# Patient Record
Sex: FEMALE | Race: WHITE | NOT HISPANIC OR LATINO | Employment: FULL TIME | URBAN - METROPOLITAN AREA
[De-identification: names, ages, dates, MRNs, and addresses within clinical notes are randomized per-mention and may not be internally consistent; named-entity substitution may affect disease eponyms.]

---

## 2017-03-26 ENCOUNTER — OFFICE VISIT (OUTPATIENT)
Dept: URGENT CARE | Facility: RETAIL CLINIC | Age: 42
End: 2017-03-26
Payer: COMMERCIAL

## 2017-03-26 VITALS — HEART RATE: 75 BPM | TEMPERATURE: 98.4 F | DIASTOLIC BLOOD PRESSURE: 74 MMHG | SYSTOLIC BLOOD PRESSURE: 112 MMHG

## 2017-03-26 DIAGNOSIS — H66.002 ACUTE SUPPURATIVE OTITIS MEDIA OF LEFT EAR WITHOUT SPONTANEOUS RUPTURE OF TYMPANIC MEMBRANE, RECURRENCE NOT SPECIFIED: Primary | ICD-10-CM

## 2017-03-26 PROCEDURE — 99213 OFFICE O/P EST LOW 20 MIN: CPT | Performed by: PHYSICIAN ASSISTANT

## 2017-03-26 RX ORDER — AZITHROMYCIN 250 MG/1
TABLET, FILM COATED ORAL
Qty: 6 TABLET | Refills: 0 | Status: SHIPPED | OUTPATIENT
Start: 2017-03-26 | End: 2017-03-30

## 2017-03-26 NOTE — NURSING NOTE
"Chief Complaint   Patient presents with     Otalgia     bilateral ear pain, left hurts the most, pt thinks low grade fever     Sinus Problem     sinus pain, more pain in the back of head       Initial /74 (BP Location: Left arm)  Pulse 75  Temp 98.4  F (36.9  C) (Temporal) Estimated body mass index is 29.21 kg/(m^2) as calculated from the following:    Height as of 4/13/16: 5' 4\" (1.626 m).    Weight as of 12/9/16: 170 lb 3.2 oz (77.2 kg).  Medication Reconciliation: complete    "

## 2017-03-26 NOTE — MR AVS SNAPSHOT
After Visit Summary   3/26/2017    Avis Avalos    MRN: [id number]           Patient Information     Date Of Birth          1975        Visit Information        Provider Department      3/26/2017 1:40 PM Diana Ardon PA-C [facility]         Follow-ups after your visit        Your next 10 appointments already scheduled     Mar 26, 2017  1:40 PM CDT   SHORT with Diana Ardon PA-C   [facility] ([facility])    [address] [facility] [address] [phone]              Who to contact     You can reach your care team any time of the day by calling [phone].  Notification of test results:  If you have an abnormal lab result, we will notify you by phone as soon as possible.         Additional Information About Your Visit        MyChart Information     MyChart gives you secure access to your electronic health record. If you see a primary care provider, you can also send messages to your care team and make appointments. If you have questions, please call your primary care clinic.  If you do not have a primary care provider, please call [phone] and they will assist you.        Care EveryWhere ID     This is your Care EveryWhere ID. This could be used by other organizations to access your [place] medical records  [id number]         Blood Pressure from Last 3 Encounters:   12/09/16 110/76   11/21/16 122/81   04/13/16 100/68    Weight from Last 3 Encounters:   12/09/16 170 lb 3.2 oz (77.2 kg)   04/13/16 164 lb 3.2 oz (74.5 kg)   09/28/15 163 lb 6.4 oz (74.1 kg)              Today, you had the following     No orders found for display       Primary Care Provider Office Phone # Fax #    Lissett Viramontes PA-C [phone]       [address] [address]        Thank you!     Thank you for choosing [facility]  for your care. Our goal is always to  provide you with excellent care. Hearing back from our patients is one way we can continue to improve our services. Please take a few minutes to complete the written survey that you may receive in the mail after your visit with us. Thank you!             Your Updated Medication List - Protect others around you: Learn how to safely use, store and throw away your medicines at www.disposemymeds.org.          This list is accurate as of: 3/26/17  1:39 PM.  Always use your most recent med list.                   Brand Name Dispense Instructions for use    ALPRAZolam 0.5 MG tablet    XANAX    20 tablet    Use 1-2 tablets 30-60 minutes before flying. Try one tablet first. Take a second if needed.       fluticasone 50 MCG/ACT spray    FLONASE    1 Bottle    Spray 1-2 sprays into both nostrils daily       IBU-200 PO      Take 2-3 tablets by mouth as needed Reported on 3/26/2017       loratadine 10 MG ODT tab    CLARITIN REDITABS     Take 10 mg by mouth daily Reported on 3/26/2017       RA ONE DAILY WOMENS Tabs      Take  by mouth.       TYLENOL PO      Take by mouth as needed Reported on 3/26/2017       valACYclovir 1000 mg tablet    VALTREX    21 tablet    TAKE ONE TABLET BY MOUTH DAILY AS NEEDED

## 2017-03-26 NOTE — PROGRESS NOTES
Chief Complaint   Patient presents with     Otalgia     bilateral ear pain, left hurts the most, pt thinks low grade fever     Sinus Problem     sinus pain, more pain in the back of head       SUBJECTIVE:  Avis Avalos is a 41 year old female who presents with bilateral ear pain (L>R) for 4 day(s).   Severity: moderate   Timing:still present and worsening  Additional symptoms include congestion, fever  History of recurrent otitis: yes    Past Medical History:   Diagnosis Date     Crohn disease (H)      Depression     1-2 years ago     Fructose intolerance      High risk HPV infection 4/14/10    normal pap, + HR HPV (59). normal paps since     PONV (postoperative nausea and vomiting)     after colonoscopy     Current Outpatient Prescriptions   Medication Sig Dispense Refill     azithromycin (ZITHROMAX) 250 MG tablet Two tablets first day, then one tablet daily for four days. 6 tablet 0     valACYclovir (VALTREX) 1000 mg tablet TAKE ONE TABLET BY MOUTH DAILY AS NEEDED 21 tablet 0     Multiple Vitamins-Minerals (RA ONE DAILY WOMENS) TABS Take  by mouth.       fluticasone (FLONASE) 50 MCG/ACT spray Spray 1-2 sprays into both nostrils daily (Patient not taking: Reported on 3/26/2017) 1 Bottle 1     ALPRAZolam (XANAX) 0.5 MG tablet Use 1-2 tablets 30-60 minutes before flying. Try one tablet first. Take a second if needed. (Patient not taking: Reported on 3/26/2017) 20 tablet 0     loratadine (CLARITIN REDITABS) 10 MG dispersible tablet Take 10 mg by mouth daily Reported on 3/26/2017       Ibuprofen (IBU-200 PO) Take 2-3 tablets by mouth as needed Reported on 3/26/2017       Acetaminophen (TYLENOL PO) Take by mouth as needed Reported on 3/26/2017          Allergies   Allergen Reactions     Fructose GI Disturbance     Sorbitol Diarrhea        History   Smoking Status     Former Smoker   Smokeless Tobacco     Never Used     Comment: 2 1/2 year ago       ROS:   Review of systems negative except as stated  above.    OBJECTIVE:  /74 (BP Location: Left arm)  Pulse 75  Temp 98.4  F (36.9  C) (Temporal)  The right TM is air/fluid interface     The right auditory canal is normal and without drainage, edema or erythema  The left TM is bulging and erythematous  The left auditory canal is normal and without drainage, edema or erythema  Oropharynx exam is normal: no lesions, erythema, adenopathy or exudate.  GENERAL: no acute distress  EYES: conjunctiva clear  NECK: supple, non-tender to palpation, no adenopathy noted  RESP: lungs clear to auscultation - no rales, rhonchi or wheezes  CV: regular rates and rhythm, normal S1 S2, no murmur noted  SKIN: no suspicious lesions or rashes     ASSESSMENT:  (H66.002) Acute suppurative otitis media of left ear without spontaneous rupture of tympanic membrane, recurrence not specified     PLAN:  Plan: azithromycin (ZITHROMAX) 250 MG tablet  Take antibiotic as directed  Tylenol, motrin, decongestant, , warm compresses next to ear, humidifier  Please follow up with primary care provider if not improving, worsening or new symptoms or for any adverse reactions to medications.      Diana Ardon PA-C  [facility]

## 2017-03-26 NOTE — PATIENT INSTRUCTIONS
Take antibiotic as directed  Tylenol, motrin, sudafed, warm compresses next to ear, humidifier  Please follow up with primary care provider if not improving, worsening or new symptoms or for any adverse reactions to medications.

## 2017-05-10 ENCOUNTER — OFFICE VISIT (OUTPATIENT)
Dept: URGENT CARE | Facility: RETAIL CLINIC | Age: 42
End: 2017-05-10
Payer: COMMERCIAL

## 2017-05-10 VITALS
TEMPERATURE: 98 F | HEART RATE: 81 BPM | DIASTOLIC BLOOD PRESSURE: 83 MMHG | SYSTOLIC BLOOD PRESSURE: 119 MMHG | OXYGEN SATURATION: 100 %

## 2017-05-10 DIAGNOSIS — J01.90 ACUTE SINUSITIS WITH SYMPTOMS > 10 DAYS: Primary | ICD-10-CM

## 2017-05-10 PROCEDURE — 99213 OFFICE O/P EST LOW 20 MIN: CPT | Performed by: PHYSICIAN ASSISTANT

## 2017-05-10 RX ORDER — CEFUROXIME AXETIL 250 MG/1
250 TABLET ORAL 2 TIMES DAILY
Qty: 20 TABLET | Refills: 0 | Status: SHIPPED | OUTPATIENT
Start: 2017-05-10 | End: 2017-05-20

## 2017-05-10 RX ORDER — FLUCONAZOLE 150 MG/1
150 TABLET ORAL ONCE
Qty: 1 TABLET | Refills: 0 | Status: SHIPPED | OUTPATIENT
Start: 2017-05-10 | End: 2017-05-10

## 2017-05-10 NOTE — PROGRESS NOTES
Chief Complaint   Patient presents with     Sinus Problem     x 1 1/2 weeks, no fevers     Otalgia     bilateral ear pain more right ear since last monday     Cough     since sunday, coughing up a lot of phlegm      SUBJECTIVE:  Avis Avalos is a 41 year old female here with concerns about sinus infection.  She states onset of symptoms were 10 day(s) ago.  She has had maxillary pressure. Course of illness is worsening. Severity moderate  Current and Associated symptoms: nasal congestion, rhinorrhea, cough , ear pressure, facial pain/pressure, headache and post-nasal drainage  Predisposing factors include HX of ear infections and sinusitis. Recent treatment has included: sudafed, flonase, claritin, ibuprofen    Past Medical History:   Diagnosis Date     Crohn disease (H)      Depression     1-2 years ago     Fructose intolerance      High risk HPV infection 4/14/10    normal pap, + HR HPV (59). normal paps since     PONV (postoperative nausea and vomiting)     after colonoscopy     Current Outpatient Prescriptions   Medication Sig Dispense Refill     fluticasone (FLONASE) 50 MCG/ACT spray Spray 1-2 sprays into both nostrils daily 1 Bottle 1     loratadine (CLARITIN REDITABS) 10 MG dispersible tablet Take 10 mg by mouth daily Reported on 3/26/2017       Multiple Vitamins-Minerals (RA ONE DAILY WOMENS) TABS Take  by mouth.       valACYclovir (VALTREX) 1000 mg tablet TAKE ONE TABLET BY MOUTH DAILY AS NEEDED (Patient not taking: Reported on 5/10/2017) 21 tablet 0     ALPRAZolam (XANAX) 0.5 MG tablet Use 1-2 tablets 30-60 minutes before flying. Try one tablet first. Take a second if needed. (Patient not taking: Reported on 3/26/2017) 20 tablet 0     Ibuprofen (IBU-200 PO) Take 2-3 tablets by mouth as needed Reported on 5/10/2017       Acetaminophen (TYLENOL PO) Take by mouth as needed Reported on 5/10/2017          Allergies   Allergen Reactions     Fructose GI Disturbance     Sorbitol Diarrhea        History   Smoking  Status     Former Smoker   Smokeless Tobacco     Never Used     Comment: 2 1/2 year ago       ROS:  Review of systems negative except as stated above.    OBJECTIVE:  /83 (BP Location: Left arm)  Pulse 81  Temp 98  F (36.7  C) (Temporal)  SpO2 100%  Exam:GENERAL APPEARANCE: mildly ill appearing  EYES: conjunctiva clear  HENT: TMs serous effusion, nasal turbinates erythematous, swollen, rhinorrhea yellow, oral mucous membranes moist, no erythema noted, maxillary sinus tenderness. Post nasal drainage noted in the pharynx.  NECK: supple, nontender, no lymphadenopathy  RESP: lungs clear to auscultation - no rales, rhonchi or wheezes  CV: regular rates and rhythm, normal S1 S2, no murmur noted  SKIN: no suspicious lesions or rashes    ASSESSMENT:  (J01.90) Acute sinusitis with symptoms > 10 days      PLAN:  cefuroxime (CEFTIN) 250 MG tablet, fluconazole   (DIFLUCAN) 150 MG tablet  Take antibiotic as directed  Use diflucan if needed  Apply warm facial compresses/packs for 5-10 minutes three times daily.  Drink plenty of fluids- 6 to 10 glasses of liquids each day. Rest.  Saline drops or nasal sprays as needed.   Steam treatments or humidifier.  Sudafed (decongestant) for congestion.  Mucinex (guaifenesin) to thin out secretions.  Tylenol or ibuprofen as needed for pain or fever  Follow up at your primary care clinic for increasing pain, high fever, vision changes, worsening symptoms, or no relief from symptoms after 7-10 days.  Please follow up with primary care provider if not improving, worsening or new symptoms or for any adverse reactions to medications.     Diana Ardon PA-C  [facility]

## 2017-05-10 NOTE — PATIENT INSTRUCTIONS
Take antibiotic as directed  Apply warm facial compresses/packs for 5-10 minutes three times daily.  Drink plenty of fluids- 6 to 10 glasses of liquids each day. Rest.  Saline drops or nasal sprays as needed.   Steam treatments or humidifier.  Sudafed (decongestant) for congestion.  Mucinex (guaifenesin) to thin out secretions.  Tylenol or ibuprofen as needed for pain or fever  Follow up at your primary care clinic for increasing pain, high fever, vision changes, worsening symptoms, or no relief from symptoms after 7-10 days.  Please follow up with primary care provider if not improving, worsening or new symptoms or for any adverse reactions to medications.

## 2017-05-10 NOTE — NURSING NOTE
"Chief Complaint   Patient presents with     Sinus Problem     x 1 1/2 weeks, no fevers     Otalgia     bilateral ear pain more right ear since last monday     Cough     since sunday, coughing up a lot of phlegm       Initial /83 (BP Location: Left arm)  Pulse 81  Temp 98  F (36.7  C) (Temporal)  SpO2 100% Estimated body mass index is 29.21 kg/(m^2) as calculated from the following:    Height as of 4/13/16: 5' 4\" (1.626 m).    Weight as of 12/9/16: 170 lb 3.2 oz (77.2 kg).  Medication Reconciliation: complete    "

## 2017-05-10 NOTE — MR AVS SNAPSHOT
After Visit Summary   5/10/2017    Avis Avalos    MRN: [id number]           Patient Information     Date Of Birth          1975        Visit Information        Provider Department      5/10/2017 6:30 PM Diana Ardon PA-C [facility]        Today's Diagnoses     Acute sinusitis with symptoms > 10 days    -  1      Care Instructions    Take antibiotic as directed  Apply warm facial compresses/packs for 5-10 minutes three times daily.  Drink plenty of fluids- 6 to 10 glasses of liquids each day. Rest.  Saline drops or nasal sprays as needed.   Steam treatments or humidifier.  Sudafed (decongestant) for congestion.  Mucinex (guaifenesin) to thin out secretions.  Tylenol or ibuprofen as needed for pain or fever  Follow up at your primary care clinic for increasing pain, high fever, vision changes, worsening symptoms, or no relief from symptoms after 7-10 days.  Please follow up with primary care provider if not improving, worsening or new symptoms or for any adverse reactions to medications.             Follow-ups after your visit        Who to contact     You can reach your care team any time of the day by calling [phone].  Notification of test results:  If you have an abnormal lab result, we will notify you by phone as soon as possible.         Additional Information About Your Visit        MyChart Information     [employer] gives you secure access to your electronic health record. If you see a primary care provider, you can also send messages to your care team and make appointments. If you have questions, please call your primary care clinic.  If you do not have a primary care provider, please call [phone] and they will assist you.        Care EveryWhere ID     This is your Care EveryWhere ID. This could be used by other organizations to access your [place] medical records  [id number]        Your Vitals Were     Pulse Temperature Pulse Oximetry              81 98  F (36.7  C) (Temporal) 100%          Blood Pressure from Last 3 Encounters:   05/10/17 119/83   03/26/17 112/74   12/09/16 110/76    Weight from Last 3 Encounters:   12/09/16 170 lb 3.2 oz (77.2 kg)   04/13/16 164 lb 3.2 oz (74.5 kg)   09/28/15 163 lb 6.4 oz (74.1 kg)              Today, you had the following     No orders found for display         Today's Medication Changes          These changes are accurate as of: 5/10/17  6:55 PM.  If you have any questions, ask your nurse or doctor.               Start taking these medicines.        Dose/Directions    cefuroxime 250 MG tablet   Commonly known as:  CEFTIN   Used for:  Acute sinusitis with symptoms > 10 days        Dose:  250 mg   Take 1 tablet (250 mg) by mouth 2 times daily for 10 days   Quantity:  20 tablet   Refills:  0       fluconazole 150 MG tablet   Commonly known as:  DIFLUCAN   Used for:  Acute sinusitis with symptoms > 10 days        Dose:  150 mg   Take 1 tablet (150 mg) by mouth once for 1 dose If symptoms develop   Quantity:  1 tablet   Refills:  0            Where to get your medicines      These medications were sent to [place] [address] [place] [facility] [address] [address] [place] [address]  [address] [address] [place] [id number]     Phone:  [phone]     cefuroxime 250 MG tablet    fluconazole 150 MG tablet                Primary Care Provider Office Phone # Fax #    Lissett Viramontes PA-C [phone]       [address] [address]        Thank you!     Thank you for choosing [facility]  for your care. Our goal is always to provide you with excellent care. Hearing back from our patients is one way we can continue to improve our services. Please take a few minutes to complete the written survey that you may receive in the mail after your visit with us. Thank you!             Your Updated Medication List - Protect others around you: Learn how to safely use, store and throw away your medicines at  www.disposemymeds.org.          This list is accurate as of: 5/10/17  6:55 PM.  Always use your most recent med list.                   Brand Name Dispense Instructions for use    ALPRAZolam 0.5 MG tablet    XANAX    20 tablet    Use 1-2 tablets 30-60 minutes before flying. Try one tablet first. Take a second if needed.       cefuroxime 250 MG tablet    CEFTIN    20 tablet    Take 1 tablet (250 mg) by mouth 2 times daily for 10 days       fluconazole 150 MG tablet    DIFLUCAN    1 tablet    Take 1 tablet (150 mg) by mouth once for 1 dose If symptoms develop       fluticasone 50 MCG/ACT spray    FLONASE    1 Bottle    Spray 1-2 sprays into both nostrils daily       IBU-200 PO      Take 2-3 tablets by mouth as needed Reported on 5/10/2017       loratadine 10 MG ODT tab    CLARITIN REDITABS     Take 10 mg by mouth daily Reported on 3/26/2017       RA ONE DAILY WOMENS Tabs      Take  by mouth.       TYLENOL PO      Take by mouth as needed Reported on 5/10/2017       valACYclovir 1000 mg tablet    VALTREX    21 tablet    TAKE ONE TABLET BY MOUTH DAILY AS NEEDED

## 2017-05-18 DIAGNOSIS — F40.243 FLYING PHOBIA: ICD-10-CM

## 2017-05-18 NOTE — TELEPHONE ENCOUNTER
Reason for Call:  Medication or medication refill:    Do you use a [facility]?  Name of the pharmacy and phone number for the current request:  [facility]    Name of the medication requested: med for flying    Other request: patient will be flying and needs this Rx before she leaves on Monday, please call and advise, patient states she has gotten this in the past, patient is aware that Dr. Cruz is out of the clinic today, but would like to get the Rx Friday or Saturday    Can we leave a detailed message on this number? YES    Phone number patient can be reached at: Home number on file [phone] (home)    Best Time: any    Call taken on 5/18/2017 at 10:23 AM by Bety Lamas

## 2017-05-19 RX ORDER — ALPRAZOLAM 0.5 MG
TABLET ORAL
Qty: 20 TABLET | Refills: 0 | Status: SHIPPED | OUTPATIENT
Start: 2017-05-19 | End: 2019-10-11

## 2017-05-19 NOTE — TELEPHONE ENCOUNTER
Script did not print. [facility] [place] and patient informed of refill.  Mayte Alicea, CMA

## 2017-06-02 DIAGNOSIS — Z83.79 FAMILY HISTORY OF CELIAC DISEASE: Primary | ICD-10-CM

## 2017-06-02 LAB
BASOPHILS # BLD AUTO: 0 10E9/L (ref 0–0.2)
BASOPHILS NFR BLD AUTO: 0.8 %
DIFFERENTIAL METHOD BLD: NORMAL
EOSINOPHIL # BLD AUTO: 0.1 10E9/L (ref 0–0.7)
EOSINOPHIL NFR BLD AUTO: 2.4 %
ERYTHROCYTE [DISTWIDTH] IN BLOOD BY AUTOMATED COUNT: 12.4 % (ref 10–15)
HCT VFR BLD AUTO: 38.4 % (ref 35–47)
HGB BLD-MCNC: 13.7 G/DL (ref 11.7–15.7)
LYMPHOCYTES # BLD AUTO: 1.7 10E9/L (ref 0.8–5.3)
LYMPHOCYTES NFR BLD AUTO: 34.2 %
MCH RBC QN AUTO: 32 PG (ref 26.5–33)
MCHC RBC AUTO-ENTMCNC: 35.7 G/DL (ref 31.5–36.5)
MCV RBC AUTO: 90 FL (ref 78–100)
MONOCYTES # BLD AUTO: 0.5 10E9/L (ref 0–1.3)
MONOCYTES NFR BLD AUTO: 10.1 %
NEUTROPHILS # BLD AUTO: 2.6 10E9/L (ref 1.6–8.3)
NEUTROPHILS NFR BLD AUTO: 52.5 %
PLATELET # BLD AUTO: 241 10E9/L (ref 150–450)
RBC # BLD AUTO: 4.28 10E12/L (ref 3.8–5.2)
WBC # BLD AUTO: 5 10E9/L (ref 4–11)

## 2017-06-02 PROCEDURE — 85025 COMPLETE CBC W/AUTO DIFF WBC: CPT | Performed by: PEDIATRICS

## 2017-06-02 PROCEDURE — 83516 IMMUNOASSAY NONANTIBODY: CPT | Mod: 59 | Performed by: PEDIATRICS

## 2017-06-02 PROCEDURE — 82784 ASSAY IGA/IGD/IGG/IGM EACH: CPT | Performed by: PEDIATRICS

## 2017-06-02 PROCEDURE — 36415 COLL VENOUS BLD VENIPUNCTURE: CPT | Performed by: PEDIATRICS

## 2017-06-02 PROCEDURE — 83516 IMMUNOASSAY NONANTIBODY: CPT | Performed by: PEDIATRICS

## 2017-06-04 LAB
TTG IGA SER-ACNC: NORMAL U/ML
TTG IGG SER-ACNC: NORMAL U/ML

## 2017-06-05 ENCOUNTER — OFFICE VISIT (OUTPATIENT)
Dept: FAMILY MEDICINE | Facility: OTHER | Age: 42
End: 2017-06-05
Payer: COMMERCIAL

## 2017-06-05 VITALS
WEIGHT: 172.2 LBS | HEART RATE: 74 BPM | OXYGEN SATURATION: 98 % | HEIGHT: 64 IN | TEMPERATURE: 98.2 F | DIASTOLIC BLOOD PRESSURE: 66 MMHG | BODY MASS INDEX: 29.4 KG/M2 | RESPIRATION RATE: 16 BRPM | SYSTOLIC BLOOD PRESSURE: 108 MMHG

## 2017-06-05 DIAGNOSIS — N89.8 PRURITUS OF VAGINA: ICD-10-CM

## 2017-06-05 DIAGNOSIS — K59.00 CONSTIPATION, UNSPECIFIED CONSTIPATION TYPE: ICD-10-CM

## 2017-06-05 DIAGNOSIS — K63.5 SESSILE COLONIC POLYP: ICD-10-CM

## 2017-06-05 DIAGNOSIS — K50.919 CROHN'S DISEASE WITH COMPLICATION, UNSPECIFIED GASTROINTESTINAL TRACT LOCATION (H): ICD-10-CM

## 2017-06-05 DIAGNOSIS — R30.0 DYSURIA: ICD-10-CM

## 2017-06-05 DIAGNOSIS — B37.31 YEAST INFECTION OF THE VAGINA: Primary | ICD-10-CM

## 2017-06-05 LAB
ALBUMIN UR-MCNC: NEGATIVE MG/DL
APPEARANCE UR: CLEAR
BILIRUB UR QL STRIP: NEGATIVE
COLOR UR AUTO: YELLOW
GLUCOSE UR STRIP-MCNC: NEGATIVE MG/DL
HGB UR QL STRIP: NEGATIVE
IGA SERPL-MCNC: 283 MG/DL (ref 70–380)
KETONES UR STRIP-MCNC: NEGATIVE MG/DL
LEUKOCYTE ESTERASE UR QL STRIP: NEGATIVE
MICRO REPORT STATUS: ABNORMAL
NITRATE UR QL: NEGATIVE
PH UR STRIP: 6.5 PH (ref 5–7)
SP GR UR STRIP: 1.01 (ref 1–1.03)
SPECIMEN SOURCE: ABNORMAL
URN SPEC COLLECT METH UR: NORMAL
UROBILINOGEN UR STRIP-ACNC: 0.2 EU/DL (ref 0.2–1)
WET PREP SPEC: ABNORMAL

## 2017-06-05 PROCEDURE — 87210 SMEAR WET MOUNT SALINE/INK: CPT | Performed by: STUDENT IN AN ORGANIZED HEALTH CARE EDUCATION/TRAINING PROGRAM

## 2017-06-05 PROCEDURE — 81003 URINALYSIS AUTO W/O SCOPE: CPT | Performed by: STUDENT IN AN ORGANIZED HEALTH CARE EDUCATION/TRAINING PROGRAM

## 2017-06-05 PROCEDURE — 99214 OFFICE O/P EST MOD 30 MIN: CPT | Performed by: STUDENT IN AN ORGANIZED HEALTH CARE EDUCATION/TRAINING PROGRAM

## 2017-06-05 RX ORDER — CIPROFLOXACIN 500 MG/1
500 TABLET, FILM COATED ORAL 2 TIMES DAILY
Qty: 6 TABLET | Refills: 0 | Status: CANCELLED | OUTPATIENT
Start: 2017-06-05

## 2017-06-05 RX ORDER — FLUCONAZOLE 150 MG/1
150 TABLET ORAL
Qty: 4 TABLET | Refills: 0 | Status: SHIPPED | OUTPATIENT
Start: 2017-06-05 | End: 2017-10-06

## 2017-06-05 ASSESSMENT — PAIN SCALES - GENERAL: PAINLEVEL: NO PAIN (1)

## 2017-06-05 NOTE — PROGRESS NOTES
SUBJECTIVE:                                                    Avis Avalos is a 41 year old female who presents to clinic today for the following health issues:      HPI    Acute Illness   Acute illness concerns: Ear pain  Onset: 1 week    Fever: no    Chills/Sweats: no    Headache (location?): YES- pressure in back of head    Sinus Pressure:YES- little    Conjunctivitis:  no    Ear Pain: YES: left    Rhinorrhea: no    Congestion: no    Sore Throat: no     Cough: no    Wheeze: no    Decreased Appetite: no    Nausea: no    Vomiting: no    Diarrhea:  no    Dysuria/Freq.: YES- dysuria    Fatigue/Achiness: YES- fatigue    Sick/Strep Exposure: YES-  and daughter     Therapies Tried and outcome: None    URINARY TRACT SYMPTOMS     Onset: 6 days    Description:   Painful urination (Dysuria): YES  Blood in urine (Hematuria): no   Delay in urine (Hesitency): no     Intensity: mild    Progression of Symptoms:  improving    Accompanying Signs & Symptoms:  Fever/chills: no   Flank pain no   Nausea and vomiting: no   Any vaginal symptoms: vaginal discharge and vaginal itching  Abdominal/Pelvic Pain: no    History:   History of frequent UTI's: no   History of kidney stones: no   Sexually Active: YES  Possibility of pregnancy: No    Precipitating factors:   None known         Therapies Tried and outcome: Increase fluid intake      Vaginal Symptoms     Onset: 6 days    Description:  Vaginal Discharge: Yes, but also ovulating   Itching (Pruritis): YES  Burning sensation:  no   Odor: no     Accompanying Signs & Symptoms:  Pain with Urination: YES  Abdominal Pain: no   Fever: no    History:   Sexually active: YES  New Partner: no   Possibility of Pregnancy:  No    Precipitating factors:   Recent Antibiotic Use: YES- Took last pill on May 20/21st     Alleviating factors:  None   Therapies Tried and outcome: Probiotics -May help a little    History of Fructose intolerance  Joint and muscle tightness  Constipation:  Patient  "has been taking senna for constipation. The past 1-2 years. She feels like there is a blockage close to her rectum and which is difficult to pass stools. She does not have hard stools, mainly soft stools but these are still difficult to pass once she feels the stools and her rectum. She has been managing this with laxatives for the past couple of years. She has a history of Crohn's disease. She was treated many years ago for Crohn's disease and has been in remission since. She has no current treatments for Crohn's disease. She denies blood or mucus in her stools, weight loss, change in appetite, nausea, vomiting. Her last colonoscopy was in 2011 which showed an adenomatous polyp. Recommendation was to repeat colonoscopy in 5 years.     Problem list and histories reviewed & adjusted, as indicated.  Additional history: as documented    Labs reviewed in EPIC    ROS:  Constitutional, HEENT, cardiovascular, pulmonary, gi and gu systems are negative, except as otherwise noted.    OBJECTIVE:                                                    /66 (BP Location: Right arm, Patient Position: Chair, Cuff Size: Adult Regular)  Pulse 74  Temp 98.2  F (36.8  C) (Oral)  Resp 16  Ht 5' 3.78\" (1.62 m)  Wt 172 lb 3.2 oz (78.1 kg)  LMP 05/25/2017 (Exact Date)  SpO2 98%  BMI 29.76 kg/m2  Body mass index is 29.76 kg/(m^2).  GENERAL: healthy, alert and no distress  EYES: Eyes grossly normal to inspection, PERRL and conjunctivae and sclerae normal  HENTLeft TM with clear effusion, bilateral ear canals normal and mouth without ulcers or lesions  NECK: no adenopathy, no asymmetry, masses, or scars and thyroid normal to palpation  RESP: lungs clear to auscultation - no rales, rhonchi or wheezes  CV: regular rate and rhythm, normal S1 S2, no S3 or S4, no murmur, click or rub, no peripheral edema   ABDOMEN: soft, nontender, no hepatosplenomegaly, no masses and bowel sounds normal  Rectal exam: Anal sphincter is tight, " nonthrombosed hemorrhoids externally, no obvious masses palpated in the rectum, no obvious blood   MS: no gross musculoskeletal defects noted, no edema  SKIN: no suspicious lesions or rashes  BACK: No CVA tenderness   NEURO: Normal strength and tone, mentation intact and speech normal  PSYCH: mentation appears normal, affect normal/bright    Diagnostic Test Results:  Results for orders placed or performed in visit on 06/05/17   *UA reflex to Microscopic and Culture ([place] [facility] (except [place] and [place])   Result Value Ref Range    Color Urine Yellow     Appearance Urine Clear     Glucose Urine Negative NEG mg/dL    Bilirubin Urine Negative NEG    Ketones Urine Negative NEG mg/dL    Specific Gravity Urine 1.015 1.003 - 1.035    Blood Urine Negative NEG    pH Urine 6.5 5.0 - 7.0 pH    Protein Albumin Urine Negative NEG mg/dL    Urobilinogen Urine 0.2 0.2 - 1.0 EU/dL    Nitrite Urine Negative NEG    Leukocyte Esterase Urine Negative NEG    Source Unspecified Urine    Wet prep   Result Value Ref Range    Specimen Description Vagina     Wet Prep (A)      No Trichomonas seen  No clue cells seen  Yeast seen      Micro Report Status FINAL 06/05/2017         ASSESSMENT/PLAN:                                                      1. Yeast infection of the vagina  Yeast present on wet prep. Will treat with Diflucan ×4 tablets. Reviewed that patient should take 1 tablet every 3 days. Return to clinic if symptoms persist or worsen.  - fluconazole (DIFLUCAN) 150 MG tablet; Take 1 tablet (150 mg) by mouth every 3 days  Dispense: 4 tablet; Refill: 0    2. Sessile colonic polyp  Patient complains of constipation that seems to be less related to stool consistency and more related to an outlet obstruction. On rectal exam she does have tightness of the anal sphincter and external hemorrhoids that are nonthrombosed. I am very concerned about her symptoms in light of her history of an adenomatous polyp on previous  colonoscopy and her history of Crohn's disease. It is encouraging that she has had no notable blood in her stool. I have recommended colonoscopy and given her instructions for prep. She will call to schedule this.  - GASTROENTEROLOGY ADULT REF PROCEDURE ONLY    3. Constipation, unspecified constipation type  Continue current medications to keep stool soft. Follow-up with colonoscopy.  - GASTROENTEROLOGY ADULT REF PROCEDURE ONLY    4. Crohn's disease with complication, unspecified gastrointestinal tract location (H)  Once colonoscopy is completed recommend following up with a gastroenterologist to follow patient for Crohn's disease.  - GASTROENTEROLOGY ADULT REF PROCEDURE ONLY    5. Pruritus of vagina  - Wet prep    6. Dysuria  UA was completely normal.  - *UA reflex to Microscopic and Culture ([place] [facility] (except [place] and [place])    JEM Lindsay [facility]

## 2017-06-05 NOTE — NURSING NOTE
"Chief Complaint   Patient presents with     Otalgia     Vaginal Problem     UTI       Initial /66 (BP Location: Right arm, Patient Position: Chair, Cuff Size: Adult Regular)  Pulse 74  Temp 98.2  F (36.8  C) (Oral)  Resp 16  Ht 5' 3.78\" (1.62 m)  Wt 172 lb 3.2 oz (78.1 kg)  LMP 05/25/2017 (Exact Date)  SpO2 98%  BMI 29.76 kg/m2 Estimated body mass index is 29.76 kg/(m^2) as calculated from the following:    Height as of this encounter: 5' 3.78\" (1.62 m).    Weight as of this encounter: 172 lb 3.2 oz (78.1 kg).  Medication Reconciliation: complete   Karin Almeida CMA      "

## 2017-06-05 NOTE — PATIENT INSTRUCTIONS
Schedule colonoscopy.    Diflucan - take one tablet every 72 hours for 4 doses.    Sarah Wilhelm, NP-C  [phone]

## 2017-06-05 NOTE — MR AVS SNAPSHOT
After Visit Summary   6/5/2017    Avis Avalos    MRN: [id number]           Patient Information     Date Of Birth          1975        Visit Information        Provider Department      6/5/2017 2:30 PM Sarah Wilhelm APRN CNP [facility]        Today's Diagnoses     Pruritus of vagina    -  1    Dysuria        Sessile colonic polyp        Constipation, unspecified constipation type        Crohn's disease with complication, unspecified gastrointestinal tract location (H)        Yeast infection of the vagina          Care Instructions    Schedule colonoscopy.    Diflucan - take one tablet every 72 hours for 4 doses.    LOREN Abdullahi  [phone]          Follow-ups after your visit        Additional Services     GASTROENTEROLOGY ADULT REF PROCEDURE ONLY       Last Lab Result: Creatinine (mg/dL)       Date                     Value                 05/04/2015               0.83             ----------  Body mass index is 29.76 kg/(m^2).     Needed:  No  Language:  English    Patient will be contacted to schedule procedure.     Please be aware that coverage of these services is subject to the terms and limitations of your health insurance plan.  Call member services at your health plan with any benefit or coverage questions.  Any procedures must be performed at a [place] facility OR coordinated by your clinic's referral office.    Please bring the following with you to your appointment:    (1) Any X-Rays, CTs or MRIs which have been performed.  Contact the facility where they were done to arrange for  prior to your scheduled appointment.    (2) List of current medications   (3) This referral request   (4) Any documents/labs given to you for this referral                  Your next 10 appointments already scheduled     Jun 16, 2017  3:20 PM CDT   PHYSICAL with JEM Huffmna CNP   [facility] ([facility]  "[place])    [address]  [place] [id number] [phone]              Who to contact     If you have questions or need follow up information about today's clinic visit or your schedule please contact [facility] directly at [phone].  Normal or non-critical lab and imaging results will be communicated to you by MyChart, letter or phone within 4 business days after the clinic has received the results. If you do not hear from us within 7 days, please contact the clinic through [employer] or phone. If you have a critical or abnormal lab result, we will notify you by phone as soon as possible.  Submit refill requests through [employer] or call your pharmacy and they will forward the refill request to us. Please allow 3 business days for your refill to be completed.          Additional Information About Your Visit        MyChart Information     [employer] gives you secure access to your electronic health record. If you see a primary care provider, you can also send messages to your care team and make appointments. If you have questions, please call your primary care clinic.  If you do not have a primary care provider, please call [phone] and they will assist you.        Care EveryWhere ID     This is your Care EveryWhere ID. This could be used by other organizations to access your [place] medical records  [id number]        Your Vitals Were     Pulse Temperature Respirations Height Last Period Pulse Oximetry    74 98.2  F (36.8  C) (Oral) 16 5' 3.78\" (1.62 m) 05/25/2017 (Exact Date) 98%    BMI (Body Mass Index)                   29.76 kg/m2            Blood Pressure from Last 3 Encounters:   06/05/17 108/66   05/10/17 119/83   03/26/17 112/74    Weight from Last 3 Encounters:   06/05/17 172 lb 3.2 oz (78.1 kg)   12/09/16 170 lb 3.2 oz (77.2 kg)   04/13/16 164 lb 3.2 oz (74.5 kg)              We Performed the Following     *UA reflex to Microscopic and Culture ([place] and [facility] " (except [place] and [place])     GASTROENTEROLOGY ADULT REF PROCEDURE ONLY     Wet prep          Today's Medication Changes          These changes are accurate as of: 6/5/17  3:34 PM.  If you have any questions, ask your nurse or doctor.               Start taking these medicines.        Dose/Directions    fluconazole 150 MG tablet   Commonly known as:  DIFLUCAN   Used for:  Yeast infection of the vagina   Started by:  Sarah Wilhelm APRN CNP        Dose:  150 mg   Take 1 tablet (150 mg) by mouth every 3 days   Quantity:  4 tablet   Refills:  0            Where to get your medicines      These medications were sent to [place] [address] [place] [facility] [place] - [address]  [address] [address] [place] [facility] [place] [address]     Phone:  [phone]     fluconazole 150 MG tablet                Primary Care Provider Office Phone # Fax #    JEM Huffman [phone]       [facility] [address]  [place] [address]        Thank you!     Thank you for choosing [facility]  for your care. Our goal is always to provide you with excellent care. Hearing back from our patients is one way we can continue to improve our services. Please take a few minutes to complete the written survey that you may receive in the mail after your visit with us. Thank you!             Your Updated Medication List - Protect others around you: Learn how to safely use, store and throw away your medicines at www.disposemymeds.org.          This list is accurate as of: 6/5/17  3:34 PM.  Always use your most recent med list.                   Brand Name Dispense Instructions for use    ALPRAZolam 0.5 MG tablet    XANAX    20 tablet    Use 1-2 tablets 30-60 minutes before flying. Try one tablet first. Take a second if needed.       fluconazole 150 MG tablet    DIFLUCAN    4 tablet    Take 1 tablet (150 mg) by mouth every 3 days       fluticasone 50 MCG/ACT spray    FLONASE    1  Bottle    Spray 1-2 sprays into both nostrils daily       IBU-200 PO      Take 2-3 tablets by mouth as needed Reported on 5/10/2017       loratadine 10 MG ODT tab    CLARITIN REDITABS     Take 10 mg by mouth daily Reported on 3/26/2017       RA ONE DAILY WOMENS Tabs      Take  by mouth.       TYLENOL PO      Take by mouth as needed Reported on 5/10/2017       valACYclovir 1000 mg tablet    VALTREX    21 tablet    TAKE ONE TABLET BY MOUTH DAILY AS NEEDED

## 2017-06-06 ENCOUNTER — TELEPHONE (OUTPATIENT)
Dept: FAMILY MEDICINE | Facility: OTHER | Age: 42
End: 2017-06-06

## 2017-06-09 PROBLEM — K59.00 CONSTIPATION, UNSPECIFIED CONSTIPATION TYPE: Status: ACTIVE | Noted: 2017-06-09

## 2017-06-09 PROBLEM — K63.5 SESSILE COLONIC POLYP: Status: ACTIVE | Noted: 2017-06-09
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
## 2017-06-16 ENCOUNTER — OFFICE VISIT (OUTPATIENT)
Dept: FAMILY MEDICINE | Facility: OTHER | Age: 42
End: 2017-06-16
Payer: COMMERCIAL

## 2017-06-16 ENCOUNTER — RADIANT APPOINTMENT (OUTPATIENT)
Dept: GENERAL RADIOLOGY | Facility: OTHER | Age: 42
End: 2017-06-16
Attending: STUDENT IN AN ORGANIZED HEALTH CARE EDUCATION/TRAINING PROGRAM
Payer: COMMERCIAL

## 2017-06-16 VITALS
TEMPERATURE: 98.5 F | DIASTOLIC BLOOD PRESSURE: 66 MMHG | OXYGEN SATURATION: 97 % | WEIGHT: 170.2 LBS | BODY MASS INDEX: 29.06 KG/M2 | HEART RATE: 73 BPM | HEIGHT: 64 IN | RESPIRATION RATE: 14 BRPM | SYSTOLIC BLOOD PRESSURE: 114 MMHG

## 2017-06-16 DIAGNOSIS — M54.50 CHRONIC BILATERAL LOW BACK PAIN WITHOUT SCIATICA: ICD-10-CM

## 2017-06-16 DIAGNOSIS — G89.29 CHRONIC BILATERAL LOW BACK PAIN WITHOUT SCIATICA: ICD-10-CM

## 2017-06-16 DIAGNOSIS — Z12.4 SCREENING FOR MALIGNANT NEOPLASM OF CERVIX: ICD-10-CM

## 2017-06-16 DIAGNOSIS — M25.50 PAIN IN JOINT, MULTIPLE SITES: ICD-10-CM

## 2017-06-16 DIAGNOSIS — B00.9 HERPES SIMPLEX TYPE 2 INFECTION: ICD-10-CM

## 2017-06-16 DIAGNOSIS — Z87.19 HX OF CROHN'S DISEASE: ICD-10-CM

## 2017-06-16 DIAGNOSIS — E74.10 FRUCTOSE INTOLERANCE: ICD-10-CM

## 2017-06-16 DIAGNOSIS — Z00.00 ENCOUNTER FOR ROUTINE ADULT HEALTH EXAMINATION WITHOUT ABNORMAL FINDINGS: Primary | ICD-10-CM

## 2017-06-16 PROCEDURE — 72170 X-RAY EXAM OF PELVIS: CPT

## 2017-06-16 PROCEDURE — 84443 ASSAY THYROID STIM HORMONE: CPT | Performed by: STUDENT IN AN ORGANIZED HEALTH CARE EDUCATION/TRAINING PROGRAM

## 2017-06-16 PROCEDURE — 82306 VITAMIN D 25 HYDROXY: CPT | Performed by: STUDENT IN AN ORGANIZED HEALTH CARE EDUCATION/TRAINING PROGRAM

## 2017-06-16 PROCEDURE — 82746 ASSAY OF FOLIC ACID SERUM: CPT | Performed by: STUDENT IN AN ORGANIZED HEALTH CARE EDUCATION/TRAINING PROGRAM

## 2017-06-16 PROCEDURE — 87624 HPV HI-RISK TYP POOLED RSLT: CPT | Performed by: STUDENT IN AN ORGANIZED HEALTH CARE EDUCATION/TRAINING PROGRAM

## 2017-06-16 PROCEDURE — 72100 X-RAY EXAM L-S SPINE 2/3 VWS: CPT

## 2017-06-16 PROCEDURE — 80053 COMPREHEN METABOLIC PANEL: CPT | Performed by: STUDENT IN AN ORGANIZED HEALTH CARE EDUCATION/TRAINING PROGRAM

## 2017-06-16 PROCEDURE — G0145 SCR C/V CYTO,THINLAYER,RESCR: HCPCS | Performed by: STUDENT IN AN ORGANIZED HEALTH CARE EDUCATION/TRAINING PROGRAM

## 2017-06-16 PROCEDURE — 99396 PREV VISIT EST AGE 40-64: CPT | Performed by: STUDENT IN AN ORGANIZED HEALTH CARE EDUCATION/TRAINING PROGRAM

## 2017-06-16 PROCEDURE — 82607 VITAMIN B-12: CPT | Performed by: STUDENT IN AN ORGANIZED HEALTH CARE EDUCATION/TRAINING PROGRAM

## 2017-06-16 PROCEDURE — 36415 COLL VENOUS BLD VENIPUNCTURE: CPT | Performed by: STUDENT IN AN ORGANIZED HEALTH CARE EDUCATION/TRAINING PROGRAM

## 2017-06-16 RX ORDER — VALACYCLOVIR HYDROCHLORIDE 1 G/1
TABLET, FILM COATED ORAL
Qty: 21 TABLET | Refills: 0 | Status: SHIPPED | OUTPATIENT
Start: 2017-06-16 | End: 2019-12-17

## 2017-06-16 ASSESSMENT — PAIN SCALES - GENERAL: PAINLEVEL: MILD PAIN (2)

## 2017-06-16 NOTE — NURSING NOTE
"Chief Complaint   Patient presents with     [facility] Care     Physical     Panel Management     height       Initial /66  Pulse 73  Temp 98.5  F (36.9  C) (Oral)  Resp 14  Ht 5' 3.58\" (1.615 m)  Wt 170 lb 3.2 oz (77.2 kg)  LMP 05/25/2017 (Exact Date)  SpO2 97%  BMI 29.6 kg/m2 Estimated body mass index is 29.6 kg/(m^2) as calculated from the following:    Height as of this encounter: 5' 3.58\" (1.615 m).    Weight as of this encounter: 170 lb 3.2 oz (77.2 kg).  Medication Reconciliation: complete    "

## 2017-06-16 NOTE — MR AVS SNAPSHOT
After Visit Summary   6/16/2017    Avis Avalos    MRN: [id number]           Patient Information     Date Of Birth          1975        Visit Information        Provider Department      6/16/2017 3:20 PM Sarah Wilhelm APRN CNP [facility]        Today's Diagnoses     Chronic bilateral low back pain without sciatica    -  1    Herpes simplex type 2 infection        Pain in joint, multiple sites        Hx of Crohn's disease        Fructose intolerance          Care Instructions    Lab work today.    X-ray of lumbar spine and pelvis.    Refills of Valtrex sent to pharmacy.    Sarah Wilhelm, NP-C  [phone]          Follow-ups after your visit        Your next 10 appointments already scheduled     Jun 19, 2017   Procedure with Ham Turcios MD   [facility] ([facility])    [address] [place] [id number] [phone]              Future tests that were ordered for you today     Open Future Orders        Priority Expected Expires Ordered    XR Lumbar Spine 2/3 Views Routine 6/16/2017 6/16/2018 6/16/2017    XR Pelvis 1/2 Views Routine 6/16/2017 6/16/2018 6/16/2017            Who to contact     If you have questions or need follow up information about today's clinic visit or your schedule please contact [facility] directly at [phone].  Normal or non-critical lab and imaging results will be communicated to you by MyChart, letter or phone within 4 business days after the clinic has received the results. If you do not hear from us within 7 days, please contact the clinic through MyChart or phone. If you have a critical or abnormal lab result, we will notify you by phone as soon as possible.  Submit refill requests through [employer] or call your pharmacy and they will forward the refill request to us. Please allow 3 business days for your refill to be completed.          Additional Information  "About Your Visit        MyChart Information     [employer] gives you secure access to your electronic health record. If you see a primary care provider, you can also send messages to your care team and make appointments. If you have questions, please call your primary care clinic.  If you do not have a primary care provider, please call [phone] and they will assist you.        Care EveryWhere ID     This is your Care EveryWhere ID. This could be used by other organizations to access your [place] medical records  [id number]        Your Vitals Were     Pulse Temperature Respirations Height Last Period Pulse Oximetry    73 98.5  F (36.9  C) (Oral) 14 5' 3.58\" (1.615 m) 05/25/2017 (Exact Date) 97%    BMI (Body Mass Index)                   29.6 kg/m2            Blood Pressure from Last 3 Encounters:   06/16/17 114/66   06/05/17 108/66   05/10/17 119/83    Weight from Last 3 Encounters:   06/16/17 170 lb 3.2 oz (77.2 kg)   06/05/17 172 lb 3.2 oz (78.1 kg)   12/09/16 170 lb 3.2 oz (77.2 kg)              We Performed the Following     Comprehensive metabolic panel (BMP + Alb, Alk Phos, ALT, AST, Total. Bili, TP)     Folate     TSH with free T4 reflex     Vitamin B12     Vitamin D Deficiency          Where to get your medicines      These medications were sent to [facility] [address] [facility] - [place], [place] - [address] [facility]  [address] [facility] [address]     Phone:  [phone]     valACYclovir 1000 mg tablet          Primary Care Provider Office Phone # Fax #    JEM Huffman [phone]       [facility] [address]  [facility] [address]        Thank you!     Thank you for choosing [facility]  for your care. Our goal is always to provide you with excellent care. Hearing back from our patients is one way we can continue to improve our services. Please take a few minutes to complete the written survey that you may receive in the mail after your " visit with us. Thank you!             Your Updated Medication List - Protect others around you: Learn how to safely use, store and throw away your medicines at www.disposemymeds.org.          This list is accurate as of: 6/16/17  4:19 PM.  Always use your most recent med list.                   Brand Name Dispense Instructions for use    ALPRAZolam 0.5 MG tablet    XANAX    20 tablet    Use 1-2 tablets 30-60 minutes before flying. Try one tablet first. Take a second if needed.       fluconazole 150 MG tablet    DIFLUCAN    4 tablet    Take 1 tablet (150 mg) by mouth every 3 days       fluticasone 50 MCG/ACT spray    FLONASE    1 Bottle    Spray 1-2 sprays into both nostrils daily       IBU-200 PO      Take 2-3 tablets by mouth as needed Reported on 5/10/2017       loratadine 10 MG ODT tab    CLARITIN REDITABS     Take 10 mg by mouth daily Reported on 3/26/2017       RA ONE DAILY WOMENS Tabs      Take  by mouth.       TYLENOL PO      Take by mouth as needed Reported on 5/10/2017       valACYclovir 1000 mg tablet    VALTREX    21 tablet    TAKE ONE TABLET BY MOUTH DAILY AS NEEDED

## 2017-06-16 NOTE — PATIENT INSTRUCTIONS
Lab work today.    X-ray of lumbar spine and pelvis.    Refills of Valtrex sent to pharmacy.    Sarah Wilhelm, NP-C  [phone]

## 2017-06-17 LAB
ALBUMIN SERPL-MCNC: 3.7 G/DL (ref 3.4–5)
ALP SERPL-CCNC: 54 U/L (ref 40–150)
ALT SERPL W P-5'-P-CCNC: 25 U/L (ref 0–50)
ANION GAP SERPL CALCULATED.3IONS-SCNC: 9 MMOL/L (ref 3–14)
AST SERPL W P-5'-P-CCNC: 18 U/L (ref 0–45)
BILIRUB SERPL-MCNC: 0.2 MG/DL (ref 0.2–1.3)
BUN SERPL-MCNC: 15 MG/DL (ref 7–30)
CALCIUM SERPL-MCNC: 8.7 MG/DL (ref 8.5–10.1)
CHLORIDE SERPL-SCNC: 110 MMOL/L (ref 94–109)
CO2 SERPL-SCNC: 25 MMOL/L (ref 20–32)
CREAT SERPL-MCNC: 0.76 MG/DL (ref 0.52–1.04)
FOLATE SERPL-MCNC: 22.3 NG/ML
GFR SERPL CREATININE-BSD FRML MDRD: 84 ML/MIN/1.7M2
GLUCOSE SERPL-MCNC: 94 MG/DL (ref 70–99)
POTASSIUM SERPL-SCNC: 4.1 MMOL/L (ref 3.4–5.3)
PROT SERPL-MCNC: 6.9 G/DL (ref 6.8–8.8)
SODIUM SERPL-SCNC: 144 MMOL/L (ref 133–144)
TSH SERPL DL<=0.005 MIU/L-ACNC: 1.33 MU/L (ref 0.4–4)
VIT B12 SERPL-MCNC: 1044 PG/ML (ref 193–986)

## 2017-06-19 ENCOUNTER — SURGERY (OUTPATIENT)
Age: 42
End: 2017-06-19

## 2017-06-19 ENCOUNTER — HOSPITAL ENCOUNTER (OUTPATIENT)
Facility: CLINIC | Age: 42
Discharge: HOME | End: 2017-06-19
Attending: FAMILY MEDICINE | Admitting: FAMILY MEDICINE
Payer: COMMERCIAL

## 2017-06-19 VITALS
RESPIRATION RATE: 18 BRPM | SYSTOLIC BLOOD PRESSURE: 105 MMHG | OXYGEN SATURATION: 98 % | TEMPERATURE: 98.3 F | DIASTOLIC BLOOD PRESSURE: 60 MMHG | WEIGHT: 170.2 LBS | BODY MASS INDEX: 29.6 KG/M2

## 2017-06-19 LAB
COLONOSCOPY: NORMAL
DEPRECATED CALCIDIOL+CALCIFEROL SERPL-MC: 28 UG/L (ref 20–75)
HCG UR QL: NEGATIVE

## 2017-06-19 PROCEDURE — 25000128 H RX IP 250 OP 636: Performed by: INTERNAL MEDICINE

## 2017-06-19 PROCEDURE — 99153 MOD SED SAME PHYS/QHP EA: CPT

## 2017-06-19 PROCEDURE — 25000125 ZZHC RX 250: Performed by: INTERNAL MEDICINE

## 2017-06-19 PROCEDURE — 45380 COLONOSCOPY AND BIOPSY: CPT | Performed by: INTERNAL MEDICINE

## 2017-06-19 PROCEDURE — 45382 COLONOSCOPY W/CONTROL BLEED: CPT | Performed by: INTERNAL MEDICINE

## 2017-06-19 PROCEDURE — 40000296 ZZH STATISTIC ENDO RECOVERY CLASS 1:2 FIRST HOUR: Performed by: INTERNAL MEDICINE

## 2017-06-19 PROCEDURE — 88305 TISSUE EXAM BY PATHOLOGIST: CPT | Performed by: INTERNAL MEDICINE

## 2017-06-19 PROCEDURE — 88305 TISSUE EXAM BY PATHOLOGIST: CPT | Mod: 26 | Performed by: INTERNAL MEDICINE

## 2017-06-19 PROCEDURE — G0500 MOD SEDAT ENDO SERVICE >5YRS: HCPCS

## 2017-06-19 PROCEDURE — 81025 URINE PREGNANCY TEST: CPT | Performed by: NURSE ANESTHETIST, CERTIFIED REGISTERED

## 2017-06-19 PROCEDURE — 45385 COLONOSCOPY W/LESION REMOVAL: CPT | Performed by: INTERNAL MEDICINE

## 2017-06-19 RX ORDER — ONDANSETRON 2 MG/ML
4 INJECTION INTRAMUSCULAR; INTRAVENOUS
Status: DISCONTINUED | OUTPATIENT
Start: 2017-06-19 | End: 2017-06-19 | Stop reason: HOSPADM

## 2017-06-19 RX ORDER — LIDOCAINE 40 MG/G
CREAM TOPICAL
Status: DISCONTINUED | OUTPATIENT
Start: 2017-06-19 | End: 2017-06-19 | Stop reason: HOSPADM

## 2017-06-19 RX ORDER — SENNOSIDES 8.6 MG
1 TABLET ORAL DAILY
COMMUNITY
End: 2017-11-13

## 2017-06-19 RX ORDER — FENTANYL CITRATE 50 UG/ML
INJECTION, SOLUTION INTRAMUSCULAR; INTRAVENOUS PRN
Status: DISCONTINUED | OUTPATIENT
Start: 2017-06-19 | End: 2017-06-19 | Stop reason: HOSPADM

## 2017-06-19 RX ADMIN — FENTANYL CITRATE 50 MCG: 50 INJECTION, SOLUTION INTRAMUSCULAR; INTRAVENOUS at 14:33

## 2017-06-19 RX ADMIN — MIDAZOLAM HYDROCHLORIDE 1 MG: 1 INJECTION, SOLUTION INTRAMUSCULAR; INTRAVENOUS at 14:34

## 2017-06-19 RX ADMIN — MIDAZOLAM HYDROCHLORIDE 2 MG: 1 INJECTION, SOLUTION INTRAMUSCULAR; INTRAVENOUS at 14:33

## 2017-06-19 RX ADMIN — MIDAZOLAM HYDROCHLORIDE 1 MG: 1 INJECTION, SOLUTION INTRAMUSCULAR; INTRAVENOUS at 14:47

## 2017-06-19 RX ADMIN — FENTANYL CITRATE 25 MCG: 50 INJECTION, SOLUTION INTRAMUSCULAR; INTRAVENOUS at 14:38

## 2017-06-19 RX ADMIN — MIDAZOLAM HYDROCHLORIDE 1 MG: 1 INJECTION, SOLUTION INTRAMUSCULAR; INTRAVENOUS at 14:36

## 2017-06-19 RX ADMIN — MIDAZOLAM HYDROCHLORIDE 2 MG: 1 INJECTION, SOLUTION INTRAMUSCULAR; INTRAVENOUS at 14:38

## 2017-06-19 RX ADMIN — FENTANYL CITRATE 25 MCG: 50 INJECTION, SOLUTION INTRAMUSCULAR; INTRAVENOUS at 14:40

## 2017-06-19 RX ADMIN — MIDAZOLAM HYDROCHLORIDE 1 MG: 1 INJECTION, SOLUTION INTRAMUSCULAR; INTRAVENOUS at 14:35

## 2017-06-19 NOTE — DISCHARGE INSTRUCTIONS
[facility] Discharge Instructions     Discharge disposition:  Discharged to home       Diet:  Regular       Activity Activity as tolerated       Follow-up: with Primary Physician PRN       Additional instructions: None

## 2017-06-19 NOTE — CONSULTS
[facility] GI Pre-Procedure Physical Assessment    Avis Avalos MRN# [id number]   Age: 41 year old YOB: 1975      Date of Surgery: 6/19/2017  Location [facility]      Date of Exam 6/19/2017 Facility (Same day)         Primary care provider: Sarah Wilhelm         Active problem list:   Patient Active Problem List   Diagnosis     Crohns disease (H)     Herpes simplex type 2 infection     Knee pain     Sessile colonic polyp     Constipation, unspecified constipation type            Medications (include herbals and vitamins):   Any Plavix use in the last 7 days?  No     Current Facility-Administered Medications   Medication     lidocaine 1 % 1 mL     lidocaine (LMX4) kit     sodium chloride (PF) 0.9% PF flush 3 mL     sodium chloride (PF) 0.9% PF flush 3 mL     lidocaine 1 % 1 mL     lidocaine (LMX4) kit     sodium chloride (PF) 0.9% PF flush 3 mL     sodium chloride (PF) 0.9% PF flush 3 mL     sodium chloride (PF) 0.9% PF flush 3 mL     ondansetron (ZOFRAN) injection 4 mg             Allergies:      Allergies   Allergen Reactions     Fructose GI Disturbance     Sorbitol Diarrhea     Allergy to Latex?  No  Allergy to tape?    No          Social History:     Social History   Substance Use Topics     Smoking status: Former Smoker     Smokeless tobacco: Never Used      Comment: 2 1/2 year ago     Alcohol use Yes      Comment: Occ.            Physical Exam:   All vitals have been reviewed  Blood pressure 127/88, temperature 98.3  F (36.8  C), temperature source Oral, resp. rate 14, weight 77.2 kg (170 lb 3.2 oz), last menstrual period 05/25/2017, SpO2 99 %, not currently breastfeeding.  Airway assessment:   Patient is able to open mouth wide      Lungs:   No increased work of breathing, good air exchange, clear to auscultation bilaterally, no crackles or wheezing      Cardiovascular:   Normal apical impulse, regular rate and rhythm, normal S1 and S2, no S3 or S4, and no  murmur noted           Lab / Radiology Results:   All laboratory data reviewed          Assessment:   Appropriately NPO  Chief complaint or anatomic assessment of involved area: history of polyps         Plan:   Moderate (conscious) sedation     Patient's active problems diagnostically and therapeutically optimized for the planned procedure  Risks, benefits, alternatives to procedure explained and consent obtained  P2 (patient with mild systemic disease)  Orders and progress notes are in the chart  Discharge from Phase 1 and / or Phase 2 recovery when patient meets criteria    I have reviewed the history and physical, lab finding(s), diagnostic data, medicaitons, and the plan for sedation.  I have determined this patient to be an appropriate candidate for the planned sedation / procedure and have reassessed the patient immediately prior to sedation / procedure.    I have personally and medically directed the administration of medications used.    Josué Hawk MD

## 2017-06-21 LAB — COPATH REPORT: NORMAL

## 2017-06-22 ENCOUNTER — MYC MEDICAL ADVICE (OUTPATIENT)
Dept: FAMILY MEDICINE | Facility: OTHER | Age: 42
End: 2017-06-22

## 2017-06-22 DIAGNOSIS — M54.50 CHRONIC BILATERAL LOW BACK PAIN WITHOUT SCIATICA: Primary | ICD-10-CM

## 2017-06-22 DIAGNOSIS — G89.29 CHRONIC BILATERAL LOW BACK PAIN WITHOUT SCIATICA: Primary | ICD-10-CM

## 2017-06-22 LAB
COPATH REPORT: NORMAL
PAP: NORMAL

## 2017-06-23 LAB
FINAL DIAGNOSIS: NORMAL
HPV HR 12 DNA CVX QL NAA+PROBE: NEGATIVE
HPV16 DNA SPEC QL NAA+PROBE: NEGATIVE
HPV18 DNA SPEC QL NAA+PROBE: NEGATIVE
SPECIMEN DESCRIPTION: NORMAL

## 2017-06-23 NOTE — TELEPHONE ENCOUNTER
Message sent stating EM out of clinic until Monday. Will route to EM.    Last OV not completed, so unsure of plan.  Sara Cruz, CMA

## 2017-06-25 ENCOUNTER — MYC MEDICAL ADVICE (OUTPATIENT)
Dept: FAMILY MEDICINE | Facility: OTHER | Age: 42
End: 2017-06-25

## 2017-06-25 DIAGNOSIS — E55.9 VITAMIN D INSUFFICIENCY: Primary | ICD-10-CM

## 2017-06-26 RX ORDER — CHOLECALCIFEROL (VITAMIN D3) 1250 MCG
50000 CAPSULE ORAL
Qty: 8 CAPSULE | Refills: 0 | Status: SHIPPED | OUTPATIENT
Start: 2017-06-26 | End: 2018-01-09

## 2017-06-27 ENCOUNTER — OFFICE VISIT (OUTPATIENT)
Dept: FAMILY MEDICINE | Facility: OTHER | Age: 42
End: 2017-06-27
Payer: COMMERCIAL

## 2017-06-27 VITALS
BODY MASS INDEX: 28.82 KG/M2 | RESPIRATION RATE: 16 BRPM | SYSTOLIC BLOOD PRESSURE: 100 MMHG | HEIGHT: 64 IN | WEIGHT: 168.8 LBS | TEMPERATURE: 98.4 F | HEART RATE: 72 BPM | DIASTOLIC BLOOD PRESSURE: 62 MMHG

## 2017-06-27 DIAGNOSIS — K63.5 SESSILE COLONIC POLYP: ICD-10-CM

## 2017-06-27 DIAGNOSIS — G89.29 CHRONIC BILATERAL LOW BACK PAIN WITHOUT SCIATICA: ICD-10-CM

## 2017-06-27 DIAGNOSIS — F33.1 MODERATE EPISODE OF RECURRENT MAJOR DEPRESSIVE DISORDER (H): Primary | ICD-10-CM

## 2017-06-27 DIAGNOSIS — K59.00 CONSTIPATION, UNSPECIFIED CONSTIPATION TYPE: ICD-10-CM

## 2017-06-27 DIAGNOSIS — F42.9 OBSESSIVE-COMPULSIVE DISORDER, UNSPECIFIED TYPE: ICD-10-CM

## 2017-06-27 DIAGNOSIS — M54.50 CHRONIC BILATERAL LOW BACK PAIN WITHOUT SCIATICA: ICD-10-CM

## 2017-06-27 DIAGNOSIS — R19.8 RECTAL PRESSURE: ICD-10-CM

## 2017-06-27 PROCEDURE — 99214 OFFICE O/P EST MOD 30 MIN: CPT | Performed by: STUDENT IN AN ORGANIZED HEALTH CARE EDUCATION/TRAINING PROGRAM

## 2017-06-27 RX ORDER — FLUOXETINE 10 MG/1
10 CAPSULE ORAL DAILY
Qty: 30 CAPSULE | Refills: 1 | Status: SHIPPED | OUTPATIENT
Start: 2017-06-27 | End: 2017-08-21

## 2017-06-27 ASSESSMENT — ANXIETY QUESTIONNAIRES
5. BEING SO RESTLESS THAT IT IS HARD TO SIT STILL: NOT AT ALL
7. FEELING AFRAID AS IF SOMETHING AWFUL MIGHT HAPPEN: NOT AT ALL
2. NOT BEING ABLE TO STOP OR CONTROL WORRYING: NOT AT ALL
GAD7 TOTAL SCORE: 3
1. FEELING NERVOUS, ANXIOUS, OR ON EDGE: SEVERAL DAYS
3. WORRYING TOO MUCH ABOUT DIFFERENT THINGS: SEVERAL DAYS
6. BECOMING EASILY ANNOYED OR IRRITABLE: SEVERAL DAYS
GAD7 TOTAL SCORE: 3
4. TROUBLE RELAXING: NOT AT ALL
7. FEELING AFRAID AS IF SOMETHING AWFUL MIGHT HAPPEN: NOT AT ALL
GAD7 TOTAL SCORE: 3

## 2017-06-27 ASSESSMENT — PAIN SCALES - GENERAL: PAINLEVEL: NO PAIN (0)

## 2017-06-27 ASSESSMENT — PATIENT HEALTH QUESTIONNAIRE - PHQ9
10. IF YOU CHECKED OFF ANY PROBLEMS, HOW DIFFICULT HAVE THESE PROBLEMS MADE IT FOR YOU TO DO YOUR WORK, TAKE CARE OF THINGS AT HOME, OR GET ALONG WITH OTHER PEOPLE: SOMEWHAT DIFFICULT
SUM OF ALL RESPONSES TO PHQ QUESTIONS 1-9: 4
SUM OF ALL RESPONSES TO PHQ QUESTIONS 1-9: 4

## 2017-06-27 NOTE — NURSING NOTE
"Chief Complaint   Patient presents with     Depression     Anxiety       Initial /62 (BP Location: Right arm, Patient Position: Chair, Cuff Size: Adult Regular)  Pulse 72  Temp 98.4  F (36.9  C) (Oral)  Resp 16  Ht 5' 3.58\" (1.615 m)  Wt 168 lb 12.8 oz (76.6 kg)  LMP 05/25/2017 (Exact Date)  BMI 29.36 kg/m2 Estimated body mass index is 29.36 kg/(m^2) as calculated from the following:    Height as of this encounter: 5' 3.58\" (1.615 m).    Weight as of this encounter: 168 lb 12.8 oz (76.6 kg).  Medication Reconciliation: complete   Karin Almeida CMA      "

## 2017-06-27 NOTE — MR AVS SNAPSHOT
After Visit Summary   6/27/2017    Avis Avalos    MRN: [id number]           Patient Information     Date Of Birth          1975        Visit Information        Provider Department      6/27/2017 4:40 PM Sarah Wilhelm APRN CNP [facility]        Today's Diagnoses     Moderate episode of recurrent major depressive disorder (H)    -  1    Chronic bilateral low back pain without sciatica        Obsessive-compulsive disorder, unspecified type        Sessile colonic polyp        Constipation, unspecified constipation type        Rectal pressure           Follow-ups after your visit        Who to contact     If you have questions or need follow up information about today's clinic visit or your schedule please contact [facility] directly at [phone].  Normal or non-critical lab and imaging results will be communicated to you by [employer], letter or phone within 4 business days after the clinic has received the results. If you do not hear from us within 7 days, please contact the clinic through [employer] or phone. If you have a critical or abnormal lab result, we will notify you by phone as soon as possible.  Submit refill requests through [employer] or call your pharmacy and they will forward the refill request to us. Please allow 3 business days for your refill to be completed.          Additional Information About Your Visit        MyChart Information     [employer] gives you secure access to your electronic health record. If you see a primary care provider, you can also send messages to your care team and make appointments. If you have questions, please call your primary care clinic.  If you do not have a primary care provider, please call [phone] and they will assist you.        Care EveryWhere ID     This is your Care EveryWhere ID. This could be used by other organizations to access your [place] medical records  [id number]        Your Vitals Were   "   Pulse Temperature Respirations Height Last Period BMI (Body Mass Index)    72 98.4  F (36.9  C) (Oral) 16 5' 3.58\" (1.615 m) 05/25/2017 (Exact Date) 29.36 kg/m2       Blood Pressure from Last 3 Encounters:   06/27/17 100/62   06/19/17 105/60   06/16/17 114/66    Weight from Last 3 Encounters:   06/27/17 168 lb 12.8 oz (76.6 kg)   06/19/17 170 lb 3.2 oz (77.2 kg)   06/16/17 170 lb 3.2 oz (77.2 kg)              Today, you had the following     No orders found for display         Today's Medication Changes          These changes are accurate as of: 6/27/17 11:59 PM.  If you have any questions, ask your nurse or doctor.               Start taking these medicines.        Dose/Directions    FLUoxetine 10 MG capsule   Commonly known as:  PROzac   Used for:  Moderate episode of recurrent major depressive disorder (H)   Started by:  Sarah Wilhelm APRN CNP        Dose:  10 mg   Take 1 capsule (10 mg) by mouth daily   Quantity:  30 capsule   Refills:  1            Where to get your medicines      These medications were sent to [facility] [address] [facility] - [place], [place] - [address] [address] [place] [address]  [address] [address] [place] [address]     Phone:  [phone]     FLUoxetine 10 MG capsule                Primary Care Provider Office Phone # Fax #    JEM Huffman [phone]       [address]  [facility] [address]        Equal Access to Services     [facility]ME[facility] AH: Hadii larry serratoo Sooleg, waaxda luqadaha, qaybta kaalmada adeshonayaandrae, les elliott. So [facility] [phone].    ATENCIÓN: Si habla español, tiene a devine disposición servicios gratuitos de asistencia lingüística. Llame al [phone].    We comply with applicable federal civil rights laws and [place] laws. We do not discriminate on the basis of race, color, national origin, age, disability sex, sexual orientation or gender identity.            Thank you!     Thank you for " choosing [facility]  for your care. Our goal is always to provide you with excellent care. Hearing back from our patients is one way we can continue to improve our services. Please take a few minutes to complete the written survey that you may receive in the mail after your visit with us. Thank you!             Your Updated Medication List - Protect others around you: Learn how to safely use, store and throw away your medicines at www.disposemymeds.org.          This list is accurate as of: 6/27/17 11:59 PM.  Always use your most recent med list.                   Brand Name Dispense Instructions for use Diagnosis    ALEVE PO      Take 220 mg by mouth daily as needed for moderate pain        ALPRAZolam 0.5 MG tablet    XANAX    20 tablet    Use 1-2 tablets 30-60 minutes before flying. Try one tablet first. Take a second if needed.    Flying phobia       fluconazole 150 MG tablet    DIFLUCAN    4 tablet    Take 1 tablet (150 mg) by mouth every 3 days    Yeast infection of the vagina       FLUoxetine 10 MG capsule    PROzac    30 capsule    Take 1 capsule (10 mg) by mouth daily    Moderate episode of recurrent major depressive disorder (H)       fluticasone 50 MCG/ACT spray    FLONASE    1 Bottle    Spray 1-2 sprays into both nostrils daily    Acute sinusitis with symptoms > 10 days       IBU-200 PO      Take 2-3 tablets by mouth as needed Reported on 5/10/2017        loratadine 10 MG ODT tab    CLARITIN REDITABS     Take 10 mg by mouth daily Reported on 3/26/2017        * NONFORMULARY      1 tablet daily        * NONFORMULARY           RA ONE DAILY WOMENS Tabs      Take  by mouth.        sennosides 8.6 MG tablet    SENOKOT     Take 1 tablet by mouth daily        TYLENOL PO      Take by mouth as needed Reported on 5/10/2017        valACYclovir 1000 mg tablet    VALTREX    21 tablet    TAKE ONE TABLET BY MOUTH DAILY AS NEEDED    Herpes simplex type 2 infection       vitamin D3 62449 UNITS capsule     CHOLECALCIFEROL    8 capsule    Take 1 capsule (50,000 Units) by mouth every 7 days    Vitamin D insufficiency       * Notice:  This list has 2 medication(s) that are the same as other medications prescribed for you. Read the directions carefully, and ask your doctor or other care provider to review them with you.

## 2017-06-27 NOTE — PROGRESS NOTES
SUBJECTIVE:                                                    Avis Avalos is a 41 year old female who presents to clinic today for the following health issues:      HPI     Answers for HPI/ROS submitted by the patient on 6/27/2017   If you checked off any problems, how difficult have these problems made it for you to do your work, take care of things at home, or get along with other people?: Somewhat difficult  PHQ9 TOTAL SCORE: 4  LYNDSAY 7 TOTAL SCORE: 3      Abnormal Mood Symptoms  Onset: 15-20 years     Description:   Depression: YES  Anxiety: YES    Accompanying Signs & Symptoms:  Still participating in activities that you used to enjoy: YES  Fatigue: no  Irritability: no  Difficulty concentrating: YES- sometimes  Changes in appetite: no  Problems with sleep: YES- when waking up in the middle of the night, hard to fall asleep  Heart racing/beating fast : YES- sometimes  Thoughts of hurting yourself or others: none    History:   Recent stress: YES- work  Prior depression hospitalization: None  Family history of depression: YES - strong family history of depression, anxiety, OCD    Family history of anxiety: YES    Precipitating factors:   Alcohol/drug use: YES- occasional alcohol    Alleviating factors:  none    Therapies Tried and outcome: Zoloft (Sertraline), she tolerated this well but felt that it blunted her emotions. She would prefer not to take medication but is feeling that she is at a point that without medication she is having difficulty dealing with symptoms. She would like to try something different than Zoloft. Her sister takes Prozac and tolerates this well.    Pt stopped medications when she started having children about in 2008.  Pt states she can't stop thinking, mood is more tied to menstrual cycle.    Back pain - will start physical therapy for chronic low back pain, she thought about this after previous clinic visit and decided that this may be a good option to treat back pain. Taking  "ibuprofen as needed for pain. Denies radiation of back pain into buttocks or thighs.    Constipation - patient continues to have change in bowel movements over the past year. She reports that she will have difficulty having a bowel movement as if she is constipated but then the caliber of the stool is narrow. She has a sensation of rectal pressure with the feeling that her rectum is narrowed. She had a colonoscopy which showed 1 polyp. Denies blood in stool.    Problem list and histories reviewed & adjusted, as indicated.  Additional history: as documented    Labs reviewed in EPIC    ROS:  Constitutional, HEENT, cardiovascular, pulmonary, gi and gu systems are negative, except as otherwise noted.    OBJECTIVE:     /62 (BP Location: Right arm, Patient Position: Chair, Cuff Size: Adult Regular)  Pulse 72  Temp 98.4  F (36.9  C) (Oral)  Resp 16  Ht 5' 3.58\" (1.615 m)  Wt 168 lb 12.8 oz (76.6 kg)  LMP 05/25/2017 (Exact Date)  BMI 29.36 kg/m2  Body mass index is 29.36 kg/(m^2).  GENERAL: healthy, alert and no distress  RESP: lungs clear to auscultation - no rales, rhonchi or wheezes  CV: regular rate and rhythm, normal S1 S2, no S3 or S4, no murmur, click or rub, no peripheral edema and peripheral pulses strong  MS: no gross musculoskeletal defects noted, no edema  SKIN: no suspicious lesions or rashes  NEURO: Normal strength and tone, mentation intact and speech normal  PSYCH: mentation appears normal, affect normal/bright, tearful, anxious, judgement and insight intact and appearance well groomed    Diagnostic Test Results:  none     ASSESSMENT/PLAN:     1. Moderate episode of recurrent major depressive disorder (H)/3. Obsessive-compulsive disorder, unspecified type  The patient has history of depression. She was previously on Zoloft but felt that this blunted her emotionally and would like to try something different. She stopped taking the Zoloft when she started having children. She's been trying to " manage her depression through good self-care but feels that her symptoms have worsened and that she would like to try medication to get through this phase. She does report history of OCD with persistent repetitive thoughts that can be overwhelming. Her sister takes Prozac and does very well on this. We will start her at a low dose of 10 mg daily and follow-up within 6-8 weeks.  Discussed counseling but patient feels that her symptoms are primarily due to genetic disposition to depression and chemical imbalance of serotonin as she has really done well with self-care but continues to have symptoms.   - FLUoxetine (PROZAC) 10 MG capsule; Take 1 capsule (10 mg) by mouth daily  Dispense: 30 capsule; Refill: 1    2. Chronic bilateral low back pain without sciatica  Physical therapy order was placed after her last visit as patient was interested in trying this for chronic bilateral low back pain without sciatica. She previously had physical therapy for knee pain which was very helpful and is hoping that this will help with chronic pain. Taking ibuprofen as needed for pain.     4. Sessile colonic polyp  5. Constipation, unspecified constipation type  6. Rectal pressure  Reviewed results of colonoscopy and hyperplastic polyp that is essentially benign. I am still concerned about her rectal exam at previous visit that demonstrated tightness of the rectal sphincter with digital exam. Patient also notes that her stools have changed in the last year in which she has difficulty having a bowel movement but the caliber of the stool is not large. Patient is going to make an appointment with gastroenterology for follow-up on this. Patient was previously diagnosed with Crohn's disease. However her colonoscopy showed no signs of inflammation that would be consistent with Crohn's. Her previous colonoscopy in 2011 also showed no signs of inflammation consistent with Crohn's. This is reassuring but we will continue to consider this  previous diagnosis if alternative symptoms develop.    Greater than 50% of 30 minute visit were spent on counseling or coordination of care regarding depression, OCD, chronic back pain, sessile polyp, constipation, rectal pressure.     JEM Lindsay [facility]

## 2017-06-28 PROBLEM — F33.1 MODERATE EPISODE OF RECURRENT MAJOR DEPRESSIVE DISORDER (H): Status: ACTIVE | Noted: 2017-06-28

## 2017-06-28 PROBLEM — G89.29 CHRONIC BILATERAL LOW BACK PAIN WITHOUT SCIATICA: Status: ACTIVE | Noted: 2017-06-28

## 2017-06-28 PROBLEM — M54.50 CHRONIC BILATERAL LOW BACK PAIN WITHOUT SCIATICA: Status: ACTIVE | Noted: 2017-06-28

## 2017-06-28 PROBLEM — F42.9 OBSESSIVE-COMPULSIVE DISORDER, UNSPECIFIED TYPE: Status: ACTIVE | Noted: 2017-06-28

## 2017-06-28 ASSESSMENT — PATIENT HEALTH QUESTIONNAIRE - PHQ9: SUM OF ALL RESPONSES TO PHQ QUESTIONS 1-9: 4

## 2017-06-28 ASSESSMENT — ANXIETY QUESTIONNAIRES: GAD7 TOTAL SCORE: 3

## 2017-07-03 ENCOUNTER — THERAPY VISIT (OUTPATIENT)
Dept: PHYSICAL THERAPY | Facility: CLINIC | Age: 42
End: 2017-07-03
Payer: COMMERCIAL

## 2017-07-03 DIAGNOSIS — G89.29 CHRONIC BILATERAL LOW BACK PAIN WITHOUT SCIATICA: Primary | ICD-10-CM

## 2017-07-03 DIAGNOSIS — M54.50 CHRONIC BILATERAL LOW BACK PAIN WITHOUT SCIATICA: Primary | ICD-10-CM

## 2017-07-03 PROCEDURE — 97162 PT EVAL MOD COMPLEX 30 MIN: CPT | Mod: GP | Performed by: PHYSICAL THERAPIST

## 2017-07-03 PROCEDURE — 97140 MANUAL THERAPY 1/> REGIONS: CPT | Mod: GP | Performed by: PHYSICAL THERAPIST

## 2017-07-03 NOTE — LETTER
[facility] PH[facility]ICAL [facility]  [address]  [facility] [id number] [phone]    2017    Re: Avis Avalos   :   1975  MRN:  [id number]   REFERRING PHYSICIAN:   Sarah Whitten*    [facility]    Date of Initial Evaluation:  ***  Visits:  Rxs Used: 1  Reason for Referral:  Chronic bilateral low back pain without sciatica    EVALUATION SUMMARY    [facility] Initial Evaluation  Subjective:    Patient is a 41 year old female presenting with rehab back hpi. The history is provided by the patient. No  was used.   Avis Avalos is a 41 year old female with a lumbar condition.  Condition occurred with:  Other reason.  Condition occurred: for unknown reasons.  This is a chronic condition  Was involved in dance line when younger. Has had low back pain for past 5-10 years. Has been increasing over past 2-3 years. Referral date 17..    Patient reports pain:  Lower lumbar spine, SI joint right and SI joint left (also gets rectal pain).    Pain is described as burning and aching (rarely sharping/zinging) and is intermittent and reported as 6/10 (depending on position).  Associated symptoms:  Fatigue, loss of motion/stiffness and loss of strength (decreased frequency of bowel movements). Pain is worse in the A.M..  Symptoms are exacerbated by bending and walking (prolonged positions (sitting or standing)) and relieved by rest and NSAID's.  Since onset symptoms are gradually worsening.  Special tests:  X-ray (negative).  Previous treatment includes chiropractic.  There was no improvement following previous treatment.  General health as reported by patient is good.  Pertinent medical history includes:  Overweight, depression and migraines.  Medical allergies: yes.  Other surgeries include:  Other.  Current medications:  Anti-depressants and other.  Current  occupation ; 2 kids at home (ages 7 and 4).  Patient is working in normal job without restrictions.  Primary job tasks include:  Prolonged sitting, lifting and other (computer work).    Barriers: stairs.    Red flags:  None as reported by the patient.                        Objective:    Standing Alignment:    Cervical/Thoracic:  Forward head and Dowager's hump          Knee deviations alignment: stands w/ knees locked.          Flexibility/Screens:       Lower Extremity:      Decreased right lower extremity flexibility:  Piriformis               Lumbar/SI Evaluation  ROM:    AROM Lumbar:   Flexion:            To floor  Ext:                    25% limited ++pain central low back   Side Bend:        Left:  To knee    Right:  To knee  Rotation:           Left:  WNL    Right:  WNL  Side Glide:        Left:     Right:                   Neural Tension/Mobility:    Left side:  Slump (mild low back pain improves w/ cervical ext) positive.  Left side:SLR  negative.     Right side:   Slump or SLR  negative.           SI joint/Sacrum:    (+) Active SLR L  (+) IESHA R    Hip abd strength:  L 4/5, R 5/5  ASIS low on R  After MET:  (-) Active SLR  Hip Abd 5/5  After piriformis stretch:  (-) IESHA R                                                       General     ROS    Assessment/Plan:      Patient is a 41 year old female with lumbar complaints.    Patient has the following significant findings with corresponding treatment plan.                Diagnosis 1:  Low back pain  Pain -  hot/cold therapy, electric stimulation, manual therapy, self management, education, directional preference exercise and home program  Decreased ROM/flexibility - manual therapy, therapeutic exercise, therapeutic activity and home program  Decreased joint mobility - manual therapy, therapeutic exercise, therapeutic activity and home program  Decreased strength - therapeutic exercise, therapeutic activities and home program  Impaired  muscle performance - neuro re-education and home program  Decreased function - therapeutic activities and home program  Impaired posture - neuro re-education, therapeutic activities and home program    Therapy Evaluation Codes:   1) History comprised of:   Personal factors that impact the plan of care:      Profession.    Comorbidity factors that impact the plan of care are:      Bowel/bladder changes, Depression, Migraines/headaches and Overweight.     Medications impacting care: Anti-depressant.  2) Examination of Body Systems comprised of:   Body structures and functions that impact the plan of care:      Lumbar spine, Pelvis and Sacral illiac joint.   Activity limitations that impact the plan of care are:      Bending, Cooking, Sitting, Stairs, Standing, Walking, Working, Sleeping and Laying down.  3) Clinical presentation characteristics are:   Evolving/Changing.  4) Decision-Making    Moderate complexity using standardized patient assessment instrument and/or measureable assessment of functional outcome.  Cumulative Therapy Evaluation is: Moderate complexity.    Previous and current functional limitations:  (See Goal Flow Sheet for this information)    Short term and Long term goals: (See Goal Flow Sheet for this information)     Communication ability:  Patient appears to be able to clearly communicate and understand verbal and written communication and follow directions correctly.  Treatment Explanation - The following has been discussed with the patient:   RX ordered/plan of care  Anticipated outcomes  Possible risks and side effects  This patient would benefit from PT intervention to resume normal activities.   Rehab potential is good.    Frequency:  1-2 X week, once daily  Duration:  for 8 weeks  Discharge Plan:  Achieve all LTG.  Independent in home treatment program.  Reach maximal therapeutic benefit.    Please refer to the daily flowsheet for treatment today, total treatment time and time spent  performing 1:1 timed codes.             Thank you for your referral.    INQUIRIES  Therapist:   [facility]  [address]  [facility] [address]  Phone: [phone]  Fax: [phone]

## 2017-07-03 NOTE — PROGRESS NOTES
[facility] Initial Evaluation  Subjective:    Patient is a 41 year old female presenting with rehab back hpi. The history is provided by the patient. No  was used.   Avis Avalos is a 41 year old female with a lumbar condition.  Condition occurred with:  Other reason.  Condition occurred: for unknown reasons.  This is a chronic condition  Was involved in dance line when younger. Has had low back pain for past 5-10 years. Has been increasing over past 2-3 years. Referral date 6/22/17..    Patient reports pain:  Lower lumbar spine, SI joint right and SI joint left (also gets rectal pain).    Pain is described as burning and aching (rarely sharping/zinging) and is intermittent and reported as 6/10 (depending on position).  Associated symptoms:  Fatigue, loss of motion/stiffness and loss of strength (decreased frequency of bowel movements). Pain is worse in the A.M..  Symptoms are exacerbated by bending and walking (prolonged positions (sitting or standing)) and relieved by rest and NSAID's.  Since onset symptoms are gradually worsening.  Special tests:  X-ray (negative).  Previous treatment includes chiropractic.  There was no improvement following previous treatment.  General health as reported by patient is good.  Pertinent medical history includes:  Overweight, depression and migraines.  Medical allergies: yes.  Other surgeries include:  Other.  Current medications:  Anti-depressants and other.  Current occupation ; 2 kids at home (ages 7 and 4).  Patient is working in normal job without restrictions.  Primary job tasks include:  Prolonged sitting, lifting and other (computer work).    Barriers: stairs.    Red flags:  None as reported by the patient.                        Objective:    Standing Alignment:    Cervical/Thoracic:  Forward head and Dowager's hump          Knee deviations alignment: stands w/ knees locked.          Flexibility/Screens:        Lower Extremity:      Decreased right lower extremity flexibility:  Piriformis               Lumbar/SI Evaluation  ROM:    AROM Lumbar:   Flexion:            To floor  Ext:                    25% limited ++pain central low back   Side Bend:        Left:  To knee    Right:  To knee  Rotation:           Left:  WNL    Right:  WNL  Side Glide:        Left:     Right:                   Neural Tension/Mobility:    Left side:  Slump (mild low back pain improves w/ cervical ext) positive.  Left side:SLR  negative.     Right side:   Slump or SLR  negative.           SI joint/Sacrum:    (+) Active SLR L  (+) IESHA R    Hip abd strength:  L 4/5, R 5/5  ASIS low on R  After MET:  (-) Active SLR  Hip Abd 5/5  After piriformis stretch:  (-) IESHA GUZMAN                                                       General     ROS    Assessment/Plan:      Patient is a 41 year old female with lumbar complaints.    Patient has the following significant findings with corresponding treatment plan.                Diagnosis 1:  Low back pain  Pain -  hot/cold therapy, electric stimulation, manual therapy, self management, education, directional preference exercise and home program  Decreased ROM/flexibility - manual therapy, therapeutic exercise, therapeutic activity and home program  Decreased joint mobility - manual therapy, therapeutic exercise, therapeutic activity and home program  Decreased strength - therapeutic exercise, therapeutic activities and home program  Impaired muscle performance - neuro re-education and home program  Decreased function - therapeutic activities and home program  Impaired posture - neuro re-education, therapeutic activities and home program    Therapy Evaluation Codes:   1) History comprised of:   Personal factors that impact the plan of care:      Profession.    Comorbidity factors that impact the plan of care are:      Bowel/bladder changes, Depression, Migraines/headaches and Overweight.     Medications  impacting care: Anti-depressant.  2) Examination of Body Systems comprised of:   Body structures and functions that impact the plan of care:      Lumbar spine, Pelvis and Sacral illiac joint.   Activity limitations that impact the plan of care are:      Bending, Cooking, Sitting, Stairs, Standing, Walking, Working, Sleeping and Laying down.  3) Clinical presentation characteristics are:   Evolving/Changing.  4) Decision-Making    Moderate complexity using standardized patient assessment instrument and/or measureable assessment of functional outcome.  Cumulative Therapy Evaluation is: Moderate complexity.    Previous and current functional limitations:  (See Goal Flow Sheet for this information)    Short term and Long term goals: (See Goal Flow Sheet for this information)     Communication ability:  Patient appears to be able to clearly communicate and understand verbal and written communication and follow directions correctly.  Treatment Explanation - The following has been discussed with the patient:   RX ordered/plan of care  Anticipated outcomes  Possible risks and side effects  This patient would benefit from PT intervention to resume normal activities.   Rehab potential is good.    Frequency:  1-2 X week, once daily  Duration:  for 8 weeks  Discharge Plan:  Achieve all LTG.  Independent in home treatment program.  Reach maximal therapeutic benefit.    Please refer to the daily flowsheet for treatment today, total treatment time and time spent performing 1:1 timed codes.

## 2017-07-03 NOTE — MR AVS SNAPSHOT
After Visit Summary   7/3/2017    Avis Avalos    MRN: [id number]           Patient Information     Date Of Birth          1975        Visit Information        Provider Department      7/3/2017 3:30 PM Hilligoss, Amanda K, PT [facility]        Today's Diagnoses     Chronic bilateral low back pain without sciatica    -  1       Follow-ups after your visit        Your next 10 appointments already scheduled     Jul 06, 2017  5:10 PM CDT   МАРИНА Spine with Amanda K Hilligoss, PT   [facility] ([facility]  )    [address]  [facility] [address]   [phone]            Jul 20, 2017  4:30 PM CDT   МАРИНА For Women Only with Amanda K Hilligoss, PT   [facility] ([facility]  )    [address]  [facility] [id number] [phone]              Who to contact     If you have questions or need follow up information about today's clinic visit or your schedule please contact [facility] directly at [phone].  Normal or non-critical lab and imaging results will be communicated to you by Stockleaphart, letter or phone within 4 business days after the clinic has received the results. If you do not hear from us within 7 days, please contact the clinic through Stockleaphart or phone. If you have a critical or abnormal lab result, we will notify you by phone as soon as possible.  Submit refill requests through Koemei or call your pharmacy and they will forward the refill request to us. Please allow 3 business days for your refill to be completed.          Additional Information About Your Visit        Stockleaphart Information     Koemei gives you secure access to your electronic health record. If you see a primary care provider, you can also send messages to your care team and make appointments.  If you have questions, please call your primary care clinic.  If you do not have a primary care provider, please call [phone] and they will assist you.        Care EveryWhere ID     This is your Care EveryWhere ID. This could be used by other organizations to access your [place] medical records  [id number]        Your Vitals Were     Last Period                   05/25/2017 (Exact Date)            Blood Pressure from Last 3 Encounters:   06/27/17 100/62   06/19/17 105/60   06/16/17 114/66    Weight from Last 3 Encounters:   06/27/17 76.6 kg (168 lb 12.8 oz)   06/19/17 77.2 kg (170 lb 3.2 oz)   06/16/17 77.2 kg (170 lb 3.2 oz)              We Performed the Following     HC PT EVAL, MODERATE COMPLEXITY     МАРИНА INITIAL EVAL REPORT     MANUAL THER TECH,1+REGIONS,EA 15 MIN        Primary Care Provider Office Phone # Fax #    Sarah JEM Panda [facility] [phone]       [facility] [address]  [facility] [address]        Equal Access to Services     JAQUI NEWMAN AH: Hadii aad ku hadasho Soomaali, waaxda luqadaha, qaybta kaalmada adeegyada, les lópez . So [facility] [phone].    ATENCIÓN: Si habla español, tiene a devine disposición servicios gratuitos de asistencia lingüística. [facility] [phone].    We comply with applicable federal civil rights laws and [place] laws. We do not discriminate on the basis of race, color, national origin, age, disability sex, sexual orientation or gender identity.            Thank you!     Thank you for choosing [facility]  for your care. Our goal is always to provide you with excellent care. Hearing back from our patients is one way we can continue to improve our services. Please take a few minutes to complete the written survey that you may receive in the mail after your visit with us. Thank you!             Your Updated Medication List - Protect others  around you: Learn how to safely use, store and throw away your medicines at www.disposemymeds.org.          This list is accurate as of: 7/3/17  6:23 PM.  Always use your most recent med list.                   Brand Name Dispense Instructions for use Diagnosis    ALEVE PO      Take 220 mg by mouth daily as needed for moderate pain        ALPRAZolam 0.5 MG tablet    XANAX    20 tablet    Use 1-2 tablets 30-60 minutes before flying. Try one tablet first. Take a second if needed.    Flying phobia       fluconazole 150 MG tablet    DIFLUCAN    4 tablet    Take 1 tablet (150 mg) by mouth every 3 days    Yeast infection of the vagina       FLUoxetine 10 MG capsule    PROzac    30 capsule    Take 1 capsule (10 mg) by mouth daily    Moderate episode of recurrent major depressive disorder (H)       fluticasone 50 MCG/ACT spray    FLONASE    1 Bottle    Spray 1-2 sprays into both nostrils daily    Acute sinusitis with symptoms > 10 days       IBU-200 PO      Take 2-3 tablets by mouth as needed Reported on 5/10/2017        loratadine 10 MG ODT tab    CLARITIN REDITABS     Take 10 mg by mouth daily Reported on 3/26/2017        * NONFORMULARY      1 tablet daily        * NONFORMULARY           RA ONE DAILY WOMENS Tabs      Take  by mouth.        sennosides 8.6 MG tablet    SENOKOT     Take 1 tablet by mouth daily        TYLENOL PO      Take by mouth as needed Reported on 5/10/2017        valACYclovir 1000 mg tablet    VALTREX    21 tablet    TAKE ONE TABLET BY MOUTH DAILY AS NEEDED    Herpes simplex type 2 infection       vitamin D3 25859 UNITS capsule    CHOLECALCIFEROL    8 capsule    Take 1 capsule (50,000 Units) by mouth every 7 days    Vitamin D insufficiency       * Notice:  This list has 2 medication(s) that are the same as other medications prescribed for you. Read the directions carefully, and ask your doctor or other care provider to review them with you.

## 2017-07-06 ENCOUNTER — THERAPY VISIT (OUTPATIENT)
Dept: PHYSICAL THERAPY | Facility: CLINIC | Age: 42
End: 2017-07-06
Payer: COMMERCIAL

## 2017-07-06 DIAGNOSIS — M54.50 CHRONIC BILATERAL LOW BACK PAIN WITHOUT SCIATICA: ICD-10-CM

## 2017-07-06 DIAGNOSIS — G89.29 CHRONIC BILATERAL LOW BACK PAIN WITHOUT SCIATICA: ICD-10-CM

## 2017-07-06 PROCEDURE — 97140 MANUAL THERAPY 1/> REGIONS: CPT | Mod: GP | Performed by: PHYSICAL THERAPIST

## 2017-07-06 PROCEDURE — 97110 THERAPEUTIC EXERCISES: CPT | Mod: GP | Performed by: PHYSICAL THERAPIST

## 2017-07-10 ENCOUNTER — MYC MEDICAL ADVICE (OUTPATIENT)
Dept: FAMILY MEDICINE | Facility: OTHER | Age: 42
End: 2017-07-10

## 2017-07-13 NOTE — TELEPHONE ENCOUNTER
Contacted pt and let her know EM not in clinic today and will route and have her review in the AM.  Chikis Medel, CMA

## 2017-07-19 ENCOUNTER — MYC MEDICAL ADVICE (OUTPATIENT)
Dept: FAMILY MEDICINE | Facility: OTHER | Age: 42
End: 2017-07-19

## 2017-07-19 DIAGNOSIS — R19.8 RECTAL PRESSURE: Primary | ICD-10-CM

## 2017-07-19 DIAGNOSIS — M62.89 PELVIC FLOOR DYSFUNCTION: ICD-10-CM

## 2017-07-19 DIAGNOSIS — K59.00 CONSTIPATION, UNSPECIFIED CONSTIPATION TYPE: ICD-10-CM

## 2017-07-20 ENCOUNTER — THERAPY VISIT (OUTPATIENT)
Dept: PHYSICAL THERAPY | Facility: CLINIC | Age: 42
End: 2017-07-20
Payer: COMMERCIAL

## 2017-07-20 DIAGNOSIS — K62.89 RECTAL PAIN: Primary | ICD-10-CM

## 2017-07-20 DIAGNOSIS — G89.29 CHRONIC BILATERAL LOW BACK PAIN WITHOUT SCIATICA: ICD-10-CM

## 2017-07-20 DIAGNOSIS — M54.50 CHRONIC BILATERAL LOW BACK PAIN WITHOUT SCIATICA: ICD-10-CM

## 2017-07-20 DIAGNOSIS — M62.89 PELVIC FLOOR DYSFUNCTION: ICD-10-CM

## 2017-07-20 PROCEDURE — 97110 THERAPEUTIC EXERCISES: CPT | Mod: GP | Performed by: PHYSICAL THERAPIST

## 2017-07-20 PROCEDURE — 97161 PT EVAL LOW COMPLEX 20 MIN: CPT | Mod: GP | Performed by: PHYSICAL THERAPIST

## 2017-07-20 PROCEDURE — 97140 MANUAL THERAPY 1/> REGIONS: CPT | Mod: GP | Performed by: PHYSICAL THERAPIST

## 2017-07-20 PROCEDURE — 97112 NEUROMUSCULAR REEDUCATION: CPT | Mod: GP | Performed by: PHYSICAL THERAPIST

## 2017-07-20 NOTE — MR AVS SNAPSHOT
After Visit Summary   7/20/2017    Avis Avalso    MRN: [id number]           Patient Information     Date Of Birth          1975        Visit Information        Provider Department      7/20/2017 4:30 PM Hilligoss, Amanda K, PT [facility]        Today's Diagnoses     Rectal pain    -  1    Chronic bilateral low back pain without sciatica        Pelvic floor dysfunction           Follow-ups after your visit        Who to contact     If you have questions or need follow up information about today's clinic visit or your schedule please contact [facility] directly at [phone].  Normal or non-critical lab and imaging results will be communicated to you by Perficienthart, letter or phone within 4 business days after the clinic has received the results. If you do not hear from us within 7 days, please contact the clinic through Perficienthart or phone. If you have a critical or abnormal lab result, we will notify you by phone as soon as possible.  Submit refill requests through Clinical Data or call your pharmacy and they will forward the refill request to us. Please allow 3 business days for your refill to be completed.          Additional Information About Your Visit        MyChart Information     Clinical Data gives you secure access to your electronic health record. If you see a primary care provider, you can also send messages to your care team and make appointments. If you have questions, please call your primary care clinic.  If you do not have a primary care provider, please call [phone] and they will assist you.        Care EveryWhere ID     This is your Care EveryWhere ID. This could be used by other organizations to access your [place] medical records  [id number]         Blood Pressure from Last 3 Encounters:   06/27/17 100/62   06/19/17 105/60   06/16/17 114/66    Weight from Last 3 Encounters:   06/27/17  76.6 kg (168 lb 12.8 oz)   06/19/17 77.2 kg (170 lb 3.2 oz)   06/16/17 77.2 kg (170 lb 3.2 oz)              We Performed the Following     HC PT EVAL, LOW COMPLEXITY     МАРИНА INITIAL EVAL REPORT     MANUAL THER TECH,1+REGIONS,EA 15 MIN     NEUROMUSCULAR RE-EDUCATION     THERAPEUTIC EXERCISES        Primary Care Provider Office Phone # Fax #    Sarah ReeseJEM charles [facility] [phone]       [facility] [address]  [facility] [address]        Equal Access to Services     [facility] Hospital[facility] : Hadii aad ku hadasho Soomaali, waaxda luqadaha, qaybta kaalmada adeegyada, les segovia haysana lópez . So [facility] [phone].    ATENCIÓN: Si habla español, tiene a devine disposición servicios gratuitos de asistencia lingüística. [facility] [phone].    We comply with applicable federal civil rights laws and [place] laws. We do not discriminate on the basis of race, color, national origin, age, disability sex, sexual orientation or gender identity.            Thank you!     Thank you for choosing [facility]  for your care. Our goal is always to provide you with excellent care. Hearing back from our patients is one way we can continue to improve our services. Please take a few minutes to complete the written survey that you may receive in the mail after your visit with us. Thank you!             Your Updated Medication List - Protect others around you: Learn how to safely use, store and throw away your medicines at www.disposemymeds.org.          This list is accurate as of: 7/20/17  7:29 PM.  Always use your most recent med list.                   Brand Name Dispense Instructions for use Diagnosis    ALEVE PO      Take 220 mg by mouth daily as needed for moderate pain        ALPRAZolam 0.5 MG tablet    XANAX    20 tablet    Use 1-2 tablets 30-60 minutes before flying. Try one tablet first. Take a second if needed.    Flying  phobia       fluconazole 150 MG tablet    DIFLUCAN    4 tablet    Take 1 tablet (150 mg) by mouth every 3 days    Yeast infection of the vagina       FLUoxetine 10 MG capsule    PROzac    30 capsule    Take 1 capsule (10 mg) by mouth daily    Moderate episode of recurrent major depressive disorder (H)       fluticasone 50 MCG/ACT spray    FLONASE    1 Bottle    Spray 1-2 sprays into both nostrils daily    Acute sinusitis with symptoms > 10 days       IBU-200 PO      Take 2-3 tablets by mouth as needed Reported on 5/10/2017        loratadine 10 MG ODT tab    CLARITIN REDITABS     Take 10 mg by mouth daily Reported on 3/26/2017        * NONFORMULARY      1 tablet daily        * NONFORMULARY           RA ONE DAILY WOMENS Tabs      Take  by mouth.        sennosides 8.6 MG tablet    SENOKOT     Take 1 tablet by mouth daily        TYLENOL PO      Take by mouth as needed Reported on 5/10/2017        valACYclovir 1000 mg tablet    VALTREX    21 tablet    TAKE ONE TABLET BY MOUTH DAILY AS NEEDED    Herpes simplex type 2 infection       vitamin D3 38852 UNITS capsule    CHOLECALCIFEROL    8 capsule    Take 1 capsule (50,000 Units) by mouth every 7 days    Vitamin D insufficiency       * Notice:  This list has 2 medication(s) that are the same as other medications prescribed for you. Read the directions carefully, and ask your doctor or other care provider to review them with you.

## 2017-07-20 NOTE — PROGRESS NOTES
[facility] Initial Evaluation  Subjective:  Avis Avalos is a 41 year old year old female with a pelvic floor condition. Patient reports onset of symptoms July 20176 for unknown reason. Symptoms include rectal pain and constipation. Pain level 2-3/10. Pain exacerbated by sitting on hard surfaces (worst w/ initial sitting) or with bowel movents  Since onset symptoms have been getting worse.  Urination:  Do you leak on the way to the bathroom or with a strong urge to void? No   Do you leak with cough,sneeze, jumping, running?No   Any other activities that cause leaking? No   Do you have triggers that make you feel you can't wait to go to the bathroom? No  Type of pad and number used per day? No  When you leak what is the amount? None  How long can you delay the need to urinate? 1-2 hours.   How many times do you get up to urinate at night? 0   Can you stop the flow of urine when on the toilet? Yes  Is the volume of urine passed usually: average. (8sec rule= 250ml with average bladder storing 400-600ml)  Do you strain to pass urine? No  Do you have a slow or hesitant urinary stream? No  Do you have difficulty initiating the urine stream? No  Is urination painful? No  How many bladder infections have you had in last 12 months? 2-3 yeast infection  Fluid intake(one glass is 8oz or one cup) 5-6 glasses/day, 1-2 caffinated glasses/day  0-2 alcohol glasses/day.  Bowel habits:  Frequency of bowel movements? 2-4 times a week  Consistancy of stool? soft formed, Bulloch Stool Scale 4  Do you ignore the urge to defecate? No  Do you strain to pass stool? Yes  Pelvic Pain:  Do you have any pelvic pain with intercourse, exams, use of tampons? Yes (rarely, but sacrum to coccyx if present)  Pain level rate at 1 /10  Are you sexually active?Yes  Is initial penetration during intercourse painful? No  Is deeper penetration painful? Yes  Do you use lubricant? Yes, MD recommended lotion  ?  Given birth? Yes   Any  complications? Yes, breach  # of vaginal delieveries? 0  # of C-sections? 2  # of episiotomies? 0.  Have you ever been worried for your physical safety? No  Any abdominal or pelvic surgeries? Yes, appendectomy, colonoscopy  Are you having any regular exercise? Yes, 3-4x/week  Have you practiced the PF(kegel) exercises for 4 or more weeks? Intermittently   Thyroid checked?Yes (related to hair loss, flu-like symptoms, wt gain/loss, fatigue, menopause)  Changed diet lately? Over past few years has taken out processed foods, added probiotic to food.    General health as reported by patient is good.  Pertinent medical history includes:  Overweight, depression and migraines.  Medical allergies: yes.  Other surgeries include:  Other.  Current medications:  Anti-depressants and other.  Current occupation ; 2 kids at home (ages 7 and 4).  Patient is working in normal job without restrictions.  Primary job tasks include:  Prolonged sitting, lifting and other (computer work).     Barriers: stairs.     Red flags:  None as reported by the patient.       HPI                    Objective:  Pt verbally agrees to pelvic floor muscle assessment rectally. Did not assess pelvic floor muscles vaginally today (may assess as needed in future)  OBSERVATION  Lumbar Posture: Positive  Hemorrhoidal remnants observed  SI Assessment:  (-) Gillet test, (+) Active SLR R, (-) IESHA B; Hip abd 5/5 B  Joint Mobility  Hypomobile coccyx  PALPATION  Reproduction of symptoms with digital evaluations and Pain: levator ani, coccygeous, cocyx, symphysis pubis and internal/external anal sphincter          System    Physical Exam    General     ROS    Assessment/Plan:      Patient is a 41 year old female with pelvic complaints.    Patient has the following significant findings with corresponding treatment plan.                Diagnosis 1:  Pelvic floor dysfunction and rectal pain consistent w/ levator ani syndrome  Pain -  manual therapy,  self management, education and home program  Decreased ROM/flexibility - manual therapy, therapeutic exercise, therapeutic activity and home program  Decreased joint mobility - manual therapy, therapeutic exercise, therapeutic activity and home program  Impaired muscle performance - neuro re-education and home program    Therapy Evaluation Codes:   1) History comprised of:   Personal factors that impact the plan of care:      Time since onset of symptoms.    Comorbidity factors that impact the plan of care are:      Depression, Migraines/headaches and Overweight.     Medications impacting care: Anti-depressant.  2) Examination of Body Systems comprised of:   Body structures and functions that impact the plan of care:      Lumbar spine, Pelvis and Sacral illiac joint.   Activity limitations that impact the plan of care are:      Sitting and [place].  3) Clinical presentation characteristics are:   Stable/Uncomplicated.  4) Decision-Making    Low complexity using standardized patient assessment instrument and/or measureable assessment of functional outcome.  Cumulative Therapy Evaluation is: Low complexity.    Previous and current functional limitations:  (See Goal Flow Sheet for this information)    Short term and Long term goals: (See Goal Flow Sheet for this information)     Communication ability:  Patient appears to be able to clearly communicate and understand verbal and written communication and follow directions correctly.  Treatment Explanation - The following has been discussed with the patient:   RX ordered/plan of care  Anticipated outcomes  Possible risks and side effects  This patient would benefit from PT intervention to resume normal activities.   Rehab potential is good.    Frequency:  1 X week, once daily  Duration:  for 6 weeks  Discharge Plan:  Achieve all LTG.  Independent in home treatment program.  Reach maximal therapeutic benefit.    Please refer to the daily flowsheet for treatment today,  total treatment time and time spent performing 1:1 timed codes.

## 2017-07-20 NOTE — LETTER
[facility]  [address]  [facility] [id number] [phone]    2017    Re: Avis Avalos   :   1975  MRN:  [id number]   REFERRING PHYSICIAN:   Sarah Whitten*    [facility]    Date of Initial Evaluation:  ***  Visits:  Rxs Used: 3  Reason for Referral:     Chronic bilateral low back pain without sciatica  Rectal pain  Pelvic floor dysfunction    EVALUATION SUMMARY    [facility]  Subjective:  Avis Avalos is a 41 year old year old female with a pelvic floor condition. Patient reports onset of symptoms  for unknown reason. Symptoms include rectal pain and constipation. Pain level 2-3/10. Pain exacerbated by sitting on hard surfaces (worst w/ initial sitting) or with bowel movents  Since onset symptoms have been getting worse.  Urination:  Do you leak on the way to the bathroom or with a strong urge to void? No   Do you leak with cough,sneeze, jumping, running?No   Any other activities that cause leaking? No   Do you have triggers that make you feel you can't wait to go to the bathroom? No  Type of pad and number used per day? No  When you leak what is the amount? None  How long can you delay the need to urinate? 1-2 hours.   How many times do you get up to urinate at night? 0   Can you stop the flow of urine when on the toilet? Yes  Is the volume of urine passed usually: average. (8sec rule= 250ml with average bladder storing 400-600ml)  Do you strain to pass urine? No  Do you have a slow or hesitant urinary stream? No  Do you have difficulty initiating the urine stream? No  Is urination painful? No  How many bladder infections have you had in last 12 months? 2-3 yeast infection  Fluid intake(one glass is 8oz or one cup) 5-6 glasses/day, 1-2 caffinated glasses/day  0-2 alcohol glasses/day.  Bowel habits:  Frequency of bowel  movements? 2-4 times a week  Consistancy of stool? soft formed, Coryell Stool Scale 4  Do you ignore the urge to defecate? No  Do you strain to pass stool? Yes  Pelvic Pain:  Do you have any pelvic pain with intercourse, exams, use of tampons? Yes (rarely, but sacrum to coccyx if present)  Pain level rate at 1 /10  Are you sexually active?Yes  Is initial penetration during intercourse painful? No  Is deeper penetration painful? Yes  Do you use lubricant? Yes, MD recommended lotion  ?  Given birth? Yes   Any complications? Yes, breach  # of vaginal delieveries? 0  # of C-sections? 2  # of episiotomies? 0.  Have you ever been worried for your physical safety? No  Any abdominal or pelvic surgeries? Yes, appendectomy, colonoscopy  Are you having any regular exercise? Yes, 3-4x/week  Have you practiced the PF(kegel) exercises for 4 or more weeks? Intermittently   Thyroid checked?Yes (related to hair loss, flu-like symptoms, wt gain/loss, fatigue, menopause)  Changed diet lately? Over past few years has taken out processed foods, added probiotic to food.    General health as reported by patient is good.  Pertinent medical history includes:  Overweight, depression and migraines.  Medical allergies: yes.  Other surgeries include:  Other.  Current medications:  Anti-depressants and other.  Current occupation ; 2 kids at home (ages 7 and 4).  Patient is working in normal job without restrictions.  Primary job tasks include:  Prolonged sitting, lifting and other (computer work).     Barriers: stairs.     Red flags:  None as reported by the patient.       HPI                    Objective:  Pt verbally agrees to pelvic floor muscle assessment rectally. Did not assess pelvic floor muscles vaginally today (may assess as needed in future)  OBSERVATION  Lumbar Posture: Positive  Hemorrhoidal remnants observed  SI Assessment:  (-) Gillet test, (+) Active SLR R, (-) IESHA B; Hip abd 5/5 B  Joint Mobility  Hypomobile  coccyx  PALPATION  Reproduction of symptoms with digital evaluations and Pain: levator ani, coccygeous, cocyx, symphysis pubis and internal/external anal sphincter          System    Physical Exam    General     ROS    Assessment/Plan:      Patient is a 41 year old female with pelvic complaints.    Patient has the following significant findings with corresponding treatment plan.                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    Comorbidity factors that impact the plan of care are:      Depression, Migraines/headaches and Overweight.     Medications impacting care: Anti-depressant.  2) Examination of Body Systems comprised of:   Body structures and functions that impact the plan of care:      Lumbar spine, Pelvis and Sacral illiac joint.   Activity limitations that impact the plan of care are:      Sitting and [place].  3) Clinical presentation characteristics are:   Stable/Uncomplicated.  4) Decision-Making    Low complexity using standardized patient assessment instrument and/or measureable assessment of functional outcome.  Cumulative Therapy Evaluation is: Low complexity.    Previous and current functional limitations:  (See Goal Flow Sheet for this information)    Short term and Long term goals: (See Goal Flow Sheet for this information)     Communication ability:  Patient appears to be able to clearly communicate and understand verbal and written communication and follow  directions correctly.  Treatment Explanation - The following has been discussed with the patient:   RX ordered/plan of care  Anticipated outcomes  Possible risks and side effects  This patient would benefit from PT intervention to resume normal activities.   Rehab potential is good.    Frequency:  1 X week, once daily  Duration:  for 6 weeks  Discharge Plan:  Achieve all LTG.  Independent in home treatment program.  Reach maximal therapeutic benefit.            Thank you for your referral.    INQUIRIES  Therapist:   [facility]  [address]  [facility] [address]  Phone: [phone]  Fax: [phone]

## 2017-07-26 ENCOUNTER — THERAPY VISIT (OUTPATIENT)
Dept: PHYSICAL THERAPY | Facility: CLINIC | Age: 42
End: 2017-07-26
Payer: COMMERCIAL

## 2017-07-26 DIAGNOSIS — K62.89 RECTAL PAIN: ICD-10-CM

## 2017-07-26 DIAGNOSIS — M62.89 PELVIC FLOOR DYSFUNCTION: ICD-10-CM

## 2017-07-26 PROCEDURE — 97140 MANUAL THERAPY 1/> REGIONS: CPT | Mod: GP | Performed by: PHYSICAL THERAPIST

## 2017-08-18 ENCOUNTER — THERAPY VISIT (OUTPATIENT)
Dept: PHYSICAL THERAPY | Facility: CLINIC | Age: 42
End: 2017-08-18
Payer: COMMERCIAL

## 2017-08-18 DIAGNOSIS — G89.29 CHRONIC BILATERAL LOW BACK PAIN WITHOUT SCIATICA: ICD-10-CM

## 2017-08-18 DIAGNOSIS — M54.50 CHRONIC BILATERAL LOW BACK PAIN WITHOUT SCIATICA: ICD-10-CM

## 2017-08-18 PROCEDURE — 97140 MANUAL THERAPY 1/> REGIONS: CPT | Mod: GP | Performed by: PHYSICAL THERAPIST

## 2017-08-18 PROCEDURE — 97110 THERAPEUTIC EXERCISES: CPT | Mod: GP | Performed by: PHYSICAL THERAPIST

## 2017-08-21 DIAGNOSIS — F33.1 MODERATE EPISODE OF RECURRENT MAJOR DEPRESSIVE DISORDER (H): ICD-10-CM

## 2017-08-21 NOTE — TELEPHONE ENCOUNTER
prozac     Last Written Prescription Date: 06/27/17  Last Fill Quantity: 30, # refills: 1  Last Office Visit with G primary care provider:  06/27/17        Last PHQ-9 score on record=   PHQ-9 SCORE 6/27/2017   Total Score -   Total Score MyChart 4 (Minimal depression)   Total Score 4

## 2017-08-22 RX ORDER — FLUOXETINE 10 MG/1
CAPSULE ORAL
Qty: 30 CAPSULE | Refills: 0 | Status: SHIPPED | OUTPATIENT
Start: 2017-08-22 | End: 2017-09-13

## 2017-08-22 NOTE — TELEPHONE ENCOUNTER
Medication is being filled for 1 time refill only due to:  Patient needs to be seen because due for mood follow up.     Annabella Matthews, RN, BSN

## 2017-08-24 ENCOUNTER — THERAPY VISIT (OUTPATIENT)
Dept: PHYSICAL THERAPY | Facility: CLINIC | Age: 42
End: 2017-08-24
Payer: COMMERCIAL

## 2017-08-24 DIAGNOSIS — K62.89 RECTAL PAIN: ICD-10-CM

## 2017-08-24 DIAGNOSIS — M62.89 PELVIC FLOOR DYSFUNCTION: ICD-10-CM

## 2017-08-24 PROCEDURE — 97140 MANUAL THERAPY 1/> REGIONS: CPT | Mod: GP | Performed by: PHYSICAL THERAPIST

## 2017-08-24 PROCEDURE — 97110 THERAPEUTIC EXERCISES: CPT | Mod: GP | Performed by: PHYSICAL THERAPIST

## 2017-09-01 ENCOUNTER — THERAPY VISIT (OUTPATIENT)
Dept: PHYSICAL THERAPY | Facility: CLINIC | Age: 42
End: 2017-09-01
Payer: COMMERCIAL

## 2017-09-01 DIAGNOSIS — M54.50 CHRONIC BILATERAL LOW BACK PAIN WITHOUT SCIATICA: ICD-10-CM

## 2017-09-01 DIAGNOSIS — G89.29 CHRONIC BILATERAL LOW BACK PAIN WITHOUT SCIATICA: ICD-10-CM

## 2017-09-01 PROCEDURE — 97140 MANUAL THERAPY 1/> REGIONS: CPT | Mod: GP | Performed by: PHYSICAL THERAPIST

## 2017-09-01 PROCEDURE — 97112 NEUROMUSCULAR REEDUCATION: CPT | Mod: GP | Performed by: PHYSICAL THERAPIST

## 2017-09-01 PROCEDURE — 97110 THERAPEUTIC EXERCISES: CPT | Mod: GP | Performed by: PHYSICAL THERAPIST

## 2017-09-08 ENCOUNTER — THERAPY VISIT (OUTPATIENT)
Dept: PHYSICAL THERAPY | Facility: CLINIC | Age: 42
End: 2017-09-08
Payer: COMMERCIAL

## 2017-09-08 DIAGNOSIS — M54.50 CHRONIC BILATERAL LOW BACK PAIN WITHOUT SCIATICA: ICD-10-CM

## 2017-09-08 DIAGNOSIS — G89.29 CHRONIC BILATERAL LOW BACK PAIN WITHOUT SCIATICA: ICD-10-CM

## 2017-09-08 PROCEDURE — 97140 MANUAL THERAPY 1/> REGIONS: CPT | Mod: GP | Performed by: PHYSICAL THERAPIST

## 2017-09-08 PROCEDURE — 97112 NEUROMUSCULAR REEDUCATION: CPT | Mod: GP | Performed by: PHYSICAL THERAPIST

## 2017-09-08 PROCEDURE — 97110 THERAPEUTIC EXERCISES: CPT | Mod: GP | Performed by: PHYSICAL THERAPIST

## 2017-09-12 ENCOUNTER — MYC MEDICAL ADVICE (OUTPATIENT)
Dept: FAMILY MEDICINE | Facility: OTHER | Age: 42
End: 2017-09-12

## 2017-09-12 DIAGNOSIS — F33.1 MODERATE EPISODE OF RECURRENT MAJOR DEPRESSIVE DISORDER (H): ICD-10-CM

## 2017-09-12 DIAGNOSIS — E55.9 VITAMIN D INSUFFICIENCY: Primary | ICD-10-CM

## 2017-09-12 NOTE — LETTER
[address]   [facility] [address]  Phone: [phone]    September 13, 2017        Avis Avalos  [address] [facility] [address]          To whom it may concern:    RE: Avis Avalos    I am recommending an adjustable desk at work as a part of her treatment plan for back pain.    Please contact me for questions or concerns.      Sincerely,    JEM Lindsay CNP

## 2017-09-13 RX ORDER — FLUOXETINE 10 MG/1
10 CAPSULE ORAL DAILY
Qty: 30 CAPSULE | Refills: 3 | Status: SHIPPED | OUTPATIENT
Start: 2017-09-13 | End: 2017-11-07 | Stop reason: ALTCHOICE

## 2017-09-15 ENCOUNTER — THERAPY VISIT (OUTPATIENT)
Dept: PHYSICAL THERAPY | Facility: CLINIC | Age: 42
End: 2017-09-15
Payer: COMMERCIAL

## 2017-09-15 DIAGNOSIS — M62.89 PELVIC FLOOR DYSFUNCTION: ICD-10-CM

## 2017-09-15 DIAGNOSIS — M54.50 CHRONIC BILATERAL LOW BACK PAIN WITHOUT SCIATICA: ICD-10-CM

## 2017-09-15 DIAGNOSIS — G89.29 CHRONIC BILATERAL LOW BACK PAIN WITHOUT SCIATICA: ICD-10-CM

## 2017-09-15 DIAGNOSIS — K62.89 RECTAL PAIN: ICD-10-CM

## 2017-09-15 PROCEDURE — 97112 NEUROMUSCULAR REEDUCATION: CPT | Mod: GP | Performed by: PHYSICAL THERAPIST

## 2017-09-15 PROCEDURE — 97140 MANUAL THERAPY 1/> REGIONS: CPT | Mod: GP | Performed by: PHYSICAL THERAPIST

## 2017-09-15 PROCEDURE — 97110 THERAPEUTIC EXERCISES: CPT | Mod: GP | Performed by: PHYSICAL THERAPIST

## 2017-09-15 NOTE — PROGRESS NOTES
Subjective:    HPI  Oswestry Score: 18 %                 Objective:    System    Physical Exam    General     ROS    Assessment/Plan:      DISCHARGE REPORT    Progress reporting period is from 7/20/17 to 9/15/17.       SUBJECTIVE  Pt notes her low back continues to get stiff, but feels overall sharp pains are less. Has returned to performing elliptical and she feels this increases low back pain after about 10 minutes. Prolonged standing also continues to aggravate low back. Has not been having any issues w/ rectal pain and feels she is now able to sit on any surface and no longer has to ease into position. Feels she is ready to continue managing sx independently for now.      Current Pain level: 3/10.     Initial Pain level: 6/10.   Changes in function:  Yes (See Goal flowsheet attached for changes in current functional level)  Adverse reaction to treatment or activity: None    OBJECTIVE  Lumbar AROM: flexion to floor, sidebend to knee bilateral, extension WNL +pain. (-) Gillet test, (-) Active SLR, (-) IESHA B; decline rectal reassessment due to improvement in sx.     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  Self Management Plans:  Patient is  independent in a home treatment program.  Patient is independent in self management of symptoms.  I have re-evaluated this patient and find that PT intervention is no longer required to meet STG/LTG.    Recommendations:  This patient is ready to be discharged from therapy and continue their home treatment program.    Please refer to the daily flowsheet for treatment today, total treatment time and time spent performing 1:1 timed codes.

## 2017-09-15 NOTE — MR AVS SNAPSHOT
After Visit Summary   9/15/2017    Avis Avalos    MRN: [id number]           Patient Information     Date Of Birth          1975        Visit Information        Provider Department      9/15/2017 7:00 AM Hilligoss, Amanda K, PT [facility]        Today's Diagnoses     Chronic bilateral low back pain without sciatica        Rectal pain        Pelvic floor dysfunction           Follow-ups after your visit        Who to contact     If you have questions or need follow up information about today's clinic visit or your schedule please contact [facility] directly at [phone].  Normal or non-critical lab and imaging results will be communicated to you by [employer], letter or phone within 4 business days after the clinic has received the results. If you do not hear from us within 7 days, please contact the clinic through [employer] or phone. If you have a critical or abnormal lab result, we will notify you by phone as soon as possible.  Submit refill requests through [employer] or call your pharmacy and they will forward the refill request to us. Please allow 3 business days for your refill to be completed.          Additional Information About Your Visit        MyChart Information     [employer] gives you secure access to your electronic health record. If you see a primary care provider, you can also send messages to your care team and make appointments. If you have questions, please call your primary care clinic.  If you do not have a primary care provider, please call [phone] and they will assist you.        Care EveryWhere ID     This is your Care EveryWhere ID. This could be used by other organizations to access your [facility] medical records  [id number]         Blood Pressure from Last 3 Encounters:   06/27/17 100/62   06/19/17 105/60   06/16/17 114/66    Weight from Last 3 Encounters:   06/27/17  76.6 kg (168 lb 12.8 oz)   06/19/17 77.2 kg (170 lb 3.2 oz)   06/16/17 77.2 kg (170 lb 3.2 oz)              We Performed the Following     МАРИНА PROGRESS NOTES REPORT     MANUAL THER TECH,1+REGIONS,EA 15 MIN     NEUROMUSCULAR RE-EDUCATION     THERAPEUTIC EXERCISES        Primary Care Provider Office Phone # Fax #    Sarah Wilhelm, APRN [phone]       [address] [facility] [address]  [facility] [address]        Equal Access to Services     [facility]: Hadii aad ku hadasho Soomaali, waaxda luqadaha, qaybta kaalmada adeegyada, waxay idiin haymandyn lena lópez . So [facility] [place] [facility] [phone].    ATENCIÓN: Si habla español, tiene a devine disposición servicios gratuitos de asistencia lingüística. [facility] [phone].    We comply with applicable federal civil rights laws and [place] laws. We do not discriminate on the basis of race, color, national origin, age, disability sex, sexual orientation or gender identity.            Thank you!     Thank you for choosing [facility]  for your care. Our goal is always to provide you with excellent care. Hearing back from our patients is one way we can continue to improve our services. Please take a few minutes to complete the written survey that you may receive in the mail after your visit with us. Thank you!             Your Updated Medication List - Protect others around you: Learn how to safely use, store and throw away your medicines at www.disposemymeds.org.          This list is accurate as of: 9/15/17 11:34 AM.  Always use your most recent med list.                   Brand Name Dispense Instructions for use Diagnosis    ALEVE PO      Take 220 mg by mouth daily as needed for moderate pain        ALPRAZolam 0.5 MG tablet    XANAX    20 tablet    Use 1-2 tablets 30-60 minutes before flying. Try one tablet first. Take a second if needed.    Flying phobia       fluconazole 150 MG tablet    DIFLUCAN    4  tablet    Take 1 tablet (150 mg) by mouth every 3 days    Yeast infection of the vagina       FLUoxetine 10 MG capsule    PROzac    30 capsule    Take 1 capsule (10 mg) by mouth daily    Moderate episode of recurrent major depressive disorder (H)       fluticasone 50 MCG/ACT spray    FLONASE    1 Bottle    Spray 1-2 sprays into both nostrils daily    Acute sinusitis with symptoms > 10 days       IBU-200 PO      Take 2-3 tablets by mouth as needed Reported on 5/10/2017        loratadine 10 MG ODT tab    CLARITIN REDITABS     Take 10 mg by mouth daily Reported on 3/26/2017        * NONFORMULARY      1 tablet daily        * NONFORMULARY           RA ONE DAILY WOMENS Tabs      Take  by mouth.        sennosides 8.6 MG tablet    SENOKOT     Take 1 tablet by mouth daily        TYLENOL PO      Take by mouth as needed Reported on 5/10/2017        valACYclovir 1000 mg tablet    VALTREX    21 tablet    TAKE ONE TABLET BY MOUTH DAILY AS NEEDED    Herpes simplex type 2 infection       vitamin D3 78786 UNITS capsule    CHOLECALCIFEROL    8 capsule    Take 1 capsule (50,000 Units) by mouth every 7 days    Vitamin D insufficiency       * Notice:  This list has 2 medication(s) that are the same as other medications prescribed for you. Read the directions carefully, and ask your doctor or other care provider to review them with you.

## 2017-09-18 ENCOUNTER — OFFICE VISIT (OUTPATIENT)
Dept: URGENT CARE | Facility: RETAIL CLINIC | Age: 42
End: 2017-09-18
Payer: COMMERCIAL

## 2017-09-18 VITALS
HEART RATE: 82 BPM | TEMPERATURE: 98.2 F | OXYGEN SATURATION: 99 % | SYSTOLIC BLOOD PRESSURE: 113 MMHG | DIASTOLIC BLOOD PRESSURE: 72 MMHG

## 2017-09-18 DIAGNOSIS — J01.90 ACUTE SINUSITIS WITH SYMPTOMS > 10 DAYS: Primary | ICD-10-CM

## 2017-09-18 DIAGNOSIS — R05.9 COUGH: ICD-10-CM

## 2017-09-18 PROCEDURE — 99213 OFFICE O/P EST LOW 20 MIN: CPT | Performed by: PHYSICIAN ASSISTANT

## 2017-09-18 RX ORDER — FLUCONAZOLE 150 MG/1
150 TABLET ORAL ONCE
Qty: 1 TABLET | Refills: 0 | Status: SHIPPED | OUTPATIENT
Start: 2017-09-18 | End: 2017-09-18

## 2017-09-18 RX ORDER — ALBUTEROL SULFATE 90 UG/1
1-2 AEROSOL, METERED RESPIRATORY (INHALATION) EVERY 4 HOURS PRN
Qty: 1 INHALER | Refills: 0 | Status: SHIPPED | OUTPATIENT
Start: 2017-09-18 | End: 2017-11-13

## 2017-09-18 RX ORDER — GUAIFENESIN 600 MG/1
1200 TABLET, EXTENDED RELEASE ORAL 2 TIMES DAILY
COMMUNITY
End: 2019-09-26

## 2017-09-18 NOTE — NURSING NOTE
"Chief Complaint   Patient presents with     Sinus Problem     symptoms over 2 weeks; began with itchy eyes, ears, throat; drainage began 2 days ago; alot of pressure     Cough     chest congestion; productive     Otalgia     pain, pressure; both     Fever     2 days     Generalized Body Aches     2 days     Headache       Initial /72 (BP Location: Left arm)  Pulse 82  Temp 98.2  F (36.8  C) (Oral)  SpO2 99% Estimated body mass index is 29.36 kg/(m^2) as calculated from the following:    Height as of 6/27/17: 5' 3.58\" (1.615 m).    Weight as of 6/27/17: 168 lb 12.8 oz (76.6 kg).  Medication Reconciliation: complete  "

## 2017-09-18 NOTE — PATIENT INSTRUCTIONS
Take antibiotic as directed  Apply warm facial compresses/packs for 5-10 minutes three times daily.  Use inhaler as needed  Take diflucan if needed  Drink plenty of fluids- 6 to 10 glasses of liquids each day. Rest.  Saline drops or nasal sprays as needed.   May use netti pot as needed. Do not use tap water. May use filtered or distilled water.  Steam treatments or humidifier.  Sudafed (decongestant) for congestion.  Mucinex (guaifenesin) to thin out secretions.  Tylenol or ibuprofen as needed for pain or fever  Follow up at your primary care clinic for increasing pain, high fever, vision changes, worsening symptoms, or no relief from symptoms after 7-10 days.  Please follow up with primary care provider if not improving, worsening or new symptoms or for any adverse reactions to medications.

## 2017-09-18 NOTE — PROGRESS NOTES
Chief Complaint   Patient presents with     Sinus Problem     symptoms over 2 weeks; began with itchy eyes, ears, throat; drainage began 2 days ago; alot of pressure     Cough     chest congestion; productive     Otalgia     pain, pressure; both     Fever     2 days     Generalized Body Aches     2 days     Headache     SUBJECTIVE:  Avis Avalos is a 42 year old female here with concerns about sinus infection.  She states onset of symptoms were 2 week(s) ago.  She has had maxillary pressure. Course of illness is worsening. Severity moderate  Current and Associated symptoms: nasal congestion, rhinorrhea, cough , facial pain/pressure, headache and post-nasal drainage, ear pressure,  Predisposing factors include HX of recurrent sinusitis and recent illness. Recent treatment has included: zyrtec, flonase, tylenol, benadryl, mucinex, ibuprofen    Past Medical History:   Diagnosis Date     Crohn disease (H)      Depression     1-2 years ago     Fructose intolerance      High risk HPV infection 4/14/10    normal pap, + HR HPV (59). normal paps since     PONV (postoperative nausea and vomiting)     after colonoscopy     Current Outpatient Prescriptions   Medication Sig Dispense Refill     Cetirizine HCl (ZYRTEC ALLERGY PO)        Pseudoephedrine HCl (SUDAFED PO)        guaiFENesin (MUCINEX) 600 MG 12 hr tablet Take 1,200 mg by mouth 2 times daily       DiphenhydrAMINE HCl (BENADRYL PO)        amoxicillin-clavulanate (AUGMENTIN) 875-125 MG per tablet Take 1 tablet by mouth 2 times daily for 10 days 20 tablet 0     fluconazole (DIFLUCAN) 150 MG tablet Take 1 tablet (150 mg) by mouth once for 1 dose If symptoms develop 1 tablet 0     albuterol (PROAIR HFA/PROVENTIL HFA/VENTOLIN HFA) 108 (90 BASE) MCG/ACT Inhaler Inhale 1-2 puffs into the lungs every 4 hours as needed For wheezing 1 Inhaler 0     FLUoxetine (PROZAC) 10 MG capsule Take 1 capsule (10 mg) by mouth daily 30 capsule 3     NONFORMULARY 1 tablet daily        fluticasone (FLONASE) 50 MCG/ACT spray Spray 1-2 sprays into both nostrils daily 1 Bottle 1     Ibuprofen (IBU-200 PO) Take 2-3 tablets by mouth as needed Reported on 5/10/2017       Acetaminophen (TYLENOL PO) Take by mouth as needed Reported on 5/10/2017       Multiple Vitamins-Minerals (RA ONE DAILY WOMENS) TABS Take  by mouth.       vitamin D3 (CHOLECALCIFEROL) 53527 UNITS capsule Take 1 capsule (50,000 Units) by mouth every 7 days (Patient not taking: Reported on 9/18/2017) 8 capsule 0     NONFORMULARY        sennosides (SENOKOT) 8.6 MG tablet Take 1 tablet by mouth daily       Naproxen Sodium (ALEVE PO) Take 220 mg by mouth daily as needed for moderate pain       valACYclovir (VALTREX) 1000 mg tablet TAKE ONE TABLET BY MOUTH DAILY AS NEEDED (Patient not taking: Reported on 9/18/2017) 21 tablet 0     fluconazole (DIFLUCAN) 150 MG tablet Take 1 tablet (150 mg) by mouth every 3 days (Patient not taking: Reported on 9/18/2017) 4 tablet 0     ALPRAZolam (XANAX) 0.5 MG tablet Use 1-2 tablets 30-60 minutes before flying. Try one tablet first. Take a second if needed. (Patient not taking: Reported on 9/18/2017) 20 tablet 0     loratadine (CLARITIN REDITABS) 10 MG dispersible tablet Take 10 mg by mouth daily Reported on 3/26/2017          Allergies   Allergen Reactions     Fructose GI Disturbance     Sorbitol Diarrhea        History   Smoking Status     Former Smoker   Smokeless Tobacco     Never Used     Comment: 2 1/2 year ago       ROS:  CONSTITUTIONAL:POSITIVE  for malaise, body aches  ENT/MOUTH: POSITIVE for nasal congestion, rhinorrhea-purulent and sinus pressure and  RESP:POSITIVE for cough-productive, mild wheezing    OBJECTIVE:  /72 (BP Location: Left arm)  Pulse 82  Temp 98.2  F (36.8  C) (Oral)  SpO2 99%  Exam:GENERAL APPEARANCE: mildly ill appearing  EYES: conjunctiva clear  HENT: TMs dull, nasal turbinates erythematous, swollen, rhinorrhea yellow, oral mucous membranes moist, no erythema noted,  maxillary sinus tenderness. Post nasal drainage noted in the pharynx.  NECK: supple, nontender, no lymphadenopathy  RESP: lungs clear to auscultation - no rales, rhonchi or wheezes  CV: regular rates and rhythm, normal S1 S2, no murmur noted  SKIN: no suspicious lesions or rashes    ASSESSMENT:  (J01.90) Acute sinusitis with symptoms > 10 days  (primary encounter diagnosis)  (R05) Cough    PLAN:  Plan: amoxicillin-clavulanate (AUGMENTIN) 875-125 MG per tablet,  fluconazole (DIFLUCAN) 150 MG tablet  albuterol (PROAIR HFA/PROVENTIL HFA/VENTOLIN    HFA) 108 (90 BASE) MCG/ACT Inhaler  Take antibiotic as directed  Apply warm facial compresses/packs for 5-10 minutes three times daily.  Use inhaler as needed  Take diflucan if needed  Drink plenty of fluids- 6 to 10 glasses of liquids each day. Rest.  Saline drops or nasal sprays as needed.   May use netti pot as needed. Do not use tap water. May use filtered or distilled water.  Steam treatments or humidifier.  Sudafed (decongestant) for congestion.  Mucinex (guaifenesin) to thin out secretions.  Tylenol or ibuprofen as needed for pain or fever  Follow up at your primary care clinic for increasing pain, high fever, vision changes, worsening symptoms, or no relief from symptoms after 7-10 days.  Please follow up with primary care provider if not improving, worsening or new symptoms or for any adverse reactions to medications      Diana Ardon PA-C  [facility]

## 2017-09-18 NOTE — MR AVS SNAPSHOT
After Visit Summary   9/18/2017    Avis Avalos    MRN: [id number]           Patient Information     Date Of Birth          1975        Visit Information        Provider Department      9/18/2017 2:50 PM Diana Ardon PA-C [facility]        Today's Diagnoses     Acute sinusitis with symptoms > 10 days    -  1    Cough          Care Instructions    Take antibiotic as directed  Apply warm facial compresses/packs for 5-10 minutes three times daily.  Use inhaler as needed  Take diflucan if needed  Drink plenty of fluids- 6 to 10 glasses of liquids each day. Rest.  Saline drops or nasal sprays as needed.   May use netti pot as needed. Do not use tap water. May use filtered or distilled water.  Steam treatments or humidifier.  Sudafed (decongestant) for congestion.  Mucinex (guaifenesin) to thin out secretions.  Tylenol or ibuprofen as needed for pain or fever  Follow up at your primary care clinic for increasing pain, high fever, vision changes, worsening symptoms, or no relief from symptoms after 7-10 days.  Please follow up with primary care provider if not improving, worsening or new symptoms or for any adverse reactions to medications.               Follow-ups after your visit        Who to contact     You can reach your care team any time of the day by calling [phone].  Notification of test results:  If you have an abnormal lab result, we will notify you by phone as soon as possible.         Additional Information About Your Visit        MyChart Information     [employer] gives you secure access to your electronic health record. If you see a primary care provider, you can also send messages to your care team and make appointments. If you have questions, please call your primary care clinic.  If you do not have a primary care provider, please call [phone] and they will assist you.        Care EveryWhere ID     This is your Care EveryWhere ID. This could be  used by other organizations to access your [facility] medical records  [id number]        Your Vitals Were     Pulse Temperature Pulse Oximetry             82 98.2  F (36.8  C) (Oral) 99%          Blood Pressure from Last 3 Encounters:   09/18/17 113/72   06/27/17 100/62   06/19/17 105/60    Weight from Last 3 Encounters:   06/27/17 168 lb 12.8 oz (76.6 kg)   06/19/17 170 lb 3.2 oz (77.2 kg)   06/16/17 170 lb 3.2 oz (77.2 kg)              Today, you had the following     No orders found for display         Today's Medication Changes          These changes are accurate as of: 9/18/17  3:42 PM.  If you have any questions, ask your nurse or doctor.               Start taking these medicines.        Dose/Directions    albuterol 108 (90 BASE) MCG/ACT Inhaler   Commonly known as:  PROAIR HFA/PROVENTIL HFA/VENTOLIN HFA   Used for:  Cough        Dose:  1-2 puff   Inhale 1-2 puffs into the lungs every 4 hours as needed For wheezing   Quantity:  1 Inhaler   Refills:  0       amoxicillin-clavulanate 875-125 MG per tablet   Commonly known as:  AUGMENTIN   Used for:  Acute sinusitis with symptoms > 10 days        Dose:  1 tablet   Take 1 tablet by mouth 2 times daily for 10 days   Quantity:  20 tablet   Refills:  0         These medicines have changed or have updated prescriptions.        Dose/Directions    * fluconazole 150 MG tablet   Commonly known as:  DIFLUCAN   This may have changed:  Another medication with the same name was added. Make sure you understand how and when to take each.   Used for:  Yeast infection of the vagina        Dose:  150 mg   Take 1 tablet (150 mg) by mouth every 3 days   Quantity:  4 tablet   Refills:  0       * fluconazole 150 MG tablet   Commonly known as:  DIFLUCAN   This may have changed:  You were already taking a medication with the same name, and this prescription was added. Make sure you understand how and when to take each.   Used for:  Acute sinusitis with symptoms > 10 days        Dose:   150 mg   Take 1 tablet (150 mg) by mouth once for 1 dose If symptoms develop   Quantity:  1 tablet   Refills:  0       * Notice:  This list has 2 medication(s) that are the same as other medications prescribed for you. Read the directions carefully, and ask your doctor or other care provider to review them with you.         Where to get your medicines      These medications were sent to [facility] [address] [address] [facility] - [place], [place] - [address] [address]  [address] [facility] [address]     Phone:  [phone]     albuterol 108 (90 BASE) MCG/ACT Inhaler    amoxicillin-clavulanate 875-125 MG per tablet    fluconazole 150 MG tablet                Primary Care Provider Office Phone # Fax #    Sarah Pilar Wilhelm, APRN [facility] [phone]       [address]  [facility] [address]        Equal Access to Services     [facility] CenterME[facility] : Hadii aad ku hadasho Sooleg, waaxda luqadaha, qaybta kaalmada adeegyada, les segovia haysana lópez . So [facility] [phone].    ATENCIÓN: Si habla español, tiene a devine disposición servicios gratuitos de asistencia lingüística. Llame al [phone].    We comply with applicable federal civil rights laws and [place] laws. We do not discriminate on the basis of race, color, national origin, age, disability sex, sexual orientation or gender identity.            Thank you!     Thank you for choosing [facility]  for your care. Our goal is always to provide you with excellent care. Hearing back from our patients is one way we can continue to improve our services. Please take a few minutes to complete the written survey that you may receive in the mail after your visit with us. Thank you!             Your Updated Medication List - Protect others around you: Learn how to safely use, store and throw away your medicines at www.disposemymeds.org.          This list is accurate as of: 9/18/17  3:42 PM.  Always use your most recent med list.                    Brand Name Dispense Instructions for use Diagnosis    albuterol 108 (90 BASE) MCG/ACT Inhaler    PROAIR HFA/PROVENTIL HFA/VENTOLIN HFA    1 Inhaler    Inhale 1-2 puffs into the lungs every 4 hours as needed For wheezing    Cough       ALEVE PO      Take 220 mg by mouth daily as needed for moderate pain        ALPRAZolam 0.5 MG tablet    XANAX    20 tablet    Use 1-2 tablets 30-60 minutes before flying. Try one tablet first. Take a second if needed.    Flying phobia       amoxicillin-clavulanate 875-125 MG per tablet    AUGMENTIN    20 tablet    Take 1 tablet by mouth 2 times daily for 10 days    Acute sinusitis with symptoms > 10 days       BENADRYL PO           * fluconazole 150 MG tablet    DIFLUCAN    4 tablet    Take 1 tablet (150 mg) by mouth every 3 days    Yeast infection of the vagina       * fluconazole 150 MG tablet    DIFLUCAN    1 tablet    Take 1 tablet (150 mg) by mouth once for 1 dose If symptoms develop    Acute sinusitis with symptoms > 10 days       FLUoxetine 10 MG capsule    PROzac    30 capsule    Take 1 capsule (10 mg) by mouth daily    Moderate episode of recurrent major depressive disorder (H)       fluticasone 50 MCG/ACT spray    FLONASE    1 Bottle    Spray 1-2 sprays into both nostrils daily    Acute sinusitis with symptoms > 10 days       IBU-200 PO      Take 2-3 tablets by mouth as needed Reported on 5/10/2017        loratadine 10 MG ODT tab    CLARITIN REDITABS     Take 10 mg by mouth daily Reported on 3/26/2017        MUCINEX 600 MG 12 hr tablet   Generic drug:  guaiFENesin      Take 1,200 mg by mouth 2 times daily        * NONFORMULARY      1 tablet daily        * NONFORMULARY           RA ONE DAILY WOMENS Tabs      Take  by mouth.        sennosides 8.6 MG tablet    SENOKOT     Take 1 tablet by mouth daily        SUDAFED PO           TYLENOL PO      Take by mouth as needed Reported on 5/10/2017        valACYclovir 1000 mg tablet    VALTREX    21 tablet    TAKE ONE TABLET BY MOUTH  DAILY AS NEEDED    Herpes simplex type 2 infection       vitamin D3 37921 UNITS capsule    CHOLECALCIFEROL    8 capsule    Take 1 capsule (50,000 Units) by mouth every 7 days    Vitamin D insufficiency       ZYRTEC ALLERGY PO           * Notice:  This list has 4 medication(s) that are the same as other medications prescribed for you. Read the directions carefully, and ask your doctor or other care provider to review them with you.

## 2017-10-02 ENCOUNTER — MYC MEDICAL ADVICE (OUTPATIENT)
Dept: FAMILY MEDICINE | Facility: OTHER | Age: 42
End: 2017-10-02

## 2017-10-02 DIAGNOSIS — K64.4 EXTERNAL HEMORRHOIDS: ICD-10-CM

## 2017-10-02 NOTE — TELEPHONE ENCOUNTER
Anusol       Last Written Prescription Date: 12/29/14  Last Fill Quantity: 28,  # refills: 3   Last Office Visit with [facility] or [facility] prescribing provider: 6/27/17

## 2017-10-03 RX ORDER — HYDROCORTISONE ACETATE 25 MG/1
25 SUPPOSITORY RECTAL 2 TIMES DAILY
Qty: 28 SUPPOSITORY | Refills: 5 | Status: SHIPPED | OUTPATIENT
Start: 2017-10-03 | End: 2018-01-09

## 2017-10-06 ENCOUNTER — TELEPHONE (OUTPATIENT)
Dept: FAMILY MEDICINE | Facility: OTHER | Age: 42
End: 2017-10-06

## 2017-10-06 DIAGNOSIS — B37.31 YEAST INFECTION OF THE VAGINA: ICD-10-CM

## 2017-10-06 RX ORDER — FLUCONAZOLE 150 MG/1
150 TABLET ORAL ONCE
Qty: 1 TABLET | Refills: 0 | Status: SHIPPED | OUTPATIENT
Start: 2017-10-06 | End: 2017-10-06

## 2017-10-10 NOTE — PROGRESS NOTES
"  SUBJECTIVE:                                                    Avis Avalos is a 42 year old female who presents to clinic today for the following health issues:      History of Present Illness     Depression & Anxiety Follow-up:     Depression/Anxiety:  Depression & Anxiety    Status since last visit::  Worsened    Other associated symptoms of depression and anxiety::  YES    Significant life event::  No    Current substance use::  Alcohol and Prescription Drugs    Diet:  Other  Frequency of exercise:  2-3 days/week  Duration of exercise:  15-30 minutes  Taking medications regularly:  No  Barriers to taking medications:  None and Not applicable  Medication side effects:  Other  Additional concerns today:  YES    Answers for HPI/ROS submitted by the patient on 10/12/2017   If you checked off any problems, how difficult have these problems made it for you to do your work, take care of things at home, or get along with other people?: Not difficult at all  PHQ9 TOTAL SCORE: 5  LYNDSAY 7 TOTAL SCORE: 5  PHQ-2 Score: 1      Vaginal Symptoms  Onset: Couple days    Description:   Vaginal Discharge: white, coming from clitoris   Itching (Pruritis): no   Burning sensation:  no   Odor: no     Accompanying Signs & Symptoms:  Pain with Urination: no   Abdominal Pain: no   Fever: no     History:   Sexually active: YES  New Partner: no   Possibility of Pregnancy:  No    Precipitating factors:   Recent Antibiotic Use: YES    Alleviating factors:  Monistat    Therapies Tried and outcome: Monistat    Palm oil reaction  Hydrogenated oils  Cramping, diarrhea, within 30 minutes of eating, painful explosive diarrhea  ? Endometriosis, cousin has this and is wondering if abdominal and back pain is related to this  Connected to cycle - pain in back seems to worsen with menstrual cycle  Doom and gloom prior to period  Prozac - decrease in energy    Is wondering if she has a diagnosis of fibromyalgia  Period has been \"different\" since last " "child was born 5 years ago    Problem list and histories reviewed & adjusted, as indicated.  Additional history: as documented    Labs reviewed in EPIC    ROS:  Constitutional, HEENT, cardiovascular, pulmonary, GI, , musculoskeletal, neuro, skin, endocrine and psych systems are negative, except as otherwise noted.      OBJECTIVE:   /70 (BP Location: Right arm, Patient Position: Sitting, Cuff Size: Adult Regular)  Pulse 75  Temp 99.3  F (37.4  C) (Temporal)  Resp 16  Ht 5' 3.58\" (1.615 m)  Wt 170 lb 1.6 oz (77.2 kg)  SpO2 98%  BMI 29.58 kg/m2  Body mass index is 29.58 kg/(m^2).  GENERAL: healthy, alert and no distress  EYES: Eyes grossly normal to inspection, PERRL and conjunctivae and sclerae normal  HENT: ear canals and TM's normal, nose and mouth without ulcers or lesions  NECK: no adenopathy, no asymmetry, masses, or scars and thyroid normal to palpation  RESP: lungs clear to auscultation - no rales, rhonchi or wheezes  CV: regular rate and rhythm, normal S1 S2, no S3 or S4, no murmur, click or rub, no peripheral edema and peripheral pulses strong  ABDOMEN: soft, nontender, no hepatosplenomegaly, no masses and bowel sounds normal  MS: no gross musculoskeletal defects noted, no edema  SKIN: no suspicious lesions or rashes  NEURO: Normal strength and tone, mentation intatendernessct and speech normal  BACK: no CVA tenderness, no paralumbar   Comprehensive back pain exam:  Tenderness of bilateral lumbar paraspinal muscles, Range of motion not limited by pain, Lower extremity strength functional and equal on both sides, Lower extremity reflexes within normal limits bilaterally and Lower extremity sensation normal and equal on both sides  PSYCH: mentation appears normal, affect normal/bright    Diagnostic Test Results:  Results for orders placed or performed in visit on 10/12/17   Wet prep   Result Value Ref Range    Specimen Description Labia     Wet Prep No Trichomonas seen     Wet Prep No clue cells " "seen     Wet Prep No yeast seen        ASSESSMENT/PLAN:     1. Lichen sclerosus of female genitalia  Patient reported sensation of a \"paper cut\" on labia after intercourse. Pelvic exam reveals symptoms of lichen sclerosis. Will start topical steroid for 6-12 weeks and see if symptoms improve. Her wet prep was negative. The whitish discharge is likely from the lubricant she used during intercourse that was white and thick in consistency.  - clobetasol (TEMOVATE) 0.05 % ointment; Apply sparingly to affected area at bedtime for 6-12 weeks.  Dispense: 60 g; Refill: 3    2. Chronic bilateral low back pain without sciatica  Patient has chronic low back pain due to unknown etiology. Has had x-rays of the lumbar spine that revealed mild dextroconvex curvature of the lumbar spine without other abnormalities. Will get MRI to determine if back pain is due to other etiologies.   - MR Lumbar Spine w/o Contrast; Future  - diazepam (VALIUM) 10 MG tablet; Take 1 tablet (10 mg) by mouth every 6 hours as needed for anxiety or sleep Take 30-60 minutes before procedure.  Do not operate a vehicle after taking this medication.  Dispense: 1 tablet; Refill: 0    3. Labile diastolic hypertension  Stable.    4. Chronic neck pain  Patient has not had x-rays of the cervical spine. We will start with this to see if there are any abnormalities of the vertebrae.  - XR Cervical Spine 2/3 Views; Future    5. Claustrophobia  - diazepam (VALIUM) 10 MG tablet; Take 1 tablet (10 mg) by mouth every 6 hours as needed for anxiety or sleep Take 30-60 minutes before procedure.  Do not operate a vehicle after taking this medication.  Dispense: 1 tablet; Refill: 0    6. Labial pain  - Wet prep    7. Vaginal symptom  - Wet prep    Greater than 50% of 40 minute visit were spent on counseling or coordination of care regarding lichen sclerosis, chronic neck and back pain.     JEM Lindsay [facility]"

## 2017-10-12 ENCOUNTER — OFFICE VISIT (OUTPATIENT)
Dept: FAMILY MEDICINE | Facility: OTHER | Age: 42
End: 2017-10-12
Payer: COMMERCIAL

## 2017-10-12 VITALS
OXYGEN SATURATION: 98 % | RESPIRATION RATE: 16 BRPM | BODY MASS INDEX: 29.04 KG/M2 | HEIGHT: 64 IN | WEIGHT: 170.1 LBS | TEMPERATURE: 99.3 F | HEART RATE: 75 BPM | SYSTOLIC BLOOD PRESSURE: 108 MMHG | DIASTOLIC BLOOD PRESSURE: 70 MMHG

## 2017-10-12 DIAGNOSIS — G89.29 CHRONIC BILATERAL LOW BACK PAIN WITHOUT SCIATICA: ICD-10-CM

## 2017-10-12 DIAGNOSIS — I10 LABILE DIASTOLIC HYPERTENSION: ICD-10-CM

## 2017-10-12 DIAGNOSIS — N94.89 LABIAL PAIN: ICD-10-CM

## 2017-10-12 DIAGNOSIS — M54.2 CHRONIC NECK PAIN: ICD-10-CM

## 2017-10-12 DIAGNOSIS — F40.240 CLAUSTROPHOBIA: ICD-10-CM

## 2017-10-12 DIAGNOSIS — N90.4 LICHEN SCLEROSUS OF FEMALE GENITALIA: ICD-10-CM

## 2017-10-12 DIAGNOSIS — M54.50 CHRONIC BILATERAL LOW BACK PAIN WITHOUT SCIATICA: ICD-10-CM

## 2017-10-12 DIAGNOSIS — G89.29 CHRONIC NECK PAIN: ICD-10-CM

## 2017-10-12 DIAGNOSIS — N94.9 VAGINAL SYMPTOM: Primary | ICD-10-CM

## 2017-10-12 LAB
SPECIMEN SOURCE: NORMAL
WET PREP SPEC: NORMAL

## 2017-10-12 PROCEDURE — 87210 SMEAR WET MOUNT SALINE/INK: CPT | Performed by: STUDENT IN AN ORGANIZED HEALTH CARE EDUCATION/TRAINING PROGRAM

## 2017-10-12 PROCEDURE — 99214 OFFICE O/P EST MOD 30 MIN: CPT | Performed by: STUDENT IN AN ORGANIZED HEALTH CARE EDUCATION/TRAINING PROGRAM

## 2017-10-12 RX ORDER — CLOBETASOL PROPIONATE 0.5 MG/G
OINTMENT TOPICAL
Qty: 60 G | Refills: 3 | Status: SHIPPED | OUTPATIENT
Start: 2017-10-12 | End: 2019-10-11

## 2017-10-12 RX ORDER — DIAZEPAM 10 MG
10 TABLET ORAL EVERY 6 HOURS PRN
Qty: 1 TABLET | Refills: 0 | Status: SHIPPED | OUTPATIENT
Start: 2017-10-12 | End: 2018-01-09

## 2017-10-12 ASSESSMENT — ANXIETY QUESTIONNAIRES
4. TROUBLE RELAXING: SEVERAL DAYS
7. FEELING AFRAID AS IF SOMETHING AWFUL MIGHT HAPPEN: NOT AT ALL
2. NOT BEING ABLE TO STOP OR CONTROL WORRYING: SEVERAL DAYS
7. FEELING AFRAID AS IF SOMETHING AWFUL MIGHT HAPPEN: NOT AT ALL
6. BECOMING EASILY ANNOYED OR IRRITABLE: SEVERAL DAYS
3. WORRYING TOO MUCH ABOUT DIFFERENT THINGS: SEVERAL DAYS
1. FEELING NERVOUS, ANXIOUS, OR ON EDGE: SEVERAL DAYS
GAD7 TOTAL SCORE: 5
5. BEING SO RESTLESS THAT IT IS HARD TO SIT STILL: NOT AT ALL

## 2017-10-12 ASSESSMENT — PATIENT HEALTH QUESTIONNAIRE - PHQ9
10. IF YOU CHECKED OFF ANY PROBLEMS, HOW DIFFICULT HAVE THESE PROBLEMS MADE IT FOR YOU TO DO YOUR WORK, TAKE CARE OF THINGS AT HOME, OR GET ALONG WITH OTHER PEOPLE: NOT DIFFICULT AT ALL
SUM OF ALL RESPONSES TO PHQ QUESTIONS 1-9: 5
SUM OF ALL RESPONSES TO PHQ QUESTIONS 1-9: 5

## 2017-10-12 ASSESSMENT — PAIN SCALES - GENERAL: PAINLEVEL: NO PAIN (0)

## 2017-10-12 NOTE — MR AVS SNAPSHOT
After Visit Summary   10/12/2017    Avis Avalos    MRN: [id number]           Patient Information     Date Of Birth          1975        Visit Information        Provider Department      10/12/2017 4:40 PM Sarah Wilhelm APRN CNP [facility]        Today's Diagnoses     Vaginal symptom    -  1    Labial pain        Labile diastolic hypertension        Lichen sclerosus of female genitalia          Care Instructions    Clobetasol - topically to bottom at bedtime for 6-12 weeks.    Sarah Wilhelm NP-C            Follow-ups after your visit        Who to contact     If you have questions or need follow up information about today's clinic visit or your schedule please contact [facility] directly at [phone].  Normal or non-critical lab and imaging results will be communicated to you by MyMund[employer], letter or phone within 4 business days after the clinic has received the results. If you do not hear from us within 7 days, please contact the clinic through MyMundush[employer] or phone. If you have a critical or abnormal lab result, we will notify you by phone as soon as possible.  Submit refill requests through [employer] or call your pharmacy and they will forward the refill request to us. Please allow 3 business days for your refill to be completed.          Additional Information About Your Visit        MyChart Information     [employer] gives you secure access to your electronic health record. If you see a primary care provider, you can also send messages to your care team and make appointments. If you have questions, please call your primary care clinic.  If you do not have a primary care provider, please call [phone] and they will assist you.        Care EveryWhere ID     This is your Care EveryWhere ID. This could be used by other organizations to access your [facility] medical records  [id number]        Your Vitals Were     Pulse Temperature Respirations Height  "Pulse Oximetry BMI (Body Mass Index)    75 99.3  F (37.4  C) (Temporal) 16 5' 3.58\" (1.615 m) 98% 29.58 kg/m2       Blood Pressure from Last 3 Encounters:   10/12/17 108/70   09/18/17 113/72   06/27/17 100/62    Weight from Last 3 Encounters:   10/12/17 170 lb 1.6 oz (77.2 kg)   06/27/17 168 lb 12.8 oz (76.6 kg)   06/19/17 170 lb 3.2 oz (77.2 kg)              We Performed the Following     Wet prep          Today's Medication Changes          These changes are accurate as of: 10/12/17  5:46 PM.  If you have any questions, ask your nurse or doctor.               Start taking these medicines.        Dose/Directions    clobetasol 0.05 % ointment   Commonly known as:  TEMOVATE   Used for:  Lichen sclerosus of female genitalia   Started by:  Sarah Wilhelm APRN CNP        Apply sparingly to affected area at bedtime for 6-12 weeks.   Quantity:  60 g   Refills:  3            Where to get your medicines      These medications were sent to [place] [id number] [place] [facility] [place], [place] - [address] [address] [place] [address]  [address] [address] [place] [id number]     Phone:  [phone]     clobetasol 0.05 % ointment                Primary Care Provider Office Phone # Fax #    JEM Huffman [phone]       [address] [place] [address]  [facility] [address]        Equal Access to Services     CORY NEWMAN AH: Hadii larry serratoo Sooleg, waaxda luqadaha, qaybta kaalmada adeegyada, waxay luca elliott. So [facility] [phone].    ATENCIÓN: Si habla español, tiene a devine disposición servicios gratuitos de asistencia lingüística. Llame al [phone].    We comply with applicable federal civil rights laws and [place] laws. We do not discriminate on the basis of race, color, national origin, age, disability, sex, sexual orientation, or gender identity.            Thank you!     Thank you for choosing [facility]  for your care. Our goal is always to provide you with excellent care. Hearing " back from our patients is one way we can continue to improve our services. Please take a few minutes to complete the written survey that you may receive in the mail after your visit with us. Thank you!             Your Updated Medication List - Protect others around you: Learn how to safely use, store and throw away your medicines at www.disposemymeds.org.          This list is accurate as of: 10/12/17  5:46 PM.  Always use your most recent med list.                   Brand Name Dispense Instructions for use Diagnosis    albuterol 108 (90 BASE) MCG/ACT Inhaler    PROAIR HFA/PROVENTIL HFA/VENTOLIN HFA    1 Inhaler    Inhale 1-2 puffs into the lungs every 4 hours as needed For wheezing    Cough       ALEVE PO      Take 220 mg by mouth daily as needed for moderate pain        ALPRAZolam 0.5 MG tablet    XANAX    20 tablet    Use 1-2 tablets 30-60 minutes before flying. Try one tablet first. Take a second if needed.    Flying phobia       BENADRYL PO           clobetasol 0.05 % ointment    TEMOVATE    60 g    Apply sparingly to affected area at bedtime for 6-12 weeks.    Lichen sclerosus of female genitalia       FLUoxetine 10 MG capsule    PROzac    30 capsule    Take 1 capsule (10 mg) by mouth daily    Moderate episode of recurrent major depressive disorder (H)       fluticasone 50 MCG/ACT spray    FLONASE    1 Bottle    Spray 1-2 sprays into both nostrils daily    Acute sinusitis with symptoms > 10 days       hydrocortisone 25 MG Suppository    ANUSOL-HC    28 suppository    Place 1 suppository (25 mg) rectally 2 times daily    External hemorrhoids       IBU-200 PO      Take 2-3 tablets by mouth as needed Reported on 5/10/2017        loratadine 10 MG ODT tab    CLARITIN REDITABS     Take 10 mg by mouth daily Reported on 3/26/2017        MUCINEX 600 MG 12 hr tablet   Generic drug:  guaiFENesin      Take 1,200 mg by mouth 2 times daily        * NONFORMULARY      1 tablet daily        * NONFORMULARY           RA ONE  DAILY WOMENS Tabs      Take  by mouth.        sennosides 8.6 MG tablet    SENOKOT     Take 1 tablet by mouth daily        SUDAFED PO           TYLENOL PO      Take by mouth as needed Reported on 5/10/2017        valACYclovir 1000 mg tablet    VALTREX    21 tablet    TAKE ONE TABLET BY MOUTH DAILY AS NEEDED    Herpes simplex type 2 infection       vitamin D3 06761 UNITS capsule    CHOLECALCIFEROL    8 capsule    Take 1 capsule (50,000 Units) by mouth every 7 days    Vitamin D insufficiency       ZYRTEC ALLERGY PO           * Notice:  This list has 2 medication(s) that are the same as other medications prescribed for you. Read the directions carefully, and ask your doctor or other care provider to review them with you.

## 2017-10-12 NOTE — NURSING NOTE
"Chief Complaint   Patient presents with     Depression     Vaginal Problem     Panel Management     flu       Initial /70 (BP Location: Right arm, Patient Position: Sitting, Cuff Size: Adult Regular)  Pulse 75  Temp 99.3  F (37.4  C) (Temporal)  Resp 16  Ht 5' 3.58\" (1.615 m)  Wt 170 lb 1.6 oz (77.2 kg)  SpO2 98%  BMI 29.58 kg/m2 Estimated body mass index is 29.58 kg/(m^2) as calculated from the following:    Height as of this encounter: 5' 3.58\" (1.615 m).    Weight as of this encounter: 170 lb 1.6 oz (77.2 kg).  Medication Reconciliation: complete   Karin Almeida CMA      "

## 2017-10-13 ASSESSMENT — ANXIETY QUESTIONNAIRES: GAD7 TOTAL SCORE: 5

## 2017-10-13 ASSESSMENT — PATIENT HEALTH QUESTIONNAIRE - PHQ9: SUM OF ALL RESPONSES TO PHQ QUESTIONS 1-9: 5

## 2017-10-13 NOTE — PROGRESS NOTES
Discussed these results with patient in the clinic.    Sarah Wilhelm NP-C  [facility]

## 2017-10-18 ENCOUNTER — RADIANT APPOINTMENT (OUTPATIENT)
Dept: GENERAL RADIOLOGY | Facility: OTHER | Age: 42
End: 2017-10-18
Attending: STUDENT IN AN ORGANIZED HEALTH CARE EDUCATION/TRAINING PROGRAM
Payer: COMMERCIAL

## 2017-10-18 ENCOUNTER — TELEPHONE (OUTPATIENT)
Dept: FAMILY MEDICINE | Facility: OTHER | Age: 42
End: 2017-10-18

## 2017-10-18 DIAGNOSIS — N90.4 LICHEN SCLEROSUS OF FEMALE GENITALIA: Primary | ICD-10-CM

## 2017-10-18 DIAGNOSIS — G89.29 CHRONIC NECK PAIN: ICD-10-CM

## 2017-10-18 DIAGNOSIS — M54.2 CHRONIC NECK PAIN: ICD-10-CM

## 2017-10-18 PROCEDURE — 72040 X-RAY EXAM NECK SPINE 2-3 VW: CPT

## 2017-10-18 NOTE — TELEPHONE ENCOUNTER
Patient called in for a cheaper version of clobetasol (TEMOVATE) 0.05 % ointment. She stated she had requested this before, but I could not locater any documentation. Please have generic prescription sent to pharmacy. Contact patient with further questions.

## 2017-10-19 PROBLEM — N90.4 LICHEN SCLEROSUS OF FEMALE GENITALIA: Status: ACTIVE | Noted: 2017-10-19

## 2017-10-19 RX ORDER — TRIAMCINOLONE ACETONIDE 5 MG/G
CREAM TOPICAL
Qty: 454 G | Refills: 1 | Status: SHIPPED | OUTPATIENT
Start: 2017-10-19 | End: 2018-01-09

## 2017-10-19 NOTE — TELEPHONE ENCOUNTER
Message below given to patient no further questions.  Claudia Flannery CMA ([facility])

## 2017-10-19 NOTE — TELEPHONE ENCOUNTER
Sent prescription for triamcinolone steroid cream to [facility]. Please call patient to let her know of the change.  Thanks,  MICHAELA AbdullahiC

## 2017-11-06 ENCOUNTER — MYC MEDICAL ADVICE (OUTPATIENT)
Dept: FAMILY MEDICINE | Facility: OTHER | Age: 42
End: 2017-11-06

## 2017-11-06 DIAGNOSIS — M54.50 CHRONIC BILATERAL LOW BACK PAIN WITHOUT SCIATICA: Primary | ICD-10-CM

## 2017-11-06 DIAGNOSIS — F42.9 OBSESSIVE-COMPULSIVE DISORDER, UNSPECIFIED TYPE: ICD-10-CM

## 2017-11-06 DIAGNOSIS — G89.29 CHRONIC BILATERAL LOW BACK PAIN WITHOUT SCIATICA: Primary | ICD-10-CM

## 2017-11-06 DIAGNOSIS — F33.1 MODERATE EPISODE OF RECURRENT MAJOR DEPRESSIVE DISORDER (H): ICD-10-CM

## 2017-11-06 DIAGNOSIS — G89.29 CHRONIC NECK PAIN: ICD-10-CM

## 2017-11-06 DIAGNOSIS — M54.2 CHRONIC NECK PAIN: ICD-10-CM

## 2017-11-07 RX ORDER — CITALOPRAM HYDROBROMIDE 20 MG/1
TABLET ORAL
Qty: 30 TABLET | Refills: 1 | Status: SHIPPED | OUTPATIENT
Start: 2017-11-07 | End: 2017-12-20

## 2017-11-13 ENCOUNTER — OFFICE VISIT (OUTPATIENT)
Dept: FAMILY MEDICINE | Facility: OTHER | Age: 42
End: 2017-11-13
Payer: COMMERCIAL

## 2017-11-13 VITALS
TEMPERATURE: 98.3 F | SYSTOLIC BLOOD PRESSURE: 116 MMHG | RESPIRATION RATE: 16 BRPM | BODY MASS INDEX: 29.91 KG/M2 | DIASTOLIC BLOOD PRESSURE: 72 MMHG | WEIGHT: 172 LBS | HEART RATE: 62 BPM

## 2017-11-13 DIAGNOSIS — H65.91 OME (OTITIS MEDIA WITH EFFUSION), RIGHT: Primary | ICD-10-CM

## 2017-11-13 DIAGNOSIS — B37.31 YEAST INFECTION OF THE VAGINA: ICD-10-CM

## 2017-11-13 DIAGNOSIS — Z23 NEED FOR PROPHYLACTIC VACCINATION AND INOCULATION AGAINST INFLUENZA: ICD-10-CM

## 2017-11-13 PROCEDURE — 99214 OFFICE O/P EST MOD 30 MIN: CPT | Performed by: NURSE PRACTITIONER

## 2017-11-13 RX ORDER — FLUCONAZOLE 150 MG/1
150 TABLET ORAL ONCE
Qty: 1 TABLET | Refills: 0 | Status: SHIPPED | OUTPATIENT
Start: 2017-11-13 | End: 2017-11-13

## 2017-11-13 RX ORDER — AZITHROMYCIN 250 MG/1
TABLET, FILM COATED ORAL
Qty: 6 TABLET | Refills: 0 | Status: SHIPPED | OUTPATIENT
Start: 2017-11-13 | End: 2017-12-06

## 2017-11-13 NOTE — NURSING NOTE
"Chief Complaint   Patient presents with     Sick       Initial /72 (BP Location: Left arm, Patient Position: Chair, Cuff Size: Adult Large)  Pulse 62  Temp 98.3  F (36.8  C) (Oral)  Resp 16  Wt 172 lb (78 kg)  BMI 29.91 kg/m2 Estimated body mass index is 29.91 kg/(m^2) as calculated from the following:    Height as of 10/12/17: 5' 3.58\" (1.615 m).    Weight as of this encounter: 172 lb (78 kg).  Medication Reconciliation: complete    "

## 2017-11-13 NOTE — PROGRESS NOTES
SUBJECTIVE:                                                    Avis Avalos is a 42 year old female who presents to clinic today for the following health issues:      HPI    Acute Illness   Acute illness concerns: sinuses, neck and jaw pain   Onset: Last week Monday     Fever: no     Chills/Sweats: no     Headache (location?): YES    Sinus Pressure:YES    Conjunctivitis:  no    Ear Pain: YES- pressure and pain     Rhinorrhea: no     Congestion: Yes     Sore Throat: YES- just started from drainage      Cough: no    Wheeze: no     Decreased Appetite: YES    Nausea: YES, drainage     Vomiting: no     Diarrhea:  no     Dysuria/Freq.: no     Fatigue/Achiness: YES    Sick/Strep Exposure: YES- daughter      Therapies Tried and outcome: flonase, sudafed, tylenol, hot packs on neck and ears      Has also had warm sensations on the calf of leg on and off for the past week. Almost feels like warm water running down leg.  Has history of low back pain she has MRI scheduled for evaluation.     Also concerns for TMJ.       Problem list and histories reviewed & adjusted, as indicated.  Additional history: as documented    BP Readings from Last 3 Encounters:   11/13/17 116/72   10/12/17 108/70   09/18/17 113/72    Wt Readings from Last 3 Encounters:   11/13/17 172 lb (78 kg)   10/12/17 170 lb 1.6 oz (77.2 kg)   06/27/17 168 lb 12.8 oz (76.6 kg)        Labs reviewed in EPIC      ROS:  Constitutional, HEENT, cardiovascular, pulmonary, gi and gu systems are negative, except as otherwise noted.      OBJECTIVE:   /72 (BP Location: Left arm, Patient Position: Chair, Cuff Size: Adult Large)  Pulse 62  Temp 98.3  F (36.8  C) (Oral)  Resp 16  Wt 172 lb (78 kg)  BMI 29.91 kg/m2  Body mass index is 29.91 kg/(m^2).  GENERAL: alert, mild distress and fatigued  EYES: Eyes grossly normal to inspection, PERRL and conjunctivae and sclerae normal  HENT: normal cephalic/atraumatic, right ear: erythematous, bulging membrane and  mucopurulent effusion, nose and mouth without ulcers or lesions, nasal mucosa edematous , rhinorrhea clear and yellow, oropharynx clear and oral mucous membranes moist  NECK: bilateral anterior cervical adenopathy, no asymmetry, masses, or scars and thyroid normal to palpation  RESP: lungs clear to auscultation - no rales, rhonchi or wheezes  CV: regular rate and rhythm, normal S1 S2, no S3 or S4, no murmur, click or rub, no peripheral edema and peripheral pulses strong  ABDOMEN: soft, nontender, no hepatosplenomegaly, no masses and bowel sounds normal  MS: no gross musculoskeletal defects noted, no edema    Diagnostic Test Results:  none     ASSESSMENT/PLAN:     1. OME (otitis media with effusion), right  Recommend treatment with antibiotic has tolerated Zpack in past. Reviewed home care.   - azithromycin (ZITHROMAX) 250 MG tablet; Two tablets first day, then one tablet daily for four days.  Dispense: 6 tablet; Refill: 0    2. Yeast infection of the vagina  States she will get a yeast infection with antibiotics.   - fluconazole (DIFLUCAN) 150 MG tablet; Take 1 tablet (150 mg) by mouth once for 1 dose  Dispense: 1 tablet; Refill: 0    3. Need for prophylactic vaccination and inoculation against influenza  Do not recommend at this time due to acute illness will return to clinic for future immunization.   - FLU VAC, SPLIT VIRUS IM > 3 YO (QUADRIVALENT) [27770]  - Vaccine Administration, Initial [22208]      Information given for TMJ.     Patient Instructions     Pain Relief Methods for Temporomandibular Disorders (TMD)  You have been diagnosed with temporomandibular disorder (TMD). This term describes a group of problems related to the temporomandibular joint (TMJ)and nearby muscles. The TMJ is located where the upper and lower jaws meet. TMD can cause painful and frustrating symptoms. But your health care provider can recommend various pain relief methods as part of your treatment. These may include medicines and  certain types of therapy, like massage or gentle exercise.  Using medicines    Medicines may be prescribed to treat TMD. Others may be available over-the-counter. The medicine type and dosage will depend on the problem you have. For your safety, tell your health care provider if you are currently taking any medicines. Also mention any herbs or supplements you are using. Common medicines used to treat TMD include:    Anti-inflammatories and analgesics. These treat pain, inflammation, osteoarthritis, and rheumatoid arthritis. Anti-inflammatories reduce swelling, heat, redness, and pain. They also help restore function. Analgesics reduce pain. Nonsteroidal anti-inflammatories (NSAIDs) relieve inflammation as well as pain.    Muscle relaxants. These treat myofascial pain. This is pain that occurs in the soft tissues or muscles around the TMJ. Muscle relaxants help ease muscle tension. This reduces pressure on the TMJ from tight jaw muscles.    Antidepressants. These can be used to reduce pain or bruxism (teeth grinding). At higher dosages, these medicines are used to treat depression. Given at low dosages, antidepressants help relieve TMD symptoms. They can reduce muscle pain. They also raise the level of serotonin, a body chemical that improves sleep. This in turn can decrease bruxism during the night.  Treating painful muscles  A trigger point is a painful spot in a tight muscle. It is often painful to the touch and may refer pain to other places. Your health care provider can focus on trigger points using:    Massage, both inside and outside the mouth. This relaxes muscles and improves circulation.    Palpation, which is applying pressure to points of the jaw and face with the fingers.    Cold spray and stretching of the muscles to relax them.    An anesthetic for pain relief. This may be given as an injection by your dentist.  Treating the joint  Therapy may focus directly on the TMJ. There are different ways to  treat the joint:    A self-care program helps you treat and manage symptoms on your own. Your program may include exercises. It may also include using ice and heat to relieve pain.    Gentle manipulation reduces pain and restores range of motion. The health care provider uses his or her hands to relax muscles and ligaments around the joint.    Exercises strengthen muscles in the jaw and face.    Ultrasound uses sound waves to reduce pain and swelling. It also improves pain and swelling.  Treating inflammation  When the joint is inflamed, movement becomes difficult -- even impossible at times. Your health care provider can help. Treatment may include:    Rest and gentle exercise to increase range of motion. One common exercise is to apply pressure to the jaw and resist the movement (isometric exercise).    A gel pack or ice wrapped in a towel applied for 10 to 20 minutes repeated 3 or 4 times a day. This eases swelling and reduces pain.    Massage and gentle manipulation as described above.  Date Last Reviewed: 7/13/2015 2000-2017 [employer]. [address] [place] [address]. All rights reserved. This information is not intended as a substitute for professional medical care. Always follow your healthcare professional's instructions.      Please take antibiotic with a probiotic or yogurt (3 small containers) daily not directly with the antibiotic for optimal good bacterial protection.     Return to clinic if symptoms worsen or do not improve with treatment.     Thank you  Florencia Alanis, [facility]

## 2017-11-13 NOTE — MR AVS SNAPSHOT
After Visit Summary   11/13/2017    Avis Avalos    MRN: [id number]           Patient Information     Date Of Birth          1975        Visit Information        Provider Department      11/13/2017 3:50 PM Florencia Alanis APRN [facility]        Today's Diagnoses     Need for prophylactic vaccination and inoculation against influenza    -  1    OME (otitis media with effusion), right        Yeast infection of the vagina          Care Instructions      Pain Relief Methods for Temporomandibular Disorders (TMD)  You have been diagnosed with temporomandibular disorder (TMD). This term describes a group of problems related to the temporomandibular joint (TMJ)and nearby muscles. The TMJ is located where the upper and lower jaws meet. TMD can cause painful and frustrating symptoms. But your health care provider can recommend various pain relief methods as part of your treatment. These may include medicines and certain types of therapy, like massage or gentle exercise.  Using medicines    Medicines may be prescribed to treat TMD. Others may be available over-the-counter. The medicine type and dosage will depend on the problem you have. For your safety, tell your health care provider if you are currently taking any medicines. Also mention any herbs or supplements you are using. Common medicines used to treat TMD include:    Anti-inflammatories and analgesics. These treat pain, inflammation, osteoarthritis, and rheumatoid arthritis. Anti-inflammatories reduce swelling, heat, redness, and pain. They also help restore function. Analgesics reduce pain. Nonsteroidal anti-inflammatories (NSAIDs) relieve inflammation as well as pain.    Muscle relaxants. These treat myofascial pain. This is pain that occurs in the soft tissues or muscles around the TMJ. Muscle relaxants help ease muscle tension. This reduces pressure on the TMJ from tight jaw muscles.    Antidepressants. These can  be used to reduce pain or bruxism (teeth grinding). At higher dosages, these medicines are used to treat depression. Given at low dosages, antidepressants help relieve TMD symptoms. They can reduce muscle pain. They also raise the level of serotonin, a body chemical that improves sleep. This in turn can decrease bruxism during the night.  Treating painful muscles  A trigger point is a painful spot in a tight muscle. It is often painful to the touch and may refer pain to other places. Your health care provider can focus on trigger points using:    Massage, both inside and outside the mouth. This relaxes muscles and improves circulation.    Palpation, which is applying pressure to points of the jaw and face with the fingers.    Cold spray and stretching of the muscles to relax them.    An anesthetic for pain relief. This may be given as an injection by your dentist.  Treating the joint  Therapy may focus directly on the TMJ. There are different ways to treat the joint:    A self-care program helps you treat and manage symptoms on your own. Your program may include exercises. It may also include using ice and heat to relieve pain.    Gentle manipulation reduces pain and restores range of motion. The health care provider uses his or her hands to relax muscles and ligaments around the joint.    Exercises strengthen muscles in the jaw and face.    Ultrasound uses sound waves to reduce pain and swelling. It also improves pain and swelling.  Treating inflammation  When the joint is inflamed, movement becomes difficult -- even impossible at times. Your health care provider can help. Treatment may include:    Rest and gentle exercise to increase range of motion. One common exercise is to apply pressure to the jaw and resist the movement (isometric exercise).    A gel pack or ice wrapped in a towel applied for 10 to 20 minutes repeated 3 or 4 times a day. This eases swelling and reduces pain.    Massage and gentle  manipulation as described above.  Date Last Reviewed: 7/13/2015 2000-2017 [employer]. [address] [place] [address], [place] [address]. All rights reserved. This information is not intended as a substitute for professional medical care. Always follow your healthcare professional's instructions.      Please take antibiotic with a probiotic or yogurt (3 small containers) daily not directly with the antibiotic for optimal good bacterial protection.     Return to clinic if symptoms worsen or do not improve with treatment.     Thank you  Florencia Alanis CNP                Follow-ups after your visit        Your next 10 appointments already scheduled     Nov 17, 2017  8:45 AM CST   MR LUMBAR SPINE W/O CONTRAST with PHMR1   [facility] ([facility])    [address] [place] [id number] [phone]           Take your medicines as usual, unless your doctor tells you not to. Bring a list of your current medicines to your exam (including vitamins, minerals and over-the-counter drugs). Also bring the results of similar scans you may have had.  Please remove any body piercings and hair extensions before you arrive.  Follow your doctor s orders. If you do not, we may have to postpone your exam.  You will not have contrast for this exam. You do not need to do anything special to prepare.  The MRI machine uses a strong magnet. Please wear clothes without metal (snaps, zippers). A sweatsuit works well, or we may give you a hospital gown.   **IMPORTANT** THE INSTRUCTIONS BELOW ARE ONLY FOR THOSE PATIENTS WHO HAVE BEEN TOLD THEY WILL RECEIVE SEDATION OR GENERAL ANESTHESIA DURING THEIR MRI PROCEDURE:  IF YOU WILL RECEIVE SEDATION (take medicine to help you relax during your exam):   You must get the medicine from your doctor before you arrive. Bring the medicine to the exam. Do not take it at home.   Arrive one hour early. Bring someone who can take you home after the test. Your  medicine will make you sleepy. After the exam, you may not drive, take a bus or take a taxi by yourself.   No eating 8 hours before your exam. You may have clear liquids up until 4 hours before your exam. (Clear liquids include water, clear tea, black coffee and fruit juice without pulp.)  IF YOU WILL RECEIVE ANESTHESIA (be asleep for your exam):   Arrive 1 1/2 hours early. Bring someone who can take you home after the test. You may not drive, take a bus or take a taxi by yourself.   No eating 8 hours before your exam. You may have clear liquids up until 4 hours before your exam. (Clear liquids include water, clear tea, black coffee and fruit juice without pulp.)   You will spend four to five hours in the recovery room.  Please call the Imaging Department at your exam site with any questions.            Nov 17, 2017  9:30 AM CST   MR CERVICAL SPINE W/O CONTRAST with PHMR1   [facility] ([facility])    [address] [place] [id number] [phone]           Take your medicines as usual, unless your doctor tells you not to. Bring a list of your current medicines to your exam (including vitamins, minerals and over-the-counter drugs). Also bring the results of similar scans you may have had.  Please remove any body piercings and hair extensions before you arrive.  Follow your doctor s orders. If you do not, we may have to postpone your exam.  You will not have contrast for this exam. You do not need to do anything special to prepare.  The MRI machine uses a strong magnet. Please wear clothes without metal (snaps, zippers). A sweatsuit works well, or we may give you a hospital gown.   **IMPORTANT** THE INSTRUCTIONS BELOW ARE ONLY FOR THOSE PATIENTS WHO HAVE BEEN TOLD THEY WILL RECEIVE SEDATION OR GENERAL ANESTHESIA DURING THEIR MRI PROCEDURE:  IF YOU WILL RECEIVE SEDATION (take medicine to help you relax during your exam):   You must get the medicine from your doctor before you  arrive. Bring the medicine to the exam. Do not take it at home.   Arrive one hour early. Bring someone who can take you home after the test. Your medicine will make you sleepy. After the exam, you may not drive, take a bus or take a taxi by yourself.   No eating 8 hours before your exam. You may have clear liquids up until 4 hours before your exam. (Clear liquids include water, clear tea, black coffee and fruit juice without pulp.)  IF YOU WILL RECEIVE ANESTHESIA (be asleep for your exam):   Arrive 1 1/2 hours early. Bring someone who can take you home after the test. You may not drive, take a bus or take a taxi by yourself.   No eating 8 hours before your exam. You may have clear liquids up until 4 hours before your exam. (Clear liquids include water, clear tea, black coffee and fruit juice without pulp.)   You will spend four to five hours in the recovery room.  Please call the Imaging Department at your exam site with any questions.              Who to contact     If you have questions or need follow up information about today's clinic visit or your schedule please contact [facility] directly at [phone].  Normal or non-critical lab and imaging results will be communicated to you by OpenQhart, letter or phone within 4 business days after the clinic has received the results. If you do not hear from us within 7 days, please contact the clinic through OpenQhart or phone. If you have a critical or abnormal lab result, we will notify you by phone as soon as possible.  Submit refill requests through Flowline or call your pharmacy and they will forward the refill request to us. Please allow 3 business days for your refill to be completed.          Additional Information About Your Visit        Flowline Information     Flowline gives you secure access to your electronic health record. If you see a primary care provider, you can also send messages to your care team and make appointments. If you have  questions, please call your primary care clinic.  If you do not have a primary care provider, please call [phone] and they will assist you.        Care EveryWhere ID     This is your Care EveryWhere ID. This could be used by other organizations to access your [facility] medical records  [id number]        Your Vitals Were     Pulse Temperature Respirations BMI (Body Mass Index)          62 98.3  F (36.8  C) (Oral) 16 29.91 kg/m2         Blood Pressure from Last 3 Encounters:   11/13/17 116/72   10/12/17 108/70   09/18/17 113/72    Weight from Last 3 Encounters:   11/13/17 172 lb (78 kg)   10/12/17 170 lb 1.6 oz (77.2 kg)   06/27/17 168 lb 12.8 oz (76.6 kg)              We Performed the Following     FLU VAC, SPLIT VIRUS IM > 3 YO (QUADRIVALENT) [10554]     Vaccine Administration, Initial [64912]          Today's Medication Changes          These changes are accurate as of: 11/13/17  4:43 PM.  If you have any questions, ask your nurse or doctor.               Start taking these medicines.        Dose/Directions    azithromycin 250 MG tablet   Commonly known as:  ZITHROMAX   Used for:  OME (otitis media with effusion), right   Started by:  Florencia Alanis APRN CNP        Two tablets first day, then one tablet daily for four days.   Quantity:  6 tablet   Refills:  0       fluconazole 150 MG tablet   Commonly known as:  DIFLUCAN   Used for:  Yeast infection of the vagina   Started by:  Florencia Alanis APRN CNP        Dose:  150 mg   Take 1 tablet (150 mg) by mouth once for 1 dose   Quantity:  1 tablet   Refills:  0            Where to get your medicines      These medications were sent to [place] [address] [place] [facility] [place], [place] - [address]  [address] [address] [facility] [address]     Phone:  [phone]     azithromycin 250 MG tablet    fluconazole 150 MG tablet                Primary Care Provider Office Phone # Fax #    JEM Huffman [phone] [address] [facility]  [facility] [address]        Equal Access to Services     [facility] PATY : Hadii larry ku fran Canales, waaxda luqadaha, qaybta kaduaneandrae chorosa mandrae, les segovia haysana chestermattravin elliott. So [facility] [phone].    ATENCIÓN: Si habla español, tiene a devine disposición servicios gratuitos de asistencia lingüística. [facility] [phone].    We comply with applicable federal civil rights laws and [place] laws. We do not discriminate on the basis of race, color, national origin, age, disability, sex, sexual orientation, or gender identity.            Thank you!     Thank you for choosing [facility]  for your care. Our goal is always to provide you with excellent care. Hearing back from our patients is one way we can continue to improve our services. Please take a few minutes to complete the written survey that you may receive in the mail after your visit with us. Thank you!             Your Updated Medication List - Protect others around you: Learn how to safely use, store and throw away your medicines at www.disposemymeds.org.          This list is accurate as of: 11/13/17  4:43 PM.  Always use your most recent med list.                   Brand Name Dispense Instructions for use Diagnosis    ALEVE PO      Take 220 mg by mouth daily as needed for moderate pain        ALPRAZolam 0.5 MG tablet    XANAX    20 tablet    Use 1-2 tablets 30-60 minutes before flying. Try one tablet first. Take a second if needed.    Flying phobia       azithromycin 250 MG tablet    ZITHROMAX    6 tablet    Two tablets first day, then one tablet daily for four days.    OME (otitis media with effusion), right       BENADRYL PO           citalopram 20 MG tablet    celeXA    30 tablet    Take 1/2 tablet (10 mg) for 1-2 weeks, then increase to 1 tablet orally daily    Moderate episode of recurrent major depressive disorder (H), Obsessive-compulsive disorder, unspecified type       clobetasol 0.05 % ointment    TEMOVATE    60 g     Apply sparingly to affected area at bedtime for 6-12 weeks.    Lichen sclerosus of female genitalia       diazepam 10 MG tablet    VALIUM    1 tablet    Take 1 tablet (10 mg) by mouth every 6 hours as needed for anxiety or sleep Take 30-60 minutes before procedure.  Do not operate a vehicle after taking this medication.    Chronic bilateral low back pain without sciatica, Claustrophobia       fluconazole 150 MG tablet    DIFLUCAN    1 tablet    Take 1 tablet (150 mg) by mouth once for 1 dose    Yeast infection of the vagina       fluticasone 50 MCG/ACT spray    FLONASE    1 Bottle    Spray 1-2 sprays into both nostrils daily    Acute sinusitis with symptoms > 10 days       hydrocortisone 25 MG Suppository    ANUSOL-HC    28 suppository    Place 1 suppository (25 mg) rectally 2 times daily    External hemorrhoids       IBU-200 PO      Take 2-3 tablets by mouth as needed Reported on 5/10/2017        MUCINEX 600 MG 12 hr tablet   Generic drug:  guaiFENesin      Take 1,200 mg by mouth 2 times daily        * NONFORMULARY      1 tablet daily        * NONFORMULARY           RA ONE DAILY WOMENS Tabs      Take  by mouth.        SUDAFED PO           triamcinolone 0.5 % cream    KENALOG    454 g    Apply sparingly to affected area at bedtime for 6-12 weeks.    Lichen sclerosus of female genitalia       TYLENOL PO      Take by mouth as needed Reported on 5/10/2017        valACYclovir 1000 mg tablet    VALTREX    21 tablet    TAKE ONE TABLET BY MOUTH DAILY AS NEEDED    Herpes simplex type 2 infection       vitamin D3 27599 UNITS capsule    CHOLECALCIFEROL    8 capsule    Take 1 capsule (50,000 Units) by mouth every 7 days    Vitamin D insufficiency       ZYRTEC ALLERGY PO           * Notice:  This list has 2 medication(s) that are the same as other medications prescribed for you. Read the directions carefully, and ask your doctor or other care provider to review them with you.

## 2017-11-13 NOTE — PATIENT INSTRUCTIONS
Pain Relief Methods for Temporomandibular Disorders (TMD)  You have been diagnosed with temporomandibular disorder (TMD). This term describes a group of problems related to the temporomandibular joint (TMJ)and nearby muscles. The TMJ is located where the upper and lower jaws meet. TMD can cause painful and frustrating symptoms. But your health care provider can recommend various pain relief methods as part of your treatment. These may include medicines and certain types of therapy, like massage or gentle exercise.  Using medicines    Medicines may be prescribed to treat TMD. Others may be available over-the-counter. The medicine type and dosage will depend on the problem you have. For your safety, tell your health care provider if you are currently taking any medicines. Also mention any herbs or supplements you are using. Common medicines used to treat TMD include:    Anti-inflammatories and analgesics. These treat pain, inflammation, osteoarthritis, and rheumatoid arthritis. Anti-inflammatories reduce swelling, heat, redness, and pain. They also help restore function. Analgesics reduce pain. Nonsteroidal anti-inflammatories (NSAIDs) relieve inflammation as well as pain.    Muscle relaxants. These treat myofascial pain. This is pain that occurs in the soft tissues or muscles around the TMJ. Muscle relaxants help ease muscle tension. This reduces pressure on the TMJ from tight jaw muscles.    Antidepressants. These can be used to reduce pain or bruxism (teeth grinding). At higher dosages, these medicines are used to treat depression. Given at low dosages, antidepressants help relieve TMD symptoms. They can reduce muscle pain. They also raise the level of serotonin, a body chemical that improves sleep. This in turn can decrease bruxism during the night.  Treating painful muscles  A trigger point is a painful spot in a tight muscle. It is often painful to the touch and may refer pain to other places. Your health care  provider can focus on trigger points using:    Massage, both inside and outside the mouth. This relaxes muscles and improves circulation.    Palpation, which is applying pressure to points of the jaw and face with the fingers.    Cold spray and stretching of the muscles to relax them.    An anesthetic for pain relief. This may be given as an injection by your dentist.  Treating the joint  Therapy may focus directly on the TMJ. There are different ways to treat the joint:    A self-care program helps you treat and manage symptoms on your own. Your program may include exercises. It may also include using ice and heat to relieve pain.    Gentle manipulation reduces pain and restores range of motion. The health care provider uses his or her hands to relax muscles and ligaments around the joint.    Exercises strengthen muscles in the jaw and face.    Ultrasound uses sound waves to reduce pain and swelling. It also improves pain and swelling.  Treating inflammation  When the joint is inflamed, movement becomes difficult -- even impossible at times. Your health care provider can help. Treatment may include:    Rest and gentle exercise to increase range of motion. One common exercise is to apply pressure to the jaw and resist the movement (isometric exercise).    A gel pack or ice wrapped in a towel applied for 10 to 20 minutes repeated 3 or 4 times a day. This eases swelling and reduces pain.    Massage and gentle manipulation as described above.  Date Last Reviewed: 7/13/2015 2000-2017 [employer]. [address] [place] [address], [place] [address]. All rights reserved. This information is not intended as a substitute for professional medical care. Always follow your healthcare professional's instructions.      Please take antibiotic with a probiotic or yogurt (3 small containers) daily not directly with the antibiotic for optimal good bacterial protection.     Return to clinic if symptoms worsen or do not improve  with treatment.     Thank you  Florencia Alanis CNP

## 2017-11-17 ENCOUNTER — HOSPITAL ENCOUNTER (OUTPATIENT)
Dept: MRI IMAGING | Facility: CLINIC | Age: 42
Discharge: HOME | End: 2017-11-17
Attending: STUDENT IN AN ORGANIZED HEALTH CARE EDUCATION/TRAINING PROGRAM | Admitting: STUDENT IN AN ORGANIZED HEALTH CARE EDUCATION/TRAINING PROGRAM
Payer: COMMERCIAL

## 2017-11-17 ENCOUNTER — HOSPITAL ENCOUNTER (OUTPATIENT)
Dept: MRI IMAGING | Facility: CLINIC | Age: 42
End: 2017-11-17
Attending: STUDENT IN AN ORGANIZED HEALTH CARE EDUCATION/TRAINING PROGRAM
Payer: COMMERCIAL

## 2017-11-17 DIAGNOSIS — G89.29 CHRONIC BILATERAL LOW BACK PAIN WITHOUT SCIATICA: ICD-10-CM

## 2017-11-17 DIAGNOSIS — M54.50 CHRONIC BILATERAL LOW BACK PAIN WITHOUT SCIATICA: ICD-10-CM

## 2017-11-17 DIAGNOSIS — G89.29 CHRONIC NECK PAIN: ICD-10-CM

## 2017-11-17 DIAGNOSIS — M54.2 CHRONIC NECK PAIN: ICD-10-CM

## 2017-11-17 PROCEDURE — 72148 MRI LUMBAR SPINE W/O DYE: CPT

## 2017-11-17 PROCEDURE — 72141 MRI NECK SPINE W/O DYE: CPT

## 2017-12-06 ENCOUNTER — OFFICE VISIT (OUTPATIENT)
Dept: ALLERGY | Facility: OTHER | Age: 42
End: 2017-12-06
Payer: COMMERCIAL

## 2017-12-06 VITALS
RESPIRATION RATE: 16 BRPM | HEART RATE: 73 BPM | OXYGEN SATURATION: 96 % | HEIGHT: 64 IN | SYSTOLIC BLOOD PRESSURE: 94 MMHG | WEIGHT: 165 LBS | DIASTOLIC BLOOD PRESSURE: 56 MMHG | BODY MASS INDEX: 28.17 KG/M2

## 2017-12-06 DIAGNOSIS — J30.1 CHRONIC SEASONAL ALLERGIC RHINITIS DUE TO POLLEN: Primary | ICD-10-CM

## 2017-12-06 PROCEDURE — 99204 OFFICE O/P NEW MOD 45 MIN: CPT | Mod: 25 | Performed by: ALLERGY & IMMUNOLOGY

## 2017-12-06 PROCEDURE — 95004 PERQ TESTS W/ALRGNC XTRCS: CPT | Performed by: ALLERGY & IMMUNOLOGY

## 2017-12-06 RX ORDER — AZELASTINE HYDROCHLORIDE, FLUTICASONE PROPIONATE 137; 50 UG/1; UG/1
1 SPRAY, METERED NASAL 2 TIMES DAILY
Qty: 1 BOTTLE | Refills: 3 | Status: SHIPPED | OUTPATIENT
Start: 2017-12-06 | End: 2018-01-09

## 2017-12-06 NOTE — MR AVS SNAPSHOT
After Visit Summary   12/6/2017    Avis Avalos    MRN: [id number]           Patient Information     Date Of Birth          1975        Visit Information        Provider Department      12/6/2017 8:20 AM Gagandeep Lafleur DO [facility]        Today's Diagnoses     Chronic seasonal allergic rhinitis due to pollen    -  1      Care Instructions    Allergy Staff Appt Hours Shot Hours Locations    Physician     Gagandeep Lafleur DO       Support Staff     SHIVA Foster MA  Monday:                      [place] 8-7     Tuesday:         [place] 8-5     Wednesday:        [place]: 7-5     Friday:        [place] 7-5   [place] Monday: 9-6        Friday: 7-2     [place]        Tuesday: 7-10:45        Thursday: 1:30-6:30     [place] Tuesday: 1-7        Wednesday: 11-6         Thursday: 7-12 [facility]  [address] [place] [address]  Appt Line: [phone]  Allergy RN (Monday):  [phone]    [facility]  [address] [place] [address]  Appt Line: [phone]  Allergy RN (Tues & Wed):  [phone]    [facility]  [address] [place] [address]  Appt Line: [phone]  Allergy RN (Friday):  [phone]       Important Scheduling Information  Aspirin Desensitization: Appt will last 2 clinic days. Please call the Allergy RN line for your clinic to schedule. Discontinue antihistamines 7 days prior to the appointment.     Food Challenges: Appt will last 3-4 hours. Please call the Allergy RN line for your clinic to schedule. Discontinue antihistamines 7 days prior to the appointment.     Penicillin Testing: Appt will last 2-3 hours. Please call the Allergy RN line for your clinic to schedule. Discontinue antihistamines 7 days prior to the appointment.     Skin Testing: Appt will about 40 minutes. Call the appointment line for your clinic to schedule. Discontinue antihistamines 7 days prior to the appointment.      Venom Testing: Appt will last 2-3 hours. Please call the Allergy RN line for your clinic to schedule. Discontinue antihistamines 7 days prior to the appointment.     Thank you for trusting us with your Allergy, Asthma, and Immunology care. Please feel free to contact us with any questions or concerns you may have.      - Dymista 1 spray/nostril twice daily. Use August through December.   - Zyrtec, Allegra or Claritin daily as needed for allergy symptoms.   - Ketotifen 1 drop/eye twice daily as needed for allergy symptoms.   AEROALLERGEN AVOIDANCE INSTRUCTIONS  POLLEN  Pollens are the tiny airborne particles given off by trees, weeds, and grasses. They can be the cause of seasonal allergic rhinitis or hay fever symptoms, which include stuffy, itchy, runny nose, redness, swelling and itching of the eyes, and itching of the ears and throat. Here are some tips on how to avoid pollen exposure.  1. .Keep windows closed and use the air conditioner when possible.  2.  Avoid outside exposure in the early morning as pollen counts are highest at that time.  3.  Take a shower and wash hair each night.  4.  Consider wearing a mask when working in the yard and/or garden.  5.  Clean furnace filter monthly with HEPA filters. Consider a HEPA filter vacuum  which will prevent pollen from being reintroduced into the air.               Follow-ups after your visit        Who to contact     If you have questions or need follow up information about today's clinic visit or your schedule please contact [facility] directly at [phone].  Normal or non-critical lab and imaging results will be communicated to you by MyChart, letter or phone within 4 business days after the clinic has received the results. If you do not hear from us within 7 days, please contact the clinic through MyChart or phone. If you have a critical or abnormal lab result, we will notify you by phone as soon as possible.  Submit refill  "requests through [employer] or call your pharmacy and they will forward the refill request to us. Please allow 3 business days for your refill to be completed.          Additional Information About Your Visit        [employer] Information     [employer] gives you secure access to your electronic health record. If you see a primary care provider, you can also send messages to your care team and make appointments. If you have questions, please call your primary care clinic.  If you do not have a primary care provider, please call [phone] and they will assist you.        Care EveryWhere ID     This is your Care EveryWhere ID. This could be used by other organizations to access your [facility] records  [id number]        Your Vitals Were     Pulse Respirations Height Pulse Oximetry BMI (Body Mass Index)       73 16 1.62 m (5' 3.78\") 96% 28.52 kg/m2        Blood Pressure from Last 3 Encounters:   12/06/17 94/56   11/13/17 116/72   10/12/17 108/70    Weight from Last 3 Encounters:   12/06/17 74.8 kg (165 lb)   11/13/17 78 kg (172 lb)   10/12/17 77.2 kg (170 lb 1.6 oz)              We Performed the Following     ALLERGY SKIN TESTS,ALLERGENS          Today's Medication Changes          These changes are accurate as of: 12/6/17  9:42 AM.  If you have any questions, ask your nurse or doctor.               Start taking these medicines.        Dose/Directions    azelastine-fluticasone 137-50 MCG/ACT nasal spray   Commonly known as:  DYMISTA   Used for:  Chronic seasonal allergic rhinitis due to pollen   Started by:  Gagandeep Lafleur,         Dose:  1 spray   Spray 1 spray into both nostrils 2 times daily   Quantity:  1 Bottle   Refills:  3            Where to get your medicines      These medications were sent to [facility] [address] [place] [facility] [place] - [address] [facility]  [address] [facility] [address]     Phone:  [phone]     azelastine-fluticasone 137-50 MCG/ACT nasal spray                Primary Care Provider Office " Phone # Fax JEM Ludwig [phone] [address] [facility] [address]        Equal Access to Services     JAQUI NEWMAN : Hadii aad ku hadasho Soomaali, waaxda luqadaha, qaybta kaalmada adeegyada, waxlibrado walkern tammieshona fernandez laKevonsana elliott. So [facility] [phone].    ATENCIÓN: Si habla español, tiene a devine disposición servicios gratuitos de asistencia lingüística. Llame al [phone].    We comply with applicable federal civil rights laws and [place] laws. We do not discriminate on the basis of race, color, national origin, age, disability, sex, sexual orientation, or gender identity.            Thank you!     Thank you for choosing [facility]  for your care. Our goal is always to provide you with excellent care. Hearing back from our patients is one way we can continue to improve our services. Please take a few minutes to complete the written survey that you may receive in the mail after your visit with us. Thank you!             Your Updated Medication List - Protect others around you: Learn how to safely use, store and throw away your medicines at www.disposemymeds.org.          This list is accurate as of: 12/6/17  9:42 AM.  Always use your most recent med list.                   Brand Name Dispense Instructions for use Diagnosis    ALEVE PO      Take 220 mg by mouth daily as needed for moderate pain        ALPRAZolam 0.5 MG tablet    XANAX    20 tablet    Use 1-2 tablets 30-60 minutes before flying. Try one tablet first. Take a second if needed.    Flying phobia       azelastine-fluticasone 137-50 MCG/ACT nasal spray    DYMISTA    1 Bottle    Spray 1 spray into both nostrils 2 times daily    Chronic seasonal allergic rhinitis due to pollen       BENADRYL PO           citalopram 20 MG tablet    celeXA    30 tablet    Take 1/2 tablet (10 mg) for 1-2 weeks, then increase to 1 tablet orally daily    Moderate episode of recurrent major depressive  disorder (H), Obsessive-compulsive disorder, unspecified type       clobetasol 0.05 % ointment    TEMOVATE    60 g    Apply sparingly to affected area at bedtime for 6-12 weeks.    Lichen sclerosus of female genitalia       diazepam 10 MG tablet    VALIUM    1 tablet    Take 1 tablet (10 mg) by mouth every 6 hours as needed for anxiety or sleep Take 30-60 minutes before procedure.  Do not operate a vehicle after taking this medication.    Chronic bilateral low back pain without sciatica, Claustrophobia       fluticasone 50 MCG/ACT spray    FLONASE    1 Bottle    Spray 1-2 sprays into both nostrils daily    Acute sinusitis with symptoms > 10 days       hydrocortisone 25 MG Suppository    ANUSOL-HC    28 suppository    Place 1 suppository (25 mg) rectally 2 times daily    External hemorrhoids       IBU-200 PO      Take 2-3 tablets by mouth as needed Reported on 5/10/2017        MUCINEX 600 MG 12 hr tablet   Generic drug:  guaiFENesin      Take 1,200 mg by mouth 2 times daily        * NONFORMULARY      1 tablet daily        * NONFORMULARY           RA ONE DAILY WOMENS Tabs      Take  by mouth.        SUDAFED PO           triamcinolone 0.5 % cream    KENALOG    454 g    Apply sparingly to affected area at bedtime for 6-12 weeks.    Lichen sclerosus of female genitalia       TYLENOL PO      Take by mouth as needed Reported on 5/10/2017        valACYclovir 1000 mg tablet    VALTREX    21 tablet    TAKE ONE TABLET BY MOUTH DAILY AS NEEDED    Herpes simplex type 2 infection       vitamin D3 64122 UNITS capsule    CHOLECALCIFEROL    8 capsule    Take 1 capsule (50,000 Units) by mouth every 7 days    Vitamin D insufficiency       ZYRTEC ALLERGY PO           * Notice:  This list has 2 medication(s) that are the same as other medications prescribed for you. Read the directions carefully, and ask your doctor or other care provider to review them with you.

## 2017-12-06 NOTE — PATIENT INSTRUCTIONS
Allergy Staff Appt Hours Shot Hours Locations    Physician     Gagandeep Lafleur DO       Support Staff     Annabella BEARD RN      [place] LERMA MA  Monday:                      [place] 8-7 Tuesday:         [place] 8-5 Wednesday:        [place]: 7-5     Friday:        [place] 7-5   [place]        Monday: 9-6        Friday: 7-2     [place]        Tuesday: 7-10:45        Thursday: 1:30-6:30     [place] Tuesday: 1-7        Wednesday: 11-6 Thursday: 7-12 [facility]  [address] [place] [address]  Appt Line: [phone]  Allergy RN (Monday):  [phone]    [facility]  [address] [place] [address]  Appt Line: [phone]  Allergy RN (Tues & Wed):  [phone]    [facility]  [address] [place] [address]  Appt Line: [phone]  Allergy RN (Friday):  [phone]       Important Scheduling Information  Aspirin Desensitization: Appt will last 2 clinic days. Please call the Allergy RN line for your clinic to schedule. Discontinue antihistamines 7 days prior to the appointment.     Food Challenges: Appt will last 3-4 hours. Please call the Allergy RN line for your clinic to schedule. Discontinue antihistamines 7 days prior to the appointment.     Penicillin Testing: Appt will last 2-3 hours. Please call the Allergy RN line for your clinic to schedule. Discontinue antihistamines 7 days prior to the appointment.     Skin Testing: Appt will about 40 minutes. Call the appointment line for your clinic to schedule. Discontinue antihistamines 7 days prior to the appointment.     Venom Testing: Appt will last 2-3 hours. Please call the Allergy RN line for your clinic to schedule. Discontinue antihistamines 7 days prior to the appointment.     Thank you for trusting us with your Allergy, Asthma, and Immunology care. Please feel free to contact us with any questions or concerns you may have.      - Dymista 1 spray/nostril twice daily. Use August through  December.   - Zyrtec, Allegra or Claritin daily as needed for allergy symptoms.   - Ketotifen 1 drop/eye twice daily as needed for allergy symptoms.   AEROALLERGEN AVOIDANCE INSTRUCTIONS  POLLEN  Pollens are the tiny airborne particles given off by trees, weeds, and grasses. They can be the cause of seasonal allergic rhinitis or hay fever symptoms, which include stuffy, itchy, runny nose, redness, swelling and itching of the eyes, and itching of the ears and throat. Here are some tips on how to avoid pollen exposure.  1. .Keep windows closed and use the air conditioner when possible.  2.  Avoid outside exposure in the early morning as pollen counts are highest at that time.  3.  Take a shower and wash hair each night.  4.  Consider wearing a mask when working in the yard and/or garden.  5.  Clean furnace filter monthly with HEPA filters. Consider a HEPA filter vacuum  which will prevent pollen from being reintroduced into the air.

## 2017-12-06 NOTE — ASSESSMENT & PLAN NOTE
This year she has had fall nasal and ocular symptoms.  Flonase used intermittently.  Cetirizine has been beneficial.  Over-the-counter ocular antihistamine has been beneficial.    - Dymista 1 spray/nostril twice daily.  - Zyrtec, Allegra or Claritin daily as needed for allergy symptoms.   - Ketotifen 1 drop/eye twice daily as needed for allergy symptoms.   - Aeroallergen avoidance measures discussed and literature provided.

## 2017-12-06 NOTE — NURSING NOTE
Per provider verbal order, placed Adult Environmental Panel and additional animals  scratch test.  Consent was obtained prior to procedure.  Once panels were placed, patient was monitored for 15 minutes in clinic.  RN read test after 15 minutes and provider was notified of results.  Pt tolerated procedure well.  All questions and concerns were addressed at office visit.     Maria L Rodriguez RN

## 2017-12-06 NOTE — LETTER
12/6/2017         RE: Avis Avalos  [address] [facility] [address]        Dear Colleague,    Thank you for referring your patient, Avis Avalos, to the [facility]. Please see a copy of my visit note below.    Avis Avalos is a 42 year old White female with no significant previous medical history. Avis Avalos is being seen today for evaluation of seasonal allergies. The patient is accompanied by spouse. The spouse helped provide the history.     The patient has had low level perennial nasal and ocular symptoms since childhood.  Over the last 5-7 years she has had increased frequency of sinus and ear infections.  This year since September she has had ocular itching, nasal itching, facial itching, sneezing, congestion and ear pressure.  She additionally has had postnasal drainage, congestion, ocular itching and sinus headaches.  She has used Flonase on an as-needed basis and this has been helpful.  She has used Zyrtec and this has been helpful.  She is using over-the-counter ocular antihistamine and this has been helpful.  Patient reports that she had sinus x-rays in 2009 which showed narrowing.  She has never had sinus surgery.  No recent CT scan of her sinuses.  She does not clearly have any triggers that make her symptoms worse including cats, dogs, dust, mowing grass, raking leaves or smoke.    History of exercise induced asthma in early childhood. She has outgrown this.     The patient has no history of eczema, food allergies, medications allergies or hives.     ENVIRONMENTAL HISTORY: The family lives [place] a [facility] [place]. The home is heated with a forced air. They does have central air conditioning. The patient's bedroom is furnished with carpeting in bedroom.  Pets inside the house include 2 cat(s). There is not history of cockroach or mice infestation. There is/are 0 smokers in the house.  The house does have a damp basement.     Past Medical  History:   Diagnosis Date     Crohn disease (H)      Depression     1-2 years ago     Fructose intolerance      High risk HPV infection 4/14/10    normal pap, + HR HPV (59). normal paps since     PONV (postoperative nausea and vomiting)     after colonoscopy     Family History   Problem Relation Age of Onset     DIABETES Mother      borderline     Depression Mother      GASTROINTESTINAL DISEASE Mother      IBS     Thyroid Disease Mother      hypothyroidism     DIABETES Father      NIDDM - Type II     DIABETES Maternal Grandmother      Arthritis Maternal Grandfather      HEART DISEASE Paternal Grandfather      Thyroid Disease Sister      hypothyroidism     Rheumatoid Arthritis Sister      Other Cancer Sister      Asthma Other      niece     Obesity Sister      half-sister/had gastric bypass     Past Surgical History:   Procedure Laterality Date     C APPENDECTOMY           C/SECTION, LOW TRANSVERSE  02/17/10    , Low Transverse      SECTION  2012    Procedure:  SECTION;   SECTION ;  Surgeon: Yehuda Cruz MD;  Location: [facility]     COLONOSCOPY  2011    Procedure:COMBINED COLONOSCOPY, SINGLE BIOPSY/POLYPECTOMY BY BIOPSY; Surgeon:MARTIN MAO; Location:[facility]     COLONOSCOPY  2011    Procedure:COMBINED COLONOSCOPY, SINGLE BIOPSY/POLYPECTOMY BY BIOPSY; Colonoscopy biopsy of distal iiluem, colonoscopy removal of colon polyp with fulguration, colonoscopy and control of biopsy bleed with placement of resolution clip; Surgeon:TESS POLLARD; Location:[facility]     COLONOSCOPY  2011    Procedure:COMBINED COLONOSCOPY, REMOVE TUMOR/POLYP/LESION BY FULGURATION/HOT BIOPSY; Surgeon:TESS POLLARD; Location:[facility]     COLONOSCOPY  2011    Procedure:COMBINED COLONOSCOPY, CONTROL BLEED; Surgeon:TESS POLLARD; Location:[facility]     COLONOSCOPY N/A 2017    Procedure: COMBINED COLONOSCOPY, SINGLE OR MULTIPLE BIOPSY/POLYPECTOMY BY BIOPSY;;  Surgeon: Josué Hawk  MD [place];  Location: [facility]     HC FLEX SIGMOIDOSCOPY W/WO MIKY SPEC BY BRUSH/WASH  07/02/09    [facility]     HC TOOTH EXTRACTION W/FORCEP       VIDEO CAPSULE ENDOSCOPY  07/30/09    [facility]       REVIEW OF SYSTEMS:  General: negative for weight gain. negative for weight loss. negative for changes in sleep.   Ears: positive  for fullness. negative for hearing loss. negative for dizziness.   Nose: negative for snoring.negative for changes in smell. negative for drainage.   Eyes: negative for eye watering. positive  for eye itching. negative for vision changes. negative for eye redness.  Throat: negative for hoarseness. negative for sore throat. negative for trouble swallowing.   Lungs: negative for shortness of breath.negative for wheezing. negative for sputum production.   Cardiovascular: negative for chest pain. negative for swelling of ankles. positive  for fast or irregular heartbeat.   Gastrointestinal: negative for nausea. positive  for heartburn. negative for acid reflux.   Musculoskeletal: positive  for joint pain. positive  for joint stiffness. negative for joint swelling.   Neurologic: negative for seizures. negative for fainting. negative for weakness.   Psychiatric: negative for changes in mood. positive  for anxiety.   Endocrine: negative for cold intolerance. negative for heat intolerance. negative for tremors.   Lymphatic: negative for lower extremity swelling. positive  for lymph node swelling.   Hematologic: negative for easy bruising. negative for easy bleeding.  Integumentary: negative for rash. negative for scaling. negative for nail changes.       Current Outpatient Prescriptions:      azelastine-fluticasone (DYMISTA) 137-50 MCG/ACT nasal spray, Spray 1 spray into both nostrils 2 times daily, Disp: 1 Bottle, Rfl: 3     citalopram (CELEXA) 20 MG tablet, Take 1/2 tablet (10 mg) for 1-2 weeks, then increase to 1 tablet orally daily, Disp: 30 tablet, Rfl: 1     triamcinolone  (KENALOG) 0.5 % cream, Apply sparingly to affected area at bedtime for 6-12 weeks., Disp: 454 g, Rfl: 1     clobetasol (TEMOVATE) 0.05 % ointment, Apply sparingly to affected area at bedtime for 6-12 weeks., Disp: 60 g, Rfl: 3     Pseudoephedrine HCl (SUDAFED PO), , Disp: , Rfl:      guaiFENesin (MUCINEX) 600 MG 12 hr tablet, Take 1,200 mg by mouth 2 times daily, Disp: , Rfl:      DiphenhydrAMINE HCl (BENADRYL PO), , Disp: , Rfl:      NONFORMULARY, 1 tablet daily, Disp: , Rfl:      Naproxen Sodium (ALEVE PO), Take 220 mg by mouth daily as needed for moderate pain, Disp: , Rfl:      valACYclovir (VALTREX) 1000 mg tablet, TAKE ONE TABLET BY MOUTH DAILY AS NEEDED, Disp: 21 tablet, Rfl: 0     ALPRAZolam (XANAX) 0.5 MG tablet, Use 1-2 tablets 30-60 minutes before flying. Try one tablet first. Take a second if needed., Disp: 20 tablet, Rfl: 0     fluticasone (FLONASE) 50 MCG/ACT spray, Spray 1-2 sprays into both nostrils daily, Disp: 1 Bottle, Rfl: 1     Ibuprofen (IBU-200 PO), Take 2-3 tablets by mouth as needed Reported on 5/10/2017, Disp: , Rfl:      Acetaminophen (TYLENOL PO), Take by mouth as needed Reported on 5/10/2017, Disp: , Rfl:      Multiple Vitamins-Minerals (RA ONE DAILY WOMENS) TABS, Take  by mouth., Disp: , Rfl:      diazepam (VALIUM) 10 MG tablet, Take 1 tablet (10 mg) by mouth every 6 hours as needed for anxiety or sleep Take 30-60 minutes before procedure.  Do not operate a vehicle after taking this medication. (Patient not taking: Reported on 12/6/2017), Disp: 1 tablet, Rfl: 0     hydrocortisone (ANUSOL-HC) 25 MG Suppository, Place 1 suppository (25 mg) rectally 2 times daily (Patient not taking: Reported on 12/6/2017), Disp: 28 suppository, Rfl: 5     Cetirizine HCl (ZYRTEC ALLERGY PO), , Disp: , Rfl:      vitamin D3 (CHOLECALCIFEROL) 05326 UNITS capsule, Take 1 capsule (50,000 Units) by mouth every 7 days (Patient not taking: Reported on 12/6/2017), Disp: 8 capsule, Rfl: 0     NONFORMULARY, , Disp: ,  Rfl:   Immunization History   Administered Date(s) Administered     Influenza (H1N1) 11/10/2009     Influenza (IIV3) PF 02/22/2012, 09/27/2012     Influenza Vaccine IM 3yrs+ 4 Valent IIV4 02/18/2014     TD (ADULT, 7+) 04/14/2008     TDAP Vaccine (Adacel) 06/13/2011     Allergies   Allergen Reactions     Fructose GI Disturbance     Sorbitol Diarrhea         EXAM:   Constitutional:  Appears well-developed and well-nourished. No distress.   HEENT:   Head: Normocephalic.   Right Ear: External ear normal. TM normal  Left Ear: External ear normal. TM normal  Mouth/Throat: No oropharyngeal exudate present.   Cobblestoning of posterior oropharynx.   Nasal tissue pink and normal appearing.  No rhinorrhea noted.    Eyes: Conjunctivae are non-erythematous   No maxillary or frontal sinus tenderness to palpation.   Cardiovascular: Normal rate, regular rhythm and normal heart sounds. Exam reveals no gallop and no friction rub.   No murmur heard.  Respiratory: Effort normal and breath sounds normal. No respiratory distress. No wheezes. No rales.   Musculoskeletal: Normal range of motion.   Lymphadenopathy:   No cervical adenopathy.   No lower extremity edema.   Neuro: Oriented to person, place, and time.  Skin: Skin is warm and dry. No rash noted.   Psychiatric: Normal mood and affect.     Nursing note and vitals reviewed.      WORKUP:   Skin testing  Positive for weeds.    ASSESSMENT/PLAN:  Problem List Items Addressed This Visit        Respiratory    Chronic seasonal allergic rhinitis due to pollen - Primary     This year she has had fall nasal and ocular symptoms.  Flonase used intermittently.  Cetirizine has been beneficial.  Over-the-counter ocular antihistamine has been beneficial.    - Dymista 1 spray/nostril twice daily.  - Zyrtec, Allegra or Claritin daily as needed for allergy symptoms.   - Ketotifen 1 drop/eye twice daily as needed for allergy symptoms.   - Aeroallergen avoidance measures discussed and literature  provided.              Relevant Medications    azelastine-fluticasone (DYMISTA) 137-50 MCG/ACT nasal spray    Other Relevant Orders    ALLERGY SKIN TESTS,ALLERGENS (Completed)        Return in fall of 2018.     Chart documentation with Dragon Voice recognition Software. Although reviewed after completion, some words and grammatical errors may remain.    Gagandeep Lafleur,    Allergy/Immunology  [facility] [place]        Again, thank you for allowing me to participate in the care of your patient.        Sincerely,        Gagandeep Lafleur, DO

## 2017-12-06 NOTE — PROGRESS NOTES
Avis Avalos is a 42 year old White female with no significant previous medical history. Avis Avalos is being seen today for evaluation of seasonal allergies. The patient is accompanied by spouse. The spouse helped provide the history.     The patient has had low level perennial nasal and ocular symptoms since childhood.  Over the last 5-7 years she has had increased frequency of sinus and ear infections.  This year since September she has had ocular itching, nasal itching, facial itching, sneezing, congestion and ear pressure.  She additionally has had postnasal drainage, congestion, ocular itching and sinus headaches.  She has used Flonase on an as-needed basis and this has been helpful.  She has used Zyrtec and this has been helpful.  She is using over-the-counter ocular antihistamine and this has been helpful.  Patient reports that she had sinus x-rays in 2009 which showed narrowing.  She has never had sinus surgery.  No recent CT scan of her sinuses.  She does not clearly have any triggers that make her symptoms worse including cats, dogs, dust, mowing grass, raking leaves or smoke.    History of exercise induced asthma in early childhood. She has outgrown this.     The patient has no history of eczema, food allergies, medications allergies or hives.     ENVIRONMENTAL HISTORY: The [place] The home is heated with a forced air. They does have central air conditioning. The patient's bedroom is furnished with carpeting in bedroom.  Pets inside the house include 2 cat(s). There is not history of cockroach or mice infestation. There is/are 0 smokers in the house.  The house does have a damp basement.     Past Medical History:   Diagnosis Date     Crohn disease (H)      Depression     1-2 years ago     Fructose intolerance      High risk HPV infection 4/14/10    normal pap, + HR HPV (59). normal paps since     PONV (postoperative nausea and vomiting)     after  colonoscopy     Family History   Problem Relation Age of Onset     DIABETES Mother      borderline     Depression Mother      GASTROINTESTINAL DISEASE Mother      IBS     Thyroid Disease Mother      hypothyroidism     DIABETES Father      NIDDM - Type II     DIABETES Maternal Grandmother      Arthritis Maternal Grandfather      HEART DISEASE Paternal Grandfather      Thyroid Disease Sister      hypothyroidism     Rheumatoid Arthritis Sister      Other Cancer Sister      Asthma Other      niece     Obesity Sister      half-sister/had gastric bypass     Past Surgical History:   Procedure Laterality Date     C APPENDECTOMY           C/SECTION, LOW TRANSVERSE  02/17/10    , Low Transverse      SECTION  2012    Procedure:  SECTION;   SECTION ;  Surgeon: Yehuda Cruz MD;  [facility]     COLONOSCOPY  2011    Procedure:COMBINED COLONOSCOPY, SINGLE BIOPSY/POLYPECTOMY BY BIOPSY; Surgeon:MARTIN MAO; Location:[facility]     COLONOSCOPY  2011    Procedure:COMBINED COLONOSCOPY, SINGLE BIOPSY/POLYPECTOMY BY BIOPSY; Colonoscopy biopsy of distal iiluem, colonoscopy removal of colon polyp with fulguration, colonoscopy and control of biopsy bleed with placement of resolution clip; Surgeon:TESS POLLARD; Location:[facility]     COLONOSCOPY  2011    Procedure:COMBINED COLONOSCOPY, REMOVE TUMOR/POLYP/LESION BY FULGURATION/HOT BIOPSY; Surgeon:TESS POLLARD; Location[facility]     COLONOSCOPY  2011    Procedure:COMBINED COLONOSCOPY, CONTROL BLEED; Surgeon:TESS POLLARD; Location[facility]     COLONOSCOPY N/A 2017    Procedure: COMBINED COLONOSCOPY, SINGLE OR MULTIPLE BIOPSY/POLYPECTOMY BY BIOPSY;;  Surgeon: Josué Hawk MD;  Location: [facility]     HC FLEX SIGMOIDOSCOPY W/WO MIKY SPEC BY BRUSH/WASH  09    [facility]     HC TOOTH EXTRACTION W/FORCEP       VIDEO CAPSULE ENDOSCOPY  09    [facility]       REVIEW OF SYSTEMS:  General:  negative for weight gain. negative for weight loss. negative for changes in sleep.   Ears: positive  for fullness. negative for hearing loss. negative for dizziness.   Nose: negative for snoring.negative for changes in smell. negative for drainage.   Eyes: negative for eye watering. positive  for eye itching. negative for vision changes. negative for eye redness.  Throat: negative for hoarseness. negative for sore throat. negative for trouble swallowing.   Lungs: negative for shortness of breath.negative for wheezing. negative for sputum production.   Cardiovascular: negative for chest pain. negative for swelling of ankles. positive  for fast or irregular heartbeat.   Gastrointestinal: negative for nausea. positive  for heartburn. negative for acid reflux.   Musculoskeletal: positive  for joint pain. positive  for joint stiffness. negative for joint swelling.   Neurologic: negative for seizures. negative for fainting. negative for weakness.   Psychiatric: negative for changes in mood. positive  for anxiety.   Endocrine: negative for cold intolerance. negative for heat intolerance. negative for tremors.   Lymphatic: negative for lower extremity swelling. positive  for lymph node swelling.   Hematologic: negative for easy bruising. negative for easy bleeding.  Integumentary: negative for rash. negative for scaling. negative for nail changes.       Current Outpatient Prescriptions:      azelastine-fluticasone (DYMISTA) 137-50 MCG/ACT nasal spray, Spray 1 spray into both nostrils 2 times daily, Disp: 1 Bottle, Rfl: 3     citalopram (CELEXA) 20 MG tablet, Take 1/2 tablet (10 mg) for 1-2 weeks, then increase to 1 tablet orally daily, Disp: 30 tablet, Rfl: 1     triamcinolone (KENALOG) 0.5 % cream, Apply sparingly to affected area at bedtime for 6-12 weeks., Disp: 454 g, Rfl: 1     clobetasol (TEMOVATE) 0.05 % ointment, Apply sparingly to affected area at bedtime for 6-12 weeks., Disp: 60 g, Rfl: 3     Pseudoephedrine HCl  (SUDAFED PO), , Disp: , Rfl:      guaiFENesin (MUCINEX) 600 MG 12 hr tablet, Take 1,200 mg by mouth 2 times daily, Disp: , Rfl:      DiphenhydrAMINE HCl (BENADRYL PO), , Disp: , Rfl:      NONFORMULARY, 1 tablet daily, Disp: , Rfl:      Naproxen Sodium (ALEVE PO), Take 220 mg by mouth daily as needed for moderate pain, Disp: , Rfl:      valACYclovir (VALTREX) 1000 mg tablet, TAKE ONE TABLET BY MOUTH DAILY AS NEEDED, Disp: 21 tablet, Rfl: 0     ALPRAZolam (XANAX) 0.5 MG tablet, Use 1-2 tablets 30-60 minutes before flying. Try one tablet first. Take a second if needed., Disp: 20 tablet, Rfl: 0     fluticasone (FLONASE) 50 MCG/ACT spray, Spray 1-2 sprays into both nostrils daily, Disp: 1 Bottle, Rfl: 1     Ibuprofen (IBU-200 PO), Take 2-3 tablets by mouth as needed Reported on 5/10/2017, Disp: , Rfl:      Acetaminophen (TYLENOL PO), Take by mouth as needed Reported on 5/10/2017, Disp: , Rfl:      Multiple Vitamins-Minerals (RA ONE DAILY WOMENS) TABS, Take  by mouth., Disp: , Rfl:      diazepam (VALIUM) 10 MG tablet, Take 1 tablet (10 mg) by mouth every 6 hours as needed for anxiety or sleep Take 30-60 minutes before procedure.  Do not operate a vehicle after taking this medication. (Patient not taking: Reported on 12/6/2017), Disp: 1 tablet, Rfl: 0     hydrocortisone (ANUSOL-HC) 25 MG Suppository, Place 1 suppository (25 mg) rectally 2 times daily (Patient not taking: Reported on 12/6/2017), Disp: 28 suppository, Rfl: 5     Cetirizine HCl (ZYRTEC ALLERGY PO), , Disp: , Rfl:      vitamin D3 (CHOLECALCIFEROL) 55795 UNITS capsule, Take 1 capsule (50,000 Units) by mouth every 7 days (Patient not taking: Reported on 12/6/2017), Disp: 8 capsule, Rfl: 0     NONFORMULARY, , Disp: , Rfl:   Immunization History   Administered Date(s) Administered     Influenza (H1N1) 11/10/2009     Influenza (IIV3) PF 02/22/2012, 09/27/2012     Influenza Vaccine IM 3yrs+ 4 Valent IIV4 02/18/2014     TD (ADULT, 7+) 04/14/2008     TDAP Vaccine  (Adacel) 06/13/2011     Allergies   Allergen Reactions     Fructose GI Disturbance     Sorbitol Diarrhea         EXAM:   Constitutional:  Appears well-developed and well-nourished. No distress.   HEENT:   Head: Normocephalic.   Right Ear: External ear normal. TM normal  Left Ear: External ear normal. TM normal  Mouth/Throat: No oropharyngeal exudate present.   Cobblestoning of posterior oropharynx.   Nasal tissue pink and normal appearing.  No rhinorrhea noted.    Eyes: Conjunctivae are non-erythematous   No maxillary or frontal sinus tenderness to palpation.   Cardiovascular: Normal rate, regular rhythm and normal heart sounds. Exam reveals no gallop and no friction rub.   No murmur heard.  Respiratory: Effort normal and breath sounds normal. No respiratory distress. No wheezes. No rales.   Musculoskeletal: Normal range of motion.   Lymphadenopathy:   No cervical adenopathy.   No lower extremity edema.   Neuro: Oriented to person, place, and time.  Skin: Skin is warm and dry. No rash noted.   Psychiatric: Normal mood and affect.     Nursing note and vitals reviewed.      WORKUP:   Skin testing  Positive for weeds.    ASSESSMENT/PLAN:  Problem List Items Addressed This Visit        Respiratory    Chronic seasonal allergic rhinitis due to pollen - Primary     This year she has had fall nasal and ocular symptoms.  Flonase used intermittently.  Cetirizine has been beneficial.  Over-the-counter ocular antihistamine has been beneficial.    - Dymista 1 spray/nostril twice daily.  - Zyrtec, Allegra or Claritin daily as needed for allergy symptoms.   - Ketotifen 1 drop/eye twice daily as needed for allergy symptoms.   - Aeroallergen avoidance measures discussed and literature provided.              Relevant Medications    azelastine-fluticasone (DYMISTA) 137-50 MCG/ACT nasal spray    Other Relevant Orders    ALLERGY SKIN TESTS,ALLERGENS (Completed)        Return in fall of 2018.     Chart documentation with Dragon Voice  recognition Software. Although reviewed after completion, some words and grammatical errors may remain.    Gagandeep Lafleur DO   Allergy/Immunology  [facility], [place] and [facility] [place]

## 2017-12-08 ENCOUNTER — THERAPY VISIT (OUTPATIENT)
Dept: PHYSICAL THERAPY | Facility: CLINIC | Age: 42
End: 2017-12-08
Payer: COMMERCIAL

## 2017-12-08 DIAGNOSIS — K62.89 RECTAL PAIN: ICD-10-CM

## 2017-12-08 DIAGNOSIS — M62.89 PELVIC FLOOR DYSFUNCTION: ICD-10-CM

## 2017-12-08 PROCEDURE — 97140 MANUAL THERAPY 1/> REGIONS: CPT | Mod: GP | Performed by: PHYSICAL THERAPIST

## 2017-12-08 PROCEDURE — 97164 PT RE-EVAL EST PLAN CARE: CPT | Mod: GP | Performed by: PHYSICAL THERAPIST

## 2017-12-08 NOTE — PROGRESS NOTES
Subjective:    HPI                    Objective:    System    Physical Exam    General     ROS    Assessment/Plan:      PROGRESS  REPORT (RE-EVALUATION)    Progress reporting period is from 9/15/17 to 12/8/17.       SUBJECTIVE  Pt has not been to therapy since 9/15/17 as she was working independently and felt rectal pain was manageable. Pt notes she had a bout of illness late September 2017. Feels this caused setback by increasing pelvic floor tone due to irritation w/ GI issues. Has been having pain w/ bowel movements and difficulty relaxing again. Also has increase in low back pain the week before menstruating.     Current pain level is 7/10 w/ bowel movement .     Previous pain level was  3/10 Initial Pain level: 6/10.   Changes in function:  New goal created (See Goal flowsheet attached for changes in current functional level)  Adverse reaction to treatment or activity: None    OBJECTIVE  (-) Active SLR, (-) IESHA B, (-) Torsion, Hip flex 5/5 B, Hip abd 5/5 B; (-) SI Malalignment; Tender/hypertonic Levator Ani L>R; hypomobile coccyx w/ severe tenderness; (?) Prone lumbar instability (unsure if pain is better w/ B LE extension)     ASSESSMENT/PLAN  Updated problem list and treatment plan: Diagnosis 1:  Pelvic floor dysfunction and rectal pain consistent w/ levator ani syndrome       Pain -  manual therapy, self management and home program  Impaired muscle performance - biofeedback, electric stimulation, neuro re-education and home program  Decreased function - therapeutic activities and home program  STG/LTGs have been met or progress has been made towards goals:  Yes (See Goal flow sheet completed today.)  Assessment of Progress: The patient's condition has exacerbated.  Self Management Plans:  Patient has been instructed in a home treatment program.  Patient  has been instructed in self management of symptoms.  I have re-evaluated this patient and find that the nature, scope, duration and intensity of the  therapy is appropriate for the medical condition of the patient.  Avis continues to require the following intervention to meet STG and LTG's:  PT    Recommendations:  This patient would benefit from continued therapy.     Frequency:  1 X week, once daily  Duration:  for 4 weeks          Please refer to the daily flowsheet for treatment today, total treatment time and time spent performing 1:1 timed codes.

## 2017-12-08 NOTE — MR AVS SNAPSHOT
After Visit Summary   12/8/2017    Avis Avalos    MRN: [id number]           Patient Information     Date Of Birth          1975        Visit Information        Provider Department      12/8/2017 1:50 PM Hilligoss, Amanda K, PT [facility] Physical Therapy        Today's Diagnoses     Rectal pain        Pelvic floor dysfunction           Follow-ups after your visit        Your next 10 appointments already scheduled     Dec 15, 2017 10:00 AM CST   М[facility]НА For Women Only with Amanda K Hilligoss, DANIE   [facility] Physical Therapy ([facility] [place]  )    [address]  [facility] [address] [phone]              Who to contact     If you have questions or need follow up information about today's clinic visit or your schedule please contact [facility] PH[facility] THERAP[facility] directly at [phone].  Normal or non-critical lab and imaging results will be communicated to you by [facility], letter or phone within 4 business days after the clinic has received the results. If you do not hear from us within 7 days, please contact the clinic through [facility] or phone. If you have a critical or abnormal lab result, we will notify you by phone as soon as possible.  Submit refill requests through [employer] or call your pharmacy and they will forward the refill request to us. Please allow 3 business days for your refill to be completed.          Additional Information About Your Visit        [facility] Information     [employer] gives you secure access to your electronic health record. If you see a primary care provider, you can also send messages to your care team and make appointments. If you have questions, please call your primary care clinic.  If you do not have a primary care provider, please call [phone] and they will assist you.        Care EveryWhere ID     This is your Care EveryWhere ID. This could be  used by other organizations to access your [place] medical records  [id number]         Blood Pressure from Last 3 Encounters:   12/06/17 94/56   11/13/17 116/72   10/12/17 108/70    Weight from Last 3 Encounters:   12/06/17 74.8 kg (165 lb)   11/13/17 78 kg (172 lb)   10/12/17 77.2 kg (170 lb 1.6 oz)              We Performed the Following     HC PT RE-EVAL     МАРИНА PROGRESS NOTES REPORT     MANUAL THER TECH,1+REGIONS,EA 15 MIN        Primary Care Provider Office Phone # Fax #    Sarah Wilhelm, APRN [facility] [phone] [address] [address] [facility] [id number]        Equal Access to Services     JAQUI NEWMAN : Hadii larry ku hadasho Soomaali, waaxda luqadaha, qaybta kaalmada adeegyada, waxlibrado lópez . So [facility] [phone].    ATENCIÓN: Si habla español, tiene a devine disposición servicios gratuitos de asistencia lingüística. [facility] [phone].    We comply with applicable federal civil rights laws and [place] laws. We do not discriminate on the basis of race, color, national origin, age, disability, sex, sexual orientation, or gender identity.            Thank you!     Thank you for [facility]  for your care. Our goal is always to provide you with excellent care. Hearing back from our patients is one way we can continue to improve our services. Please take a few minutes to complete the written survey that you may receive in the mail after your visit with us. Thank you!             Your Updated Medication List - Protect others around you: Learn how to safely use, store and throw away your medicines at www.disposemymeds.org.          This list is accurate as of: 12/8/17  3:42 PM.  Always use your most recent med list.                   Brand Name Dispense Instructions for use Diagnosis    ALEVE PO      Take 220 mg by mouth daily as needed for moderate pain        ALPRAZolam 0.5 MG tablet    XANAX    20  tablet    Use 1-2 tablets 30-60 minutes before flying. Try one tablet first. Take a second if needed.    Flying phobia       azelastine-fluticasone 137-50 MCG/ACT nasal spray    DYMISTA    1 Bottle    Spray 1 spray into both nostrils 2 times daily    Chronic seasonal allergic rhinitis due to pollen       BENADRYL PO           citalopram 20 MG tablet    celeXA    30 tablet    Take 1/2 tablet (10 mg) for 1-2 weeks, then increase to 1 tablet orally daily    Moderate episode of recurrent major depressive disorder (H), Obsessive-compulsive disorder, unspecified type       clobetasol 0.05 % ointment    TEMOVATE    60 g    Apply sparingly to affected area at bedtime for 6-12 weeks.    Lichen sclerosus of female genitalia       diazepam 10 MG tablet    VALIUM    1 tablet    Take 1 tablet (10 mg) by mouth every 6 hours as needed for anxiety or sleep Take 30-60 minutes before procedure.  Do not operate a vehicle after taking this medication.    Chronic bilateral low back pain without sciatica, Claustrophobia       fluticasone 50 MCG/ACT spray    FLONASE    1 Bottle    Spray 1-2 sprays into both nostrils daily    Acute sinusitis with symptoms > 10 days       hydrocortisone 25 MG Suppository    ANUSOL-HC    28 suppository    Place 1 suppository (25 mg) rectally 2 times daily    External hemorrhoids       IBU-200 PO      Take 2-3 tablets by mouth as needed Reported on 5/10/2017        MUCINEX 600 MG 12 hr tablet   Generic drug:  guaiFENesin      Take 1,200 mg by mouth 2 times daily        * NONFORMULARY      1 tablet daily        * NONFORMULARY           RA ONE DAILY WOMENS Tabs      Take  by mouth.        SUDAFED PO           triamcinolone 0.5 % cream    KENALOG    454 g    Apply sparingly to affected area at bedtime for 6-12 weeks.    Lichen sclerosus of female genitalia       TYLENOL PO      Take by mouth as needed Reported on 5/10/2017        valACYclovir 1000 mg tablet    VALTREX    21 tablet    TAKE ONE TABLET BY MOUTH  DAILY AS NEEDED    Herpes simplex type 2 infection       vitamin D3 80834 UNITS capsule    CHOLECALCIFEROL    8 capsule    Take 1 capsule (50,000 Units) by mouth every 7 days    Vitamin D insufficiency       ZYRTEC ALLERGY PO           * Notice:  This list has 2 medication(s) that are the same as other medications prescribed for you. Read the directions carefully, and ask your doctor or other care provider to review them with you.

## 2017-12-08 NOTE — LETTER
[facility]  [address]  [facility] [id number] [phone]    2017    Re: Avis Avalos   :   1975  MRN:  [id number]   REFERRING PHYSICIAN:   Sarah Whitten*    [facility]    Date of Initial Evaluation:  ***  Visits:  Rxs Used: 4  Reason for Referral:     Rectal pain  Pelvic floor dysfunction    EVALUATION SUMMARY    Subjective:    HPI                    Objective:    System    Physical Exam    General     ROS    Assessment/Plan:      PROGRESS  REPORT (RE-EVALUATION)    Progress reporting period is from 9/15/17 to 17.       SUBJECTIVE  Pt has not been to therapy since 9/15/17 as she was working independently and felt rectal pain was manageable. Pt notes she had a bout of illness late 2017. Feels this caused setback by increasing pelvic floor tone due to irritation w/ GI issues. Has been having pain w/ bowel movements and difficulty relaxing again. Also has increase in low back pain the week before menstruating.     Current pain level is 7/10 w/ bowel movement .     Previous pain level was  3/10 Initial Pain level: 6/10.   Changes in function:  New goal created (See Goal flowsheet attached for changes in current functional level)  Adverse reaction to treatment or activity: None    OBJECTIVE  (-) Active SLR, (-) IESHA B, (-) Torsion, Hip flex 5/5 B, Hip abd 5/5 B; (-) SI Malalignment; Tender/hypertonic Levator Ani L>R; hypomobile coccyx w/ severe tenderness; (?) Prone lumbar instability (unsure if pain is better w/ B LE extension)     ASSESSMENT/PLAN  Updated problem list and treatment plan: Diagnosis 1:  Pelvic floor dysfunction and rectal pain consistent w/ levator ani syndrome       Pain -  manual therapy, self management and home program  Impaired muscle performance - biofeedback, electric stimulation, neuro re-education and home program  Decreased  function - therapeutic activities and home program  STG/LTGs have been met or progress has been made towards goals:  Yes (See Goal flow sheet completed today.)  Assessment of Progress: The patient's condition has exacerbated.  Self Management Plans:  Patient has been instructed in a home treatment program.  Patient  has been instructed in self management of symptoms.  I have re-evaluated this patient and find that the nature, scope, duration and intensity of the therapy is appropriate for the medical condition of the patient.  Avis continues to require the following intervention to meet STG and LTG's:  PT    Recommendations:  This patient would benefit from continued therapy.     Frequency:  1 X week, once daily  Duration:  for 4 weeks            Thank you for your referral.    INQUIRIES  Therapist:  [facility]  [address]  [facility] [address]  Phone: [phone]  Fax: [phone]

## 2017-12-18 ENCOUNTER — TELEPHONE (OUTPATIENT)
Dept: FAMILY MEDICINE | Facility: OTHER | Age: 42
End: 2017-12-18

## 2017-12-18 DIAGNOSIS — Z20.828 EXPOSURE TO INFLUENZA: Primary | ICD-10-CM

## 2017-12-18 RX ORDER — OSELTAMIVIR PHOSPHATE 75 MG/1
75 CAPSULE ORAL DAILY
Qty: 10 CAPSULE | Refills: 0 | Status: SHIPPED | OUTPATIENT
Start: 2017-12-18 | End: 2018-01-09

## 2017-12-18 NOTE — TELEPHONE ENCOUNTER
Reason for Call:  Other prescription    Detailed comments: pt states daughter just seen [facility] today and was tested positive for influenza A and pt states wondering if Mini can call in prescription for preventative as well due to been exposed and feeling some of the same symptoms. Please advise and contact pt. Pt would like it filled to [facility]     Phone Number Patient can be reached at: Cell number on file:    Telephone Information:   Mobile [phone]       Best Time: ANY    Can we leave a detailed message on this number? YES    Call taken on 12/18/2017 at 1:51 PM by Rochelle Calderon

## 2017-12-18 NOTE — TELEPHONE ENCOUNTER
Please review in PCP's absence.  Claudia Flannery CMA ([facility])

## 2017-12-26 ENCOUNTER — OFFICE VISIT (OUTPATIENT)
Dept: FAMILY MEDICINE | Facility: OTHER | Age: 42
End: 2017-12-26
Payer: COMMERCIAL

## 2017-12-26 VITALS
DIASTOLIC BLOOD PRESSURE: 60 MMHG | TEMPERATURE: 98.2 F | HEIGHT: 64 IN | SYSTOLIC BLOOD PRESSURE: 100 MMHG | BODY MASS INDEX: 28.32 KG/M2 | RESPIRATION RATE: 16 BRPM | HEART RATE: 62 BPM | WEIGHT: 165.9 LBS

## 2017-12-26 DIAGNOSIS — R79.89 ELEVATED VITAMIN B12 LEVEL: ICD-10-CM

## 2017-12-26 DIAGNOSIS — E55.9 VITAMIN D INSUFFICIENCY: ICD-10-CM

## 2017-12-26 DIAGNOSIS — L30.9 DERMATITIS: Primary | ICD-10-CM

## 2017-12-26 LAB — VIT B12 SERPL-MCNC: 942 PG/ML (ref 193–986)

## 2017-12-26 PROCEDURE — 99213 OFFICE O/P EST LOW 20 MIN: CPT | Performed by: STUDENT IN AN ORGANIZED HEALTH CARE EDUCATION/TRAINING PROGRAM

## 2017-12-26 PROCEDURE — 82306 VITAMIN D 25 HYDROXY: CPT | Performed by: STUDENT IN AN ORGANIZED HEALTH CARE EDUCATION/TRAINING PROGRAM

## 2017-12-26 PROCEDURE — 36415 COLL VENOUS BLD VENIPUNCTURE: CPT | Performed by: STUDENT IN AN ORGANIZED HEALTH CARE EDUCATION/TRAINING PROGRAM

## 2017-12-26 PROCEDURE — 82607 VITAMIN B-12: CPT | Performed by: STUDENT IN AN ORGANIZED HEALTH CARE EDUCATION/TRAINING PROGRAM

## 2017-12-26 ASSESSMENT — PATIENT HEALTH QUESTIONNAIRE - PHQ9
10. IF YOU CHECKED OFF ANY PROBLEMS, HOW DIFFICULT HAVE THESE PROBLEMS MADE IT FOR YOU TO DO YOUR WORK, TAKE CARE OF THINGS AT HOME, OR GET ALONG WITH OTHER PEOPLE: NOT DIFFICULT AT ALL
SUM OF ALL RESPONSES TO PHQ QUESTIONS 1-9: 1
SUM OF ALL RESPONSES TO PHQ QUESTIONS 1-9: 1

## 2017-12-26 NOTE — MR AVS SNAPSHOT
"              After Visit Summary   12/26/2017    Avis Avalos    MRN: [id number]           Patient Information     Date Of Birth          1975        Visit Information        Provider Department      12/26/2017 2:20 PM Sarah Wilhelm APRN CNP [facility]        Today's Diagnoses     Dermatitis    -  1    Elevated vitamin B12 level        Vitamin D insufficiency           Follow-ups after your visit        Who to contact     If you have questions or need follow up information about today's clinic visit or your schedule please contact [facility] directly at [phone].  Normal or non-critical lab and imaging results will be communicated to you by Treatfulhart, letter or phone within 4 business days after the clinic has received the results. If you do not hear from us within 7 days, please contact the clinic through Treatfulhart or phone. If you have a critical or abnormal lab result, we will notify you by phone as soon as possible.  Submit refill requests through CloudX or call your pharmacy and they will forward the refill request to us. Please allow 3 business days for your refill to be completed.          Additional Information About Your Visit        MyChart Information     CloudX gives you secure access to your electronic health record. If you see a primary care provider, you can also send messages to your care team and make appointments. If you have questions, please call your primary care clinic.  If you do not have a primary care provider, please call [phone] and they will assist you.        Care EveryWhere ID     This is your Care EveryWhere ID. This could be used by other organizations to access your [place] medical records  [id number]        Your Vitals Were     Pulse Temperature Respirations Height BMI (Body Mass Index)       62 98.2  F (36.8  C) (Oral) 16 5' 3.78\" (1.62 m) 28.67 kg/m2        Blood Pressure from Last 3 Encounters:   12/29/17 106/64 "   12/26/17 100/60   12/06/17 94/56    Weight from Last 3 Encounters:   12/29/17 164 lb 8 oz (74.6 kg)   12/26/17 165 lb 14.4 oz (75.3 kg)   12/06/17 165 lb (74.8 kg)              We Performed the Following     Vitamin B12     Vitamin D Deficiency        Primary Care Provider Office Phone # Fax #    Sarah Wilhelm, APRN [facility] [phone]       [address] [address] [place] [address]        Equal Access to Services     [facility]: Hadii aad ku hadasho Soomaali, waaxda luqadaha, qaybta kaalmada adeegyada, waxay luca haysana lópez . So [facility] [phone].    ATENCIÓN: Si habla español, tiene a devine disposición servicios gratuitos de asistencia lingüística. LlFirelands [facility] [phone].    We comply with applicable federal civil rights laws and [place] laws. We do not discriminate on the basis of race, color, national origin, age, disability, sex, sexual orientation, or gender identity.            Thank you!     Thank you for choosing [facility]  for your care. Our goal is always to provide you with excellent care. Hearing back from our patients is one way we can continue to improve our services. Please take a few minutes to complete the written survey that you may receive in the mail after your visit with us. Thank you!             Your Updated Medication List - Protect others around you: Learn how to safely use, store and throw away your medicines at www.disposemymeds.org.          This list is accurate as of: 12/26/17 11:59 PM.  Always use your most recent med list.                   Brand Name Dispense Instructions for use Diagnosis    ALEVE PO      Take 220 mg by mouth daily as needed for moderate pain        ALPRAZolam 0.5 MG tablet    XANAX    20 tablet    Use 1-2 tablets 30-60 minutes before flying. Try one tablet first. Take a second if needed.    Flying phobia       BENADRYL PO           citalopram 20 MG tablet    celeXA    90 tablet    Take 1 tablet (20 mg) by  mouth daily    Moderate episode of recurrent major depressive disorder (H), Obsessive-compulsive disorder, unspecified type       clobetasol 0.05 % ointment    TEMOVATE    60 g    Apply sparingly to affected area at bedtime for 6-12 weeks.    Lichen sclerosus of female genitalia       fluticasone 50 MCG/ACT spray    FLONASE    1 Bottle    Spray 1-2 sprays into both nostrils daily    Acute sinusitis with symptoms > 10 days       IBU-200 PO      Take 2-3 tablets by mouth as needed Reported on 5/10/2017        MUCINEX 600 MG 12 hr tablet   Generic drug:  guaiFENesin      Take 1,200 mg by mouth 2 times daily        * NONFORMULARY      1 tablet daily        * NONFORMULARY           RA ONE DAILY WOMENS Tabs      Take  by mouth.        SUDAFED PO           TYLENOL PO      Take by mouth as needed Reported on 5/10/2017        valACYclovir 1000 mg tablet    VALTREX    21 tablet    TAKE ONE TABLET BY MOUTH DAILY AS NEEDED    Herpes simplex type 2 infection       ZYRTEC ALLERGY PO           * Notice:  This list has 2 medication(s) that are the same as other medications prescribed for you. Read the directions carefully, and ask your doctor or other care provider to review them with you.

## 2017-12-26 NOTE — PROGRESS NOTES
"  SUBJECTIVE:   Avis Avalos is a 42 year old female who presents to clinic today for the following health issues:      Patient is here with itching and bumps on labia. Patient states it starts out itching really bad, feels like ingrown hair.  Wondering if it has something to do with her hormones. She has been using the steroid cream for suspected early lichen sclerosis which initially helped symptoms but now she is having bump and itching. Denies vaginal discharge or odor. She currently has menstrual cycle.    Answers for HPI/ROS submitted by the patient on 12/26/2017   If you checked off any problems, how difficult have these problems made it for you to do your work, take care of things at home, or get along with other people?: Not difficult at all  PHQ9 TOTAL SCORE: 1      Problem list and histories reviewed & adjusted, as indicated.  Additional history: as documented    Reviewed and updated as needed this visit by clinical staffAllergies       Reviewed and updated as needed this visit by Provider         ROS:  Constitutional, HEENT, cardiovascular, pulmonary, gi and gu systems are negative, except as otherwise noted.      OBJECTIVE:   /60 (BP Location: Right arm, Patient Position: Sitting, Cuff Size: Adult Regular)  Pulse 62  Temp 98.2  F (36.8  C) (Oral)  Resp 16  Ht 5' 3.78\" (1.62 m)  Wt 165 lb 14.4 oz (75.3 kg)  BMI 28.67 kg/m2  Body mass index is 28.67 kg/(m^2).  GENERAL: healthy, alert and no distress  RESP: lungs clear to auscultation - no rales, rhonchi or wheezes  CV: regular rate and rhythm, normal S1 S2, no S3 or S4, no murmur, click or rub, no peripheral edema and peripheral pulses strong  ABDOMEN: soft, nontender, no hepatosplenomegaly, no masses and bowel sounds normal   (female): red papular rash on external labia, greater on right side with mild flaking of skin.   MS: no gross musculoskeletal defects noted, no edema  PSYCH: mentation appears normal, affect " normal/bright    Diagnostic Test Results:  Results for orders placed or performed in visit on 12/26/17   Vitamin B12   Result Value Ref Range    Vitamin B12 942 193 - 986 pg/mL   Vitamin D Deficiency   Result Value Ref Range    Vitamin D Deficiency screening 33 20 - 75 ug/L       ASSESSMENT/PLAN:     1. Dermatitis  Dermatitis type rash on labia.  Recommend continued use of clobetasol cream and to follow up with gynecology.     2. Elevated vitamin B12 level  This has normalized.   - Vitamin B12    3. Vitamin D insufficiency  This has normalized.  - Vitamin D Deficiency      JEM Lindsay [facility]

## 2017-12-27 LAB — DEPRECATED CALCIDIOL+CALCIFEROL SERPL-MC: 33 UG/L (ref 20–75)

## 2017-12-27 ASSESSMENT — PATIENT HEALTH QUESTIONNAIRE - PHQ9: SUM OF ALL RESPONSES TO PHQ QUESTIONS 1-9: 1

## 2017-12-28 ENCOUNTER — THERAPY VISIT (OUTPATIENT)
Dept: PHYSICAL THERAPY | Facility: CLINIC | Age: 42
End: 2017-12-28
Payer: COMMERCIAL

## 2017-12-28 DIAGNOSIS — K62.89 RECTAL PAIN: ICD-10-CM

## 2017-12-28 DIAGNOSIS — M62.89 PELVIC FLOOR DYSFUNCTION: ICD-10-CM

## 2017-12-28 PROCEDURE — 97140 MANUAL THERAPY 1/> REGIONS: CPT | Mod: GP | Performed by: PHYSICAL THERAPIST

## 2017-12-28 PROCEDURE — 97110 THERAPEUTIC EXERCISES: CPT | Mod: GP | Performed by: PHYSICAL THERAPIST

## 2017-12-28 NOTE — MR AVS SNAPSHOT
After Visit Summary   12/28/2017    Avis Avalos    MRN: [id number]           Patient Information     Date Of Birth          1975        Visit Information        Provider Department      12/28/2017 2:50 PM Hilligoss, Amanda K, PT [facility] Athletic [facility] Physical [facility]        Today's Diagnoses     Rectal pain        Pelvic floor dysfunction           Follow-ups after your visit        Your next 10 appointments already scheduled     Dec 29, 2017  3:00 PM CST   Office Visit with JEM Escalante CNM   [facility]    [address] [facility] [id number] [phone]           Bring a current list of meds and any records pertaining to this visit. For Physicals, please bring immunization records and any forms needing to be filled out. Please arrive 10 minutes early to complete paperwork.              Who to contact     If you have questions or need follow up information about today's clinic visit or your schedule please contact [facility] directly at [phone].  Normal or non-critical lab and imaging results will be communicated to you by NextPrincipleshart, letter or phone within 4 business days after the clinic has received the results. If you do not hear from us within 7 days, please contact the clinic through Poppint or phone. If you have a critical or abnormal lab result, we will notify you by phone as soon as possible.  Submit refill requests through [employer] or call your pharmacy and they will forward the refill request to us. Please allow 3 business days for your refill to be completed.          Additional Information About Your Visit        NextPrincipleshart Information     [employer] gives you secure access to your electronic health record. If you see a primary care provider, you can also send messages to your care team and make appointments. If you have questions, please call your  primary care clinic.  If you do not have a primary care provider, please call [phone] and they will assist you.        Care EveryWhere ID     This is your Care EveryWhere ID. This could be used by other organizations to access your [place] medical records  [id number]         Blood Pressure from Last 3 Encounters:   12/26/17 100/60   12/06/17 94/56   11/13/17 116/72    Weight from Last 3 Encounters:   12/26/17 75.3 kg (165 lb 14.4 oz)   12/06/17 74.8 kg (165 lb)   11/13/17 78 kg (172 lb)              We Performed the Following     MANUAL THER TECH,1+REGIONS,EA 15 MIN     THERAPEUTIC EXERCISES        Primary Care Provider Office Phone # Fax #    Sarah PilarJEM Barakat [facility] [phone] [address] [facility] [address]        Equal Access to Services     JAQUI NEWMAN : Hadii aad ku hadasho Soomaali, waaxda luqadaha, qaybta kaalmada adeegyada, waxay idiin hayaan lena lópez . So [facility] [phone].    ATENCIÓN: Si habla español, tiene a devine disposición servicios gratuitos de asistencia lingüística. Jero al [phone].    We comply with applicable federal civil rights laws and [place] laws. We do not discriminate on the basis of race, color, national origin, age, disability, sex, sexual orientation, or gender identity.            Thank you!     Thank you for choosing [facility]  for your care. Our goal is always to provide you with excellent care. Hearing back from our patients is one way we can continue to improve our services. Please take a few minutes to complete the written survey that you may receive in the mail after your visit with us. Thank you!             Your Updated Medication List - Protect others around you: Learn how to safely use, store and throw away your medicines at www.disposemymeds.org.          This list is accurate as of: 12/28/17  4:56 PM.  Always use your most recent med list.                    Brand Name Dispense Instructions for use Diagnosis    ALEVE PO      Take 220 mg by mouth daily as needed for moderate pain        ALPRAZolam 0.5 MG tablet    XANAX    20 tablet    Use 1-2 tablets 30-60 minutes before flying. Try one tablet first. Take a second if needed.    Flying phobia       azelastine-fluticasone 137-50 MCG/ACT nasal spray    DYMISTA    1 Bottle    Spray 1 spray into both nostrils 2 times daily    Chronic seasonal allergic rhinitis due to pollen       BENADRYL PO           citalopram 20 MG tablet    celeXA    90 tablet    Take 1 tablet (20 mg) by mouth daily    Moderate episode of recurrent major depressive disorder (H), Obsessive-compulsive disorder, unspecified type       clobetasol 0.05 % ointment    TEMOVATE    60 g    Apply sparingly to affected area at bedtime for 6-12 weeks.    Lichen sclerosus of female genitalia       diazepam 10 MG tablet    VALIUM    1 tablet    Take 1 tablet (10 mg) by mouth every 6 hours as needed for anxiety or sleep Take 30-60 minutes before procedure.  Do not operate a vehicle after taking this medication.    Chronic bilateral low back pain without sciatica, Claustrophobia       fluticasone 50 MCG/ACT spray    FLONASE    1 Bottle    Spray 1-2 sprays into both nostrils daily    Acute sinusitis with symptoms > 10 days       hydrocortisone 25 MG Suppository    ANUSOL-HC    28 suppository    Place 1 suppository (25 mg) rectally 2 times daily    External hemorrhoids       IBU-200 PO      Take 2-3 tablets by mouth as needed Reported on 5/10/2017        MUCINEX 600 MG 12 hr tablet   Generic drug:  guaiFENesin      Take 1,200 mg by mouth 2 times daily        * NONFORMULARY      1 tablet daily        * NONFORMULARY           oseltamivir 75 MG capsule    TAMIFLU    10 capsule    Take 1 capsule (75 mg) by mouth daily    Exposure to influenza       RA ONE DAILY WOMENS Tabs      Take  by mouth.        SUDAFED PO           triamcinolone 0.5 % cream    KENALOG    454 g    Apply  sparingly to affected area at bedtime for 6-12 weeks.    Lichen sclerosus of female genitalia       TYLENOL PO      Take by mouth as needed Reported on 5/10/2017        valACYclovir 1000 mg tablet    VALTREX    21 tablet    TAKE ONE TABLET BY MOUTH DAILY AS NEEDED    Herpes simplex type 2 infection       vitamin D3 11926 UNITS capsule    CHOLECALCIFEROL    8 capsule    Take 1 capsule (50,000 Units) by mouth every 7 days    Vitamin D insufficiency       ZYRTEC ALLERGY PO           * Notice:  This list has 2 medication(s) that are the same as other medications prescribed for you. Read the directions carefully, and ask your doctor or other care provider to review them with you.

## 2017-12-29 ENCOUNTER — OFFICE VISIT (OUTPATIENT)
Dept: OBGYN | Facility: OTHER | Age: 42
End: 2017-12-29
Payer: COMMERCIAL

## 2017-12-29 VITALS
SYSTOLIC BLOOD PRESSURE: 106 MMHG | HEART RATE: 76 BPM | DIASTOLIC BLOOD PRESSURE: 64 MMHG | BODY MASS INDEX: 28.43 KG/M2 | WEIGHT: 164.5 LBS

## 2017-12-29 DIAGNOSIS — L29.9 ITCHING: Primary | ICD-10-CM

## 2017-12-29 PROCEDURE — 99202 OFFICE O/P NEW SF 15 MIN: CPT | Performed by: ADVANCED PRACTICE MIDWIFE

## 2017-12-29 NOTE — MR AVS SNAPSHOT
After Visit Summary   12/29/2017    Avis Avalos    MRN: [id number]           Patient Information     Date Of Birth          1975        Visit Information        Provider Department      12/29/2017 3:00 PM Joceline Cheek APRN CNM [facility]        Today's Diagnoses     Itching    -  1       Follow-ups after your visit        Who to contact     If you have questions or need follow up information about today's clinic visit or your schedule please contact [facility] directly at [phone].  Normal or non-critical lab and imaging results will be communicated to you by ShoeSize.Mehart, letter or phone within 4 business days after the clinic has received the results. If you do not hear from us within 7 days, please contact the clinic through DigePrintt or phone. If you have a critical or abnormal lab result, we will notify you by phone as soon as possible.  Submit refill requests through Zencoder or call your pharmacy and they will forward the refill request to us. Please allow 3 business days for your refill to be completed.          Additional Information About Your Visit        MyChart Information     Zencoder gives you secure access to your electronic health record. If you see a primary care provider, you can also send messages to your care team and make appointments. If you have questions, please call your primary care clinic.  If you do not have a primary care provider, please call [phone] and they will assist you.        Care EveryWhere ID     This is your Care EveryWhere ID. This could be used by other organizations to access your [place] medical records  [id number]        Your Vitals Were     Pulse Last Period BMI (Body Mass Index)             76 12/24/2017 (Approximate) 28.43 kg/m2          Blood Pressure from Last 3 Encounters:   12/29/17 106/64   12/26/17 100/60   12/06/17 94/56    Weight from Last 3 Encounters:   12/29/17 164 lb 8 oz (74.6 kg)   12/26/17  165 lb 14.4 oz (75.3 kg)   12/06/17 165 lb (74.8 kg)              Today, you had the following     No orders found for display       Primary Care Provider Office Phone # Fax JEM Ludwig MITCHELL [phone] [address] [address] [facility] [address]        Equal Access to Services     [facility]: Hadii aad ku hadasho Soomaali, waaxda luqadaha, qaybta kaalmada adeegyada, waxay idiin hayaan adeeg kharash la'aan . So [facility] [phone].    ATENCIÓN: Si habla español, tiene a devine disposición servicios gratuitos de asistencia lingüística. [facility] [phone].    We comply with applicable federal civil rights laws and [place] laws. We do not discriminate on the basis of race, color, national origin, age, disability, sex, sexual orientation, or gender identity.            Thank you!     Thank you for choosing [facility]  for your care. Our goal is always to provide you with excellent care. Hearing back from our patients is one way we can continue to improve our services. Please take a few minutes to complete the written survey that you may receive in the mail after your visit with us. Thank you!             Your Updated Medication List - Protect others around you: Learn how to safely use, store and throw away your medicines at www.disposemymeds.org.          This list is accurate as of: 12/29/17  4:06 PM.  Always use your most recent med list.                   Brand Name Dispense Instructions for use Diagnosis    ALEVE PO      Take 220 mg by mouth daily as needed for moderate pain        ALPRAZolam 0.5 MG tablet    XANAX    20 tablet    Use 1-2 tablets 30-60 minutes before flying. Try one tablet first. Take a second if needed.    Flying phobia       azelastine-fluticasone 137-50 MCG/ACT nasal spray    DYMISTA    1 Bottle    Spray 1 spray into both nostrils 2 times daily    Chronic seasonal allergic rhinitis due to pollen       BENADRYL PO           citalopram 20 MG  tablet    celeXA    90 tablet    Take 1 tablet (20 mg) by mouth daily    Moderate episode of recurrent major depressive disorder (H), Obsessive-compulsive disorder, unspecified type       clobetasol 0.05 % ointment    TEMOVATE    60 g    Apply sparingly to affected area at bedtime for 6-12 weeks.    Lichen sclerosus of female genitalia       diazepam 10 MG tablet    VALIUM    1 tablet    Take 1 tablet (10 mg) by mouth every 6 hours as needed for anxiety or sleep Take 30-60 minutes before procedure.  Do not operate a vehicle after taking this medication.    Chronic bilateral low back pain without sciatica, Claustrophobia       fluticasone 50 MCG/ACT spray    FLONASE    1 Bottle    Spray 1-2 sprays into both nostrils daily    Acute sinusitis with symptoms > 10 days       hydrocortisone 25 MG Suppository    ANUSOL-HC    28 suppository    Place 1 suppository (25 mg) rectally 2 times daily    External hemorrhoids       IBU-200 PO      Take 2-3 tablets by mouth as needed Reported on 5/10/2017        MUCINEX 600 MG 12 hr tablet   Generic drug:  guaiFENesin      Take 1,200 mg by mouth 2 times daily        * NONFORMULARY      1 tablet daily        * NONFORMULARY           oseltamivir 75 MG capsule    TAMIFLU    10 capsule    Take 1 capsule (75 mg) by mouth daily    Exposure to influenza       RA ONE DAILY WOMENS Tabs      Take  by mouth.        SUDAFED PO           triamcinolone 0.5 % cream    KENALOG    454 g    Apply sparingly to affected area at bedtime for 6-12 weeks.    Lichen sclerosus of female genitalia       TYLENOL PO      Take by mouth as needed Reported on 5/10/2017        valACYclovir 1000 mg tablet    VALTREX    21 tablet    TAKE ONE TABLET BY MOUTH DAILY AS NEEDED    Herpes simplex type 2 infection       vitamin D3 71839 UNITS capsule    CHOLECALCIFEROL    8 capsule    Take 1 capsule (50,000 Units) by mouth every 7 days    Vitamin D insufficiency       ZYRTEC ALLERGY PO           * Notice:  This list has 2  medication(s) that are the same as other medications prescribed for you. Read the directions carefully, and ask your doctor or other care provider to review them with you.

## 2017-12-29 NOTE — PROGRESS NOTES
Avis Avalos is a 42 year old who presents to the clinic for evaluation of itchy area on her labia.  She is worried that perhaps is it an ingrown hair.  Has had it happen about 4 times this year.  Itches intensely, not sure if it ever drains or opens.  Has a history of herpes for about 20 years and these symptoms are different. No change in bowel or bladder.       Histories reviewed and updated  Past Medical History:   Diagnosis Date     Crohn disease (H)      Depression     1-2 years ago     Fructose intolerance      High risk HPV infection 4/14/10    normal pap, + HR HPV (59). normal paps since     PONV (postoperative nausea and vomiting)     after colonoscopy     Past Surgical History:   Procedure Laterality Date     C APPENDECTOMY           C/SECTION, LOW TRANSVERSE  02/17/10    , Low Transverse      SECTION  2012    Procedure:  SECTION;   SECTION ;  Surgeon: Yehuda Cruz MD;  Location: [facility]     COLONOSCOPY  2011    Procedure:COMBINED COLONOSCOPY, SINGLE BIOPSY/POLYPECTOMY BY BIOPSY; Surgeon:MARTIN MAO; Location: [facility]     COLONOSCOPY  2011    Procedure:COMBINED COLONOSCOPY, SINGLE BIOPSY/POLYPECTOMY BY BIOPSY; Colonoscopy biopsy of distal iiluem, colonoscopy removal of colon polyp with fulguration, colonoscopy and control of biopsy bleed with placement of resolution clip; Surgeon:TESS POLLARD; Location: GI     COLONOSCOPY  2011    Procedure:COMBINED COLONOSCOPY, REMOVE TUMOR/POLYP/LESION BY FULGURATION/HOT BIOPSY; Surgeon:TESS POLLARD; Location: GI     COLONOSCOPY  2011    Procedure:COMBINED COLONOSCOPY, CONTROL BLEED; Surgeon:TESS POLLARD; Location: GI     COLONOSCOPY N/A 2017    Procedure: COMBINED COLONOSCOPY, SINGLE OR MULTIPLE BIOPSY/POLYPECTOMY BY BIOPSY;;  Surgeon: Josué Hawk MD;  Location:  GI     HC FLEX SIGMOIDOSCOPY W/WO MIKY SPEC BY BRUSH/WASH  09    [facility]      HC TOOTH EXTRACTION W/FORCEP       VIDEO CAPSULE ENDOSCOPY  07/30/09    [facility]     Social History     Social History     Marital status:      Spouse name: N/A     Number of children: N/A     Years of education: N/A     Occupational History     Not on file.     Social History Main Topics     Smoking status: Former Smoker     Smokeless tobacco: Never Used      Comment: 2 1/2 year ago     Alcohol use Yes      Comment: Occ.     Drug use: No     Sexual activity: Yes     Partners: Male     Birth control/ protection: Surgical      Comment: Spouse - Vasectomy     Other Topics Concern     Parent/Sibling W/ Cabg, Mi Or Angioplasty Before 65f 55m? No      Service No     Blood Transfusions No     Caffeine Concern Yes     occas. - advised not more than 16 ounces/day     Occupational Exposure No     Hobby Hazards No     Volleyball     Sleep Concern No     Stress Concern No     Weight Concern No     Special Diet Yes     GI sx.     Back Care Yes     Exercise Yes     Walking/WiFit     Bike Helmet No     Seat Belt Yes     Self-Exams Yes     Social History Narrative    Lives in [place] with , Edmar and their daughter, Alex.  No smokers in the home.  No concerns about domestic violence.  No indoor cats/kittens.     Family History   Problem Relation Age of Onset     DIABETES Mother      borderline     Depression Mother      GASTROINTESTINAL DISEASE Mother      IBS     Thyroid Disease Mother      hypothyroidism     DIABETES Father      NIDDM - Type II     DIABETES Maternal Grandmother      Arthritis Maternal Grandfather      HEART DISEASE Paternal Grandfather      Thyroid Disease Sister      hypothyroidism     Rheumatoid Arthritis Sister      Other Cancer Sister      Asthma Other      niece     Obesity Sister      half-sister/had gastric bypass            ROS: 10 point ROS neg other than the symptoms noted above in the HPI.  EXAM:  /64 (BP Location: Right arm, Patient Position: Chair, Cuff Size: Adult  Regular)  Pulse 76  Wt 164 lb 8 oz (74.6 kg)  LMP 12/24/2017 (Approximate)  BMI 28.43 kg/m2    : PELVIC EXAM:  Vulva: normal hair distribution, no adenopathy, BUS WNL  On the right labia majora centrally located there is a 2 cm area of reddened keratotic skin.  It is not a folliculitis and it does not appear to be herpes       ASSESSMENT/PLAN:    (L29.9) Itching  (primary encounter diagnosis)  Comment:   Plan:     Probably atrophic dermatitis.  Has clobetasol ointment at home.  She is uncertain if this helps or not but had been using it on and off.   Recommended using it in small amounts bid for a week then qd for a week.       Visit length 30 min with >50% spent in counseling regarding yeast infections, herpes, atrophic dematitis use of steroids.  Time noted does not include any time required to complete procedures.

## 2017-12-29 NOTE — NURSING NOTE
"Chief Complaint   Patient presents with     Derm Problem     itchy bump on labia     Panel Management     mammo, flu       Initial /64 (BP Location: Right arm, Patient Position: Chair, Cuff Size: Adult Regular)  Pulse 76  Wt 164 lb 8 oz (74.6 kg)  LMP 12/24/2017 (Approximate)  BMI 28.43 kg/m2 Estimated body mass index is 28.43 kg/(m^2) as calculated from the following:    Height as of 12/26/17: 5' 3.78\" (1.62 m).    Weight as of this encounter: 164 lb 8 oz (74.6 kg).  Medication Reconciliation: complete   Candelaria Ruvalcaba CMA      "

## 2018-02-07 PROBLEM — K62.89 RECTAL PAIN: Status: RESOLVED | Noted: 2017-12-08 | Resolved: 2018-02-07

## 2018-02-07 PROBLEM — M62.89 PELVIC FLOOR DYSFUNCTION: Status: RESOLVED | Noted: 2017-12-08 | Resolved: 2018-02-07

## 2018-02-07 NOTE — PROGRESS NOTES
"DISCHARGE NOTE    Progress reporting period is from 12/8/17 to 12/28/17.     Avis failed to return for next follow up visit and current status is unknown.  Please see information below for last relevant information on current status.  Patient seen for Rxs Used: 5 visits.  SUBJECTIVE  Subjective changes noted by patient:  Subjective: Pt notes she has been having more low back pain over past 3 weeks and feels this may be from coccyx mobilization. Feels she is still intermittently constipated, but overall bowel movements have been more frequent and easier. Feels that she has difficulty relaxing core and \"holds everything really tense\" throughout her day. Also feels tailbone pain and GI upset are worse depnding on time in cycle  .  Current pain level is Current Pain level: 4/10.     Previous pain level was  Initial Pain level: 6/10.   Changes in function:  Yes (See Goal flowsheet attached for changes in current functional level)  Adverse reaction to treatment or activity: None    OBJECTIVE  Changes noted in objective findings: Objective: Moderate tenderness throughout abdomen, severe tenderness epigastric region; fascial stretching to peridiaphragm tissues intermittently reproduces back pain; tenderness decreased to mild-moderate by end of MT.     ASSESSMENT/PLAN  Diagnosis: Rectal pain   DIAGP:  Diagnoses of Rectal pain and Pelvic floor dysfunction were pertinent to this visit.  Updated problem list and treatment plan:   Pain - HEP  Decreased function - HEP  Impaired muscle performance - HEP  STG/LTGs have been met or progress has been made towards goals:  Yes, please see goal flowsheet for most current information  Assessment of Progress: current status is unknown.  Last current status: Assessment of progress: Pt is progressing as expected   Self Management Plans:  HEP  I have re-evaluated this patient and find that the nature, scope, duration and intensity of the therapy is appropriate for the medical condition of " the patient.  Avis continues to require the following intervention to meet STG and LTG's:  HEP.    Recommendations:  As this patient failed to complete ordered appointments, she will be discharged from therapy with current home program.  Patient to follow up with MD as needed.    Please refer to the daily flowsheet for treatment today, total treatment time and time spent performing 1:1 timed codes.

## 2018-02-21 ENCOUNTER — TELEPHONE (OUTPATIENT)
Dept: FAMILY MEDICINE | Facility: OTHER | Age: 43
End: 2018-02-21

## 2018-02-21 ENCOUNTER — OFFICE VISIT (OUTPATIENT)
Dept: FAMILY MEDICINE | Facility: OTHER | Age: 43
End: 2018-02-21
Payer: COMMERCIAL

## 2018-02-21 VITALS
OXYGEN SATURATION: 98 % | BODY MASS INDEX: 27.14 KG/M2 | RESPIRATION RATE: 16 BRPM | HEIGHT: 64 IN | SYSTOLIC BLOOD PRESSURE: 102 MMHG | WEIGHT: 159 LBS | HEART RATE: 82 BPM | DIASTOLIC BLOOD PRESSURE: 68 MMHG | TEMPERATURE: 98.7 F

## 2018-02-21 DIAGNOSIS — B96.89 BACTERIAL SINUSITIS: Primary | ICD-10-CM

## 2018-02-21 DIAGNOSIS — J32.9 BACTERIAL SINUSITIS: Primary | ICD-10-CM

## 2018-02-21 PROCEDURE — 99213 OFFICE O/P EST LOW 20 MIN: CPT | Performed by: NURSE PRACTITIONER

## 2018-02-21 RX ORDER — AZITHROMYCIN 250 MG/1
TABLET, FILM COATED ORAL
Qty: 6 TABLET | Refills: 0 | Status: SHIPPED | OUTPATIENT
Start: 2018-02-21 | End: 2018-07-06

## 2018-02-21 NOTE — MR AVS SNAPSHOT
After Visit Summary   2/21/2018    Avis Avalos    MRN: [id number]           Patient Information     Date Of Birth          1975        Visit Information        Provider Department      2/21/2018 2:00 PM Florencia Alanis APRN CNP [facility]        Today's Diagnoses     Bacterial sinusitis    -  1      Care Instructions    Please take antibiotic with a probiotic or yogurt (3 small containers) daily not directly with the antibiotic for optimal good bacterial protection.     Recommend using saline nasal spray to help thin out and remove thick mucous.     Return to clinic if symptoms do not improve or worsen.     Thank you,  Florencia Alanis CNP            Follow-ups after your visit        Who to contact     If you have questions or need follow up information about today's clinic visit or your schedule please contact [facility] directly at [phone].  Normal or non-critical lab and imaging results will be communicated to you by Zanghart, letter or phone within 4 business days after the clinic has received the results. If you do not hear from us within 7 days, please contact the clinic through Zanghart or phone. If you have a critical or abnormal lab result, we will notify you by phone as soon as possible.  Submit refill requests through [employer] or call your pharmacy and they will forward the refill request to us. Please allow 3 business days for your refill to be completed.          Additional Information About Your Visit        MyChart Information     [employer] gives you secure access to your electronic health record. If you see a primary care provider, you can also send messages to your care team and make appointments. If you have questions, please call your primary care clinic.  If you do not have a primary care provider, please call [phone] and they will assist you.        Care EveryWhere ID     This is your Care EveryWhere ID. This could be used by  "other organizations to access your [facility] medical records  [id number]        Your Vitals Were     Pulse Temperature Respirations Height Pulse Oximetry BMI (Body Mass Index)    82 98.7  F (37.1  C) (Oral) 16 5' 3.5\" (1.613 m) 98% 27.72 kg/m2       Blood Pressure from Last 3 Encounters:   02/21/18 102/68   12/29/17 106/64   12/26/17 100/60    Weight from Last 3 Encounters:   02/21/18 159 lb (72.1 kg)   12/29/17 164 lb 8 oz (74.6 kg)   12/26/17 165 lb 14.4 oz (75.3 kg)              Today, you had the following     No orders found for display         Today's Medication Changes          These changes are accurate as of 2/21/18  2:28 PM.  If you have any questions, ask your nurse or doctor.               Start taking these medicines.        Dose/Directions    azithromycin 250 MG tablet   Commonly known as:  ZITHROMAX   Used for:  Bacterial sinusitis   Started by:  Florencia Alanis APRN CNP        Two tablets first day, then one tablet daily for four days.   Quantity:  6 tablet   Refills:  0            Where to get your medicines      These medications were sent to [facility] [address] [place] [facility] - [place], [place] - [address] [address] [place] [address]  [address] [facility] [address]     Phone:  [phone]     azithromycin 250 MG tablet                Primary Care Provider Office Phone # Fax #    Sarah JEM Panda [phone]       [address] [facility] [address]        Equal Access to Services     [facility] Hospital[facility] : Hadii larry serratoo Sooleg, waaxda luqadaha, qaybta kaalmada kerri, waxay idiin hayaan adeeg kharash la'aan . So [facility] [phone].    ATENCIÓN: Si habla español, tiene a devine disposición servicios gratuitos de asistencia lingüística. Llame al [phone].    We comply with applicable federal civil rights laws and [place] laws. We do not discriminate on the basis of race, color, national origin, age, disability, sex, sexual orientation, or gender identity.            Thank you!     " Thank you for choosing [facility]  for your care. Our goal is always to provide you with excellent care. Hearing back from our patients is one way we can continue to improve our services. Please take a few minutes to complete the written survey that you may receive in the mail after your visit with us. Thank you!             Your Updated Medication List - Protect others around you: Learn how to safely use, store and throw away your medicines at www.disposemymeds.org.          This list is accurate as of 2/21/18  2:28 PM.  Always use your most recent med list.                   Brand Name Dispense Instructions for use Diagnosis    ALEVE PO      Take 220 mg by mouth daily as needed for moderate pain        ALPRAZolam 0.5 MG tablet    XANAX    20 tablet    Use 1-2 tablets 30-60 minutes before flying. Try one tablet first. Take a second if needed.    Flying phobia       azithromycin 250 MG tablet    ZITHROMAX    6 tablet    Two tablets first day, then one tablet daily for four days.    Bacterial sinusitis       BENADRYL PO           citalopram 20 MG tablet    celeXA    90 tablet    Take 1 tablet (20 mg) by mouth daily    Moderate episode of recurrent major depressive disorder (H), Obsessive-compulsive disorder, unspecified type       clobetasol 0.05 % ointment    TEMOVATE    60 g    Apply sparingly to affected area at bedtime for 6-12 weeks.    Lichen sclerosus of female genitalia       fluticasone 50 MCG/ACT spray    FLONASE    1 Bottle    Spray 1-2 sprays into both nostrils daily    Acute sinusitis with symptoms > 10 days       IBU-200 PO      Take 2-3 tablets by mouth as needed Reported on 5/10/2017        MAGNESIUM OXIDE PO           MUCINEX 600 MG 12 hr tablet   Generic drug:  guaiFENesin      Take 1,200 mg by mouth 2 times daily        * NONFORMULARY      1 tablet daily        * NONFORMULARY           RA ONE DAILY WOMENS Tabs      Take  by mouth.        SUDAFED PO           TYLENOL PO      Take by  mouth as needed Reported on 5/10/2017        valACYclovir 1000 mg tablet    VALTREX    21 tablet    TAKE ONE TABLET BY MOUTH DAILY AS NEEDED    Herpes simplex type 2 infection       ZYRTEC ALLERGY PO           * Notice:  This list has 2 medication(s) that are the same as other medications prescribed for you. Read the directions carefully, and ask your doctor or other care provider to review them with you.

## 2018-02-21 NOTE — PATIENT INSTRUCTIONS
Please take antibiotic with a probiotic or yogurt (3 small containers) daily not directly with the antibiotic for optimal good bacterial protection.     Recommend using saline nasal spray to help thin out and remove thick mucous.     Return to clinic if symptoms do not improve or worsen.     Thank you,  Florencia Alanis CNP

## 2018-02-21 NOTE — PROGRESS NOTES
"  SUBJECTIVE:   Avis Avalos is a 42 year old female who presents to clinic today for the following health issues:      HPI  Acute Illness   Acute illness concerns: URI, possible flu  Onset: 10 days ago    Fever: YES    Chills/Sweats: YES    Headache (location?): YES    Sinus Pressure:YES    Conjunctivitis:  no    Ear Pain: YES: bilateral    Rhinorrhea: no    Congestion: YES    Sore Throat: no     Cough: YES-non-productive    Wheeze: no    Decreased Appetite: YES    Nausea: YES    Vomiting: no     Diarrhea:  no    Dysuria/Freq.: no    Fatigue/Achiness: YES    Sick/Strep Exposure: YES-     Therapies Tried and outcome: Mucinex, Sudafed, has also been taking tylenol and ibuprofen  Symptoms of sinuses started then to have lung symptoms with dry cough. States that symptoms are worse in morning. Fever has been about 99 max.     Problem list and histories reviewed & adjusted, as indicated.  Additional history: as documented    BP Readings from Last 3 Encounters:   02/21/18 102/68   12/29/17 106/64   12/26/17 100/60    Wt Readings from Last 3 Encounters:   02/21/18 159 lb (72.1 kg)   12/29/17 164 lb 8 oz (74.6 kg)   12/26/17 165 lb 14.4 oz (75.3 kg)                  Labs reviewed in EPIC    ROS:  Constitutional, HEENT, cardiovascular, pulmonary, gi and gu systems are negative, except as otherwise noted.    OBJECTIVE:     /68 (BP Location: Left arm, Patient Position: Chair, Cuff Size: Adult Regular)  Pulse 82  Temp 98.7  F (37.1  C) (Oral)  Resp 16  Ht 5' 3.5\" (1.613 m)  Wt 159 lb (72.1 kg)  SpO2 98%  BMI 27.72 kg/m2  Body mass index is 27.72 kg/(m^2).  GENERAL: alert, mild distress and fatigued  EYES: Eyes grossly normal to inspection, PERRL and conjunctivae and sclerae normal  HENT: normal cephalic/atraumatic, right ear: normal: no effusions, no erythema, normal landmarks, left ear: erythematous, nose and mouth without ulcers or lesions, nasal mucosa edematous , rhinorrhea yellow and green, oropharynx " clear, oral mucous membranes moist, tonsillar erythema and sinuses: maxillary, frontal tenderness on right  NECK: bilateral anterior cervical adenopathy, no asymmetry, masses, or scars and thyroid normal to palpation  RESP: lungs clear to auscultation - no rales, rhonchi or wheezes  CV: regular rate and rhythm, normal S1 S2, no S3 or S4, no murmur, click or rub, no peripheral edema and peripheral pulses strong    ASSESSMENT/PLAN:     1. Bacterial sinusitis  - Due to length of symptoms, will treat   - Discussed antibiotic use, duration, and side effects  - Recommend rest and fluids  - Can continue with nyquil/dayquil   - Hand out given     The patient indicates understanding of these issues and agrees with the plan.     Follow up: PRN        - azithromycin (ZITHROMAX) 250 MG tablet; Two tablets first day, then one tablet daily for four days.  Dispense: 6 tablet; Refill: 0    Patient Instructions   Please take antibiotic with a probiotic or yogurt (3 small containers) daily not directly with the antibiotic for optimal good bacterial protection.     Recommend using saline nasal spray to help thin out and remove thick mucous.     Return to clinic if symptoms do not improve or worsen.     Thank you,  Florencia Alanis [facility]

## 2018-02-21 NOTE — TELEPHONE ENCOUNTER
Spoke with pt and she stated she is going to do the one as scheduled and does not need the work in.    Yumiko Tinajero CMA ([facility])

## 2018-02-21 NOTE — TELEPHONE ENCOUNTER
Reason for call:  Patient reporting a symptom    Symptom or request: sinus cough fever ear pain    Duration (how long have symptoms been present): a couple of weeks    Have you been treated for this before? No    Additional comments: call to advise.     Phone Number patient can be reached at:  Home number on file [phone] (home)    Best Time:  any    Can we leave a detailed message on this number:  YES    Call taken on 2/21/2018 at 7:41 AM by Cathleen Scherer

## 2018-02-21 NOTE — NURSING NOTE
"Chief Complaint   Patient presents with     URI       Initial /68 (BP Location: Left arm, Patient Position: Chair, Cuff Size: Adult Regular)  Pulse 82  Temp 98.7  F (37.1  C) (Oral)  Resp 16  Ht 5' 3.5\" (1.613 m)  Wt 159 lb (72.1 kg)  SpO2 98%  BMI 27.72 kg/m2 Estimated body mass index is 27.72 kg/(m^2) as calculated from the following:    Height as of this encounter: 5' 3.5\" (1.613 m).    Weight as of this encounter: 159 lb (72.1 kg).  Medication Reconciliation: complete    Mona Daniel CMA      "

## 2018-05-14 ENCOUNTER — TELEPHONE (OUTPATIENT)
Dept: FAMILY MEDICINE | Facility: OTHER | Age: 43
End: 2018-05-14

## 2018-05-14 NOTE — TELEPHONE ENCOUNTER
5/14/2018    Call Regarding [facility] Mammogram    Attempt 1    Message on voicemail    Patient is also due for:     Outreach   SV

## 2018-05-31 ENCOUNTER — RADIANT APPOINTMENT (OUTPATIENT)
Dept: MAMMOGRAPHY | Facility: OTHER | Age: 43
End: 2018-05-31
Payer: COMMERCIAL

## 2018-05-31 DIAGNOSIS — Z12.31 VISIT FOR SCREENING MAMMOGRAM: ICD-10-CM

## 2018-05-31 PROCEDURE — 77067 SCR MAMMO BI INCL CAD: CPT | Mod: TC

## 2018-06-28 NOTE — PATIENT INSTRUCTIONS
Recommend seeing Dr. Lafleur to discuss his thoughts on food intolerances.    Recommend seeing Dr. Rivera to discuss his thoughts on a hysterectomy.    Increase dose of citalopram to 40 mg daily. Prescription was sent in to your pharmacy.    Fasting blood work today.    Sarah Wilhelm, NP-C    Preventive Health Recommendations  Female Ages 40 to 49    Yearly exam:     See your health care provider every year in order to  1. Review health changes.   2. Discuss preventive care.    3. Review your medicines if your doctor prescribed any.      Get a Pap test every three years (unless you have an abnormal result and your provider advises testing more often).      If you get Pap tests with HPV test, you only need to test every 5 years, unless you have an abnormal result. You do not need a Pap test if your uterus was removed (hysterectomy) and you have not had cancer.      You should be tested each year for STDs (sexually transmitted diseases), if you're at risk.     Ask your doctor if you should have a mammogram.      Have a colonoscopy (test for colon cancer) if someone in your family has had colon cancer or polyps before age 50.       Have a cholesterol test every 5 years.       Have a diabetes test (fasting glucose) after age 45. If you are at risk for diabetes, you should have this test every 3 years.    Shots: Get a flu shot each year. Get a tetanus shot every 10 years.     Nutrition:     Eat at least 5 servings of fruits and vegetables each day.    Eat whole-grain bread, whole-wheat pasta and brown rice instead of white grains and rice.    Get adequate Calcium and Vitamin D.      Lifestyle    Exercise at least 150 minutes a week (an average of 30 minutes a day, 5 days a week). This will help you control your weight and prevent disease.    Limit alcohol to one drink per day.    No smoking.     Wear sunscreen to prevent skin cancer.    See your dentist every six months for an exam and cleaning.
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
## 2018-07-02 DIAGNOSIS — F33.1 MODERATE EPISODE OF RECURRENT MAJOR DEPRESSIVE DISORDER (H): ICD-10-CM

## 2018-07-02 DIAGNOSIS — F42.9 OBSESSIVE-COMPULSIVE DISORDER, UNSPECIFIED TYPE: ICD-10-CM

## 2018-07-02 RX ORDER — CITALOPRAM HYDROBROMIDE 20 MG/1
TABLET ORAL
Qty: 90 TABLET | Refills: 0 | Status: SHIPPED | OUTPATIENT
Start: 2018-07-02 | End: 2019-05-23

## 2018-07-02 NOTE — TELEPHONE ENCOUNTER
Citalopram    Next 5 appointments (look out 90 days)     Jul 06, 2018  8:40 AM CDT   PHYSICAL with JEM Huffman CNP   [facility]    [id number]  [facility] [address] [phone]                PHQ-9 SCORE 6/27/2017 10/12/2017 12/26/2017   Total Score - - -   Total Score MyChart 4 (Minimal depression) 5 (Mild depression) 1 (Minimal depression)   Total Score 4 5 1     Prescription approved per [facility].    Nini Russo, RN, BSN

## 2018-07-06 ENCOUNTER — OFFICE VISIT (OUTPATIENT)
Dept: FAMILY MEDICINE | Facility: OTHER | Age: 43
End: 2018-07-06
Payer: COMMERCIAL

## 2018-07-06 VITALS
DIASTOLIC BLOOD PRESSURE: 68 MMHG | BODY MASS INDEX: 28 KG/M2 | TEMPERATURE: 97 F | HEART RATE: 64 BPM | SYSTOLIC BLOOD PRESSURE: 106 MMHG | WEIGHT: 164 LBS | RESPIRATION RATE: 16 BRPM | HEIGHT: 64 IN

## 2018-07-06 DIAGNOSIS — Z13.1 SCREENING FOR DIABETES MELLITUS: ICD-10-CM

## 2018-07-06 DIAGNOSIS — Z13.6 ENCOUNTER FOR SCREENING FOR CARDIOVASCULAR DISORDERS: ICD-10-CM

## 2018-07-06 DIAGNOSIS — F42.9 OBSESSIVE-COMPULSIVE DISORDER, UNSPECIFIED TYPE: ICD-10-CM

## 2018-07-06 DIAGNOSIS — K64.9 HEMORRHOIDS, UNSPECIFIED HEMORRHOID TYPE: ICD-10-CM

## 2018-07-06 DIAGNOSIS — F33.1 MODERATE EPISODE OF RECURRENT MAJOR DEPRESSIVE DISORDER (H): ICD-10-CM

## 2018-07-06 DIAGNOSIS — L91.8 SKIN TAG: ICD-10-CM

## 2018-07-06 DIAGNOSIS — Z00.00 ENCOUNTER FOR ROUTINE ADULT HEALTH EXAMINATION WITHOUT ABNORMAL FINDINGS: Primary | ICD-10-CM

## 2018-07-06 LAB
ANION GAP SERPL CALCULATED.3IONS-SCNC: 6 MMOL/L (ref 3–14)
BUN SERPL-MCNC: 12 MG/DL (ref 7–30)
CALCIUM SERPL-MCNC: 8.1 MG/DL (ref 8.5–10.1)
CHLORIDE SERPL-SCNC: 107 MMOL/L (ref 94–109)
CHOLEST SERPL-MCNC: 170 MG/DL
CO2 SERPL-SCNC: 28 MMOL/L (ref 20–32)
CREAT SERPL-MCNC: 0.69 MG/DL (ref 0.52–1.04)
GFR SERPL CREATININE-BSD FRML MDRD: >90 ML/MIN/1.7M2
GLUCOSE SERPL-MCNC: 82 MG/DL (ref 70–99)
HDLC SERPL-MCNC: 71 MG/DL
LDLC SERPL CALC-MCNC: 82 MG/DL
NONHDLC SERPL-MCNC: 99 MG/DL
POTASSIUM SERPL-SCNC: 4.4 MMOL/L (ref 3.4–5.3)
SODIUM SERPL-SCNC: 141 MMOL/L (ref 133–144)
TRIGL SERPL-MCNC: 85 MG/DL

## 2018-07-06 PROCEDURE — 80061 LIPID PANEL: CPT | Performed by: STUDENT IN AN ORGANIZED HEALTH CARE EDUCATION/TRAINING PROGRAM

## 2018-07-06 PROCEDURE — 80048 BASIC METABOLIC PNL TOTAL CA: CPT | Performed by: STUDENT IN AN ORGANIZED HEALTH CARE EDUCATION/TRAINING PROGRAM

## 2018-07-06 PROCEDURE — 36415 COLL VENOUS BLD VENIPUNCTURE: CPT | Performed by: STUDENT IN AN ORGANIZED HEALTH CARE EDUCATION/TRAINING PROGRAM

## 2018-07-06 PROCEDURE — 99396 PREV VISIT EST AGE 40-64: CPT | Performed by: STUDENT IN AN ORGANIZED HEALTH CARE EDUCATION/TRAINING PROGRAM

## 2018-07-06 RX ORDER — CITALOPRAM HYDROBROMIDE 40 MG/1
40 TABLET ORAL DAILY
Qty: 90 TABLET | Refills: 3 | Status: SHIPPED | OUTPATIENT
Start: 2018-07-06 | End: 2019-05-23

## 2018-07-06 RX ORDER — CITALOPRAM HYDROBROMIDE 20 MG/1
TABLET ORAL
Qty: 90 TABLET | Refills: 0 | Status: CANCELLED | OUTPATIENT
Start: 2018-07-06

## 2018-07-06 RX ORDER — LORATADINE 10 MG/1
10 TABLET ORAL DAILY
COMMUNITY

## 2018-07-06 ASSESSMENT — PATIENT HEALTH QUESTIONNAIRE - PHQ9
SUM OF ALL RESPONSES TO PHQ QUESTIONS 1-9: 3
SUM OF ALL RESPONSES TO PHQ QUESTIONS 1-9: 3
10. IF YOU CHECKED OFF ANY PROBLEMS, HOW DIFFICULT HAVE THESE PROBLEMS MADE IT FOR YOU TO DO YOUR WORK, TAKE CARE OF THINGS AT HOME, OR GET ALONG WITH OTHER PEOPLE: NOT DIFFICULT AT ALL

## 2018-07-06 ASSESSMENT — ANXIETY QUESTIONNAIRES
5. BEING SO RESTLESS THAT IT IS HARD TO SIT STILL: SEVERAL DAYS
7. FEELING AFRAID AS IF SOMETHING AWFUL MIGHT HAPPEN: NOT AT ALL
GAD7 TOTAL SCORE: 6
1. FEELING NERVOUS, ANXIOUS, OR ON EDGE: SEVERAL DAYS
6. BECOMING EASILY ANNOYED OR IRRITABLE: SEVERAL DAYS
GAD7 TOTAL SCORE: 6
GAD7 TOTAL SCORE: 6
3. WORRYING TOO MUCH ABOUT DIFFERENT THINGS: SEVERAL DAYS
4. TROUBLE RELAXING: SEVERAL DAYS
7. FEELING AFRAID AS IF SOMETHING AWFUL MIGHT HAPPEN: NOT AT ALL
2. NOT BEING ABLE TO STOP OR CONTROL WORRYING: SEVERAL DAYS

## 2018-07-06 ASSESSMENT — PAIN SCALES - GENERAL: PAINLEVEL: MODERATE PAIN (4)

## 2018-07-06 NOTE — MR AVS SNAPSHOT
After Visit Summary   7/6/2018    Avis Avalos    MRN: [id number]           Patient Information     Date Of Birth          1975        Visit Information        Provider Department      7/6/2018 8:40 AM Sarah Wilhelm APRN [facility]        Today's Diagnoses     Encounter for screening for cardiovascular disorders    -  1    Moderate episode of recurrent major depressive disorder (H)        Obsessive-compulsive disorder, unspecified type        Screening for diabetes mellitus        Skin tag        Hemorrhoids, unspecified hemorrhoid type          Care Instructions    Recommend seeing Dr. Lafleur to discuss his thoughts on food intolerances.    Recommend seeing Dr. Rivera to discuss his thoughts on a hysterectomy.    Increase dose of citalopram to 40 mg daily. Prescription was sent in to your pharmacy.    Fasting blood work today.    Sarah Wilhelm, CHRISTIANO-C    Preventive Health Recommendations  Female Ages 40 to 49    Yearly exam:     See your health care provider every year in order to  1. Review health changes.   2. Discuss preventive care.    3. Review your medicines if your doctor prescribed any.      Get a Pap test every three years (unless you have an abnormal result and your provider advises testing more often).      If you get Pap tests with HPV test, you only need to test every 5 years, unless you have an abnormal result. You do not need a Pap test if your uterus was removed (hysterectomy) and you have not had cancer.      You should be tested each year for STDs (sexually transmitted diseases), if you're at risk.     Ask your doctor if you should have a mammogram.      Have a colonoscopy (test for colon cancer) if someone in your family has had colon cancer or polyps before age 50.       Have a cholesterol test every 5 years.       Have a diabetes test (fasting glucose) after age 45. If you are at risk for diabetes, you should have this test every 3  years.    Shots: Get a flu shot each year. Get a tetanus shot every 10 years.     Nutrition:     Eat at least 5 servings of fruits and vegetables each day.    Eat whole-grain bread, whole-wheat pasta and brown rice instead of white grains and rice.    Get adequate Calcium and Vitamin D.      Lifestyle    Exercise at least 150 minutes a week (an average of 30 minutes a day, 5 days a week). This will help you control your weight and prevent disease.    Limit alcohol to one drink per day.    No smoking.     Wear sunscreen to prevent skin cancer.    See your dentist every six months for an exam and cleaning.          Follow-ups after your visit        Additional Services     GENERAL SURG ADULT REFERRAL       Your provider has referred you to: [facility]: [facility] [phone]   [email or web address]    Please be aware that coverage of these services is subject to the terms and limitations of your health insurance plan.  Call member services at your health plan with any benefit or coverage questions.      Please bring the following with you to your appointment:    (1) Any X-Rays, CTs or MRIs which have been performed.  Contact the facility where they were done to arrange for  prior to your scheduled appointment.   (2) List of current medications   (3) This referral request   (4) Any documents/labs given to you for this referral                  Who to contact     If you have questions or need follow up information about today's clinic visit or your schedule please contact [facility] directly at [phone].  Normal or non-critical lab and imaging results will be communicated to you by MyChart, letter or phone within 4 business days after the clinic has received the results. If you do not hear from us within 7 days, please contact the clinic through MyChart or phone. If you have a critical or abnormal lab result, we will notify you by phone as soon as  "possible.  Submit refill requests through [employer] or call your pharmacy and they will forward the refill request to us. Please allow 3 business days for your refill to be completed.          Additional Information About Your Visit        [employer] Information     [employer] gives you secure access to your electronic health record. If you see a primary care provider, you can also send messages to your care team and make appointments. If you have questions, please call your primary care clinic.  If you do not have a primary care provider, please call [phone] and they will assist you.        Care EveryWhere ID     This is your Care EveryWhere ID. This could be used by other organizations to access your [facility] medical records  [id number]        Your Vitals Were     Pulse Temperature Respirations Height Last Period BMI (Body Mass Index)    64 97  F (36.1  C) (Temporal) 16 5' 3.78\" (1.62 m) 06/27/2018 (Exact Date) 28.35 kg/m2       Blood Pressure from Last 3 Encounters:   07/06/18 106/68   02/21/18 102/68   12/29/17 106/64    Weight from Last 3 Encounters:   07/06/18 164 lb (74.4 kg)   02/21/18 159 lb (72.1 kg)   12/29/17 164 lb 8 oz (74.6 kg)              We Performed the Following     Basic metabolic panel     GENERAL SURG ADULT REFERRAL     Lipid panel reflex to direct LDL Fasting          Today's Medication Changes          These changes are accurate as of 7/6/18  9:28 AM.  If you have any questions, ask your nurse or doctor.               These medicines have changed or have updated prescriptions.        Dose/Directions    * citalopram 20 MG tablet   Commonly known as:  celeXA   This may have changed:  Another medication with the same name was added. Make sure you understand how and when to take each.   Used for:  Moderate episode of recurrent major depressive disorder (H), Obsessive-compulsive disorder, unspecified type   Changed by:  Sarah Wilhelm APRN CNP        TAKE 1 TABLET (20 MG) BY MOUTH " DAILY   Quantity:  90 tablet   Refills:  0       * citalopram 40 MG tablet   Commonly known as:  celeXA   This may have changed:  You were already taking a medication with the same name, and this prescription was added. Make sure you understand how and when to take each.   Used for:  Obsessive-compulsive disorder, unspecified type, Moderate episode of recurrent major depressive disorder (H)   Changed by:  Sarah Wilhelm APRN CNP        Dose:  40 mg   Take 1 tablet (40 mg) by mouth daily   Quantity:  90 tablet   Refills:  3       * Notice:  This list has 2 medication(s) that are the same as other medications prescribed for you. Read the directions carefully, and ask your doctor or other care provider to review them with you.         Where to get your medicines      These medications were sent to [place] [address] [place] [employer] [facility] [place] [facility] [address]  [address] [facility] [address]     Phone:  [phone]     citalopram 40 MG tablet                Primary Care Provider Office Phone # Fax #    JEM Huffman [phone] [address] [facility] [address]        Equal Access to Services     [facility] Center[facility] AH: Hadii aad ku hadasho Soomaali, waaxda luqadaha, qaybta kaalmada adeegyada, waxay ananthin haysana elliott. So [facility] [phone].    ATENCIÓN: Si habla español, tiene a devine disposición servicios gratuitos de asistencia lingüística. Llame al [phone].    We comply with applicable federal civil rights laws and [place] laws. We do not discriminate on the basis of race, color, national origin, age, disability, sex, sexual orientation, or gender identity.            Thank you!     Thank you for choosing [facility]  for your care. Our goal is always to provide you with excellent care. Hearing back from our patients is one way we can continue to improve our services. Please take a few minutes to complete the written survey that you may  receive in the mail after your visit with us. Thank you!             Your Updated Medication List - Protect others around you: Learn how to safely use, store and throw away your medicines at www.disposemymeds.org.          This list is accurate as of 7/6/18  9:28 AM.  Always use your most recent med list.                   Brand Name Dispense Instructions for use Diagnosis    ALEVE PO      Take 220 mg by mouth daily as needed for moderate pain        ALPRAZolam 0.5 MG tablet    XANAX    20 tablet    Use 1-2 tablets 30-60 minutes before flying. Try one tablet first. Take a second if needed.    Flying phobia       BENADRYL PO           * citalopram 20 MG tablet    celeXA    90 tablet    TAKE 1 TABLET (20 MG) BY MOUTH DAILY    Moderate episode of recurrent major depressive disorder (H), Obsessive-compulsive disorder, unspecified type       * citalopram 40 MG tablet    celeXA    90 tablet    Take 1 tablet (40 mg) by mouth daily    Obsessive-compulsive disorder, unspecified type, Moderate episode of recurrent major depressive disorder (H)       clobetasol 0.05 % ointment    TEMOVATE    60 g    Apply sparingly to affected area at bedtime for 6-12 weeks.    Lichen sclerosus of female genitalia       fluticasone 50 MCG/ACT spray    FLONASE    1 Bottle    Spray 1-2 sprays into both nostrils daily    Acute sinusitis with symptoms > 10 days       IBU-200 PO      Take 2-3 tablets by mouth as needed Reported on 5/10/2017        loratadine 10 MG tablet    CLARITIN     Take 10 mg by mouth daily        MAGNESIUM OXIDE PO           MUCINEX 600 MG 12 hr tablet   Generic drug:  guaiFENesin      Take 1,200 mg by mouth 2 times daily        * NONFORMULARY      1 tablet daily        * NONFORMULARY           OMEGA 3 PO           RA ONE DAILY WOMENS Tabs      Take  by mouth.        SUDAFED PO           TYLENOL PO      Take by mouth as needed Reported on 5/10/2017        valACYclovir 1000 mg tablet    VALTREX    21 tablet    TAKE ONE TABLET  BY MOUTH DAILY AS NEEDED    Herpes simplex type 2 infection       ZYRTEC ALLERGY PO           * Notice:  This list has 4 medication(s) that are the same as other medications prescribed for you. Read the directions carefully, and ask your doctor or other care provider to review them with you.

## 2018-07-06 NOTE — LETTER
My Depression Action Plan  Name: Avis Avalos   Date of Birth 1975  Date: 6/28/2018    My doctor: Sarah Wilhelm   My clinic: [facility]  [id number] [place] [id number] [phone]          GREEN    ZONE   Good Control    What it looks like:     Things are going generally well. You have normal up s and down s. You may even feel depressed from time to time, but bad moods usually last less than a day.   What you need to do:  1. Continue to care for yourself (see self care plan)  2. Check your depression survival kit and update it as needed  3. Follow your physician s recommendations including any medication.  4. Do not stop taking medication unless you consult with your physician first.           YELLOW         ZONE Getting Worse    What it looks like:     Depression is starting to interfere with your life.     It may be hard to get out of bed; you may be starting to isolate yourself from others.    Symptoms of depression are starting to last most all day and this has happened for several days.     You may have suicidal thoughts but they are not constant.   What you need to do:     1. Call your care team, your response to treatment will improve if you keep your care team informed of your progress. Yellow periods are signs an adjustment may need to be made.     2. Continue your self-care, even if you have to fake it!    3. Talk to someone in your support network    4. Open up your depression survival kit           RED    ZONE Medical Alert - Get Help    What it looks like:     Depression is seriously interfering with your life.     You may experience these or other symptoms: You can t get out of bed most days, can t work or engage in other necessary activities, you have trouble taking care of basic hygiene, or basic responsibilities, thoughts of suicide or death that will not go away, self-injurious behavior.     What you need to do:  1. Call your care  team and request a same-day appointment. If they are not available (weekends or after hours) call your local crisis line, emergency room or 911.            Depression Self Care Plan / Survival Kit    Self-Care for Depression  Here s the deal. Your body and mind are really not as separate as most people think.  What you do and think affects how you feel and how you feel influences what you do and think. This means if you do things that people who feel good do, it will help you feel better.  Sometimes this is all it takes.  There is also a place for medication and therapy depending on how severe your depression is, so be sure to consult with your medical provider and/ or Behavioral Health Consultant if your symptoms are worsening or not improving.     In order to better manage my stress, I will:    Exercise  Get some form of exercise, every day. This will help reduce pain and release endorphins, the  feel good  chemicals in your brain. This is almost as good as taking antidepressants!  This is not the same as joining a gym and then never going! (they count on that by the way ) It can be as simple as just going for a walk or doing some gardening, anything that will get you moving.      Hygiene   Maintain good hygiene (Get out of bed in the morning, Make your bed, Brush your teeth, Take a shower, and Get dressed like you were going to work, even if you are unemployed).  If your clothes don't fit try to get ones that do.    Diet  I will strive to eat foods that are good for me, drink plenty of water, and avoid excessive sugar, caffeine, alcohol, and other mood-altering substances.  Some foods that are helpful in depression are: complex carbohydrates, B vitamins, flaxseed, fish or fish oil, fresh fruits and vegetables.    Psychotherapy  I agree to participate in Individual Therapy (if recommended).    Medication  If prescribed medications, I agree to take them.  Missing doses can result in serious side effects.  I  understand that drinking alcohol, or other illicit drug use, may cause potential side effects.  I will not stop my medication abruptly without first discussing it with my provider.    Staying Connected With Others  I will stay in touch with my friends, family members, and my primary care provider/team.    Use your imagination  Be creative.  We all have a creative side; it doesn t matter if it s oil painting, sand castles, or mud pies! This will also kick up the endorphins.    Witness Beauty  (AKA stop and smell the roses) Take a look outside, even in mid-winter. Notice colors, textures. Watch the squirrels and birds.     Service to others  Be of service to others.  There is always someone else in need.  By helping others we can  get out of ourselves  and remember the really important things.  This also provides opportunities for practicing all the other parts of the program.    Humor  Laugh and be silly!  Adjust your TV habits for less news and crime-drama and more comedy.    Control your stress  Try breathing deep, massage therapy, biofeedback, and meditation. Find time to relax each day.     My support system    Clinic Contact:  Phone number:    Contact 1:  Phone number:    Contact 2:  Phone number:    [facility]/:  Phone number:    Therapist:  Phone number:    Local crisis center:    Phone number:    Other community support:  Phone number:

## 2018-07-07 ASSESSMENT — PATIENT HEALTH QUESTIONNAIRE - PHQ9: SUM OF ALL RESPONSES TO PHQ QUESTIONS 1-9: 3

## 2018-07-07 ASSESSMENT — ANXIETY QUESTIONNAIRES: GAD7 TOTAL SCORE: 6

## 2018-07-19 ENCOUNTER — TELEPHONE (OUTPATIENT)
Dept: FAMILY MEDICINE | Facility: OTHER | Age: 43
End: 2018-07-19

## 2018-07-19 NOTE — TELEPHONE ENCOUNTER
You placed a referral for patient to general surgery on 7/6/18.  Patient has not scheduled as of yet.      Please review and forward to team if follow up with the patient is needed.     Thank you!  Mireya/Clinic Referrals Dyad II

## 2018-07-20 NOTE — TELEPHONE ENCOUNTER
Left message for pt to return call, when call is returned give information below and help schedule with general surgery.      Yumiko Tinajero CMA ([facility])

## 2019-02-08 ENCOUNTER — OFFICE VISIT (OUTPATIENT)
Dept: URGENT CARE | Facility: RETAIL CLINIC | Age: 44
End: 2019-02-08
Payer: COMMERCIAL

## 2019-02-08 VITALS — OXYGEN SATURATION: 96 % | TEMPERATURE: 98.3 F | HEART RATE: 87 BPM

## 2019-02-08 DIAGNOSIS — J06.9 VIRAL UPPER RESPIRATORY TRACT INFECTION: Primary | ICD-10-CM

## 2019-02-08 DIAGNOSIS — R09.81 NASAL CONGESTION: ICD-10-CM

## 2019-02-08 PROCEDURE — 99213 OFFICE O/P EST LOW 20 MIN: CPT | Performed by: PHYSICIAN ASSISTANT

## 2019-02-08 ASSESSMENT — ENCOUNTER SYMPTOMS
ALLERGIC/IMMUNOLOGIC NEGATIVE: 1
DIZZINESS: 0
HEMATOLOGIC/LYMPHATIC NEGATIVE: 1
PALPITATIONS: 0
SINUS PRESSURE: 1
MYALGIAS: 0
HEADACHES: 0
RHINORRHEA: 0
WEAKNESS: 0
NAUSEA: 0
ARTHRALGIAS: 0
BRUISES/BLEEDS EASILY: 0
CARDIOVASCULAR NEGATIVE: 1
NECK STIFFNESS: 0
SHORTNESS OF BREATH: 0
MUSCULOSKELETAL NEGATIVE: 1
COUGH: 1
ENDOCRINE NEGATIVE: 1
WOUND: 0
NECK PAIN: 0
LIGHT-HEADEDNESS: 0
JOINT SWELLING: 0
DIARRHEA: 0
BACK PAIN: 0
FEVER: 0
EYES NEGATIVE: 1
SORE THROAT: 0
VOMITING: 0
CHILLS: 0

## 2019-02-08 NOTE — PROGRESS NOTES
Chief Complaint:     Chief Complaint   Patient presents with     Ent Problem     Both, Right is worse     Sinus Problem     mild facial pressure, Right side     Nasal Congestion     stuffy, runny       HPI: Avis Avalos is an 43 year old female who presents with dry cough, nasal congestion and plugged ears. It began  3 week(s) ago and has unchanged.  Cough is nonproductive, occasional There is no shortness of breath, wheezing and chest pain.      Recent travel?  no.      ROS:     Review of Systems   Constitutional: Negative for chills and fever.   HENT: Positive for congestion, ear pain and sinus pressure. Negative for rhinorrhea and sore throat.    Eyes: Negative.    Respiratory: Positive for cough. Negative for shortness of breath.    Cardiovascular: Negative.  Negative for chest pain and palpitations.   Gastrointestinal: Negative for diarrhea, nausea and vomiting.   Endocrine: Negative.    Genitourinary: Negative.    Musculoskeletal: Negative.  Negative for arthralgias, back pain, joint swelling, myalgias, neck pain and neck stiffness.   Skin: Negative.  Negative for rash and wound.   Allergic/Immunologic: Negative.  Negative for immunocompromised state.   Neurological: Negative for dizziness, weakness, light-headedness and headaches.   Hematological: Negative.  Does not bruise/bleed easily.        Respiratory History  occasional episodes of bronchitis       Family History   Family History   Problem Relation Age of Onset     Diabetes Mother         borderline     Depression Mother      Gastrointestinal Disease Mother         IBS     Thyroid Disease Mother         hypothyroidism     Diabetes Father         NIDDM - Type II     Diabetes Maternal Grandmother      Arthritis Maternal Grandfather      Heart Disease Paternal Grandfather      Thyroid Disease Sister         hypothyroidism     Rheumatoid Arthritis Sister      Other Cancer Sister      Asthma Other         niece     Obesity Sister         half-sister/had  gastric bypass        Problem history  Patient Active Problem List   Diagnosis     Crohns disease (H)     Herpes simplex type 2 infection     Knee pain     Sessile colonic polyp     Constipation, unspecified constipation type     High risk HPV infection     Moderate episode of recurrent major depressive disorder (H)     Obsessive-compulsive disorder, unspecified type     Lichen sclerosus of female genitalia     Chronic seasonal allergic rhinitis due to pollen        Allergies  Allergies   Allergen Reactions     Fructose GI Disturbance     Ragweeds      Sorbitol Diarrhea        Social History  Social History     Socioeconomic History     Marital status:      Spouse name: Not on file     Number of children: Not on file     Years of education: Not on file     Highest education level: Not on file   Social Needs     Financial resource strain: Not on file     Food insecurity - worry: Not on file     Food insecurity - inability: Not on file     Transportation needs - medical: Not on file     Transportation needs - non-medical: Not on file   Occupational History     Not on file   Tobacco Use     Smoking status: Former Smoker     Smokeless tobacco: Never Used     Tobacco comment: 2 1/2 year ago   Substance and Sexual Activity     Alcohol use: Yes     Comment: Occ.     Drug use: No     Sexual activity: Yes     Partners: Male     Birth control/protection: Surgical     Comment: Spouse - Vasectomy   Other Topics Concern     Parent/sibling w/ CABG, MI or angioplasty before 65F 55M? No      Service No     Blood Transfusions No     Caffeine Concern Yes     Comment: occas. - advised not more than 16 ounces/day     Occupational Exposure No     Hobby Hazards No     Comment: Volleyball     Sleep Concern No     Stress Concern No     Weight Concern No     Special Diet Yes     Comment: GI sx.     Back Care Yes     Exercise Yes     Comment: Walking/[employer]     Bike Helmet No     Seat Belt Yes     Self-Exams Yes   Social  History Narrative    Lives in [place] with , Edmar and their daughter, Alex.  No smokers in the home.  No concerns about domestic violence.  No indoor cats/kittens.        Current Meds    Current Outpatient Medications:      sertraline (ZOLOFT) 50 MG tablet, Take 1/2 tablet daily for 7 days then start taking 1 tablet daily., Disp: 90 tablet, Rfl: 0     Acetaminophen (TYLENOL PO), Take by mouth as needed Reported on 5/10/2017, Disp: , Rfl:      ALPRAZolam (XANAX) 0.5 MG tablet, Use 1-2 tablets 30-60 minutes before flying. Try one tablet first. Take a second if needed., Disp: 20 tablet, Rfl: 0     Cetirizine HCl (ZYRTEC ALLERGY PO), , Disp: , Rfl:      citalopram (CELEXA) 20 MG tablet, TAKE 1 TABLET (20 MG) BY MOUTH DAILY (Patient not taking: Reported on 2/8/2019), Disp: 90 tablet, Rfl: 0     citalopram (CELEXA) 40 MG tablet, Take 1 tablet (40 mg) by mouth daily (Patient not taking: Reported on 2/8/2019), Disp: 90 tablet, Rfl: 3     clobetasol (TEMOVATE) 0.05 % ointment, Apply sparingly to affected area at bedtime for 6-12 weeks., Disp: 60 g, Rfl: 3     DiphenhydrAMINE HCl (BENADRYL PO), , Disp: , Rfl:      fluticasone (FLONASE) 50 MCG/ACT spray, Spray 1-2 sprays into both nostrils daily (Patient not taking: Reported on 2/8/2019), Disp: 1 Bottle, Rfl: 1     guaiFENesin (MUCINEX) 600 MG 12 hr tablet, Take 1,200 mg by mouth 2 times daily, Disp: , Rfl:      Ibuprofen (IBU-200 PO), Take 2-3 tablets by mouth as needed Reported on 5/10/2017, Disp: , Rfl:      loratadine (CLARITIN) 10 MG tablet, Take 10 mg by mouth daily, Disp: , Rfl:      MAGNESIUM OXIDE PO, , Disp: , Rfl:      Multiple Vitamins-Minerals (RA ONE DAILY WOMENS) TABS, Take  by mouth., Disp: , Rfl:      Naproxen Sodium (ALEVE PO), Take 220 mg by mouth daily as needed for moderate pain, Disp: , Rfl:      NONFORMULARY, 1 tablet daily, Disp: , Rfl:      NONFORMULARY, , Disp: , Rfl:      Omega-3 Fatty Acids (OMEGA 3 PO), , Disp: , Rfl:       Pseudoephedrine HCl (SUDAFED PO), , Disp: , Rfl:      valACYclovir (VALTREX) 1000 mg tablet, TAKE ONE TABLET BY MOUTH DAILY AS NEEDED, Disp: 21 tablet, Rfl: 0        OBJECTIVE     Vital signs reviewed by Cameron Ortiz  Pulse 87   Temp 98.3  F (36.8  C) (Oral)   SpO2 96%      Physical Exam   Constitutional: She is oriented to person, place, and time. She appears well-developed and well-nourished. She is cooperative.  Non-toxic appearance. She does not have a sickly appearance. She does not appear ill. No distress.   HENT:   Head: Normocephalic and atraumatic.   Right Ear: Hearing, tympanic membrane, external ear and ear canal normal. Tympanic membrane is not perforated, not erythematous, not retracted and not bulging.   Left Ear: Hearing, tympanic membrane, external ear and ear canal normal. Tympanic membrane is not perforated, not erythematous, not retracted and not bulging.   Nose: Mucosal edema present. No rhinorrhea. Right sinus exhibits no maxillary sinus tenderness and no frontal sinus tenderness. Left sinus exhibits no maxillary sinus tenderness and no frontal sinus tenderness.   Mouth/Throat: Mucous membranes are normal. No oropharyngeal exudate, posterior oropharyngeal edema, posterior oropharyngeal erythema or tonsillar abscesses. Tonsils are 0 on the right. Tonsils are 0 on the left. No tonsillar exudate.   Eyes: EOM are normal. Pupils are equal, round, and reactive to light. Right eye exhibits no discharge. Left eye exhibits no discharge.   Neck: Normal range of motion. Neck supple.   Cardiovascular: Normal rate, regular rhythm, normal heart sounds and intact distal pulses. Exam reveals no gallop and no friction rub.   No murmur heard.  Pulmonary/Chest: Effort normal and breath sounds normal. No respiratory distress. She has no decreased breath sounds. She has no wheezes. She has no rhonchi. She has no rales. She exhibits no tenderness.   Abdominal: Soft. Bowel sounds are normal. She exhibits no  distension and no mass. There is no tenderness. There is no guarding.   Lymphadenopathy:     She has no cervical adenopathy.   Neurological: She is alert and oriented to person, place, and time. She has normal reflexes. No cranial nerve deficit.   Skin: Skin is warm and dry. She is not diaphoretic.   Psychiatric: She has a normal mood and affect. Her behavior is normal. Judgment and thought content normal.   Nursing note and vitals reviewed.            Medical Decision Making:    Differential Diagnosis:  Viral URI, bronchitis, otitis media        ASSESSMENT    1. Viral upper respiratory tract infection    2. Nasal congestion        PLAN  Patient presents with 3 weeks of cough congestion, and plugged ears..  Patient is in no acute distress and is afebrile.  Lung sounds were clear and O2 sats at 96% on RA.  Imaging to rule out pneumonia is not indicated at this time.  Patient has no discharge, facial pain or fever.  Discussed antibiotic use with patient for sinus infection and will hold off on this for now.  Rest, Push fluids, vaporizer, elevation of head of bed.  Ibuprofen and or Tylenol for any fever or body aches.  Over the counter cough suppressant- PRN- as discussed.   Nasal decongestant.  Do not use for more than 3-4 days.  If symptoms worsen, recheck immediately otherwise follow up with your PCP in 1 week if symptoms are not improving.  Worrisome symptoms discussed with instructions to go to the ED.  Patient verbalized understanding and agreed with this plan.         Cameron Ortiz  2/8/2019, 2:51 PM

## 2019-03-13 ENCOUNTER — MYC MEDICAL ADVICE (OUTPATIENT)
Dept: FAMILY MEDICINE | Facility: OTHER | Age: 44
End: 2019-03-13

## 2019-03-13 DIAGNOSIS — F42.9 OBSESSIVE-COMPULSIVE DISORDER, UNSPECIFIED TYPE: ICD-10-CM

## 2019-03-13 DIAGNOSIS — F33.0 MILD EPISODE OF RECURRENT MAJOR DEPRESSIVE DISORDER (H): Primary | ICD-10-CM

## 2019-04-06 DIAGNOSIS — F42.9 OBSESSIVE-COMPULSIVE DISORDER, UNSPECIFIED TYPE: ICD-10-CM

## 2019-04-09 NOTE — TELEPHONE ENCOUNTER
Zoloft  Prescription approved per [facility] Refill Protocol.    Annabella Matthews, RN, BSN

## 2019-05-23 ENCOUNTER — OFFICE VISIT (OUTPATIENT)
Dept: FAMILY MEDICINE | Facility: OTHER | Age: 44
End: 2019-05-23
Payer: COMMERCIAL

## 2019-05-23 VITALS
SYSTOLIC BLOOD PRESSURE: 110 MMHG | HEIGHT: 64 IN | HEART RATE: 100 BPM | TEMPERATURE: 99.4 F | OXYGEN SATURATION: 97 % | BODY MASS INDEX: 30.39 KG/M2 | DIASTOLIC BLOOD PRESSURE: 60 MMHG | RESPIRATION RATE: 16 BRPM | WEIGHT: 178 LBS

## 2019-05-23 DIAGNOSIS — G44.209 TENSION HEADACHE: ICD-10-CM

## 2019-05-23 DIAGNOSIS — J02.9 SORE THROAT: ICD-10-CM

## 2019-05-23 DIAGNOSIS — R53.82 CHRONIC FATIGUE: Primary | ICD-10-CM

## 2019-05-23 PROCEDURE — 99214 OFFICE O/P EST MOD 30 MIN: CPT | Performed by: FAMILY MEDICINE

## 2019-05-23 ASSESSMENT — ANXIETY QUESTIONNAIRES
3. WORRYING TOO MUCH ABOUT DIFFERENT THINGS: NOT AT ALL
GAD7 TOTAL SCORE: 2
2. NOT BEING ABLE TO STOP OR CONTROL WORRYING: NOT AT ALL
1. FEELING NERVOUS, ANXIOUS, OR ON EDGE: NOT AT ALL
7. FEELING AFRAID AS IF SOMETHING AWFUL MIGHT HAPPEN: NOT AT ALL
5. BEING SO RESTLESS THAT IT IS HARD TO SIT STILL: NOT AT ALL
GAD7 TOTAL SCORE: 2
4. TROUBLE RELAXING: NOT AT ALL
7. FEELING AFRAID AS IF SOMETHING AWFUL MIGHT HAPPEN: NOT AT ALL
6. BECOMING EASILY ANNOYED OR IRRITABLE: MORE THAN HALF THE DAYS
GAD7 TOTAL SCORE: 2

## 2019-05-23 ASSESSMENT — PATIENT HEALTH QUESTIONNAIRE - PHQ9
SUM OF ALL RESPONSES TO PHQ QUESTIONS 1-9: 13
10. IF YOU CHECKED OFF ANY PROBLEMS, HOW DIFFICULT HAVE THESE PROBLEMS MADE IT FOR YOU TO DO YOUR WORK, TAKE CARE OF THINGS AT HOME, OR GET ALONG WITH OTHER PEOPLE: SOMEWHAT DIFFICULT
SUM OF ALL RESPONSES TO PHQ QUESTIONS 1-9: 13

## 2019-05-23 ASSESSMENT — MIFFLIN-ST. JEOR: SCORE: 1444.46

## 2019-05-23 NOTE — PROGRESS NOTES
Subjective     Avis Avalos is a 43 year old female who presents to clinic today for the following health issues:    History of Present Illness     She eats 0-1 servings of fruits and vegetables daily.She consumes 0 sweetened beverage(s) daily.  She is taking medications regularly.     Answers for HPI/ROS submitted by the patient on 5/23/2019   Chronic problems general questions HPI Form  If you checked off any problems, how difficult have these problems made it for you to do your work, take care of things at home, or get along with other people?: Somewhat difficult  PHQ9 TOTAL SCORE: 13  LYNDSAY 7 TOTAL SCORE: 2    She correlates her worsening mood scores to the fatigue from her illness. She doesn't think her mood is struggling.     Acute Illness   Acute illness concerns: Headache, Sinus issues   Onset: 3 weeks     Fever: YES    Chills/Sweats: YES    Headache (location?): YES    Sinus Pressure:YES    Conjunctivitis:  no    Ear Pain: YES: bilateral    Rhinorrhea: no    Congestion: YES    Sore Throat: YES     Cough: YES    Wheeze: no    Decreased Appetite: no    Nausea: no    Vomiting: no    Diarrhea:  no    Dysuria/Freq.: no    Fatigue/Achiness: YES    Sick/Strep Exposure: YES Around May 5th      Therapies Tried and outcome: Sudafed, Mucinex, Aleve. Nothing is helping.     She feels it improved, and she had a lot of drainage while taking sudafed, but it has started to worsen again. She states she has extreme fatigue, a consistent cough, and her neck and head are getting sore and tender.     Patient Active Problem List   Diagnosis     Crohns disease (H)     Herpes simplex type 2 infection     Knee pain     Sessile colonic polyp     Constipation, unspecified constipation type     High risk HPV infection     Moderate episode of recurrent major depressive disorder (H)     Obsessive-compulsive disorder, unspecified type     Lichen sclerosus of female genitalia     Chronic seasonal allergic rhinitis due to pollen     Past  Surgical History:   Procedure Laterality Date     C APPENDECTOMY           C/SECTION, LOW TRANSVERSE  02/17/10    , Low Transverse      SECTION  2012    Procedure:  SECTION;   SECTION ;  Surgeon: Yehuda Cruz MD;  Location: [facility]     COLONOSCOPY  2011    Procedure:COMBINED COLONOSCOPY, SINGLE BIOPSY/POLYPECTOMY BY BIOPSY; Surgeon:MARTIN MAO; Location:[facility]     COLONOSCOPY  2011    Procedure:COMBINED COLONOSCOPY, SINGLE BIOPSY/POLYPECTOMY BY BIOPSY; Colonoscopy biopsy of distal iiluem, colonoscopy removal of colon polyp with fulguration, colonoscopy and control of biopsy bleed with placement of resolution clip; Surgeon:TESS POLLARD; Location:[facility]     COLONOSCOPY  2011    Procedure:COMBINED COLONOSCOPY, REMOVE TUMOR/POLYP/LESION BY FULGURATION/HOT BIOPSY; Surgeon:TESS POLLARD; Location:[facility]     COLONOSCOPY  2011    Procedure:COMBINED COLONOSCOPY, CONTROL BLEED; Surgeon:TESS POLLARD; Location:[facility]     COLONOSCOPY N/A 2017    Procedure: COMBINED COLONOSCOPY, SINGLE OR MULTIPLE BIOPSY/POLYPECTOMY BY BIOPSY;;  Surgeon: Joséu Hawk MD;  Location:  GI     HC FLEX SIGMOIDOSCOPY W/WO MIKY SPEC BY BRUSH/WASH  09    [facility]     HC TOOTH EXTRACTION W/FORCEP       VIDEO CAPSULE ENDOSCOPY  09    [facility]       Social History     Tobacco Use     Smoking status: Former Smoker     Smokeless tobacco: Never Used     Tobacco comment: 2 1/2 year ago   Substance Use Topics     Alcohol use: Yes     Comment: Occ.     Family History   Problem Relation Age of Onset     Diabetes Mother         borderline     Depression Mother      Gastrointestinal Disease Mother         IBS     Thyroid Disease Mother         hypothyroidism     Diabetes Father         NIDDM - Type II     Diabetes Maternal Grandmother      Arthritis Maternal Grandfather      Heart Disease Paternal Grandfather      Thyroid Disease Sister          "hypothyroidism     Rheumatoid Arthritis Sister      Other Cancer Sister      Asthma Other         niece     Obesity Sister         half-sister/had gastric bypass           Reviewed and updated as needed this visit by Provider         Review of Systems   ROS COMP: Constitutional, HEENT, cardiovascular, pulmonary, GI, , musculoskeletal, neuro, skin, endocrine and psych systems are negative, except as otherwise noted.      Objective    /60 (BP Location: Right arm, Patient Position: Chair, Cuff Size: Adult Regular)   Pulse 100   Temp 99.4  F (37.4  C) (Temporal)   Resp 16   Ht 1.613 m (5' 3.5\")   Wt 80.7 kg (178 lb)   SpO2 97%   Breastfeeding? No   BMI 31.04 kg/m    Body mass index is 31.04 kg/m .  Physical Exam   GENERAL: healthy, alert and no distress, obese  EYES: Eyes grossly normal to inspection, PERRL and conjunctivae and sclerae normal  HENT: Fluid build-up behind TM bilaterally but no erythema or bulging, nose and mouth without ulcers or lesions  NECK: no adenopathy, no asymmetry, masses, or scars and thyroid normal to palpation  RESP: lungs clear to auscultation - no rales, rhonchi or wheezes  CV: regular rate and rhythm, normal S1 S2, no S3 or S4, no murmur, click or rub, no peripheral edema and peripheral pulses strong  ABDOMEN: soft, mild tenderness to palpation, no hepatosplenomegaly, no masses  MS: no gross musculoskeletal defects noted, no edema  SKIN: no suspicious lesions or rashes to visible skin  NEURO: Normal strength and tone, mentation intact and speech normal  PSYCH: mentation appears normal, affect normal/bright    Diagnostic Test Results:  No results found for this or any previous visit (from the past 24 hour(s)).        Assessment & Plan       ICD-10-CM    1. Chronic fatigue R53.82    2. Sore throat J02.9    3. Tension headache G44.209      Acute Illness:  Avis has evidence of fighting off a mild illness, but does not explain all of her reported symptoms. It is likely much of " "her congestion and cough is related to her digestive issues. Recommended follow-up with allergist. Also offered referral to ENT if it allergist consultation does not help.     GI: has h/o crohn's diagnosis but normal colonoscopy and GI  Evaluation since then including capsule endoscopy. Appears to be IBS vs intolerances possibile, but given symptoms are sore throat without a cause at this time, consider ENT as well for further evaluation.    Food Intolerances:  She has had previous skin allergen testing, but she did not get any food testing done. Recommended returning to allergist to discuss food intolerances.      BMI:   Estimated body mass index is 31.04 kg/m  as calculated from the following:    Height as of this encounter: 1.613 m (5' 3.5\").    Weight as of this encounter: 80.7 kg (178 lb).   Weight management plan: Discussed healthy diet and exercise guidelines    Length of visit was 25 minutes with more than 50 percent of that time used for discussing medical concerns and education.     Return in about 2 weeks (around 6/6/2019) for follow-up if symptoms persist or fail to improve.    This document serves as a record of the services and decisions personally performed and made by Amanda Contreras MD. It was created on her behalf by Bryce Baker, a trained medical scribe. The creation of this document is based the provider's statements to the medical scribe.  Bryce Baker, May 23, 2019 2:38 PM    The information in this document, created by the medical scribe for me, accurately reflects the services I personally performed and the decisions made by me. I have reviewed and approved this document for accuracy.   MD Amanda Mcneal MD, MD  [facility]    "

## 2019-05-24 ASSESSMENT — PATIENT HEALTH QUESTIONNAIRE - PHQ9: SUM OF ALL RESPONSES TO PHQ QUESTIONS 1-9: 13

## 2019-05-24 ASSESSMENT — ANXIETY QUESTIONNAIRES: GAD7 TOTAL SCORE: 2

## 2019-05-25 ENCOUNTER — MYC MEDICAL ADVICE (OUTPATIENT)
Dept: FAMILY MEDICINE | Facility: OTHER | Age: 44
End: 2019-05-25

## 2019-05-27 ENCOUNTER — NURSE TRIAGE (OUTPATIENT)
Dept: NURSING | Facility: CLINIC | Age: 44
End: 2019-05-27

## 2019-05-27 NOTE — TELEPHONE ENCOUNTER
Patient reports she was seen on Thursday for sore throat and told it may be food allergy but on Saturday at [facility], patient was positive for strep.  Patient reports sore throat is better but still has neck, ear, head pain.  Patient rates pain as 4/10.  Current temp is 97.4 F (oral)  Reviewed guideline and care advice with caller.  Caller verbalizes understanding.      Reason for Disposition    Earache  (Exceptions: brief ear pain of < 60 minutes duration, earache occurring during air travel    Additional Information    [1] Diagnosed strep throat AND [2] taking antibiotic AND [3] symptoms continue    Negative: Moving the earlobe or touching the ear clearly increases the pain    Negative: Foreign body struck in the ear (e.g., bug, piece of cotton)    Negative: Followed an ear injury    Negative: [1] Recently diagnosed with otitis media AND [2] currently on oral antibiotics    Negative: [1] Stiff neck (unable to touch chin to chest) AND [2] fever    Negative: [1] Bony area of skull behind the ear is pink or swollen AND [2] fever    Negative: Fever > 104 F (40 C)    Negative: Patient sounds very sick or weak to the triager    Negative: [1] SEVERE pain AND [2] not improved 2 hours after taking analgesic medication (e.g., ibuprofen or acetaminophen)    Negative: Walking is very unsteady    Negative: Sudden onset of ear pain after long - thin object was inserted into the ear canal (e.g., pencil, Q-tip)    Negative: Diabetes mellitus or weak immune system (e.g., HIV positive, cancer chemo, splenectomy, organ transplant, chronic steroids)    Negative: New blurred vision or vision changes    Negative: White, yellow, or green discharge    Negative: Bloody discharge or unexplained bleeding from ear canal    Protocols used: EARACHE-A-, SORE THROAT-A-AH

## 2019-05-28 ENCOUNTER — OFFICE VISIT (OUTPATIENT)
Dept: FAMILY MEDICINE | Facility: OTHER | Age: 44
End: 2019-05-28
Payer: COMMERCIAL

## 2019-05-28 VITALS
OXYGEN SATURATION: 98 % | WEIGHT: 176 LBS | HEART RATE: 81 BPM | BODY MASS INDEX: 30.05 KG/M2 | HEIGHT: 64 IN | SYSTOLIC BLOOD PRESSURE: 108 MMHG | RESPIRATION RATE: 16 BRPM | TEMPERATURE: 98.2 F | DIASTOLIC BLOOD PRESSURE: 70 MMHG

## 2019-05-28 DIAGNOSIS — J01.90 ACUTE SINUSITIS WITH SYMPTOMS > 10 DAYS: Primary | ICD-10-CM

## 2019-05-28 DIAGNOSIS — B37.31 YEAST INFECTION OF THE VAGINA: ICD-10-CM

## 2019-05-28 PROCEDURE — 99213 OFFICE O/P EST LOW 20 MIN: CPT | Performed by: STUDENT IN AN ORGANIZED HEALTH CARE EDUCATION/TRAINING PROGRAM

## 2019-05-28 RX ORDER — AMOXICILLIN 875 MG
TABLET ORAL 2 TIMES DAILY
Refills: 0 | COMMUNITY
Start: 2019-05-25 | End: 2019-09-26

## 2019-05-28 RX ORDER — FLUCONAZOLE 150 MG/1
TABLET ORAL
Qty: 2 TABLET | Refills: 0 | Status: SHIPPED | OUTPATIENT
Start: 2019-05-28 | End: 2019-09-26

## 2019-05-28 ASSESSMENT — MIFFLIN-ST. JEOR: SCORE: 1435.39

## 2019-05-28 ASSESSMENT — PATIENT HEALTH QUESTIONNAIRE - PHQ9
SUM OF ALL RESPONSES TO PHQ QUESTIONS 1-9: 7
SUM OF ALL RESPONSES TO PHQ QUESTIONS 1-9: 7
10. IF YOU CHECKED OFF ANY PROBLEMS, HOW DIFFICULT HAVE THESE PROBLEMS MADE IT FOR YOU TO DO YOUR WORK, TAKE CARE OF THINGS AT HOME, OR GET ALONG WITH OTHER PEOPLE: SOMEWHAT DIFFICULT

## 2019-05-28 ASSESSMENT — PAIN SCALES - GENERAL: PAINLEVEL: MILD PAIN (3)

## 2019-05-28 NOTE — PROGRESS NOTES
Subjective     Avis Avalos is a 43 year old female who presents to clinic today for the following health issues:    URI      Answers for HPI/ROS submitted by the patient on 2019   If you checked off any problems, how difficult have these problems made it for you to do your work, take care of things at home, or get along with other people?: Somewhat difficult  PHQ9 TOTAL SCORE: 7    Acute Illness   Acute illness concerns: URI  Onset: 3 weeks    Fever: no    Chills/Sweats: YES- Night    Headache (location?): YES    Sinus Pressure:YES    Conjunctivitis:  no    Ear Pain: YES: both    Rhinorrhea: no    Congestion: YES    Sore Throat: YES- Tender     Cough: YES-productive of yellow sputum, productive of green sputum    Wheeze: no    Decreased Appetite: YES    Nausea: no    Vomiting: no    Diarrhea:  no    Dysuria/Freq.: no    Fatigue/Achiness: YES    Sick/Strep Exposure: no      Therapies Tried and outcome: Amoxicillin given in urgent care on Saturday    Patient Active Problem List   Diagnosis     Herpes simplex type 2 infection     Knee pain     Sessile colonic polyp     Constipation, unspecified constipation type     High risk HPV infection     Moderate episode of recurrent major depressive disorder (H)     Obsessive-compulsive disorder, unspecified type     Lichen sclerosus of female genitalia     Chronic seasonal allergic rhinitis due to pollen     Past Surgical History:   Procedure Laterality Date     C APPENDECTOMY           C/SECTION, LOW TRANSVERSE  02/17/10    , Low Transverse      SECTION  2012    Procedure:  SECTION;   SECTION ;  Surgeon: Yehuda Cruz MD;  Location:  [facility]     COLONOSCOPY  2011    Procedure:COMBINED COLONOSCOPY, SINGLE BIOPSY/POLYPECTOMY BY BIOPSY; Surgeon:MARTIN MAO; Location: GI     COLONOSCOPY  2011    Procedure:COMBINED COLONOSCOPY, SINGLE BIOPSY/POLYPECTOMY BY BIOPSY; Colonoscopy biopsy of distal iiluem,  colonoscopy removal of colon polyp with fulguration, colonoscopy and control of biopsy bleed with placement of resolution clip; Surgeon:TESS POLLARD; [facility]     COLONOSCOPY  9/12/2011    Procedure:COMBINED COLONOSCOPY, REMOVE TUMOR/POLYP/LESION BY FULGURATION/HOT BIOPSY; Surgeon:TESS POLLARD; [facility]     COLONOSCOPY  9/12/2011    Procedure:COMBINED COLONOSCOPY, CONTROL BLEED; Surgeon:TESS POLLARD; [facility]     COLONOSCOPY N/A 6/19/2017    Procedure: COMBINED COLONOSCOPY, SINGLE OR MULTIPLE BIOPSY/POLYPECTOMY BY BIOPSY;;  Surgeon: Josué Hawk MD;  [facility]     HC FLEX SIGMOIDOSCOPY W/WO MIKY SPEC BY BRUSH/WASH  07/02/09    [facility]     HC TOOTH EXTRACTION W/FORCEP       VIDEO CAPSULE ENDOSCOPY  07/30/09    [facility]       Social History     Tobacco Use     Smoking status: Former Smoker     Smokeless tobacco: Never Used     Tobacco comment: 2 1/2 year ago   Substance Use Topics     Alcohol use: Yes     Comment: Occ.     Family History   Problem Relation Age of Onset     Diabetes Mother         borderline     Depression Mother      Gastrointestinal Disease Mother         IBS     Thyroid Disease Mother         hypothyroidism     Diabetes Father         NIDDM - Type II     Diabetes Maternal Grandmother      Arthritis Maternal Grandfather      Heart Disease Paternal Grandfather      Thyroid Disease Sister         hypothyroidism     Rheumatoid Arthritis Sister      Other Cancer Sister      Asthma Other         niece     Obesity Sister         half-sister/had gastric bypass         Current Outpatient Medications   Medication Sig Dispense Refill     ALPRAZolam (XANAX) 0.5 MG tablet Use 1-2 tablets 30-60 minutes before flying. Try one tablet first. Take a second if needed. 20 tablet 0     amoxicillin (AMOXIL) 875 MG tablet 2 times daily  0     amoxicillin-clavulanate (AUGMENTIN) 875-125 MG tablet Take 1 tablet by mouth 2 times daily 20 tablet 0     clobetasol (TEMOVATE)  "0.05 % ointment Apply sparingly to affected area at bedtime for 6-12 weeks. 60 g 3     DiphenhydrAMINE HCl (BENADRYL PO)        fluconazole (DIFLUCAN) 150 MG tablet Take 150 mg today. Repeat dose in 72 hours if needed. 2 tablet 0     fluticasone (FLONASE) 50 MCG/ACT spray Spray 1-2 sprays into both nostrils daily 1 Bottle 1     guaiFENesin (MUCINEX) 600 MG 12 hr tablet Take 1,200 mg by mouth 2 times daily       Ibuprofen (IBU-200 PO) Take 2-3 tablets by mouth as needed Reported on 5/10/2017       loratadine (CLARITIN) 10 MG tablet Take 10 mg by mouth daily       Multiple Vitamins-Minerals (RA ONE DAILY WOMENS) TABS Take  by mouth.       Naproxen Sodium (ALEVE PO) Take 220 mg by mouth daily as needed for moderate pain       NONFORMULARY 1 tablet daily       Omega-3 Fatty Acids (OMEGA 3 PO)        Pseudoephedrine HCl (SUDAFED PO)        sertraline (ZOLOFT) 50 MG tablet Take 1 tablet (50 mg) by mouth daily 90 tablet 0     valACYclovir (VALTREX) 1000 mg tablet TAKE ONE TABLET BY MOUTH DAILY AS NEEDED 21 tablet 0     vitamin D3 (VITAMIN D3) 1000 units (25 mcg) tablet Take 1,000 Units by mouth daily       Acetaminophen (TYLENOL PO) Take by mouth as needed Reported on 5/10/2017       Allergies   Allergen Reactions     Fructose GI Disturbance     Ragweeds      Sorbitol Diarrhea     BP Readings from Last 3 Encounters:   05/28/19 108/70   05/23/19 110/60   07/06/18 106/68    Wt Readings from Last 3 Encounters:   05/28/19 79.8 kg (176 lb)   05/23/19 80.7 kg (178 lb)   07/06/18 74.4 kg (164 lb)                    Reviewed and updated as needed this visit by Provider         Review of Systems   ROS COMP: Constitutional, HEENT, cardiovascular, pulmonary, gi and gu systems are negative, except as otherwise noted.      Objective    /70 (BP Location: Left arm, Patient Position: Chair, Cuff Size: Adult Regular)   Pulse 81   Temp 98.2  F (36.8  C) (Temporal)   Resp 16   Ht 1.613 m (5' 3.5\")   Wt 79.8 kg (176 lb)   SpO2 98% "   BMI 30.69 kg/m    Body mass index is 30.69 kg/m .  Physical Exam   GENERAL: alert, no distress and obese  EYES: Eyes grossly normal to inspection, PERRL and conjunctivae and sclerae normal  HENT: ear canals and TM's normal, nose and mouth without ulcers or lesions  NECK: no adenopathy, no asymmetry, masses, or scars and thyroid normal to palpation  RESP: lungs clear to auscultation - no rales, rhonchi or wheezes  CV: regular rate and rhythm, normal S1 S2, no S3 or S4, no murmur, click or rub, no peripheral edema and peripheral pulses strong  MS: no gross musculoskeletal defects noted, no edema  SKIN: no suspicious lesions or rashes  NEURO: Normal strength and tone, mentation intact and speech normal  PSYCH: mentation appears normal, anxious, judgement and insight intact and appearance well groomed    Diagnostic Test Results:  Labs reviewed in Epic        Assessment & Plan     1. Acute sinusitis with symptoms > 10 days  She has symptoms of acute sinusitis and given her symptoms have been ongoing for the past 3 weeks we will switch her from amoxicillin to Augmentin.  Follow-up if symptoms persist or worsen.  - amoxicillin-clavulanate (AUGMENTIN) 875-125 MG tablet; Take 1 tablet by mouth 2 times daily  Dispense: 20 tablet; Refill: 0    2. Yeast infection of the vagina  Patient gets these infections when she takes antibiotics.  I gave her a prescription for Diflucan.  - fluconazole (DIFLUCAN) 150 MG tablet; Take 150 mg today. Repeat dose in 72 hours if needed.  Dispense: 2 tablet; Refill: 0     No follow-ups on file.    JEM Lindsay [facility]

## 2019-05-29 ASSESSMENT — PATIENT HEALTH QUESTIONNAIRE - PHQ9: SUM OF ALL RESPONSES TO PHQ QUESTIONS 1-9: 7

## 2019-06-07 ENCOUNTER — HOSPITAL ENCOUNTER (OUTPATIENT)
Dept: MAMMOGRAPHY | Facility: CLINIC | Age: 44
Discharge: HOME | End: 2019-06-07
Attending: STUDENT IN AN ORGANIZED HEALTH CARE EDUCATION/TRAINING PROGRAM | Admitting: STUDENT IN AN ORGANIZED HEALTH CARE EDUCATION/TRAINING PROGRAM
Payer: COMMERCIAL

## 2019-06-07 DIAGNOSIS — Z12.31 VISIT FOR SCREENING MAMMOGRAM: ICD-10-CM

## 2019-06-07 PROCEDURE — 77063 BREAST TOMOSYNTHESIS BI: CPT

## 2019-06-15 ENCOUNTER — MYC MEDICAL ADVICE (OUTPATIENT)
Dept: ALLERGY | Facility: OTHER | Age: 44
End: 2019-06-15

## 2019-06-17 NOTE — TELEPHONE ENCOUNTER
Good morning. Could you please call and inform patient that if she is having IgE mediated allergy symptoms such as hives, angioedema, wheezing, shortness of breath, coughing, nasal congestion, diarrhea, vomiting, etc reproducibly and within minutes to hours after eating. If those are not her symptoms than could be more of intolerance and unfortunately no great testing for intolerances. You may consider GI evaluation for fructose intolerance and further evaluation if symptoms are predominately GI in nature. Thanks.     Dr. Lafleur

## 2019-06-18 NOTE — TELEPHONE ENCOUNTER
Per chart review, patient has read [employer] message. Will leave open temporarily for patient to respond.      Maria L Rodriguez RN

## 2019-07-07 ENCOUNTER — MYC MEDICAL ADVICE (OUTPATIENT)
Dept: ALLERGY | Facility: OTHER | Age: 44
End: 2019-07-07

## 2019-07-07 DIAGNOSIS — R14.0 BLOATING: Primary | ICD-10-CM

## 2019-07-10 ENCOUNTER — TELEPHONE (OUTPATIENT)
Dept: FAMILY MEDICINE | Facility: CLINIC | Age: 44
End: 2019-07-10

## 2019-07-10 NOTE — TELEPHONE ENCOUNTER
Called and left message for patient to call clinic.  Patient is scheduled to be seen today by Dr. Cruz at 3:50pm.  Unfortunately we will need to reschedule the patient due Provider not being in clinic.  Please help the patient to reschedule the appointment and please give 40 min due to this being a Consult.  PHILIPPE Tran MA

## 2019-07-15 DIAGNOSIS — F42.9 OBSESSIVE-COMPULSIVE DISORDER, UNSPECIFIED TYPE: ICD-10-CM

## 2019-07-16 NOTE — TELEPHONE ENCOUNTER
Pending Prescriptions:                       Disp   Refills    sertraline (ZOLOFT) 50 MG tablet [Pharmac*90 tab*0            Sig: TAKE 1 TABLET BY MOUTH EVERY DAY    Prescription approved per [facility] Refill Protocol.    Billie Diaz, RN, BSN

## 2019-09-26 ENCOUNTER — HOSPITAL ENCOUNTER (OUTPATIENT)
Dept: ULTRASOUND IMAGING | Facility: CLINIC | Age: 44
Discharge: HOME | End: 2019-09-26
Attending: OBSTETRICS & GYNECOLOGY | Admitting: OBSTETRICS & GYNECOLOGY
Payer: COMMERCIAL

## 2019-09-26 ENCOUNTER — OFFICE VISIT (OUTPATIENT)
Dept: FAMILY MEDICINE | Facility: CLINIC | Age: 44
End: 2019-09-26
Payer: COMMERCIAL

## 2019-09-26 VITALS
OXYGEN SATURATION: 98 % | RESPIRATION RATE: 14 BRPM | TEMPERATURE: 97.7 F | HEART RATE: 72 BPM | DIASTOLIC BLOOD PRESSURE: 80 MMHG | SYSTOLIC BLOOD PRESSURE: 118 MMHG | WEIGHT: 180 LBS | BODY MASS INDEX: 31.39 KG/M2

## 2019-09-26 DIAGNOSIS — R10.2 PELVIC PAIN IN FEMALE: Primary | ICD-10-CM

## 2019-09-26 DIAGNOSIS — R10.2 PELVIC PAIN IN FEMALE: ICD-10-CM

## 2019-09-26 DIAGNOSIS — R63.5 ABNORMAL WEIGHT GAIN: ICD-10-CM

## 2019-09-26 DIAGNOSIS — Z23 NEED FOR PROPHYLACTIC VACCINATION AND INOCULATION AGAINST INFLUENZA: ICD-10-CM

## 2019-09-26 LAB — TSH SERPL DL<=0.005 MIU/L-ACNC: 1.48 MU/L (ref 0.4–4)

## 2019-09-26 PROCEDURE — 90686 IIV4 VACC NO PRSV 0.5 ML IM: CPT | Performed by: OBSTETRICS & GYNECOLOGY

## 2019-09-26 PROCEDURE — 99214 OFFICE O/P EST MOD 30 MIN: CPT | Mod: 25 | Performed by: OBSTETRICS & GYNECOLOGY

## 2019-09-26 PROCEDURE — 84443 ASSAY THYROID STIM HORMONE: CPT | Performed by: OBSTETRICS & GYNECOLOGY

## 2019-09-26 PROCEDURE — 76830 TRANSVAGINAL US NON-OB: CPT

## 2019-09-26 PROCEDURE — 90471 IMMUNIZATION ADMIN: CPT | Performed by: OBSTETRICS & GYNECOLOGY

## 2019-09-26 PROCEDURE — 36415 COLL VENOUS BLD VENIPUNCTURE: CPT | Performed by: OBSTETRICS & GYNECOLOGY

## 2019-09-26 ASSESSMENT — PAIN SCALES - GENERAL: PAINLEVEL: MODERATE PAIN (4)

## 2019-09-26 NOTE — PROGRESS NOTES
SUBJECTIVE:                                                      Referral from Sarah Wilhelm       Reason for consultation:  Pelvic pain    History:  Avis  Is a 44 year old female  2 para ( 2 CS )   who presents today because of pelvic pain at the last half of each cycle.  This is been going on for a number of months.  It is making her depressed.  Her pain is quite intense.  It starts with her ovulation.  sHe has had 2 prior  sections.  She has not had an ultrasound.  So she is concerned that she is gaining weight recently.  Has not had her thyroid checked.      She did have a normal Pap 2 years ago.  HPV was negative.        Patient Active Problem List   Diagnosis     Herpes simplex type 2 infection     Knee pain     Sessile colonic polyp     Constipation, unspecified constipation type     High risk HPV infection     Moderate episode of recurrent major depressive disorder (H)     Obsessive-compulsive disorder, unspecified type     Lichen sclerosus of female genitalia     Chronic seasonal allergic rhinitis due to pollen     Past Surgical History:   Procedure Laterality Date     C APPENDECTOMY           C/SECTION, LOW TRANSVERSE  02/17/10    , Low Transverse      SECTION  2012    Procedure:  SECTION;   SECTION ;  Surgeon: Yehuda Cruz MD;  Location:  [facility]     COLONOSCOPY  2011    Procedure:COMBINED COLONOSCOPY, SINGLE BIOPSY/POLYPECTOMY BY BIOPSY; Surgeon:MARTIN MAO; Location: GI     COLONOSCOPY  2011    Procedure:COMBINED COLONOSCOPY, SINGLE BIOPSY/POLYPECTOMY BY BIOPSY; Colonoscopy biopsy of distal iiluem, colonoscopy removal of colon polyp with fulguration, colonoscopy and control of biopsy bleed with placement of resolution clip; Surgeon:TESS POLLARD; Location: GI     COLONOSCOPY  2011    Procedure:COMBINED COLONOSCOPY, REMOVE TUMOR/POLYP/LESION BY FULGURATION/HOT BIOPSY; Surgeon:TESS POLLARD;  [facility]     COLONOSCOPY  9/12/2011    Procedure:COMBINED COLONOSCOPY, CONTROL BLEED; Surgeon:TESS POLLARD; [facility]     COLONOSCOPY N/A 6/19/2017    Procedure: COMBINED COLONOSCOPY, SINGLE OR MULTIPLE BIOPSY/POLYPECTOMY BY BIOPSY;;  Surgeon: Josué Hawk MD;  [facility]     HC FLEX SIGMOIDOSCOPY W/WO MIKY SPEC BY BRUSH/WASH  07/02/09    [facility]     HC TOOTH EXTRACTION W/FORCEP       VIDEO CAPSULE ENDOSCOPY  07/30/09    [facility]       Social History     Tobacco Use     Smoking status: Former Smoker     Smokeless tobacco: Never Used     Tobacco comment: 2 1/2 year ago   Substance Use Topics     Alcohol use: Yes     Comment: Occ.     Family History   Problem Relation Age of Onset     Diabetes Mother         borderline     Depression Mother      Gastrointestinal Disease Mother         IBS     Thyroid Disease Mother         hypothyroidism     Diabetes Father         NIDDM - Type II     Diabetes Maternal Grandmother      Arthritis Maternal Grandfather      Heart Disease Paternal Grandfather      Thyroid Disease Sister         hypothyroidism     Rheumatoid Arthritis Sister      Other Cancer Sister      Asthma Other         niece     Obesity Sister         half-sister/had gastric bypass         Current Outpatient Medications   Medication Sig Dispense Refill     Acetaminophen (TYLENOL PO) Take by mouth as needed Reported on 5/10/2017       ALPRAZolam (XANAX) 0.5 MG tablet Use 1-2 tablets 30-60 minutes before flying. Try one tablet first. Take a second if needed. 20 tablet 0     clobetasol (TEMOVATE) 0.05 % ointment Apply sparingly to affected area at bedtime for 6-12 weeks. 60 g 3     DiphenhydrAMINE HCl (BENADRYL PO)        fluticasone (FLONASE) 50 MCG/ACT spray Spray 1-2 sprays into both nostrils daily 1 Bottle 1     Ibuprofen (IBU-200 PO) Take 2-3 tablets by mouth as needed Reported on 5/10/2017       loratadine (CLARITIN) 10 MG tablet Take 10 mg by mouth daily       Multiple  Vitamins-Minerals (RA ONE DAILY WOMENS) TABS Take  by mouth.       Naproxen Sodium (ALEVE PO) Take 220 mg by mouth daily as needed for moderate pain       NONFORMULARY 1 tablet daily       Omega-3 Fatty Acids (OMEGA 3 PO)        sertraline (ZOLOFT) 50 MG tablet TAKE 1 TABLET BY MOUTH EVERY DAY 90 tablet 2     valACYclovir (VALTREX) 1000 mg tablet TAKE ONE TABLET BY MOUTH DAILY AS NEEDED 21 tablet 0     vitamin D3 (VITAMIN D3) 1000 units (25 mcg) tablet Take 1,000 Units by mouth daily         ROS:  A 12 point systems review is negative except for what is listed above in the Subjective history.            OBJECTIVE:                                                    Vital signs: Blood pressure 118/80, pulse 72, temperature 97.7  F (36.5  C), temperature source Temporal, resp. rate 14, weight 81.6 kg (180 lb), last menstrual period 09/11/2019, SpO2 98 %, not currently breastfeeding.    No exam is done today.  Rather she just wanted to talk about these issues.             ASSESSMENT/PLAN:                                                    A total of 25 minutes were spent face-to-face with this patient during today's consultation, with more than 50% of that time devoted to conversation and counseling about the management decisions.      1.  She has pelvic pain at the last half of each cycle.  This is quite concerning to her.  I will start by checking pelvic ultrasound.  That will help direct me about what further evaluation will be necessary.  After that I will perform a pelvic exam and perhaps an endometrial biopsy.  I may consider laparoscopy.    2.  Abnormal weight gain we will check TSH today.  I advised her to go 5 miles walking every day.  Will recheck weight in 2 weeks.                                                                      Thank you for this consultation.    Copy to  Sarah Wilhelm MD  [facility]

## 2019-09-26 NOTE — RESULT ENCOUNTER NOTE
Natacha/Ger Urias,Please inform Avis/ or caretaker  that this result(s) is/are normal.  Thanks. MEGAN Cruz MD

## 2019-09-27 ENCOUNTER — TELEPHONE (OUTPATIENT)
Dept: FAMILY MEDICINE | Facility: CLINIC | Age: 44
End: 2019-09-27

## 2019-09-27 DIAGNOSIS — N83.201 CYST OF RIGHT OVARY: Primary | ICD-10-CM

## 2019-09-27 NOTE — TELEPHONE ENCOUNTER
Reason for Call:  Request for results:    Name of test or procedure: u/s     Date of test of procedure: 9/26    Location of the test or procedure: [facility] to leave the result message on voice mail or with a family member? NO    Phone number Patient can be reached at:  Home number on file [phone] (home)    Additional comments: Pt is hoping a covering provider can relay these results. She was informed that we only had preliminary results at time of call.     Call taken on 9/27/2019 at 1:05 PM by Ann Knox

## 2019-09-30 NOTE — TELEPHONE ENCOUNTER
Avis calls again for US results.  Asking again if another provider can address in Dr Dudley absence.

## 2019-10-01 ENCOUNTER — TELEPHONE (OUTPATIENT)
Dept: FAMILY MEDICINE | Facility: CLINIC | Age: 44
End: 2019-10-01

## 2019-10-01 NOTE — TELEPHONE ENCOUNTER
I spoke to Avis and gave her the results.  She has a hemorrhagic cyst on her right ovary.  No fibroids and no other suspicious findings.  We talked about endometriosis and how you cannot rule that out on an ultrasound.  I recommend a repeat ultrasound in 6 weeks and I placed the order.  She plans to call to schedule.  I asked for results to be forwarded back to Dr. Cruz.  Jennifer Vigil MD

## 2019-10-01 NOTE — TELEPHONE ENCOUNTER
Reason for Call:  Request for results:    Name of test or procedure:     Date of test of procedure: 9/26    Location of the test or procedure: [facility]    OK to leave the result message on voice mail or with a family member? YES    Phone number Patient can be reached at:  Cell number on file:    Telephone Information:   Mobile [phone]       Additional comments: any/can any provider on this team call her with results please    Call taken on 10/1/2019 at 10:37 AM by Amalia Rosas

## 2019-10-02 NOTE — TELEPHONE ENCOUNTER
There is a cyst on the right ovary that may be the cause of her pelvic pain. Cysts on the ovaries are benign and resolve on their own over time. We can repeat the ultrasound in 6 weeks to ensure the cyst has resolved. She should know that sometimes ovarian cysts rupture and cause a sudden pain that will subside over several hours but not all cysts rupture. This is just so she is aware that this can happen.   Sarah Wilhelm, CHRISTIANO-C

## 2019-10-07 ENCOUNTER — PREP FOR PROCEDURE (OUTPATIENT)
Dept: FAMILY MEDICINE | Facility: CLINIC | Age: 44
End: 2019-10-07

## 2019-10-07 DIAGNOSIS — R10.2 PELVIC PAIN IN FEMALE: Primary | ICD-10-CM

## 2019-10-07 NOTE — PROGRESS NOTES
Question Answer Comment   Procedure name(s) - multi select Laparoscopy    Reason for procedure pelvic Pain    Is this a multi surgeon case? No    Laterality N/A    Request for additional equipment Other (see comments) None   Anesthesia General    Initiate Pre-op orders for above procedure: Yes, as ordered in Epic Additional orders noted there also   Location of Case: [facility] OR    Surgeon Procedure Time (incision to closure) in minutes (per procedure as applicable) 30    Note:  Surgical Case Time Needed (in minutes)   Patient Class (for admit prior to surgery, specify number of days in comments): Same day (hospital outpatient)    Why can t this outpatient surgery be done at the [facility] or  ASC? Surgeon preference    Vendor Needed? No      Re Frank on 10/7/2019 at 2:44 PM

## 2019-10-07 NOTE — TELEPHONE ENCOUNTER
Notes recorded by Yehuda Cruz MD on 10/7/2019 at 9:58 AM CDT  Natacha/Ger Urias,Please inform Avis/ or caretaker  that this result(s) is/are showing a small cyst in the right ovary.  About half an inch in size.  I would advise her to repeat the ultrasound in 2 months to make sure the cyst resolves.  Otherwise the pelvic ultrasound was normal.  Thanks. MEGAN Cruz MD    Pt advised on MD notes as written and states understanding. Patient is interested in a laparoscopic procedure to look for possible endometriosis. She would like to do this sooner rather than later due to her insurance only covering for 2 more months. I advised patient I would discuss this with MD and call her back. She may need to be seen in clinic for this. She states understanding.  Natacha Lynch, CMA

## 2019-10-07 NOTE — RESULT ENCOUNTER NOTE
Natacha/Ger Urias,Please inform Avis/ or caretaker  that this result(s) is/are showing a small cyst in the right ovary.  About half an inch in size.  I would advise her to repeat the ultrasound in 2 months to make sure the cyst resolves.  Otherwise the pelvic ultrasound was normal.  Thanks. MEGAN Cruz MD

## 2019-10-07 NOTE — TELEPHONE ENCOUNTER
Patient returns call and advised on notes below. She states she would like to go ahead with the surgical procedure. She was scheduled for a pre-op consent signing on 10/11 at 9:40am.  Natacha Lynch CMA

## 2019-10-07 NOTE — TELEPHONE ENCOUNTER
Per Dr. Cruz patient can have laparoscopic surgery on 10/21 at 7:30am. Patient will need to be seen for a pre-op before this date however.   Attempted to contact patient with a busy tone. Will attempt to contact later.  Natacha Lynch CMA

## 2019-10-11 ENCOUNTER — OFFICE VISIT (OUTPATIENT)
Dept: FAMILY MEDICINE | Facility: CLINIC | Age: 44
End: 2019-10-11
Payer: COMMERCIAL

## 2019-10-11 ENCOUNTER — TELEPHONE (OUTPATIENT)
Dept: FAMILY MEDICINE | Facility: CLINIC | Age: 44
End: 2019-10-11

## 2019-10-11 VITALS
SYSTOLIC BLOOD PRESSURE: 98 MMHG | HEART RATE: 80 BPM | TEMPERATURE: 97.7 F | DIASTOLIC BLOOD PRESSURE: 66 MMHG | BODY MASS INDEX: 31.68 KG/M2 | RESPIRATION RATE: 20 BRPM | OXYGEN SATURATION: 97 % | WEIGHT: 181.7 LBS

## 2019-10-11 DIAGNOSIS — R10.2 PELVIC PAIN IN FEMALE: ICD-10-CM

## 2019-10-11 DIAGNOSIS — Z01.818 PREOP GENERAL PHYSICAL EXAM: Primary | ICD-10-CM

## 2019-10-11 DIAGNOSIS — Z01.812 PRE-OPERATIVE LABORATORY EXAMINATION: Primary | ICD-10-CM

## 2019-10-11 PROCEDURE — 99215 OFFICE O/P EST HI 40 MIN: CPT | Performed by: OBSTETRICS & GYNECOLOGY

## 2019-10-11 ASSESSMENT — PAIN SCALES - GENERAL: PAINLEVEL: NO PAIN (0)

## 2019-10-11 NOTE — PROGRESS NOTES
Fax number for surgical facility: NA  Primary Physician: Sarah Wilhelm  Type of Anesthesia Anticipated: General    Patient has a Health Care Directive or Living Will:  NO    Preop Questions 10/11/2019   Who is doing your surgery? [facility]ak   What are you having done? laprascopy   Date of Surgery/Procedure: 10/21/2019   Facility or Hospital where procedure/surgery will be performed: [facility]   1.  Do you have a history of Heart attack, stroke, stent, coronary bypass surgery, or other heart surgery? No   2.  Do you ever have any pain or discomfort in your chest? No   3.  Do you have a history of  Heart Failure? No   4.   Are you troubled by shortness of breath when:  walking on a level surface, or up a slight hill, or at night? UNKNOWN - when walking up a flight of stairs  However there is no chest pain and no significant dyspnea with usual activities. She is a nonsmoker and normal l;ipids and no hypertension or other risk factors for heart disease   5.  Do you currently have a cold, bronchitis or other respiratory infection? No   6.  Do you have a cough, shortness of breath, or wheezing? No   7.  Do you sometimes get pains in the calves of your legs when you walk? No   8. Do you or anyone in your family have previous history of blood clots? No   9.  Do you or does anyone in your family have a serious bleeding problem such as prolonged bleeding following surgeries or cuts? No   10. Have you ever had problems with anemia or been told to take iron pills? No   11. Have you had any abnormal blood loss such as black, tarry or bloody stools, or abnormal vaginal bleeding? No   12. Have you ever had a blood transfusion? No   13. Have you or any of your relatives ever had problems with anesthesia? No   14. Do you have sleep apnea, excessive snoring or daytime drowsiness? No   15. Do you have any prosthetic heart valves? No   16. Do you have prosthetic joints? No   17. Is there any chance that you may be  pregnant? No       Subjective:  Here to discuss pelvic pain.  She has it for the last half of each cycle for the past several years.  May be longer.  She is requesting laparoscopy because she wants to know if this could be endometriosis or scar tissue from her  sections.    Objective:  Blood pressure 98/66, pulse 80, temperature 97.7  F (36.5  C), temperature source Temporal, resp. rate 20, weight 82.4 kg (181 lb 11.2 oz), last menstrual period 10/05/2019, SpO2 97 %, not currently breastfeeding.    She asked me to do a pelvic exam to see if her bladder had dropped or some other reason for the pain. The pelvic exam done with my nurse Natacha present.  External genitalia look normal.  Vaginal vault is without discharge or bleeding.  The uterus is nontender to palpation in both adnexa are without masses or tenderness.    Assessment:  44-year-old female  2 para 2 who has had chronic pelvic pain in the second half of her cycle each month.  History of 2 prior  sections.  This could represent endometriosis.  She is requesting laparoscopy.    Plan: We have previously  discussed the option of laparoscopy.Laparoscopy was discussed with the patient in detail today. The purpose of the surgery is to look for possible explanations for her pelvic pain.  She might have adhesions from her prior  sections but I am primarily focused on the possibility of endometriosis.  Risks include but are not limited to bleeding, infection, injury to bowel and bladder and other organs. There is a chance that laparotomy will be needed if I suspect that an injury has occurred or if I see a problems such as a bleeding ovary or a situation that needs to be addressed urgently.She read over the consent form and signed it , witnessed by my nurse. I offered her a second opinion.     A total of 25 minutes were spent face-to-face with this patient (before the preop exam) during today's consultation, with all of that  of that time  devoted to conversation and counseling about the management decisions.    Note:  AFTER  The consenting was done, i proceeded with the preop exam, which added an additional 15 minutes to the exam time. See results below:    Preoperative History and Physical    Chief complaint/indicated for surgery/planned surgery:    Avis is planning to undergo laparoscopy by Dr Cruz.    Past Medical History:   Diagnosis Date     Crohn disease (H)      Depression     1-2 years ago     Fructose intolerance      High risk HPV infection 4/14/10    normal pap, + HR HPV (59). normal paps since     PONV (postoperative nausea and vomiting)     after colonoscopy     Current Outpatient Medications   Medication Sig Dispense Refill     Acetaminophen (TYLENOL PO) Take by mouth as needed Reported on 5/10/2017       DiphenhydrAMINE HCl (BENADRYL PO)        fluticasone (FLONASE) 50 MCG/ACT spray Spray 1-2 sprays into both nostrils daily 1 Bottle 1     loratadine (CLARITIN) 10 MG tablet Take 10 mg by mouth daily       Omega-3 Fatty Acids (OMEGA 3 PO)        sertraline (ZOLOFT) 50 MG tablet TAKE 1 TABLET BY MOUTH EVERY DAY (Patient taking differently: 25 mg ) 90 tablet 2     valACYclovir (VALTREX) 1000 mg tablet TAKE ONE TABLET BY MOUTH DAILY AS NEEDED 21 tablet 0     Ibuprofen (IBU-200 PO) Take 2-3 tablets by mouth as needed Reported on 5/10/2017       Naproxen Sodium (ALEVE PO) Take 220 mg by mouth daily as needed for moderate pain         There has been no recent use of OTC or herbal medications.   The patient denies any recent ASA or NSAID use.   The patient denies any recent steroid use within the last 6 months.   The patient denies any alcohol or drug use.     Allergies   Allergen Reactions     Fructose GI Disturbance     Ragweeds      Sorbitol Diarrhea     History   Smoking Status     Former Smoker   Smokeless Tobacco     Never Used     Comment: 2 1/2 year ago     Past Surgical History:   Procedure Laterality Date     C APPENDECTOMY            C/SECTION, LOW TRANSVERSE  02/17/10    , Low Transverse      SECTION  2012    Procedure:  SECTION;   SECTION ;  Surgeon: Yehuda Cruz MD;  [facility]     COLONOSCOPY  2011    Procedure:COMBINED COLONOSCOPY, SINGLE BIOPSY/POLYPECTOMY BY BIOPSY; Surgeon:MARTIN MAO; Location:[facility]     COLONOSCOPY  2011    Procedure:COMBINED COLONOSCOPY, SINGLE BIOPSY/POLYPECTOMY BY BIOPSY; Colonoscopy biopsy of distal iiluem, colonoscopy removal of colon polyp with fulguration, colonoscopy and control of biopsy bleed with placement of resolution clip; Surgeon:TESS POLLARD; Location:[facility]     COLONOSCOPY  2011    Procedure:COMBINED COLONOSCOPY, REMOVE TUMOR/POLYP/LESION BY FULGURATION/HOT BIOPSY; Surgeon:TESS POLLARD; Location:[facility]     COLONOSCOPY  2011    Procedure:COMBINED COLONOSCOPY, CONTROL BLEED; Surgeon:TESS POLLARD; Location:[facility]     COLONOSCOPY N/A 2017    Procedure: COMBINED COLONOSCOPY, SINGLE OR MULTIPLE BIOPSY/POLYPECTOMY BY BIOPSY;;  Surgeon: Josué Hawk MD;  Location:  GI     HC FLEX SIGMOIDOSCOPY W/WO MIKY SPEC BY BRUSH/WASH  09    [facility]     HC TOOTH EXTRACTION W/FORCEP       VIDEO CAPSULE ENDOSCOPY  09    [facility]     Social History     Socioeconomic History     Marital status:      Spouse name: Not on file     Number of children: Not on file     Years of education: Not on file     Highest education level: Not on file   Occupational History     Not on file   Social Needs     Financial resource strain: Not on file     Food insecurity:     Worry: Not on file     Inability: Not on file     Transportation needs:     Medical: Not on file     Non-medical: Not on file   Tobacco Use     Smoking status: Former Smoker     Smokeless tobacco: Never Used     Tobacco comment: 2 1/2 year ago   Substance and Sexual Activity     Alcohol use: Yes     Comment: Occ.     Drug use: No      Sexual activity: Yes     Partners: Male     Birth control/protection: Surgical     Comment: Spouse - Vasectomy   Lifestyle     Physical activity:     Days per week: Not on file     Minutes per session: Not on file     Stress: Not on file   Relationships     Social connections:     Talks on phone: Not on file     Gets together: Not on file     Attends Voodoo service: Not on file     Active member of club or organization: Not on file     Attends meetings of clubs or organizations: Not on file     Relationship status: Not on file     Intimate partner violence:     Fear of current or ex partner: Not on file     Emotionally abused: Not on file     Physically abused: Not on file     Forced sexual activity: Not on file   Other Topics Concern     Parent/sibling w/ CABG, MI or angioplasty before 65F 55M? No      Service No     Blood Transfusions No     Caffeine Concern Yes     Comment: occas. - advised not more than 16 ounces/day     Occupational Exposure No     Hobby Hazards No     Comment: Volleyball     Sleep Concern No     Stress Concern No     Weight Concern No     Special Diet Yes     Comment: GI sx.     Back Care Yes     Exercise Yes     Comment: Walking/[employer]     Bike Helmet No     Seat Belt Yes     Self-Exams Yes   Social History Narrative    Lives in [place] with , Edmar and their daughter, Alex.  No smokers in the home.  No concerns about domestic violence.  No indoor cats/kittens.     Family History   Problem Relation Age of Onset     Diabetes Mother         borderline     Depression Mother      Gastrointestinal Disease Mother         IBS     Thyroid Disease Mother         hypothyroidism     Diabetes Father         NIDDM - Type II     Diabetes Maternal Grandmother      Arthritis Maternal Grandfather      Heart Disease Paternal Grandfather      Thyroid Disease Sister         hypothyroidism     Rheumatoid Arthritis Sister      Other Cancer Sister      Asthma Other         niece      Obesity Sister         half-sister/had gastric bypass       There is no family history of anesthesia reactions or malignant hyperthermia or psevdocholinestergse problem or porphyria.    Review Of Systems  Skin: negative  Eyes: negative  Ears/Nose/Throat: negative  Respiratory: No shortness of breath(except as mentioned about when walking up multiple stairs), dyspnea on exertion, cough, or hemoptysis  Cardiovascular: negative  Gastrointestinal: negative  Genitourinary: negative  Musculoskeletal: negative  Neurologic: negative  Psychiatric: negative  Hematologic/Lymphatic/Immunologic: negative  Endocrine: negative    Physical Exam:BP 98/66   Pulse 80   Temp 97.7  F (36.5  C) (Temporal)   Resp 20   Wt 82.4 kg (181 lb 11.2 oz)   LMP 10/05/2019 (Exact Date)   SpO2 97%   Breastfeeding? No   BMI 31.68 kg/m      HEENT:    Sclerae and conjunctiva are normal.   Ear canals and TMs look normal.  Nasal mucosa is pink  - no polyps or masses seen.  Throat is unremarkable . Mucous membranes are moist.     Neck is supple, mobile, no adenopathy or masses palpable. The thyroid feels normal.   Normal range of motion noted.    Chest is clear to auscultation.  No wheezes, rales or rhonchi heard.  Cardiac exam is normal with s1, s2, no murmurs or adventitious sounds.Normal rate and rhythm is heard.     Abdomen is soft,  nondistended, No masses felt.No HSM. No guarding or rigidity or rebound   noted. Palpation reveals  no    tenderness   Normal bowel sounds heard.     Extremities are pink and there is no cyanosis and there is no edema. Pulses are physiologic.    Neurologic exam: Motor and sensory exams are grossly normal. Cranial nerves 2-12 are intact. Cerebellar exam is normal.       Other:  Labs: He will have a serum pregnancy test on the day before surgery    Assessment/Impression:  1.  Chronic pelvic pain- plans to have laparoscopy    2.Preoperative  Examination: The patient is at LOW    risk for general anesthesia for the  proposed surgery.          Recommendations:  Approval given for general anesthesia.    Medications are to be stopped before surgery. with the exception that    Discontinue ASA and NSAIDS 5 days prior to surgery to reduce bleeding risk.        MEGAN Cruz MD

## 2019-10-11 NOTE — TELEPHONE ENCOUNTER
Type of surgery: LAPAROSCOPY  Location of surgery: [facility] OR  Date and time of surgery: 10/21/2019  Surgeon: Yehuda Cruz MD  Assisting: N/A  Pre-Op Appt Date: 10/11/2019   Post-Op Appt Date: 10/31/2019 @ 10:30   Packet sent out: Yes  Arrival time reviewed with patient:Yes   Transportation reviewed: Yes   NPO reviewed: Yes   Medications reviewed: Yes   Consent obtained: Yes - 10/11/2019  -------------------------------------------------------------  Same day surgery informed: In providers blocked time  Special considerations: None    Re Frank

## 2019-10-20 ENCOUNTER — ANESTHESIA EVENT (OUTPATIENT)
Dept: SURGERY | Facility: CLINIC | Age: 44
End: 2019-10-20
Payer: COMMERCIAL

## 2019-10-20 DIAGNOSIS — Z01.812 PRE-OPERATIVE LABORATORY EXAMINATION: ICD-10-CM

## 2019-10-20 LAB — B-HCG SERPL-ACNC: <1 IU/L (ref 0–5)

## 2019-10-20 PROCEDURE — 84702 CHORIONIC GONADOTROPIN TEST: CPT | Performed by: OBSTETRICS & GYNECOLOGY

## 2019-10-20 PROCEDURE — 36415 COLL VENOUS BLD VENIPUNCTURE: CPT | Performed by: OBSTETRICS & GYNECOLOGY

## 2019-10-20 ASSESSMENT — LIFESTYLE VARIABLES: TOBACCO_USE: 1

## 2019-10-21 ENCOUNTER — SURGERY (OUTPATIENT)
Age: 44
End: 2019-10-21
Payer: COMMERCIAL

## 2019-10-21 ENCOUNTER — ANESTHESIA (OUTPATIENT)
Dept: SURGERY | Facility: CLINIC | Age: 44
End: 2019-10-21
Payer: COMMERCIAL

## 2019-10-21 ENCOUNTER — HOSPITAL ENCOUNTER (OUTPATIENT)
Facility: CLINIC | Age: 44
Discharge: HOME | End: 2019-10-21
Attending: OBSTETRICS & GYNECOLOGY | Admitting: OBSTETRICS & GYNECOLOGY
Payer: COMMERCIAL

## 2019-10-21 VITALS
HEART RATE: 69 BPM | SYSTOLIC BLOOD PRESSURE: 115 MMHG | HEIGHT: 63 IN | BODY MASS INDEX: 32.07 KG/M2 | TEMPERATURE: 97.5 F | OXYGEN SATURATION: 94 % | WEIGHT: 181 LBS | DIASTOLIC BLOOD PRESSURE: 75 MMHG | RESPIRATION RATE: 15 BRPM

## 2019-10-21 DIAGNOSIS — R10.2 PELVIC PAIN IN FEMALE: ICD-10-CM

## 2019-10-21 DIAGNOSIS — Z98.890 S/P LAPAROSCOPIC PROCEDURE: Primary | ICD-10-CM

## 2019-10-21 PROCEDURE — 27210794 ZZH OR GENERAL SUPPLY STERILE: Performed by: OBSTETRICS & GYNECOLOGY

## 2019-10-21 PROCEDURE — 58662 LAPAROSCOPY EXCISE LESIONS: CPT | Mod: 22 | Performed by: OBSTETRICS & GYNECOLOGY

## 2019-10-21 PROCEDURE — 25000566 ZZH SEVOFLURANE, EA 15 MIN: Performed by: OBSTETRICS & GYNECOLOGY

## 2019-10-21 PROCEDURE — 25000125 ZZHC RX 250: Performed by: NURSE ANESTHETIST, CERTIFIED REGISTERED

## 2019-10-21 PROCEDURE — 37000008 ZZH ANESTHESIA TECHNICAL FEE, 1ST 30 MIN: Performed by: OBSTETRICS & GYNECOLOGY

## 2019-10-21 PROCEDURE — 25800030 ZZH RX IP 258 OP 636: Performed by: NURSE ANESTHETIST, CERTIFIED REGISTERED

## 2019-10-21 PROCEDURE — 36000058 ZZH SURGERY LEVEL 3 EA 15 ADDTL MIN: Performed by: OBSTETRICS & GYNECOLOGY

## 2019-10-21 PROCEDURE — 25000128 H RX IP 250 OP 636: Performed by: NURSE ANESTHETIST, CERTIFIED REGISTERED

## 2019-10-21 PROCEDURE — 25000132 ZZH RX MED GY IP 250 OP 250 PS 637: Performed by: NURSE ANESTHETIST, CERTIFIED REGISTERED

## 2019-10-21 PROCEDURE — 25000125 ZZHC RX 250: Performed by: OBSTETRICS & GYNECOLOGY

## 2019-10-21 PROCEDURE — 71000014 ZZH RECOVERY PHASE 1 LEVEL 2 FIRST HR: Performed by: OBSTETRICS & GYNECOLOGY

## 2019-10-21 PROCEDURE — 36000056 ZZH SURGERY LEVEL 3 1ST 30 MIN: Performed by: OBSTETRICS & GYNECOLOGY

## 2019-10-21 PROCEDURE — 88305 TISSUE EXAM BY PATHOLOGIST: CPT | Mod: 26 | Performed by: OBSTETRICS & GYNECOLOGY

## 2019-10-21 PROCEDURE — 88305 TISSUE EXAM BY PATHOLOGIST: CPT | Performed by: OBSTETRICS & GYNECOLOGY

## 2019-10-21 PROCEDURE — 40000306 ZZH STATISTIC PRE PROC ASSESS II: Performed by: OBSTETRICS & GYNECOLOGY

## 2019-10-21 PROCEDURE — 37000009 ZZH ANESTHESIA TECHNICAL FEE, EACH ADDTL 15 MIN: Performed by: OBSTETRICS & GYNECOLOGY

## 2019-10-21 PROCEDURE — 71000027 ZZH RECOVERY PHASE 2 EACH 15 MINS: Performed by: OBSTETRICS & GYNECOLOGY

## 2019-10-21 RX ORDER — FENTANYL CITRATE 50 UG/ML
25-50 INJECTION, SOLUTION INTRAMUSCULAR; INTRAVENOUS
Status: DISCONTINUED | OUTPATIENT
Start: 2019-10-21 | End: 2019-10-21 | Stop reason: HOSPADM

## 2019-10-21 RX ORDER — MEPERIDINE HYDROCHLORIDE 25 MG/ML
12.5 INJECTION INTRAMUSCULAR; INTRAVENOUS; SUBCUTANEOUS
Status: DISCONTINUED | OUTPATIENT
Start: 2019-10-21 | End: 2019-10-21 | Stop reason: HOSPADM

## 2019-10-21 RX ORDER — DEXAMETHASONE SODIUM PHOSPHATE 10 MG/ML
INJECTION, SOLUTION INTRAMUSCULAR; INTRAVENOUS PRN
Status: DISCONTINUED | OUTPATIENT
Start: 2019-10-21 | End: 2019-10-21

## 2019-10-21 RX ORDER — CEFAZOLIN SODIUM 1 G/3ML
INJECTION, POWDER, FOR SOLUTION INTRAMUSCULAR; INTRAVENOUS PRN
Status: DISCONTINUED | OUTPATIENT
Start: 2019-10-21 | End: 2019-10-21

## 2019-10-21 RX ORDER — SCOLOPAMINE TRANSDERMAL SYSTEM 1 MG/1
1 PATCH, EXTENDED RELEASE TRANSDERMAL ONCE
Status: COMPLETED | OUTPATIENT
Start: 2019-10-21 | End: 2019-10-21

## 2019-10-21 RX ORDER — SODIUM CHLORIDE, SODIUM LACTATE, POTASSIUM CHLORIDE, CALCIUM CHLORIDE 600; 310; 30; 20 MG/100ML; MG/100ML; MG/100ML; MG/100ML
INJECTION, SOLUTION INTRAVENOUS CONTINUOUS
Status: DISCONTINUED | OUTPATIENT
Start: 2019-10-21 | End: 2019-10-21 | Stop reason: HOSPADM

## 2019-10-21 RX ORDER — OXYCODONE AND ACETAMINOPHEN 5; 325 MG/1; MG/1
1-2 TABLET ORAL EVERY 6 HOURS PRN
Qty: 20 TABLET | Refills: 0 | Status: SHIPPED | OUTPATIENT
Start: 2019-10-21 | End: 2019-10-30

## 2019-10-21 RX ORDER — NALOXONE HYDROCHLORIDE 0.4 MG/ML
.1-.4 INJECTION, SOLUTION INTRAMUSCULAR; INTRAVENOUS; SUBCUTANEOUS
Status: DISCONTINUED | OUTPATIENT
Start: 2019-10-21 | End: 2019-10-21 | Stop reason: HOSPADM

## 2019-10-21 RX ORDER — OXYCODONE HYDROCHLORIDE 5 MG/1
5-10 TABLET ORAL EVERY 6 HOURS PRN
Status: DISCONTINUED | OUTPATIENT
Start: 2019-10-21 | End: 2019-10-21 | Stop reason: HOSPADM

## 2019-10-21 RX ORDER — IBUPROFEN 600 MG/1
600 TABLET, FILM COATED ORAL EVERY 6 HOURS PRN
Qty: 30 TABLET | Refills: 0 | Status: SHIPPED | OUTPATIENT
Start: 2019-10-21 | End: 2020-11-24

## 2019-10-21 RX ORDER — DIMENHYDRINATE 50 MG/ML
25 INJECTION, SOLUTION INTRAMUSCULAR; INTRAVENOUS
Status: DISCONTINUED | OUTPATIENT
Start: 2019-10-21 | End: 2019-10-21 | Stop reason: HOSPADM

## 2019-10-21 RX ORDER — PROPOFOL 10 MG/ML
INJECTION, EMULSION INTRAVENOUS CONTINUOUS PRN
Status: DISCONTINUED | OUTPATIENT
Start: 2019-10-21 | End: 2019-10-21

## 2019-10-21 RX ORDER — ONDANSETRON 2 MG/ML
4 INJECTION INTRAMUSCULAR; INTRAVENOUS EVERY 30 MIN PRN
Status: DISCONTINUED | OUTPATIENT
Start: 2019-10-21 | End: 2019-10-21 | Stop reason: HOSPADM

## 2019-10-21 RX ORDER — ONDANSETRON 2 MG/ML
INJECTION INTRAMUSCULAR; INTRAVENOUS PRN
Status: DISCONTINUED | OUTPATIENT
Start: 2019-10-21 | End: 2019-10-21

## 2019-10-21 RX ORDER — LIDOCAINE 40 MG/G
CREAM TOPICAL
Status: DISCONTINUED | OUTPATIENT
Start: 2019-10-21 | End: 2019-10-21 | Stop reason: HOSPADM

## 2019-10-21 RX ORDER — HYDROMORPHONE HYDROCHLORIDE 1 MG/ML
.3-.5 INJECTION, SOLUTION INTRAMUSCULAR; INTRAVENOUS; SUBCUTANEOUS EVERY 10 MIN PRN
Status: DISCONTINUED | OUTPATIENT
Start: 2019-10-21 | End: 2019-10-21 | Stop reason: HOSPADM

## 2019-10-21 RX ORDER — ONDANSETRON 4 MG/1
4 TABLET, ORALLY DISINTEGRATING ORAL EVERY 30 MIN PRN
Status: DISCONTINUED | OUTPATIENT
Start: 2019-10-21 | End: 2019-10-21 | Stop reason: HOSPADM

## 2019-10-21 RX ORDER — BUPIVACAINE HYDROCHLORIDE AND EPINEPHRINE 2.5; 5 MG/ML; UG/ML
INJECTION, SOLUTION INFILTRATION; PERINEURAL PRN
Status: DISCONTINUED | OUTPATIENT
Start: 2019-10-21 | End: 2019-10-21 | Stop reason: HOSPADM

## 2019-10-21 RX ORDER — ACETAMINOPHEN 325 MG/1
975 TABLET ORAL ONCE
Status: COMPLETED | OUTPATIENT
Start: 2019-10-21 | End: 2019-10-21

## 2019-10-21 RX ORDER — PROPOFOL 10 MG/ML
INJECTION, EMULSION INTRAVENOUS PRN
Status: DISCONTINUED | OUTPATIENT
Start: 2019-10-21 | End: 2019-10-21

## 2019-10-21 RX ORDER — GLYCOPYRROLATE 0.2 MG/ML
INJECTION, SOLUTION INTRAMUSCULAR; INTRAVENOUS PRN
Status: DISCONTINUED | OUTPATIENT
Start: 2019-10-21 | End: 2019-10-21

## 2019-10-21 RX ORDER — GABAPENTIN 300 MG/1
300 CAPSULE ORAL ONCE
Status: COMPLETED | OUTPATIENT
Start: 2019-10-21 | End: 2019-10-21

## 2019-10-21 RX ORDER — FENTANYL CITRATE 50 UG/ML
INJECTION, SOLUTION INTRAMUSCULAR; INTRAVENOUS PRN
Status: DISCONTINUED | OUTPATIENT
Start: 2019-10-21 | End: 2019-10-21

## 2019-10-21 RX ORDER — AMOXICILLIN 250 MG
1-2 CAPSULE ORAL DAILY PRN
Qty: 30 TABLET | Refills: 3 | Status: SHIPPED | OUTPATIENT
Start: 2019-10-21 | End: 2020-11-24

## 2019-10-21 RX ADMIN — FENTANYL CITRATE 50 MCG: 50 INJECTION, SOLUTION INTRAMUSCULAR; INTRAVENOUS at 07:56

## 2019-10-21 RX ADMIN — HYDROMORPHONE HYDROCHLORIDE 0.5 MG: 1 INJECTION, SOLUTION INTRAMUSCULAR; INTRAVENOUS; SUBCUTANEOUS at 09:46

## 2019-10-21 RX ADMIN — FENTANYL CITRATE 50 MCG: 50 INJECTION, SOLUTION INTRAMUSCULAR; INTRAVENOUS at 07:24

## 2019-10-21 RX ADMIN — OXYCODONE HYDROCHLORIDE 5 MG: 5 TABLET ORAL at 10:02

## 2019-10-21 RX ADMIN — GABAPENTIN 300 MG: 300 CAPSULE ORAL at 06:37

## 2019-10-21 RX ADMIN — ROCURONIUM BROMIDE 35 MG: 10 INJECTION INTRAVENOUS at 07:31

## 2019-10-21 RX ADMIN — SODIUM CHLORIDE, POTASSIUM CHLORIDE, SODIUM LACTATE AND CALCIUM CHLORIDE: 600; 310; 30; 20 INJECTION, SOLUTION INTRAVENOUS at 08:22

## 2019-10-21 RX ADMIN — SODIUM CHLORIDE, POTASSIUM CHLORIDE, SODIUM LACTATE AND CALCIUM CHLORIDE: 600; 310; 30; 20 INJECTION, SOLUTION INTRAVENOUS at 09:56

## 2019-10-21 RX ADMIN — PROPOFOL 150 MCG/KG/MIN: 10 INJECTION, EMULSION INTRAVENOUS at 07:33

## 2019-10-21 RX ADMIN — BUPIVACAINE HYDROCHLORIDE AND EPINEPHRINE BITARTRATE 18 ML: 2.5; .005 INJECTION, SOLUTION INFILTRATION; PERINEURAL at 08:55

## 2019-10-21 RX ADMIN — ONDANSETRON 4 MG: 2 INJECTION INTRAMUSCULAR; INTRAVENOUS at 08:49

## 2019-10-21 RX ADMIN — PROPOFOL 180 MG: 10 INJECTION, EMULSION INTRAVENOUS at 07:30

## 2019-10-21 RX ADMIN — FENTANYL CITRATE 50 MCG: 50 INJECTION INTRAMUSCULAR; INTRAVENOUS at 09:58

## 2019-10-21 RX ADMIN — SODIUM CHLORIDE, POTASSIUM CHLORIDE, SODIUM LACTATE AND CALCIUM CHLORIDE: 600; 310; 30; 20 INJECTION, SOLUTION INTRAVENOUS at 07:13

## 2019-10-21 RX ADMIN — LIDOCAINE HYDROCHLORIDE 1 ML: 10 INJECTION, SOLUTION EPIDURAL; INFILTRATION; INTRACAUDAL; PERINEURAL at 07:12

## 2019-10-21 RX ADMIN — HYDROMORPHONE HYDROCHLORIDE 0.5 MG: 1 INJECTION, SOLUTION INTRAMUSCULAR; INTRAVENOUS; SUBCUTANEOUS at 08:43

## 2019-10-21 RX ADMIN — GLYCOPYRROLATE 0.2 MG: 0.2 INJECTION, SOLUTION INTRAMUSCULAR; INTRAVENOUS at 07:40

## 2019-10-21 RX ADMIN — DEXAMETHASONE SODIUM PHOSPHATE 10 MG: 10 INJECTION, SOLUTION INTRAMUSCULAR; INTRAVENOUS at 07:40

## 2019-10-21 RX ADMIN — CEFAZOLIN 2 G: 1 INJECTION, POWDER, FOR SOLUTION INTRAMUSCULAR; INTRAVENOUS at 08:28

## 2019-10-21 RX ADMIN — MIDAZOLAM 2 MG: 1 INJECTION INTRAMUSCULAR; INTRAVENOUS at 07:21

## 2019-10-21 RX ADMIN — ACETAMINOPHEN 975 MG: 325 TABLET, FILM COATED ORAL at 06:37

## 2019-10-21 RX ADMIN — SCOPALAMINE 1 PATCH: 1 PATCH, EXTENDED RELEASE TRANSDERMAL at 06:37

## 2019-10-21 ASSESSMENT — MIFFLIN-ST. JEOR: SCORE: 1445.14

## 2019-10-21 NOTE — ANESTHESIA PREPROCEDURE EVALUATION
Anesthesia Pre-Procedure Evaluation    Patient: Avis Avalos   MRN: [id number] : 1975          Preoperative Diagnosis: Pelvic pain in female [R10.2]    Procedure(s):  LAPAROSCOPY    Past Medical History:   Diagnosis Date     Crohn disease (H)      Depression     1-2 years ago     Fructose intolerance      High risk HPV infection 4/14/10    normal pap, + HR HPV (59). normal paps since     PONV (postoperative nausea and vomiting)     after colonoscopy     Past Surgical History:   Procedure Laterality Date     C APPENDECTOMY           C/SECTION, LOW TRANSVERSE  02/17/10    , Low Transverse      SECTION  2012    Procedure:  SECTION;   SECTION ;  Surgeon: Yehuda Cruz MD;  [facility]     COLONOSCOPY  2011    Procedure:COMBINED COLONOSCOPY, SINGLE BIOPSY/POLYPECTOMY BY BIOPSY; Surgeon:MARTIN MAO; Location: [facility]     COLONOSCOPY  2011    Procedure:COMBINED COLONOSCOPY, SINGLE BIOPSY/POLYPECTOMY BY BIOPSY; Colonoscopy biopsy of distal iiluem, colonoscopy removal of colon polyp with fulguration, colonoscopy and control of biopsy bleed with placement of resolution clip; Surgeon:TESS POLLARD; Location: GI     COLONOSCOPY  2011    Procedure:COMBINED COLONOSCOPY, REMOVE TUMOR/POLYP/LESION BY FULGURATION/HOT BIOPSY; Surgeon:TESS POLLARD; Location:[facility]     COLONOSCOPY  2011    Procedure:COMBINED COLONOSCOPY, CONTROL BLEED; Surgeon:TESS POLLARD; Location: GI     COLONOSCOPY N/A 2017    Procedure: COMBINED COLONOSCOPY, SINGLE OR MULTIPLE BIOPSY/POLYPECTOMY BY BIOPSY;;  Surgeon: Josué Hawk MD;  Location:  GI     HC FLEX SIGMOIDOSCOPY W/WO MIKY SPEC BY BRUSH/WASH  09    [facility]     HC TOOTH EXTRACTION W/FORCEP       VIDEO CAPSULE ENDOSCOPY  09    [facility]       Anesthesia Evaluation     . Pt has had prior anesthetic. Type: General and MAC    History of anesthetic complications   -  PONV        ROS/MED HX    ENT/Pulmonary:     (+)tobacco use, Past use , . .    Neurologic:  - neg neurologic ROS     Cardiovascular:  - neg cardiovascular ROS   (+) ----. : . . . :. . No previous cardiac testing       METS/Exercise Tolerance:     Hematologic:  - neg hematologic  ROS       Musculoskeletal:   (+) arthritis,  other musculoskeletal- chronic knee pain      GI/Hepatic:     (+) Other GI/Hepatic chrons disease /constipation      Renal/Genitourinary:  - ROS Renal section negative   (+) Pt has no history of transplant,       Endo:  - neg endo ROS       Psychiatric:     (+) psychiatric history anxiety, depression and other (comment) (OCD)      Infectious Disease:   (+) Other Infectious Disease herpes simplex type @      Malignancy:      - no malignancy   Other:    (+) No chance of pregnancy C-spine cleared: N/A, H/O Chronic Pain,                        Physical Exam  Normal systems: cardiovascular, pulmonary and dental    Airway   Mallampati: II  TM distance: >3 FB  Neck ROM: full    Dental     Cardiovascular   Rhythm and rate: regular and normal      Pulmonary             Lab Results   Component Value Date    WBC 5.0 06/02/2017    HGB 13.7 06/02/2017    HCT 38.4 06/02/2017     06/02/2017    CRP <5.0 08/10/2011     07/06/2018    POTASSIUM 4.4 07/06/2018    CHLORIDE 107 07/06/2018    CO2 28 07/06/2018    BUN 12 07/06/2018    CR 0.69 07/06/2018    GLC 82 07/06/2018    THI 8.1 (L) 07/06/2018    ALBUMIN 3.7 06/16/2017    PROTTOTAL 6.9 06/16/2017    ALT 25 06/16/2017    AST 18 06/16/2017    ALKPHOS 54 06/16/2017    BILITOTAL 0.2 06/16/2017    TSH 1.48 09/26/2019    HCG Negative 06/19/2017       Preop Vitals  BP Readings from Last 3 Encounters:   10/11/19 98/66   09/26/19 118/80   05/28/19 108/70    Pulse Readings from Last 3 Encounters:   10/11/19 80   09/26/19 72   05/28/19 81      Resp Readings from Last 3 Encounters:   10/11/19 20   09/26/19 14   05/28/19 16    SpO2 Readings from Last 3 Encounters:  "  10/11/19 97%   09/26/19 98%   05/28/19 98%      Temp Readings from Last 1 Encounters:   10/11/19 97.7  F (36.5  C) (Temporal)    Ht Readings from Last 1 Encounters:   05/28/19 1.613 m (5' 3.5\")      Wt Readings from Last 1 Encounters:   10/11/19 82.4 kg (181 lb 11.2 oz)    Estimated body mass index is 31.68 kg/m  as calculated from the following:    Height as of 5/28/19: 1.613 m (5' 3.5\").    Weight as of 10/11/19: 82.4 kg (181 lb 11.2 oz).       Anesthesia Plan      History & Physical Review  History and physical reviewed and following examination; no interval change.    ASA Status:  2 .    NPO Status:  > 8 hours    Plan for General and ETT with Intravenous and Propofol induction. Maintenance will be TIVA.    PONV prophylaxis:  Ondansetron (or other 5HT-3), Dexamethasone or Solumedrol and Scopolamine patch       Postoperative Care  Postoperative pain management:  IV analgesics and Oral pain medications.      Consents                 Moe Razo, APRN CRNA  "

## 2019-10-21 NOTE — OP NOTE
Procedure Date: 10/21/2019      PREOPERATIVE DIAGNOSES:     1.  Pelvic and abdominal pain.   2.  Right ovarian cyst.      POSTOPERATIVE DIAGNOSES:   1.  Extensive pelvic and abdominal adhesions.   2.  Right ovarian cyst.      PROCEDURES:   1.  Laparoscopic extensive lysis of adhesions.   2.  Right ovarian cystectomy.      ANESTHESIA:  General endotracheal anesthesia.      SURGEON: Yehuda Cruz MD        PATIENT PROFILE:  The patient is a 44-year-old female,  2, para 2, who has had 2 prior  sections and an appendectomy, who presented with chronic pelvic and lower abdominal pain.      OPERATIVE FINDINGS:  There were extensive adhesions of omentum to the anterior abdominal wall just below the level of the umbilicus and all the way down into the pelvis.  There were also adhesions of the uterus to the left anterior abdominal wall and adhesions of her bladder on the lower uterine segment.  There was also a right ovarian cyst approximately 2-3 cm in size.  I did remove the cyst wall and sent it for pathology.  At one point, I did ask Dr. Mccollum's opinion about the adhesions and she agreed with me that the omentum was free of any bowel and I carefully inspected it from quadrants with 4 separate ports to verify that the bowel was way below the attachments of the omentum to the anterior abdominal wall before I freed it up with the LigaSure device.  This adhesiolysis did take extended time because the adhesions were so dense.  See operative description for other details.      OPERATIVE DESCRIPTION:  After the establishment of satisfactory general endotracheal anesthesia, the patient was prepped and draped in the usual fashion for laparoscopy and the bladder was drained of urine.  She was placed in the Jose stirrups.  A 5 mm incision was made just above the umbilicus and the Veress needle was inserted and the abdomen was insufflated with CO2 gas.  I then inserted a 5 mm bladed trocar and then I introduced  two more 5 mm bladed trocars on the right and left lateral abdomen.  I then noted an extensive mass of omental adhesion to the anterior abdominal wall.  I decided to place a fourth 5 mm incision in the lower pelvis in the midline so that I could inspect the omental adhesion from all 4 sides.  Once I had the fourth port placed, I was able to verify that the bowel was well below the omentum where it was adhesed to the anterior abdominal wall and so I began using the LigaSure sequentially to free up the adhesions at the abdominal wall interface.  Once these were all freed up,  I did note that there were adhesions in the pelvis.  The left side of the uterus was adhesed to the anterior abdominal wall, but away from the bladder.  I freed up these adhesions.  I then noted that the right ovary had a cyst approximately 2-3 cm and it was filled with clear fluid.  I poked a hole in the cyst and the fluid drained before I could aspirate it, but I did cut off the cyst wall capsule with the LigaSure device and sent it for pathology.  I then inspected in the cyst and it was not bleeding.  The entire ovary was hemostatic and looked quite viable and so I preserved it as was our plan.  Otherwise, the pelvis looked normal.  There was no endometriosis.  No other significant adhesions in the cul-de-sac posteriorly was free, but anteriorly the bladder was adherent to the lower uterine segment.  At that point, I inspected the omentum one more time and it was hemostatic.  The anterior abdominal wall was hemostatic.  At that point, I expressed all the CO2 gas from the abdomen and removed all the trocars and repaired each incision site with 4-0 Vicryl subcuticular stitch.  I then injected 0.25% Marcaine with dilute epinephrine, a total of 18 mL was used on all 4 incisions.  The estimated blood loss was 5 mL.  She received 1800 mL of crystalloid during the surgery.  The final sponge, needle and instrument counts were reported to me as correct  and she was taken to the recovery room in good condition without complications.         KAREN DUONG MD             D: 10/21/2019   T: 10/21/2019   MT: LINDY      Name:     RANDY MCBRIDE   MRN:      [id number]        Account:        [id number]   :      1975           Procedure Date: 10/21/2019      Document: [id number]

## 2019-10-21 NOTE — ANESTHESIA POSTPROCEDURE EVALUATION
Patient: Avis Avalos    Procedure(s):  LYSIS, ADHESIONS, LAPAROSCOPIC  Laparoscopic cystectomy right ovarian (benign)    Diagnosis:Pelvic pain in female [R10.2]  Diagnosis Additional Information: No value filed.    Anesthesia Type:  General, ETT    Note:  Anesthesia Post Evaluation    Patient location during evaluation: Phase 2 and Bedside  Patient participation: Able to fully participate in evaluation  Level of consciousness: awake  Pain management: adequate  Airway patency: patent  Cardiovascular status: acceptable and hemodynamically stable  Respiratory status: acceptable, room air and nonlabored ventilation  Hydration status: stable  PONV: none     Anesthetic complications: None    Comments: Patient was happy with the anesthesia care received and no anesthesia related complications were noted.  I will follow up with the patient again if it is needed.        Last vitals:  Vitals:    10/21/19 1100 10/21/19 1115 10/21/19 1130   BP: 105/69 111/87 115/75   Pulse: 64 83 69   Resp: 15     Temp:      SpO2: 94%           Electronically Signed By: JEM Marshall CRNA  October 21, 2019  1:04 PM

## 2019-10-21 NOTE — ANESTHESIA CARE TRANSFER NOTE
Patient: Avis Avalos    Procedure(s):  LYSIS, ADHESIONS, LAPAROSCOPIC  Laparoscopic cystectomy right ovarian (benign)    Diagnosis: Pelvic pain in female [R10.2]  Diagnosis Additional Information: No value filed.    Anesthesia Type:   General, ETT     Note:  Airway :Nasal Cannula and Oral Airway  Patient transferred to:PACU  Handoff Report: Identifed the Patient, Identified the Reponsible Provider, Reviewed the pertinent medical history, Discussed the surgical course, Reviewed Intra-OP anesthesia mangement and issues during anesthesia, Set expectations for post-procedure period and Allowed opportunity for questions and acknowledgement of understanding      Vitals: (Last set prior to Anesthesia Care Transfer)    CRNA VITALS  10/21/2019 0845 - 10/21/2019 0920      10/21/2019             Pulse:  70    SpO2:  97 %                Electronically Signed By: JEM Marshall CRNA  October 21, 2019  9:20 AM

## 2019-10-21 NOTE — DISCHARGE INSTRUCTIONS
Discharge instructions for Dr. Cruz:         You will need to schedule a post operative appointment  Within the next week.. Please call [phone] or [phone]  to speak to Dr Cruz's nurse  or else call the main appointments line at [phone] if she is not available.         If you have unusually heavy vaginal bleeding, severe nausea with vomiting, or increased pain, or fever, call us at that same number to be seen earlier, or go to the emergency room if after hours or we are unavailable.          you should avoid intercourse for 1   week   after surgery.           you should not drive for 1 day   after surgery  .         you should limit lifting to 20 pounds for 4  weeks   after surgery.           you should not shower today but may resume showering tomorrow after the effects of anesthesia wear off.  Do not take a bath for 1  week   after surgery.          If you have questions do not hesitate to call the office at above number. Thank you.         Yehuda Cruz MD, FACOG, FAAFP              , OB/GYN  Department.    DISCHARGE INSTRUCTIONS FOR PATIENT   SCOPOLAMINE TRANSDERMAL PATCH  You may leave the patch on behind your ear for three days, but NO LONGER. You may have withdrawal symptoms (nausea, vomiting, headache, dizziness) if used longer.  When you remove the patch, you may wash and dry your hands thoroughly and before touching your eyes, as pupil may dilate.  Discard patch (away from children and pets).  You may develop some urinary hesitancy or urine retention.  [facility]   Adult Discharge Orders & Instructions     For 24 hours after surgery    1. Get plenty of rest.  A responsible adult must stay with you for at least 24 hours after you leave the hospital.   2. Do not drive or use heavy equipment.  If you have weakness or tingling, don't drive or use heavy equipment until this feeling goes away.  3. Do not drink alcohol.  4. Avoid strenuous or risky activities.   Ask for help when climbing stairs.   5. You may feel lightheaded.  IF so, sit for a few minutes before standing.  Have someone help you get up.   6. If you have nausea (feel sick to your stomach): Drink only clear liquids such as apple juice, ginger ale, broth or 7-Up.  Rest may also help.  Be sure to drink enough fluids.  Move to a regular diet as you feel able.  7. You may have a slight fever. Call the doctor if your fever is over 100 F (37.7 C) (taken under the tongue) or lasts longer than 24 hours.  8. You may have a dry mouth, a sore throat, muscle aches or trouble sleeping.  These should go away after 24 hours.  9. Do not make important or legal decisions.   Call your doctor for any of the followin.  Signs of infection (fever, growing tenderness at the surgery site, a large amount of drainage or bleeding, severe pain, foul-smelling drainage, redness, swelling).    2. It has been over 8 to 10 hours since surgery and you are still not able to urinate (pass water).

## 2019-10-21 NOTE — OP NOTE
Brief Operative Note:  Preoperative Diagnosis: pelvic/abdominal pain    Postoperative Diagnosis: 1Extensive pelvic and abdominal adhesions  2. Right ovarian cyst    Procedure: 1. Laparoscopic extensive lysis of adhesions  2. Right ovarian cystectomy    Anesthesia: General endotracheal anesthesia (GETA)        Surgeon: Yehuda Cruz MD    Findings: see dictation      Estimated blood loss:  5cc    Fluids: 1800 cc crystalloid      Complications: none      See complete operative note-dictated separately.  MEGAN Cruz MD

## 2019-10-24 LAB — COPATH REPORT: NORMAL

## 2019-10-29 ENCOUNTER — NURSE TRIAGE (OUTPATIENT)
Dept: FAMILY MEDICINE | Facility: CLINIC | Age: 44
End: 2019-10-29

## 2019-10-29 NOTE — TELEPHONE ENCOUNTER
Reason for Call:  Other     Detailed comments: Avis calls to ask Dr Curz what she can do for her incision sites.  Avis states they are VERY itchy and red.  Also wondering if these symptoms are normal.    Phone Number Patient can be reached at: Home number on file [phone] (home)    Best Time: any    Can we leave a detailed message on this number? YES    Call taken on 10/29/2019 at 12:18 PM by Jami Diaz

## 2019-10-29 NOTE — TELEPHONE ENCOUNTER
"  Reason for Disposition    [1] Small red area or streak AND [2] no fever    Answer Assessment - Initial Assessment Questions  1. SYMPTOM: \"What's the main symptom you're concerned about?\" (e.g., redness, pain, drainage)      Red around 2 2 of incision sites warm, itching  2. ONSET: \"When did itching  start?\"      Right after surgery  3. SURGERY: \"What surgery was performed?\"      Removal of cyst  4. DATE of SURGERY: \"When was surgery performed?\"       10/21/2019  5. INCISION SITE: \"Where is the incision located?\"       abdomen  6. REDNESS: \"Is there any redness at the incision site?\" If yes, ask: \"How wide across is the redness?\" (Inches, centimeters)       1 inch  7. PAIN: \"Is there any pain?\" If so, ask: \"How bad is it?\"  (Scale 1-10; or mild, moderate, severe)      Some not much from surgery  8. BLEEDING: \"Is there any bleeding?\" If so, ask: \"How much?\" and \"Where?\"        9. DRAINAGE: \"Is there any drainage from the incision site?\" If yes, ask: \"What color and how much?\" (e.g., red, cloudy, pus; drops, teaspoon)      none  10. FEVER: \"Do you have a fever?\" If so, ask: \"What is your temperature, how was it measured, and when did it start?\" no fever has not checked        *No Answer*  11. OTHER SYMPTOMS: \"Do you have any other symptoms?\" (e.g., shaking chills, weakness, rash elsewhere on body)    Protocols used: POST-OP INCISION SYMPTOMS-A-AH    "

## 2019-10-29 NOTE — TELEPHONE ENCOUNTER
Patient reports redness about an inch around 2 of her incisions with itching that started right after surgery.  She states they are warm to touch, no discharge and are intact.  She has some irritation on an area that had tape on earlier. She has been using neosporin on the area.  Has an appointment with Dr. Forrester on Thursday 10/31/2019.    Forwarded to provider for review.  Deann Ocasio RN BSN

## 2019-10-30 ENCOUNTER — OFFICE VISIT (OUTPATIENT)
Dept: FAMILY MEDICINE | Facility: CLINIC | Age: 44
End: 2019-10-30
Payer: COMMERCIAL

## 2019-10-30 VITALS
HEART RATE: 107 BPM | BODY MASS INDEX: 32.1 KG/M2 | SYSTOLIC BLOOD PRESSURE: 108 MMHG | DIASTOLIC BLOOD PRESSURE: 70 MMHG | OXYGEN SATURATION: 96 % | WEIGHT: 181.2 LBS | RESPIRATION RATE: 16 BRPM | TEMPERATURE: 97.8 F

## 2019-10-30 DIAGNOSIS — Z09 POSTOPERATIVE EXAMINATION: Primary | ICD-10-CM

## 2019-10-30 DIAGNOSIS — T78.40XA ALLERGIC REACTION, INITIAL ENCOUNTER: ICD-10-CM

## 2019-10-30 PROCEDURE — 99024 POSTOP FOLLOW-UP VISIT: CPT | Performed by: OBSTETRICS & GYNECOLOGY

## 2019-10-30 RX ORDER — PREDNISONE 20 MG/1
60 TABLET ORAL DAILY
Qty: 15 TABLET | Refills: 0 | Status: SHIPPED | OUTPATIENT
Start: 2019-10-30 | End: 2019-11-21

## 2019-10-30 ASSESSMENT — PAIN SCALES - GENERAL: PAINLEVEL: MILD PAIN (3)

## 2019-10-30 NOTE — PROGRESS NOTES
Subjective: She had a laparoscopy a few days ago and has developed some red spots over her incisions.  She did have glue on these areas.  No fever.  They are very itchy.  She has been using some Benadryl.    She is having normal bowel movements other concerns.  Eating okay.  Taking fluids.      The past medical history, social history, past surgical history and family history as shown below have been reviewed by me today.    Past Medical History:   Diagnosis Date     Crohn disease (H)      Depression     1-2 years ago     Fructose intolerance      High risk HPV infection 4/14/10    normal pap, + HR HPV (59). normal paps since     PONV (postoperative nausea and vomiting)     after colonoscopy        Allergies   Allergen Reactions     Fructose GI Disturbance     Ragweeds      Sorbitol Diarrhea     Current Outpatient Medications   Medication Sig Dispense Refill     DiphenhydrAMINE HCl (BENADRYL PO)        fluticasone (FLONASE) 50 MCG/ACT spray Spray 1-2 sprays into both nostrils daily 1 Bottle 1     ibuprofen (ADVIL/MOTRIN) 600 MG tablet Take 1 tablet (600 mg) by mouth every 6 hours as needed for other (mild and/or inflammatory pain) 30 tablet 0     loratadine (CLARITIN) 10 MG tablet Take 10 mg by mouth daily       Omega-3 Fatty Acids (OMEGA 3 PO)        predniSONE (DELTASONE) 20 MG tablet Take 3 tablets (60 mg) by mouth daily 15 tablet 0     senna-docusate (SENOKOT-S/PERICOLACE) 8.6-50 MG tablet Take 1-2 tablets by mouth daily as needed for constipation 30 tablet 3     sertraline (ZOLOFT) 50 MG tablet TAKE 1 TABLET BY MOUTH EVERY DAY (Patient taking differently: 25 mg ) 90 tablet 2     valACYclovir (VALTREX) 1000 mg tablet TAKE ONE TABLET BY MOUTH DAILY AS NEEDED 21 tablet 0     Past Surgical History:   Procedure Laterality Date     C APPENDECTOMY           C/SECTION, LOW TRANSVERSE  02/17/10    , Low Transverse      SECTION  2012    Procedure:  SECTION;   SECTION ;  Surgeon:  Yehuda Cruz MD;  [facility]     COLONOSCOPY  7/11/2011    Procedure:COMBINED COLONOSCOPY, SINGLE BIOPSY/POLYPECTOMY BY BIOPSY; Surgeon:MARTIN MAO; Location:[facility]     COLONOSCOPY  9/12/2011    Procedure:COMBINED COLONOSCOPY, SINGLE BIOPSY/POLYPECTOMY BY BIOPSY; Colonoscopy biopsy of distal iiluem, colonoscopy removal of colon polyp with fulguration, colonoscopy and control of biopsy bleed with placement of resolution clip; Surgeon:TESS POLLARD; Location:[facility]     COLONOSCOPY  9/12/2011    Procedure:COMBINED COLONOSCOPY, REMOVE TUMOR/POLYP/LESION BY FULGURATION/HOT BIOPSY; Surgeon:TESS POLLARD; Location:[facility]     COLONOSCOPY  9/12/2011    Procedure:COMBINED COLONOSCOPY, CONTROL BLEED; Surgeon:TESS POLLARD; [facility]     COLONOSCOPY N/A 6/19/2017    Procedure: COMBINED COLONOSCOPY, SINGLE OR MULTIPLE BIOPSY/POLYPECTOMY BY BIOPSY;;  Surgeon: Josué Hawk MD;  Location:  GI     HC FLEX SIGMOIDOSCOPY W/WO MIKY SPEC BY BRUSH/WASH  07/02/09    [facility]     HC TOOTH EXTRACTION W/FORCEP       LAPAROSCOPIC CYSTECTOMY OVARIAN (BENIGN) Right 10/21/2019    Procedure: Laparoscopic cystectomy right ovarian (benign);  Surgeon: Yehuda Cruz MD;  [facility]     LAPAROSCOPIC LYSIS ADHESIONS N/A 10/21/2019    Procedure: LYSIS, ADHESIONS, LAPAROSCOPIC;  Surgeon: Yehuda Cruz MD;  Location: [facility]     VIDEO CAPSULE ENDOSCOPY  07/30/09    [facility]     Social History     Socioeconomic History     Marital status:      Spouse name: None     Number of children: None     Years of education: None     Highest education level: None   Occupational History     None   Social Needs     Financial resource strain: None     Food insecurity:     Worry: None     Inability: None     Transportation needs:     Medical: None     Non-medical: None   Tobacco Use     Smoking status: Former Smoker     Smokeless tobacco: Never Used     Tobacco comment: 2 1/2 year  ago   Substance and Sexual Activity     Alcohol use: Yes     Comment: Occ.     Drug use: No     Sexual activity: Yes     Partners: Male     Birth control/protection: Surgical     Comment: Spouse - Vasectomy   Lifestyle     Physical activity:     Days per week: None     Minutes per session: None     Stress: None   Relationships     Social connections:     Talks on phone: None     Gets together: None     Attends Am[employer] service: None     Active member of club or organization: None     Attends meetings of clubs or organizations: None     Relationship status: None     Intimate partner violence:     Fear of current or ex partner: None     Emotionally abused: None     Physically abused: None     Forced sexual activity: None   Other Topics Concern     Parent/sibling w/ CABG, MI or angioplasty before 65F 55M? No      Service No     Blood Transfusions No     Caffeine Concern Yes     Comment: occas. - advised not more than 16 ounces/day     Occupational Exposure No     Hobby Hazards No     Comment: Volleyball     Sleep Concern No     Stress Concern No     Weight Concern No     Special Diet Yes     Comment: GI sx.     Back Care Yes     Exercise Yes     Comment: Walking/WiFit     Bike Helmet No     Seat Belt Yes     Self-Exams Yes   Social History Narrative    Lives in [place] with , Edmar and their daughter, Alex.  No smokers in the home.  No concerns about domestic violence.  No indoor cats/kittens.     Family History   Problem Relation Age of Onset     Diabetes Mother         borderline     Depression Mother      Gastrointestinal Disease Mother         IBS     Thyroid Disease Mother         hypothyroidism     Diabetes Father         NIDDM - Type II     Diabetes Maternal Grandmother      Arthritis Maternal Grandfather      Heart Disease Paternal Grandfather      Thyroid Disease Sister         hypothyroidism     Rheumatoid Arthritis Sister      Other Cancer Sister      Asthma Other         niece      Obesity Sister         half-sister/had gastric bypass       ROS: A 12 point review of systems was done. Except for what is listed above in the HPI, the systems review is negative .      Objective: Vital signs: Blood pressure 108/70, pulse 107, temperature 97.8  F (36.6  C), temperature source Temporal, resp. rate 16, weight 82.2 kg (181 lb 3.2 oz), last menstrual period 10/05/2019, SpO2 96 %, not currently breastfeeding.    HEENT:    Sclerae and conjunctiva are normal.   Ear canals and TMs look normal.  Nasal mucosa is pink  - no polyps or masses seen.  Throat is unremarkable . Mucous membranes are moist.   Neck is supple, mobile, no adenopathy or masses palpable. The thyroid feels normal.   Normal range of motion noted.  Chest is clear to auscultation.  No wheezes, rales or rhonchi heard.  Cardiac exam is normal with s1, s2, no murmurs or adventitious sounds.Normal rate and rhythm is heard.   Abdomen is soft,  nondistended, No masses felt.No HSM. No guarding or rigidity or rebound   noted. Palpation reveals  no    tenderness   Normal bowel sounds heard.  She has some red patches which are about 3 to 4 cm in diameter around each of the incision sites.  They appear to be hive-like and they do not appear to be infectious.  There is no pus.  They are flat.  Appeared to be a contact dermatitis from the glue.        Assessment/Plan:      1.  She has an allergic reaction which I suspect is a contact dermatitis from the glue that was used on her incisions.  Otherwise she is healing well from her laparoscopy.    2. See rx for prednisone to help treat the allergic reaction also she can use Benadryl for the itching. I explained that using steroids does come with some risks, most of them with long term use, such as immune suppression, gastric irritation, weight gain, but with a short term course of use the risks should be minimal and in this case are justified.    She will let me know if this does not improve in the next  several days and sooner if worse.     The above information was dictating using Dragon voice software and as a result there may be some irregularities that were not detected in my review of this note.    MEGAN Cruz MD

## 2019-11-07 ENCOUNTER — HEALTH MAINTENANCE LETTER (OUTPATIENT)
Age: 44
End: 2019-11-07

## 2019-11-19 ENCOUNTER — MYC MEDICAL ADVICE (OUTPATIENT)
Dept: FAMILY MEDICINE | Facility: CLINIC | Age: 44
End: 2019-11-19

## 2019-11-20 NOTE — TELEPHONE ENCOUNTER
69yo female pt presented with SOB/trouble breathing and admitted with primary dx of PNA, on IV ABT tx for same (also r/o sepsis). Pt reports -140#, .7#. Reports no significant weight changes and no changes in appetite (good po intake PTA). Alternate lunch sent for pt as she does not eat red meat. Further preferences obtained. Reports no N/V/D/C or abdominal pain. Noted with elevated glucose, with PNA- r/o sepsis and also receiving prednisone as Rx, no h/o DM.  No edema or pressure ulcers noted. PMH below: Per Dr. Cruz he would like to see patient in clinic. Patient can be seen tomorrow 11/21 at 9:30am. Appointment made to hold time.  LM for patient to call [phone]   Natacha Lynhc CMA

## 2019-11-21 ENCOUNTER — OFFICE VISIT (OUTPATIENT)
Dept: FAMILY MEDICINE | Facility: CLINIC | Age: 44
End: 2019-11-21
Payer: COMMERCIAL

## 2019-11-21 VITALS
OXYGEN SATURATION: 96 % | TEMPERATURE: 97.6 F | RESPIRATION RATE: 16 BRPM | DIASTOLIC BLOOD PRESSURE: 62 MMHG | SYSTOLIC BLOOD PRESSURE: 98 MMHG | BODY MASS INDEX: 32.06 KG/M2 | HEART RATE: 80 BPM | WEIGHT: 181 LBS

## 2019-11-21 DIAGNOSIS — L23.1 ALLERGIC CONTACT DERMATITIS DUE TO ADHESIVES: Primary | ICD-10-CM

## 2019-11-21 PROCEDURE — 99213 OFFICE O/P EST LOW 20 MIN: CPT | Performed by: OBSTETRICS & GYNECOLOGY

## 2019-11-21 RX ORDER — CLOBETASOL PROPIONATE 0.5 MG/G
CREAM TOPICAL 2 TIMES DAILY
Qty: 60 G | Refills: 1 | Status: SHIPPED | OUTPATIENT
Start: 2019-11-21 | End: 2020-11-24

## 2019-11-21 ASSESSMENT — PAIN SCALES - GENERAL: PAINLEVEL: NO PAIN (0)

## 2019-11-21 NOTE — PROGRESS NOTES
Subjective: She has developed a reaction possibly to the adhesive that was glued on her incisions.  She took prednisone for a few days and it really helped to shrink down the redness but she stopped it several days ago and there are tiny little red patches that are redeveloping over the area of each incision where the glue was applied.  No fevers.  Otherwise feels well.  No other pain or concerns.      The past medical history, social history, past surgical history and family history as shown below have been reviewed by me today.    Past Medical History:   Diagnosis Date     Crohn disease (H)      Depression     1-2 years ago     Fructose intolerance      High risk HPV infection 4/14/10    normal pap, + HR HPV (59). normal paps since     PONV (postoperative nausea and vomiting)     after colonoscopy        Allergies   Allergen Reactions     Fructose GI Disturbance     Ragweeds      Sorbitol Diarrhea     Liquid Adhesive Rash     Current Outpatient Medications   Medication Sig Dispense Refill     DiphenhydrAMINE HCl (BENADRYL PO)        fluticasone (FLONASE) 50 MCG/ACT spray Spray 1-2 sprays into both nostrils daily 1 Bottle 1     ibuprofen (ADVIL/MOTRIN) 600 MG tablet Take 1 tablet (600 mg) by mouth every 6 hours as needed for other (mild and/or inflammatory pain) 30 tablet 0     loratadine (CLARITIN) 10 MG tablet Take 10 mg by mouth daily       Omega-3 Fatty Acids (OMEGA 3 PO)        senna-docusate (SENOKOT-S/PERICOLACE) 8.6-50 MG tablet Take 1-2 tablets by mouth daily as needed for constipation 30 tablet 3     sertraline (ZOLOFT) 50 MG tablet TAKE 1 TABLET BY MOUTH EVERY DAY (Patient taking differently: 25 mg ) 90 tablet 2     valACYclovir (VALTREX) 1000 mg tablet TAKE ONE TABLET BY MOUTH DAILY AS NEEDED 21 tablet 0     Past Surgical History:   Procedure Laterality Date     C APPENDECTOMY           C/SECTION, LOW TRANSVERSE  02/17/10    , Low Transverse      SECTION  2012    Procedure:   SECTION;   SECTION ;  Surgeon: Yehuda Cruz MD;  [facility]     COLONOSCOPY  2011    Procedure:COMBINED COLONOSCOPY, SINGLE BIOPSY/POLYPECTOMY BY BIOPSY; Surgeon:MARTIN MAO; Location[facility]     COLONOSCOPY  2011    Procedure:COMBINED COLONOSCOPY, SINGLE BIOPSY/POLYPECTOMY BY BIOPSY; Colonoscopy biopsy of distal iiluem, colonoscopy removal of colon polyp with fulguration, colonoscopy and control of biopsy bleed with placement of resolution clip; Surgeon:TESS POLLARD; Location:[facility]     COLONOSCOPY  2011    Procedure:COMBINED COLONOSCOPY, REMOVE TUMOR/POLYP/LESION BY FULGURATION/HOT BIOPSY; Surgeon:TESS POLLARD; Location[facility]     COLONOSCOPY  2011    Procedure:COMBINED COLONOSCOPY, CONTROL BLEED; Surgeon:TESS POLLARD; Location[facility]     COLONOSCOPY N/A 2017    Procedure: COMBINED COLONOSCOPY, SINGLE OR MULTIPLE BIOPSY/POLYPECTOMY BY BIOPSY;;  Surgeon: Josué Hawk MD;  [facility]     HC FLEX SIGMOIDOSCOPY W/WO MIKY SPEC BY BRUSH/WASH  09    [facility]     HC TOOTH EXTRACTION W/FORCEP       LAPAROSCOPIC CYSTECTOMY OVARIAN (BENIGN) Right 10/21/2019    Procedure: Laparoscopic cystectomy right ovarian (benign);  Surgeon: Yehuda Cruz MD;  [facility]: [facility]     LAPAROSCOPIC LYSIS ADHESIONS N/A 10/21/2019    Procedure: LYSIS, ADHESIONS, LAPAROSCOPIC;  Surgeon: Yehuda Cruz MD;  Location: [facility]     VIDEO CAPSULE ENDOSCOPY  09    [facility]     Social History     Socioeconomic History     Marital status:      Spouse name: None     Number of children: None     Years of education: None     Highest education level: None   Occupational History     None   Social Needs     Financial resource strain: None     Food insecurity:     Worry: None     Inability: None     Transportation needs:     Medical: None     Non-medical: None   Tobacco Use     Smoking status: Former Smoker     Smokeless  tobacco: Never Used     Tobacco comment: 2 1/2 year ago   Substance and Sexual Activity     Alcohol use: Yes     Comment: Occ.     Drug use: No     Sexual activity: Yes     Partners: Male     Birth control/protection: Surgical     Comment: Spouse - Vasectomy   Lifestyle     Physical activity:     Days per week: None     Minutes per session: None     Stress: None   Relationships     Social connections:     Talks on phone: None     Gets together: None     Attends Zoroastrian service: None     Active member of club or organization: None     Attends meetings of clubs or organizations: None     Relationship status: None     Intimate partner violence:     Fear of current or ex partner: None     Emotionally abused: None     Physically abused: None     Forced sexual activity: None   Other Topics Concern     Parent/sibling w/ CABG, MI or angioplasty before 65F 55M? No      Service No     Blood Transfusions No     Caffeine Concern Yes     Comment: occas. - advised not more than 16 ounces/day     Occupational Exposure No     Hobby Hazards No     Comment: Volleyball     Sleep Concern No     Stress Concern No     Weight Concern No     Special Diet Yes     Comment: GI sx.     Back Care Yes     Exercise Yes     Comment: Walking/WiFit     Bike Helmet No     Seat Belt Yes     Self-Exams Yes   Social History Narrative    Lives in [place] with , Edmar and their daughter, Alex.  No smokers in the home.  No concerns about domestic violence.  No indoor cats/kittens.     Family History   Problem Relation Age of Onset     Diabetes Mother         borderline     Depression Mother      Gastrointestinal Disease Mother         IBS     Thyroid Disease Mother         hypothyroidism     Diabetes Father         NIDDM - Type II     Diabetes Maternal Grandmother      Arthritis Maternal Grandfather      Heart Disease Paternal Grandfather      Thyroid Disease Sister         hypothyroidism     Rheumatoid Arthritis Sister      Other  Cancer Sister      Asthma Other         niece     Obesity Sister         half-sister/had gastric bypass       ROS: A 12 point review of systems was done. Except for what is listed above in the HPI, the systems review is negative .      Objective: Vital signs: Blood pressure 98/62, pulse 80, temperature 97.6  F (36.4  C), temperature source Temporal, resp. rate 16, weight 82.1 kg (181 lb), last menstrual period 11/01/2019, SpO2 96 %, not currently breastfeeding.    Chest is clear to auscultation.  No wheezes, rales or rhonchi heard.  Cardiac exam is normal with s1, s2, no murmurs or adventitious sounds.Normal rate and rhythm is heard.   The incisions appear to be healing well.  The area directly over the incision itself looks normal but there is a red patch that is about 2 cm in diameter around each incision directly where the glue was applied.  It strongly suggests a contact dermatitis allergic reaction from the glue application.  It is fairly mild.  There is no pus and no induration nothing to suggest an infection.  Not tender to palpation.    Assessment/Plan:  A total of 15 minutes were spent face-to-face with this patient during today's consultation, with more than 50% of that time devoted to conversation and counseling about the management decisions.    1.  Allergic contact dermatitis due to adhesive glue.  See Rx for clobetasol cream to apply twice daily.  She will give this 5 to 7 days to fade.  If it gets worse she will let me know.  We can consider injecting some steroid underneath the incisions.             The above information was dictating using Dragon voice software and as a result there may be some irregularities that were not detected in my review of this note.    MEGAN Cruz MD

## 2019-11-25 NOTE — PROGRESS NOTES
"Subjective     Avis Avalos is a 44 year old female who presents to clinic today for the following health issues:    History of Present Illness        Mental Health Follow-up:  Patient presents to follow-up on Depression & Anxiety.Patient's depression since last visit has been:  Medium  The patient is not having other symptoms associated with depression.  Patient's anxiety since last visit has been:  Good  The patient is having other symptoms associated with anxiety.  Any significant life events: No  Patient is not feeling anxious or having panic attacks.  Patient has no concerns about alcohol or drug use.     Social History  Tobacco Use    Smoking status: Former Smoker    Smokeless tobacco: Never Used    Tobacco comment: 2 1/2 year ago  Alcohol use: Yes    Comment: Occ.  Drug use: No      Today's PHQ-9         PHQ-9 Total Score:     (P) 7   PHQ-9 Q9 Thoughts of better off dead/self-harm past 2 weeks :   (P) Not at all   Thoughts of suicide or self harm:      Self-harm Plan:        Self-harm Action:          Safety concerns for self or others:           She eats 0-1 servings of fruits and vegetables daily.She consumes 0 sweetened beverage(s) daily.  She is taking medications regularly.     She does not like the sexual side effects and dulling of her emotions on sertraline. She is not having the \"ear worm\" anymore but feels like she doesn't care about anything as a side effect of Zoloft. Has tried Prozac and citalopram in the past and they were not effective.       Patient Active Problem List   Diagnosis     Herpes simplex type 2 infection     Knee pain     Sessile colonic polyp     Constipation, unspecified constipation type     High risk HPV infection     Moderate episode of recurrent major depressive disorder (H)     Obsessive-compulsive disorder, unspecified type     Lichen sclerosus of female genitalia     Chronic seasonal allergic rhinitis due to pollen     Pelvic pain in female     Past Surgical History: "   Procedure Laterality Date     C APPENDECTOMY           C/SECTION, LOW TRANSVERSE  02/17/10    , Low Transverse      SECTION  2012    Procedure:  SECTION;   SECTION ;  Surgeon: Yehuda Cruz MD;  [facility]: [facility]     COLONOSCOPY  2011    Procedure:COMBINED COLONOSCOPY, SINGLE BIOPSY/POLYPECTOMY BY BIOPSY; Surgeon:MARTIN MAO; Location:[facility]     COLONOSCOPY  2011    Procedure:COMBINED COLONOSCOPY, SINGLE BIOPSY/POLYPECTOMY BY BIOPSY; Colonoscopy biopsy of distal iiluem, colonoscopy removal of colon polyp with fulguration, colonoscopy and control of biopsy bleed with placement of resolution clip; Surgeon:TESS POLLARD; Location:[facility]     COLONOSCOPY  2011    Procedure:COMBINED COLONOSCOPY, REMOVE TUMOR/POLYP/LESION BY FULGURATION/HOT BIOPSY; Surgeon:TESS POLLARD; Location:[facility]     COLONOSCOPY  2011    Procedure:COMBINED COLONOSCOPY, CONTROL BLEED; Surgeon:TESS POLLARD; Location:[facility]     COLONOSCOPY N/A 2017    Procedure: COMBINED COLONOSCOPY, SINGLE OR MULTIPLE BIOPSY/POLYPECTOMY BY BIOPSY;;  Surgeon: Josué Hawk MD;  Location:  GI     HC FLEX SIGMOIDOSCOPY W/WO MIKY SPEC BY BRUSH/WASH  09    [facility]     HC TOOTH EXTRACTION W/FORCEP       LAPAROSCOPIC CYSTECTOMY OVARIAN (BENIGN) Right 10/21/2019    Procedure: Laparoscopic cystectomy right ovarian (benign);  Surgeon: Yehuda Cruz MD;  Location: [facility]     LAPAROSCOPIC LYSIS ADHESIONS N/A 10/21/2019    Procedure: LYSIS, ADHESIONS, LAPAROSCOPIC;  Surgeon: Yehuda Cruz MD;  Location:  OR     VIDEO CAPSULE ENDOSCOPY  09    [facility]       Social History     Tobacco Use     Smoking status: Former Smoker     Smokeless tobacco: Never Used     Tobacco comment: 2 1/2 year ago   Substance Use Topics     Alcohol use: Yes     Comment: Occ.     Family History   Problem Relation Age of Onset     Diabetes Mother          borderline     Depression Mother      Gastrointestinal Disease Mother         IBS     Thyroid Disease Mother         hypothyroidism     Diabetes Father         NIDDM - Type II     Diabetes Maternal Grandmother      Arthritis Maternal Grandfather      Heart Disease Paternal Grandfather      Thyroid Disease Sister         hypothyroidism     Rheumatoid Arthritis Sister      Other Cancer Sister      Asthma Other         niece     Obesity Sister         half-sister/had gastric bypass         Current Outpatient Medications   Medication Sig Dispense Refill     buPROPion (WELLBUTRIN XL) 150 MG 24 hr tablet Take 1 tablet (150 mg) by mouth every morning 30 tablet 1     clobetasol (TEMOVATE) 0.05 % external cream Apply topically 2 times daily 60 g 1     Multiple Vitamins-Calcium (ONE-A-DAY WOMENS PO)        Probiotic Product (PROBIOTIC DAILY PO)        sertraline (ZOLOFT) 25 MG tablet Take 1 tablet (25 mg) by mouth daily 90 tablet 0     sertraline (ZOLOFT) 50 MG tablet TAKE 1 TABLET BY MOUTH EVERY DAY (Patient taking differently: 25 mg ) 90 tablet 2     DiphenhydrAMINE HCl (BENADRYL PO)        fluticasone (FLONASE) 50 MCG/ACT spray Spray 1-2 sprays into both nostrils daily (Patient not taking: Reported on 11/26/2019) 1 Bottle 1     ibuprofen (ADVIL/MOTRIN) 600 MG tablet Take 1 tablet (600 mg) by mouth every 6 hours as needed for other (mild and/or inflammatory pain) (Patient not taking: Reported on 11/26/2019) 30 tablet 0     loratadine (CLARITIN) 10 MG tablet Take 10 mg by mouth daily       Omega-3 Fatty Acids (OMEGA 3 PO)        senna-docusate (SENOKOT-S/PERICOLACE) 8.6-50 MG tablet Take 1-2 tablets by mouth daily as needed for constipation (Patient not taking: Reported on 11/26/2019) 30 tablet 3     valACYclovir (VALTREX) 1000 mg tablet TAKE ONE TABLET BY MOUTH DAILY AS NEEDED (Patient not taking: Reported on 11/26/2019) 21 tablet 0     Allergies   Allergen Reactions     Fructose GI Disturbance     Ragweeds      Sorbitol  "Diarrhea     Liquid Adhesive Rash     BP Readings from Last 3 Encounters:   11/26/19 90/62   11/21/19 98/62   10/30/19 108/70    Wt Readings from Last 3 Encounters:   11/26/19 80.3 kg (177 lb)   11/21/19 82.1 kg (181 lb)   10/30/19 82.2 kg (181 lb 3.2 oz)                    Reviewed and updated as needed this visit by Provider         Review of Systems   ROS COMP: Constitutional, HEENT, cardiovascular, pulmonary, gi and gu systems are negative, except as otherwise noted.      Objective    BP 90/62   Pulse 71   Temp 98  F (36.7  C) (Oral)   Resp 16   Ht 1.615 m (5' 3.58\")   Wt 80.3 kg (177 lb)   LMP 11/03/2019   SpO2 97%   BMI 30.78 kg/m    Body mass index is 30.78 kg/m .  Physical Exam   GENERAL APPEARANCE: healthy, alert and no distress  PSYCH: mentation appears normal, mildly anxious and tearful at times    Diagnostic Test Results:  Labs reviewed in Epic        Assessment & Plan     1. Obsessive-compulsive disorder, unspecified type  She does not like the sexual side effects and dulling of her emotions on sertraline. She would like to try bupropion and will start taking it with sertraline then start to taper off sertraline by going down to 12.5 mg daily then every other day and then as needed if having withdrawal effects. Follow up with telephone or e-visit in 6 weeks.   - sertraline (ZOLOFT) 25 MG tablet; Take 1 tablet (25 mg) by mouth daily  Dispense: 90 tablet; Refill: 0  - buPROPion (WELLBUTRIN XL) 150 MG 24 hr tablet; Take 1 tablet (150 mg) by mouth every morning  Dispense: 30 tablet; Refill: 1    2. Moderate episode of recurrent major depressive disorder (H)  See above notes  - sertraline (ZOLOFT) 25 MG tablet; Take 1 tablet (25 mg) by mouth daily  Dispense: 90 tablet; Refill: 0  - buPROPion (WELLBUTRIN XL) 150 MG 24 hr tablet; Take 1 tablet (150 mg) by mouth every morning  Dispense: 30 tablet; Refill: 1     BMI:   Estimated body mass index is 30.78 kg/m  as calculated from the following:    Height " "as of this encounter: 1.615 m (5' 3.58\").    Weight as of this encounter: 80.3 kg (177 lb).   Weight management plan: Discussed healthy diet and exercise guidelines    No follow-ups on file.    JEM Lindsay [facility]    "

## 2019-11-26 ENCOUNTER — OFFICE VISIT (OUTPATIENT)
Dept: FAMILY MEDICINE | Facility: OTHER | Age: 44
End: 2019-11-26
Payer: COMMERCIAL

## 2019-11-26 VITALS
OXYGEN SATURATION: 97 % | HEART RATE: 71 BPM | HEIGHT: 64 IN | SYSTOLIC BLOOD PRESSURE: 90 MMHG | DIASTOLIC BLOOD PRESSURE: 62 MMHG | WEIGHT: 177 LBS | TEMPERATURE: 98 F | BODY MASS INDEX: 30.22 KG/M2 | RESPIRATION RATE: 16 BRPM

## 2019-11-26 DIAGNOSIS — F33.1 MODERATE EPISODE OF RECURRENT MAJOR DEPRESSIVE DISORDER (H): ICD-10-CM

## 2019-11-26 DIAGNOSIS — F42.9 OBSESSIVE-COMPULSIVE DISORDER, UNSPECIFIED TYPE: ICD-10-CM

## 2019-11-26 PROCEDURE — 99214 OFFICE O/P EST MOD 30 MIN: CPT | Performed by: STUDENT IN AN ORGANIZED HEALTH CARE EDUCATION/TRAINING PROGRAM

## 2019-11-26 PROCEDURE — 96127 BRIEF EMOTIONAL/BEHAV ASSMT: CPT | Performed by: STUDENT IN AN ORGANIZED HEALTH CARE EDUCATION/TRAINING PROGRAM

## 2019-11-26 RX ORDER — BUPROPION HYDROCHLORIDE 150 MG/1
150 TABLET ORAL EVERY MORNING
Qty: 30 TABLET | Refills: 1 | Status: SHIPPED | OUTPATIENT
Start: 2019-11-26 | End: 2020-01-22

## 2019-11-26 RX ORDER — SERTRALINE HYDROCHLORIDE 25 MG/1
25 TABLET, FILM COATED ORAL DAILY
Qty: 90 TABLET | Refills: 0 | Status: SHIPPED | OUTPATIENT
Start: 2019-11-26 | End: 2020-04-02

## 2019-11-26 ASSESSMENT — ANXIETY QUESTIONNAIRES
3. WORRYING TOO MUCH ABOUT DIFFERENT THINGS: NOT AT ALL
GAD7 TOTAL SCORE: 2
GAD7 TOTAL SCORE: 2
2. NOT BEING ABLE TO STOP OR CONTROL WORRYING: NOT AT ALL
1. FEELING NERVOUS, ANXIOUS, OR ON EDGE: SEVERAL DAYS
7. FEELING AFRAID AS IF SOMETHING AWFUL MIGHT HAPPEN: NOT AT ALL
GAD7 TOTAL SCORE: 2
5. BEING SO RESTLESS THAT IT IS HARD TO SIT STILL: NOT AT ALL
4. TROUBLE RELAXING: NOT AT ALL
6. BECOMING EASILY ANNOYED OR IRRITABLE: SEVERAL DAYS
7. FEELING AFRAID AS IF SOMETHING AWFUL MIGHT HAPPEN: NOT AT ALL

## 2019-11-26 ASSESSMENT — MIFFLIN-ST. JEOR: SCORE: 1436.25

## 2019-11-26 ASSESSMENT — PATIENT HEALTH QUESTIONNAIRE - PHQ9
SUM OF ALL RESPONSES TO PHQ QUESTIONS 1-9: 7
SUM OF ALL RESPONSES TO PHQ QUESTIONS 1-9: 7

## 2019-11-26 ASSESSMENT — PAIN SCALES - GENERAL: PAINLEVEL: NO PAIN (0)

## 2019-11-27 ASSESSMENT — ANXIETY QUESTIONNAIRES: GAD7 TOTAL SCORE: 2

## 2019-11-27 ASSESSMENT — PATIENT HEALTH QUESTIONNAIRE - PHQ9: SUM OF ALL RESPONSES TO PHQ QUESTIONS 1-9: 7

## 2019-12-06 ENCOUNTER — OFFICE VISIT (OUTPATIENT)
Dept: URGENT CARE | Facility: RETAIL CLINIC | Age: 44
End: 2019-12-06
Payer: COMMERCIAL

## 2019-12-06 VITALS — SYSTOLIC BLOOD PRESSURE: 118 MMHG | DIASTOLIC BLOOD PRESSURE: 81 MMHG | TEMPERATURE: 100.3 F | HEART RATE: 98 BPM

## 2019-12-06 DIAGNOSIS — Z29.9 NEED FOR PROPHYLACTIC MEASURE: ICD-10-CM

## 2019-12-06 DIAGNOSIS — J02.0 STREPTOCOCCAL SORE THROAT: ICD-10-CM

## 2019-12-06 DIAGNOSIS — J02.9 ACUTE PHARYNGITIS, UNSPECIFIED ETIOLOGY: Primary | ICD-10-CM

## 2019-12-06 LAB — S PYO AG THROAT QL IA.RAPID: ABNORMAL

## 2019-12-06 PROCEDURE — 99213 OFFICE O/P EST LOW 20 MIN: CPT | Performed by: PHYSICIAN ASSISTANT

## 2019-12-06 PROCEDURE — 87880 STREP A ASSAY W/OPTIC: CPT | Mod: QW | Performed by: PHYSICIAN ASSISTANT

## 2019-12-06 RX ORDER — FLUCONAZOLE 150 MG/1
TABLET ORAL
Qty: 1 TABLET | Refills: 0 | Status: SHIPPED | OUTPATIENT
Start: 2019-12-06 | End: 2020-11-24

## 2019-12-06 RX ORDER — PENICILLIN V POTASSIUM 500 MG/1
500 TABLET, FILM COATED ORAL 2 TIMES DAILY
Qty: 20 TABLET | Refills: 0 | Status: SHIPPED | OUTPATIENT
Start: 2019-12-06 | End: 2020-04-27

## 2019-12-06 ASSESSMENT — ENCOUNTER SYMPTOMS
WHEEZING: 0
FEVER: 1
SINUS PAIN: 0
TROUBLE SWALLOWING: 0
APPETITE CHANGE: 0
ADENOPATHY: 0
HEADACHES: 0
EYE REDNESS: 0
FATIGUE: 0
RHINORRHEA: 0
SORE THROAT: 1
COUGH: 0
CHILLS: 0
SINUS PRESSURE: 0
EYE DISCHARGE: 0

## 2019-12-06 NOTE — PATIENT INSTRUCTIONS
"Antibiotics as directed- penicillin twice daily for 10 days.  Drink plenty of fluids and rest.  May use salt water gargles- about 8 oz warm water with about 1 teaspoon salt  Sucrets and Cepacol spray are over the counter medications that numb the throat.  Over the counter pain relievers such as tylenol or ibuprofen may be used as needed.   Honey lemon tea helps to soothe the throat. \"Throat Coat\" tea is soothing as well.  Change toothbrush after 24 hours of antibiotics (may soak in 3-6% hydrogen peroxide)  Will be contagious for 24 hours after starting antibiotic  May return to school//work/activities 24 hours after antibiotics are started.  Wash hands frequently and do not share beverages.  Please follow up with primary care provider if symptoms are not improving, worsening or new symptoms or for any adverse reactions to medications.     "

## 2019-12-06 NOTE — PROGRESS NOTES
Chief Complaint   Patient presents with     Pharyngitis     x 2 days, fevers yesterday of 102 taking ibuprofen and tylenol since, body achess, dizziness and weakness     Otalgia     bilateral ear pain off and on x 2 months     SUBJECTIVE:  Avis Avalos is a 44 year old female presenting with a chief complaint of a sore throat.  Onset of symptoms was 2 days ago.  Course of illness: sudden onset.  Severity: moderate  Current and Associated symptoms: fever up to 102F, body aches, feeling weak and dizzy. She has also had bilateral ear pain intermittently for the last 2 months or so.  Treatment measures tried include: Tylenol/Ibuprofen- took Tylenol last about 1 hour ago.  Predisposing factors include: sick contacts at work; unsure if strep positive or not.    Past Medical History:   Diagnosis Date     Crohn disease (H)      Depression     1-2 years ago     Fructose intolerance      High risk HPV infection 4/14/10    normal pap, + HR HPV (59). normal paps since     PONV (postoperative nausea and vomiting)     after colonoscopy     Acetaminophen (TYLENOL PO),   buPROPion (WELLBUTRIN XL) 150 MG 24 hr tablet, Take 1 tablet (150 mg) by mouth every morning  clobetasol (TEMOVATE) 0.05 % external cream, Apply topically 2 times daily  ibuprofen (ADVIL/MOTRIN) 600 MG tablet, Take 1 tablet (600 mg) by mouth every 6 hours as needed for other (mild and/or inflammatory pain)  Multiple Vitamins-Calcium (ONE-A-DAY WOMENS PO),   Omega-3 Fatty Acids (OMEGA 3 PO),   Probiotic Product (PROBIOTIC DAILY PO),   sertraline (ZOLOFT) 50 MG tablet, TAKE 1 TABLET BY MOUTH EVERY DAY (Patient taking differently: 25 mg )  DiphenhydrAMINE HCl (BENADRYL PO),   fluticasone (FLONASE) 50 MCG/ACT spray, Spray 1-2 sprays into both nostrils daily (Patient not taking: Reported on 11/26/2019)  loratadine (CLARITIN) 10 MG tablet, Take 10 mg by mouth daily  senna-docusate (SENOKOT-S/PERICOLACE) 8.6-50 MG tablet, Take 1-2 tablets by mouth daily as needed for  constipation (Patient not taking: Reported on 12/6/2019)  sertraline (ZOLOFT) 25 MG tablet, Take 1 tablet (25 mg) by mouth daily (Patient not taking: Reported on 12/6/2019)  valACYclovir (VALTREX) 1000 mg tablet, TAKE ONE TABLET BY MOUTH DAILY AS NEEDED (Patient not taking: Reported on 11/26/2019)    No current facility-administered medications on file prior to visit.     Social History     Tobacco Use     Smoking status: Former Smoker     Smokeless tobacco: Never Used     Tobacco comment: 2 1/2 year ago   Substance Use Topics     Alcohol use: Yes     Comment: Occ.     Allergies   Allergen Reactions     Fructose GI Disturbance     Ragweeds      Sorbitol Diarrhea     Liquid Adhesive Rash     Review of Systems   Constitutional: Positive for fever. Negative for appetite change, chills and fatigue.   HENT: Positive for ear pain (bilateral, intermittent about 1 month) and sore throat. Negative for congestion, postnasal drip, rhinorrhea, sinus pressure, sinus pain and trouble swallowing.    Eyes: Negative for discharge and redness.   Respiratory: Negative for cough and wheezing.    Skin: Negative for rash.   Neurological: Negative for headaches.   Hematological: Negative for adenopathy.     OBJECTIVE:   /81 (BP Location: Left arm)   Pulse 98   Temp 100.3  F (37.9  C) (Oral)   GENERAL APPEARANCE: healthy, alert and in no distress  HEENT: Eyes PEERL, conjunctiva clear. Bilateral ear canals and TMs normal. Nose normal. Pharynx significantly erythematous without tonsillar hypertrophy or exudate noted.  NECK: supple, non-tender to palpation, mild bilateral anterior cervical adenopathy noted  RESP: lungs clear to auscultation - no rales, rhonchi or wheezes  CV: regular rates and rhythm, normal S1 S2, no murmur noted  SKIN: no suspicious lesions or rashes    Results for orders placed or performed in visit on 12/06/19 (from the past 24 hour(s))   RAPID STREP SCREEN   Result Value Ref Range    Rapid Strep A Screen POS neg  "    ASSESSMENT:    ICD-10-CM    1. Acute pharyngitis, unspecified etiology J02.9 RAPID STREP SCREEN     CANCELED: BETA STREP GROUP A R/O CULTURE   2. Streptococcal sore throat J02.0 penicillin V (VEETID) 500 MG tablet   3. Need for prophylactic measure Z29.9 fluconazole (DIFLUCAN) 150 MG tablet     PLAN:   History of yeast infections with antibiotics in the past  Patient Instructions   Antibiotics as directed- penicillin twice daily for 10 days.  Drink plenty of fluids and rest.  May use salt water gargles- about 8 oz warm water with about 1 teaspoon salt  Sucrets and Cepacol spray are over the counter medications that numb the throat.  Over the counter pain relievers such as tylenol or ibuprofen may be used as needed.   Honey lemon tea helps to soothe the throat. \"Throat Coat\" tea is soothing as well.  Change toothbrush after 24 hours of antibiotics (may soak in 3-6% hydrogen peroxide)  Will be contagious for 24 hours after starting antibiotic  May return to school//work/activities 24 hours after antibiotics are started.  Wash hands frequently and do not share beverages.  Please follow up with primary care provider if symptoms are not improving, worsening or new symptoms or for any adverse reactions to medications.       Follow up with primary care provider with any problems, questions or concerns or if symptoms worsen or fail to improve. Patient agreed to plan and verbalized understanding.    Amelia Vásquez PA-C  [facility]  "

## 2019-12-17 DIAGNOSIS — B00.9 HERPES SIMPLEX TYPE 2 INFECTION: ICD-10-CM

## 2019-12-17 RX ORDER — VALACYCLOVIR HYDROCHLORIDE 1 G/1
TABLET, FILM COATED ORAL
Qty: 21 TABLET | Refills: 0 | Status: SHIPPED | OUTPATIENT
Start: 2019-12-17 | End: 2020-04-27

## 2019-12-17 NOTE — TELEPHONE ENCOUNTER
Pending Prescriptions:                       Disp   Refills    valACYclovir (VALTREX) 1000 mg tablet      21 tab*0        Sig: TAKE ONE TABLET BY MOUTH DAILY AS NEEDED    Routing refill request to provider for review/approval because:  A break in medication

## 2019-12-18 ENCOUNTER — MYC MEDICAL ADVICE (OUTPATIENT)
Dept: FAMILY MEDICINE | Facility: OTHER | Age: 44
End: 2019-12-18

## 2019-12-18 ENCOUNTER — MYC REFILL (OUTPATIENT)
Dept: FAMILY MEDICINE | Facility: OTHER | Age: 44
End: 2019-12-18

## 2019-12-18 DIAGNOSIS — F33.1 MODERATE EPISODE OF RECURRENT MAJOR DEPRESSIVE DISORDER (H): ICD-10-CM

## 2019-12-18 DIAGNOSIS — F42.9 OBSESSIVE-COMPULSIVE DISORDER, UNSPECIFIED TYPE: ICD-10-CM

## 2019-12-18 DIAGNOSIS — B00.9 HERPES SIMPLEX TYPE 2 INFECTION: ICD-10-CM

## 2019-12-18 RX ORDER — BUPROPION HYDROCHLORIDE 150 MG/1
TABLET ORAL
Qty: 30 TABLET | Refills: 1 | OUTPATIENT
Start: 2019-12-18

## 2019-12-18 RX ORDER — VALACYCLOVIR HYDROCHLORIDE 1 G/1
TABLET, FILM COATED ORAL
Qty: 21 TABLET | Refills: 0 | Status: CANCELLED | OUTPATIENT
Start: 2019-12-18

## 2019-12-18 NOTE — TELEPHONE ENCOUNTER
Pending Prescriptions:                       Disp   Refills    buPROPion (WELLBUTRIN XL) 150 MG 24 hr ta*30 tab*1            Sig: TAKE 1 TABLET BY MOUTH EVERY MORNING    Sent 11/26/2019 with 2 month supply. Refill not appropriate at this time.     Nini Russo, RN, BSN

## 2019-12-19 NOTE — TELEPHONE ENCOUNTER
Pending Prescriptions:                       Disp   Refills    valACYclovir (VALTREX) 1000 mg tablet     21 tab*0            Sig: TAKE ONE TABLET BY MOUTH DAILY AS NEEDED    Sent yesterday, duplicate    Nini Russo, RN, BSN

## 2020-01-02 ENCOUNTER — TRANSFERRED RECORDS (OUTPATIENT)
Dept: HEALTH INFORMATION MANAGEMENT | Facility: CLINIC | Age: 45
End: 2020-01-02

## 2020-01-22 DIAGNOSIS — F33.1 MODERATE EPISODE OF RECURRENT MAJOR DEPRESSIVE DISORDER (H): ICD-10-CM

## 2020-01-22 DIAGNOSIS — F42.9 OBSESSIVE-COMPULSIVE DISORDER, UNSPECIFIED TYPE: ICD-10-CM

## 2020-01-22 RX ORDER — BUPROPION HYDROCHLORIDE 150 MG/1
TABLET ORAL
Qty: 90 TABLET | Refills: 0 | Status: SHIPPED | OUTPATIENT
Start: 2020-01-22 | End: 2020-04-22

## 2020-01-22 NOTE — TELEPHONE ENCOUNTER
Pending Prescriptions:                       Disp   Refills    buPROPion (WELLBUTRIN XL) 150 MG 24 hr tab*30 tab*1        Sig: TAKE 1 TABLET BY MOUTH EVERY MORNING    PHQ-9 SCORE 5/23/2019 5/28/2019 11/26/2019   PHQ-9 Total Score - - -   PHQ-9 Total Score MyChart 13 (Moderate depression) 7 (Mild depression) 7 (Mild depression)   PHQ-9 Total Score 13 7 7       Routing refill request to provider for review/approval because:  Labs out of range:  PHQ9    Nini Russo, MSN, RN

## 2020-02-17 ENCOUNTER — OFFICE VISIT (OUTPATIENT)
Dept: GASTROENTEROLOGY | Facility: CLINIC | Age: 45
End: 2020-02-17
Payer: COMMERCIAL

## 2020-02-17 VITALS
HEART RATE: 81 BPM | BODY MASS INDEX: 30.97 KG/M2 | HEIGHT: 64 IN | OXYGEN SATURATION: 97 % | SYSTOLIC BLOOD PRESSURE: 116 MMHG | DIASTOLIC BLOOD PRESSURE: 73 MMHG | WEIGHT: 181.4 LBS

## 2020-02-17 DIAGNOSIS — Z83.79 FAMILY HISTORY OF CELIAC DISEASE: ICD-10-CM

## 2020-02-17 DIAGNOSIS — K58.8 OTHER IRRITABLE BOWEL SYNDROME: Primary | ICD-10-CM

## 2020-02-17 DIAGNOSIS — R12 HEARTBURN: ICD-10-CM

## 2020-02-17 PROCEDURE — 99203 OFFICE O/P NEW LOW 30 MIN: CPT | Performed by: PHYSICIAN ASSISTANT

## 2020-02-17 RX ORDER — OMEPRAZOLE 40 MG/1
40 CAPSULE, DELAYED RELEASE ORAL EVERY MORNING
Qty: 90 CAPSULE | Refills: 1 | Status: SHIPPED | OUTPATIENT
Start: 2020-02-17 | End: 2020-11-24

## 2020-02-17 ASSESSMENT — MIFFLIN-ST. JEOR: SCORE: 1456.16

## 2020-02-17 ASSESSMENT — PAIN SCALES - GENERAL: PAINLEVEL: MILD PAIN (2)

## 2020-02-17 NOTE — NURSING NOTE
"Avis Avalos's goals for this visit include:   Chief Complaint   Patient presents with     Consult     Was diagnosed with a fructose intolerance about 13 years; has different symptoms with different foods       She requests these members of her care team be copied on today's visit information: PCP    PCP: Sarah Wilhelm    Referring Provider:  No referring provider defined for this encounter.    /73 (BP Location: Left arm, Patient Position: Sitting, Cuff Size: Adult Regular)   Pulse 81   Ht 1.615 m (5' 3.58\")   Wt 82.3 kg (181 lb 6.4 oz)   SpO2 97%   BMI 31.55 kg/m      Do you need any medication refills at today's visit? No    Laurence Payne LPN      "

## 2020-02-17 NOTE — PROGRESS NOTES
GASTROENTEROLOGY NEW PATIENT CLINIC VISIT    CC/REFERRING MD:    Sarah Wilhelm    REASON FOR CONSULTATION:  Consult (food sensitivities)       HISTORY OF PRESENT ILLNESS:    Avis Avalos is 44 year old female who presents for GI concerns. She notes sensitivities to foods for quite some time now. Notes that she was initially diagnosed with fructose intolerance about 13 years ago. Within the last 3-4 years she notes more foods cause/trigger symptoms. She notes palm oil for example can cause bloating, abdominal cramping as well as diarrhea. Nuts, yogurt and certain dairy products cause bloating and gas.     She normally has a BM about once a day on average. Every now and then she will feel constipated and will take miralax as needed. Denies any straining. She has been evaluated with colonoscopy in 2017 which besides benign polyp was unremarkable. She has hx of pelvic floor therapy which she found beneficial in the past.     She does have heartburn more frequently recently. No n/v. No dysphagia. No early satiety. She takes ibuprofen about once weekly for aches/pains.     Her daughter has diagnosis of celiac disease. Patient has been tested via serology several years ago which was negative.       PREVIOUS ENDOSCOPY:    Colonoscopy 6/19/2017  Impression:          - Ascending colon polyp s/p cold snare removal and hemoclip placement.                        - S/p ileal and random colon biopsies.                        - The examined portion of the ileum was normal. Biopsied.      SPECIMEN(S):   A: Colon polyp, ascending   B: Terminal ileum biopsy   C: Colon biopsy, right   D: Colon biopsy, left     FINAL DIAGNOSIS:   A. Colon polyp, ascending:   - Hyperplastic polyp.     B. Terminal ileum biopsy:   - No diagnostic abnormalities identified, benign small bowel mucosa with   prominent Peyer's patch without inflammation or atypia.     C. Colon biopsy, right:   - No diagnostic abnormalities identified,  benign colonic mucosa with   benign lymphoid aggregate without inflammation or atypia.     D. Colon biopsy, left:   - No diagnostic abnormalities identified, benign colonic mucosa with   benign lymphoid aggregates without inflammation or atypia.     Electronically signed out by:     MEGAN Reyes M.D.       PROBLEM LIST  Patient Active Problem List    Diagnosis Date Noted     Pelvic pain in female 10/07/2019     Priority: Medium     Added automatically from request for surgery [id number]       Chronic seasonal allergic rhinitis due to pollen 2017     Priority: Medium     Lichen sclerosus of female genitalia 10/19/2017     Priority: Medium     Moderate episode of recurrent major depressive disorder (H) 2017     Priority: Medium     Obsessive-compulsive disorder, unspecified type 2017     Priority: Medium     Sessile colonic polyp 2017     Priority: Medium     Constipation, unspecified constipation type 2017     Priority: Medium     Knee pain 2015     Priority: Medium     High risk HPV infection 2010     Priority: Medium     4/14/10 NIL pap, + HR HPV (58). normal paps since  , , ,  all NIL paps  17 NIL pap/neg HR HPV.       Herpes simplex type 2 infection 2009     Priority: Medium       PERTINENT PAST MEDICAL HISTORY:  (I personally reviewed this history with the patient at today's visit)   Past Medical History:   Diagnosis Date     Crohn disease (H)      Depression     1-2 years ago     Fructose intolerance      High risk HPV infection 4/14/10    normal pap, + HR HPV (58). normal paps since     PONV (postoperative nausea and vomiting)     after colonoscopy         PREVIOUS SURGERIES: (I personally reviewed this history with the patient at today's visit)   Past Surgical History:   Procedure Laterality Date     C APPENDECTOMY      2001     C/SECTION, LOW TRANSVERSE  02/17/10    , Low Transverse      SECTION  2012    Procedure:   SECTION;   SECTION ;  Surgeon: Yehuda Cruz MD;  [facility]: [facility]     COLONOSCOPY  2011    Procedure:COMBINED COLONOSCOPY, SINGLE BIOPSY/POLYPECTOMY BY BIOPSY; Surgeon:MARTIN MAO; Location:[facility]     COLONOSCOPY  2011    Procedure:COMBINED COLONOSCOPY, SINGLE BIOPSY/POLYPECTOMY BY BIOPSY; Colonoscopy biopsy of distal iiluem, colonoscopy removal of colon polyp with fulguration, colonoscopy and control of biopsy bleed with placement of resolution clip; Surgeon:TESS POLLARD; Location:[facility]     COLONOSCOPY  2011    Procedure:COMBINED COLONOSCOPY, REMOVE TUMOR/POLYP/LESION BY FULGURATION/HOT BIOPSY; Surgeon:TESS POLLARD; Location:[facility]     COLONOSCOPY  2011    Procedure:COMBINED COLONOSCOPY, CONTROL BLEED; Surgeon:TESS POLLARD; Location:[facility]     COLONOSCOPY N/A 2017    Procedure: COMBINED COLONOSCOPY, SINGLE OR MULTIPLE BIOPSY/POLYPECTOMY BY BIOPSY;;  Surgeon: Josué Hawk MD;  Location: [facility]     HC FLEX SIGMOIDOSCOPY W/WO MIKY SPEC BY BRUSH/WASH  09    [facility]     HC TOOTH EXTRACTION W/FORCEP       LAPAROSCOPIC CYSTECTOMY OVARIAN (BENIGN) Right 10/21/2019    Procedure: Laparoscopic cystectomy right ovarian (benign);  Surgeon: Yehuda Cruz MD;  Location: [facility]     LAPAROSCOPIC LYSIS ADHESIONS N/A 10/21/2019    Procedure: LYSIS, ADHESIONS, LAPAROSCOPIC;  Surgeon: Yehuda Cruz MD;  Location: [facility]     VIDEO CAPSULE ENDOSCOPY  09    [facility]         ALLERGIES:     Allergies   Allergen Reactions     Fructose GI Disturbance     Ragweeds      Sorbitol Diarrhea     Liquid Adhesive Rash       PERTINENT MEDICATIONS:    Current Outpatient Medications:      Acetaminophen (TYLENOL PO), , Disp: , Rfl:      buPROPion (WELLBUTRIN XL) 150 MG 24 hr tablet, TAKE 1 TABLET BY MOUTH EVERY MORNING, Disp: 90 tablet, Rfl: 0     clobetasol (TEMOVATE) 0.05 % external cream, Apply topically 2 times daily, Disp: 60 g,  Rfl: 1     DiphenhydrAMINE HCl (BENADRYL PO), , Disp: , Rfl:      fluconazole (DIFLUCAN) 150 MG tablet, Take at the first sign of a yeast infection and again 3 days later., Disp: 1 tablet, Rfl: 0     fluticasone (FLONASE) 50 MCG/ACT spray, Spray 1-2 sprays into both nostrils daily, Disp: 1 Bottle, Rfl: 1     ibuprofen (ADVIL/MOTRIN) 600 MG tablet, Take 1 tablet (600 mg) by mouth every 6 hours as needed for other (mild and/or inflammatory pain), Disp: 30 tablet, Rfl: 0     loratadine (CLARITIN) 10 MG tablet, Take 10 mg by mouth daily, Disp: , Rfl:      Multiple Vitamins-Calcium (ONE-A-DAY WOMENS PO), , Disp: , Rfl:      Omega-3 Fatty Acids (OMEGA 3 PO), , Disp: , Rfl:      Probiotic Product (PROBIOTIC DAILY PO), , Disp: , Rfl:      senna-docusate (SENOKOT-S/PERICOLACE) 8.6-50 MG tablet, Take 1-2 tablets by mouth daily as needed for constipation, Disp: 30 tablet, Rfl: 3     sertraline (ZOLOFT) 25 MG tablet, Take 1 tablet (25 mg) by mouth daily (Patient taking differently: Take 12.5 mg by mouth daily ), Disp: 90 tablet, Rfl: 0     sertraline (ZOLOFT) 50 MG tablet, TAKE 1 TABLET BY MOUTH EVERY DAY (Patient taking differently: 25 mg ), Disp: 90 tablet, Rfl: 2     UNABLE TO FIND, MEDICATION NAME: Eat anything Rx 1-2 times per week, Disp: , Rfl:      valACYclovir (VALTREX) 1000 mg tablet, TAKE ONE TABLET BY MOUTH DAILY AS NEEDED, Disp: 21 tablet, Rfl: 0    SOCIAL HISTORY:  Social History     Socioeconomic History     Marital status:      Spouse name: Not on file     Number of children: Not on file     Years of education: Not on file     Highest education level: Not on file   Occupational History     Not on file   Social Needs     Financial resource strain: Not on file     Food insecurity:     Worry: Not on file     Inability: Not on file     Transportation needs:     Medical: Not on file     Non-medical: Not on file   Tobacco Use     Smoking status: Former Smoker     Smokeless tobacco: Never Used     Tobacco  comment: 2 1/2 year ago   Substance and Sexual Activity     Alcohol use: Yes     Comment: Occ.     Drug use: No     Sexual activity: Yes     Partners: Male     Birth control/protection: Surgical     Comment: Spouse - Vasectomy   Lifestyle     Physical activity:     Days per week: Not on file     Minutes per session: Not on file     Stress: Not on file   Relationships     Social connections:     Talks on phone: Not on file     Gets together: Not on file     Attends [employer] service: Not on file     Active member of club or organization: Not on file     Attends meetings of clubs or organizations: Not on file     Relationship status: Not on file     Intimate partner violence:     Fear of current or ex partner: Not on file     Emotionally abused: Not on file     Physically abused: Not on file     Forced sexual activity: Not on file   Other Topics Concern     Parent/sibling w/ CABG, MI or angioplasty before 65F 55M? No      Service No     Blood Transfusions No     Caffeine Concern Yes     Comment: occas. - advised not more than 16 ounces/day     Occupational Exposure No     Hobby Hazards No     Comment: Volleyball     Sleep Concern No     Stress Concern No     Weight Concern No     Special Diet Yes     Comment: GI sx.     Back Care Yes     Exercise Yes     Comment: Walking/[employer]     Bike Helmet No     Seat Belt Yes     Self-Exams Yes   Social History Narrative    Lives in [place] with , Edmar and their daughter, Alex.  No smokers in the home.  No concerns about domestic violence.  No indoor cats/kittens.       FAMILY HISTORY: (I personally reviewed this history with the patient at today's visit)  Family History   Problem Relation Age of Onset     Diabetes Mother         borderline     Depression Mother      Gastrointestinal Disease Mother         IBS     Thyroid Disease Mother         hypothyroidism     Diabetes Father         NIDDM - Type II     Diabetes Maternal Grandmother      Arthritis Maternal  "Grandfather      Heart Disease Paternal Grandfather      Thyroid Disease Sister         hypothyroidism     Rheumatoid Arthritis Sister      Other Cancer Sister      Asthma Other         niece     Obesity Sister         half-sister/had gastric bypass      ROS:    No fevers or chills  No weight loss  No blurry vision, double vision or change in vision  No sore throat  No lymphadenopathy  No headache, paraesthesias, or weakness in a limb  No shortness of breath or wheezing  No chest pain or pressure  No arthralgias or myalgias  No rashes or skin changes  No odynophagia or dysphagia  No BRBPR, hematochezia, melena  No dysuria, frequency or urgency  No hot/cold intolerance or polyria  No anxiety or depression  PHYSICAL EXAMINATION:  Constitutional: aaox3, cooperative, pleasant, not dyspneic/diaphoretic, no acute distress  Vitals reviewed: /73 (BP Location: Left arm, Patient Position: Sitting, Cuff Size: Adult Regular)   Pulse 81   Ht 1.615 m (5' 3.58\")   Wt 82.3 kg (181 lb 6.4 oz)   SpO2 97%   BMI 31.55 kg/m     Wt:   Wt Readings from Last 2 Encounters:   02/17/20 82.3 kg (181 lb 6.4 oz)   11/26/19 80.3 kg (177 lb)        Eyes: Sclera anicteric/injected  Ears/nose/mouth/throat: Normal oropharynx without ulcers or exudate, mucus membranes moist  Neck: supple  CV: No edema, RRR  Respiratory: Unlabored breathing, CTAB  Abd: Nondistended, no masses, +bs, no hepatosplenomegaly, nontender, no peritoneal signs  Skin: warm, perfused, no jaundice  Psych: Normal affect  MSK: Normal gait      PERTINENT STUDIES: (I personally reviewed these laboratory studies today)  Most recent CBC:   Recent Labs   Lab Test 06/02/17  1033 12/11/14  0809   WBC 5.0 6.6   HGB 13.7 14.4   HCT 38.4 41.4    233     Most recent hepatic panel:  Recent Labs   Lab Test 06/16/17  1621   ALT 25   AST 18     Most recent creatinine:  Recent Labs   Lab Test 07/06/18  0944 06/16/17  1621   CR 0.69 0.76       TSH   Date Value Ref Range Status "   09/26/2019 1.48 0.40 - 4.00 mU/L Final         ASSESSMENT/PLAN:    Avis Avalos is a 44 year old female who presents for evaluation of GI symptoms. She notes symptoms related to certain foods/diet triggers off and on for many years. Symptoms include abdominal bloating, cramping, alternating bowel habits between diarrhea and constipation (depending on food item). Her symptoms and history is most consistent with an IBS. Reviewed diagnosis and management of IBS. Recommended working with nutritionist/dietitican to determine food triggers and to assist in dietary changes. Discussed testing for celiac serology at this time, patient wishes to defer.     Recommended omeprazole 40mg daily for recent heartburn. Discussed long term side effects of medication as well. Advised to avoid NSAIDs as this could be trigger/cause.     Follow up as needed.     Other irritable bowel syndrome  Family history of celiac disease  Heartburn  - omeprazole (PRILOSEC) 40 MG DR capsule  Dispense: 90 capsule; Refill: 1      Thank you for this consultation.  It was a pleasure to participate in the care of this patient; please contact us with any further questions.     This note was created with voice recognition software, and while reviewed for accuracy, typos may remain.     Silvio Simms PA-C  Gastroenterology  [facility]

## 2020-02-24 DIAGNOSIS — F33.1 MODERATE EPISODE OF RECURRENT MAJOR DEPRESSIVE DISORDER (H): ICD-10-CM

## 2020-02-24 DIAGNOSIS — F42.9 OBSESSIVE-COMPULSIVE DISORDER, UNSPECIFIED TYPE: ICD-10-CM

## 2020-02-25 RX ORDER — SERTRALINE HYDROCHLORIDE 25 MG/1
25 TABLET, FILM COATED ORAL DAILY
Qty: 90 TABLET | Refills: 0 | OUTPATIENT
Start: 2020-02-25

## 2020-02-25 NOTE — TELEPHONE ENCOUNTER
Pending Prescriptions:                       Disp   Refills    sertraline (ZOLOFT) 25 MG tablet           90 tab*0        Sig: Take 1 tablet (25 mg) by mouth daily    PHQ 5/23/2019 5/28/2019 11/26/2019   PHQ-9 Total Score 13 7 7   Q9: Thoughts of better off dead/self-harm past 2 weeks Not at all Not at all Not at all       Routing refill request to provider for review/approval because:  Labs out of range:  PHQ9    Nini Russo, MSN, RN

## 2020-03-25 ENCOUNTER — MYC MEDICAL ADVICE (OUTPATIENT)
Dept: FAMILY MEDICINE | Facility: OTHER | Age: 45
End: 2020-03-25

## 2020-03-31 ENCOUNTER — MYC MEDICAL ADVICE (OUTPATIENT)
Dept: ALLERGY | Facility: OTHER | Age: 45
End: 2020-03-31

## 2020-04-01 NOTE — TELEPHONE ENCOUNTER
Responded to patient's [employer] message, recommended telephone appointment.      Maria L Rodriguez RN

## 2020-04-02 VITALS — BODY MASS INDEX: 30.9 KG/M2 | HEIGHT: 64 IN | WEIGHT: 181 LBS

## 2020-04-02 ASSESSMENT — MIFFLIN-ST. JEOR: SCORE: 1453.07

## 2020-04-02 NOTE — PROGRESS NOTES
"Avis Avalos is a 44 year old female who is being evaluated via a billable video visit.      The patient has been notified of following:     \"This video visit will be conducted via a call between you and your physician/provider. We have found that certain health care needs can be provided without the need for an in-person physical exam.  This service lets us provide the care you need with a video conversation.  If a prescription is necessary we can send it directly to your pharmacy.  If lab work is needed we can place an order for that and you can then stop by our lab to have the test done at a later time.    If during the course of the call the physician/provider feels a video visit is not appropriate, you will not be charged for this service.\"     Patient has given verbal consent for Video visit? Yes    Patient would like the video invitation sent by: Send to e-mail at:   [email or web address]    Video Start Time: 0932    Avis Avalos complains of shortness of breath.  The patient reports that recently with quick burst of activity such as running or walking up and down stairs she will develop wheezing, shortness of breath and tightness in chest.  Symptoms will last minutes and then resolved.  She is having symptoms greater than 10 times per week.  She does not have albuterol.  She reports her symptoms isolated to her chest as opposed to her throat.  She has no other triggers of symptoms including infections, cats, dogs, dust, smoke, perfumes.  No prior diagnosis of asthma.  She is a non-smoker.  She does have allergic nasal disease with symptoms in the fall.  She underwent allergy testing 2017 which was positive for weeds.  She is using Zyrtec as needed and Flonase as needed.    Patient reports that reproducibly after consuming peanut, almond (she does not eat other tree nuts), sunflower seeds, palm oil, and sometimes milk she will immediately developed abdominal bloating, distention.  Symptoms will last under " 24 hours.  She will feel fatigued.  She has had diarrhea immediately after palm oil but not the other foods.    I have reviewed and updated the patient's Past Medical History, Social History, Family History and Medication List.    ALLERGIES  Fructose; Ragweeds; Sorbitol; and Liquid adhesive    Constitutional:  Appears well-developed and well-nourished. No distress.   HEENT:   Head: Normocephalic.   Neuro: Oriented to person, place, and time.  Skin: Skin is warm and dry. No rash noted.   Psychiatric: Normal mood and affect.     Nursing note and vitals reviewed.      ASSESSMENT/PLAN:  Problem List Items Addressed This Visit        Respiratory    Chronic seasonal allergic rhinitis due to pollen     Fall symptoms. Currently asymptomatic.  Flonase used intermittently.  Cetirizine has been beneficial.  Over-the-counter ocular antihistamine has been beneficial.  Allergy testing was positive for weeds.     - Flonase 2 sprays/nostril daily if needed.   - Zyrtec, Allegra or Claritin daily as needed for allergy symptoms.   - Ketotifen 1 drop/eye twice daily as needed for allergy symptoms.            Relevant Medications    fluticasone-vilanterol (BREO ELLIPTA) 100-25 MCG/INH inhaler    albuterol (PROAIR HFA/PROVENTIL HFA/VENTOLIN HFA) 108 (90 Base) MCG/ACT inhaler    Shortness of breath - Primary     Shortness of breath with intense activity.  No chest symptoms at any other time.  Symptoms occur immediately and resolve within minutes.  No cardiac evaluation.  Symptoms worsened with weight gain.  Symptoms additionally include wheezing and tightness in chest.  Having symptoms greater than 10 times per week.    - Trial of Breo 100/25 mcg 1 puff inhaled daily.  Due for the next month.  -Albuterol 2-4 puffs inhaled (use a spacer unless using a Proair Respiclick device) every 4 hours as needed for chest tightness, wheezing, shortness of breath and/or coughing.   -Albuterol 2-4 puffs inhaled (use spacer if not using Proair Respiclick  device) 15-20 minutes prior to physical activity.     Please ensure warm up period prior to exercise.   - In the future will obtain either spirometry, exercise spirometry or complete pulmonary function studies.  Could not do today given pandemic and concerns with spirometry.  If normal pulmonary work-up could consider cardiac evaluation.         Relevant Medications    fluticasone-vilanterol (BREO ELLIPTA) 100-25 MCG/INH inhaler    albuterol (PROAIR HFA/PROVENTIL HFA/VENTOLIN HFA) 108 (90 Base) MCG/ACT inhaler       Musculoskeletal and Integumentary    Abdominal bloating with cramps     Abdominal bloating and cramping after consumption of peanut, tree nut, sunflower seed, palm oil.  Seen by GI and unremarkable evaluation.  No other IgE mediated symptoms. Symptoms last 24 hours.     -Return to clinic for allergy skin testing for these foods.  Additionally could do blood testing.  Unable to proceed forward at this time given the endemic.  She will continue to avoid these foods until testing can be completed.               Chart documentation with Dragon Voice recognition Software. Although reviewed after completion, some words and grammatical errors may remain.    I have reviewed the note as documented above.  This accurately captures the substance of my conversation with the patient.    Phone call contact time  Call Started at 0932  Call Ended at 0954    Gagandeep Lafleur DO FAAAAI  Allergy/Immunology  [place], [place]        Video-Visit Details    Type of service:  Video Visit    Video End Time (time video stopped): 0954    Originating Location (pt. Location): Home    Distant Location (provider location):  [facility]     Mode of Communication:  Video Conference via [employer]

## 2020-04-03 ENCOUNTER — VIRTUAL VISIT (OUTPATIENT)
Dept: ALLERGY | Facility: CLINIC | Age: 45
End: 2020-04-03
Payer: COMMERCIAL

## 2020-04-03 DIAGNOSIS — R10.9 ABDOMINAL BLOATING WITH CRAMPS: ICD-10-CM

## 2020-04-03 DIAGNOSIS — J30.1 CHRONIC SEASONAL ALLERGIC RHINITIS DUE TO POLLEN: ICD-10-CM

## 2020-04-03 DIAGNOSIS — R06.02 SHORTNESS OF BREATH: Primary | ICD-10-CM

## 2020-04-03 DIAGNOSIS — R14.0 ABDOMINAL BLOATING WITH CRAMPS: ICD-10-CM

## 2020-04-03 PROCEDURE — 99214 OFFICE O/P EST MOD 30 MIN: CPT | Mod: GT | Performed by: ALLERGY & IMMUNOLOGY

## 2020-04-03 RX ORDER — ALBUTEROL SULFATE 90 UG/1
2 AEROSOL, METERED RESPIRATORY (INHALATION) EVERY 4 HOURS PRN
Qty: 2 INHALER | Refills: 3 | Status: SHIPPED | OUTPATIENT
Start: 2020-04-03 | End: 2020-11-24

## 2020-04-03 NOTE — ASSESSMENT & PLAN NOTE
Shortness of breath with intense activity.  No chest symptoms at any other time.  Symptoms occur immediately and resolve within minutes.  No cardiac evaluation.  Symptoms worsened with weight gain.  Symptoms additionally include wheezing and tightness in chest.  Having symptoms greater than 10 times per week.    - Trial of Breo 100/25 mcg 1 puff inhaled daily.  Due for the next month.  -Albuterol 2-4 puffs inhaled (use a spacer unless using a Proair Respiclick device) every 4 hours as needed for chest tightness, wheezing, shortness of breath and/or coughing.   -Albuterol 2-4 puffs inhaled (use spacer if not using Proair Respiclick device) 15-20 minutes prior to physical activity.     Please ensure warm up period prior to exercise.   - In the future will obtain either spirometry, exercise spirometry or complete pulmonary function studies.  Could not do today given pandemic and concerns with spirometry.  If normal pulmonary work-up could consider cardiac evaluation.

## 2020-04-03 NOTE — PATIENT INSTRUCTIONS
Allergy Staff Appt Hours Shot Hours Locations    Physician     Gagandeep Lafleur DO       Support Staff     SHIVA Moore, [facility]  Tuesday:        [place] 7-4:20     Wednesday:        [place]: 7-5     Thursday:                    [place] 7-6:40     Friday:  [place]  7-2:40   [place]        Thursday: 1-5:50        Friday: 7-10:50     [place]        Tuesday: 7- 3:20        Wednesday: 7-4:20     [place] Monday: 7-4:20        Tuesday: 1-6:20         [facility]  [address] [place] [address]  Appt Line: [phone]  Allergy RN:  [phone]    [facility]  [address] [place] [address]  Appt Line: [phone]  Allergy RN:  [phone]       Important Scheduling Information  Aspirin Desensitization: Appt will last 2 clinic days. Please call the Allergy RN line for your clinic to schedule. Discontinue antihistamines 7 days prior to the appointment.     Food Challenges: Appt will last 3-4 hours. Please call the Allergy RN line for your clinic to schedule. Discontinue antihistamines 7 days prior to the appointment.     Penicillin Testing: Appt will last 2-3 hours. Please call the Allergy RN line for your clinic to schedule. Discontinue antihistamines 7 days prior to the appointment.     Skin Testing: Appt will about 40 minutes. Call the appointment line for your clinic to schedule. Discontinue antihistamines 7 days prior to the appointment.     Venom Testing: Appt will last 2-3 hours. Please call the Allergy RN line for your clinic to schedule. Discontinue antihistamines 7 days prior to the appointment.     Thank you for trusting us with your Allergy, Asthma, and Immunology care. Please feel free to contact us with any questions or concerns you may have.      - Zyrtec as needed.   - Flonase as needed.   - Breo 100/25mcg 1 puff daily.   - Albuterol 2-4 puffs inhaled (use a spacer unless using a Proair Respiclick device) every 4 hours as needed for chest tightness,  wheezing, shortness of breath and/or coughing.   - Albuterol 2-4 puffs inhaled (use spacer if not using Proair Respiclick device) 15-20 minutes prior to physical activity. Please ensure warm up period prior to exercise.    - Return to clinic for food allergy evaluation in summer of 2020. Avoid peanut, almond, sunflower, palm oil and milk products until that time.   - Return in 4 weeks.

## 2020-04-03 NOTE — ASSESSMENT & PLAN NOTE
Fall symptoms. Currently asymptomatic.  Flonase used intermittently.  Cetirizine has been beneficial.  Over-the-counter ocular antihistamine has been beneficial.  Allergy testing was positive for weeds.     - Flonase 2 sprays/nostril daily if needed.   - Zyrtec, Allegra or Claritin daily as needed for allergy symptoms.   - Ketotifen 1 drop/eye twice daily as needed for allergy symptoms.

## 2020-04-03 NOTE — ASSESSMENT & PLAN NOTE
Abdominal bloating and cramping after consumption of peanut, tree nut, sunflower seed, palm oil.  Seen by GI and unremarkable evaluation.  No other IgE mediated symptoms. Symptoms last 24 hours.     -Return to clinic for allergy skin testing for these foods.  Additionally could do blood testing.  Unable to proceed forward at this time given the endemic.  She will continue to avoid these foods until testing can be completed.

## 2020-04-27 ENCOUNTER — VIRTUAL VISIT (OUTPATIENT)
Dept: FAMILY MEDICINE | Facility: OTHER | Age: 45
End: 2020-04-27
Payer: COMMERCIAL

## 2020-04-27 DIAGNOSIS — F33.1 MODERATE EPISODE OF RECURRENT MAJOR DEPRESSIVE DISORDER (H): ICD-10-CM

## 2020-04-27 DIAGNOSIS — F42.9 OBSESSIVE-COMPULSIVE DISORDER, UNSPECIFIED TYPE: Primary | ICD-10-CM

## 2020-04-27 DIAGNOSIS — B00.9 HERPES SIMPLEX TYPE 2 INFECTION: ICD-10-CM

## 2020-04-27 PROCEDURE — 99214 OFFICE O/P EST MOD 30 MIN: CPT | Mod: TEL | Performed by: STUDENT IN AN ORGANIZED HEALTH CARE EDUCATION/TRAINING PROGRAM

## 2020-04-27 PROCEDURE — 96127 BRIEF EMOTIONAL/BEHAV ASSMT: CPT | Mod: TEL | Performed by: STUDENT IN AN ORGANIZED HEALTH CARE EDUCATION/TRAINING PROGRAM

## 2020-04-27 RX ORDER — BUPROPION HYDROCHLORIDE 150 MG/1
TABLET ORAL
Qty: 90 TABLET | Refills: 3 | Status: SHIPPED | OUTPATIENT
Start: 2020-04-27 | End: 2021-01-11

## 2020-04-27 RX ORDER — VALACYCLOVIR HYDROCHLORIDE 1 G/1
TABLET, FILM COATED ORAL
Qty: 21 TABLET | Refills: 0 | Status: SHIPPED | OUTPATIENT
Start: 2020-04-27 | End: 2020-11-24

## 2020-04-27 ASSESSMENT — ANXIETY QUESTIONNAIRES
GAD7 TOTAL SCORE: 2
7. FEELING AFRAID AS IF SOMETHING AWFUL MIGHT HAPPEN: NOT AT ALL
3. WORRYING TOO MUCH ABOUT DIFFERENT THINGS: NOT AT ALL
6. BECOMING EASILY ANNOYED OR IRRITABLE: SEVERAL DAYS
2. NOT BEING ABLE TO STOP OR CONTROL WORRYING: NOT AT ALL
IF YOU CHECKED OFF ANY PROBLEMS ON THIS QUESTIONNAIRE, HOW DIFFICULT HAVE THESE PROBLEMS MADE IT FOR YOU TO DO YOUR WORK, TAKE CARE OF THINGS AT HOME, OR GET ALONG WITH OTHER PEOPLE: NOT DIFFICULT AT ALL
5. BEING SO RESTLESS THAT IT IS HARD TO SIT STILL: SEVERAL DAYS
1. FEELING NERVOUS, ANXIOUS, OR ON EDGE: NOT AT ALL

## 2020-04-27 ASSESSMENT — PATIENT HEALTH QUESTIONNAIRE - PHQ9
SUM OF ALL RESPONSES TO PHQ QUESTIONS 1-9: 1
5. POOR APPETITE OR OVEREATING: NOT AT ALL

## 2020-04-28 ASSESSMENT — ANXIETY QUESTIONNAIRES: GAD7 TOTAL SCORE: 2

## 2020-05-19 DIAGNOSIS — R06.02 SHORTNESS OF BREATH: Primary | ICD-10-CM

## 2020-05-19 RX ORDER — FLUTICASONE PROPIONATE AND SALMETEROL 232; 14 UG/1; UG/1
1 POWDER, METERED RESPIRATORY (INHALATION) 2 TIMES DAILY
Qty: 1 INHALER | Refills: 3 | Status: SHIPPED | OUTPATIENT
Start: 2020-05-19 | End: 2020-09-04

## 2020-05-19 RX ORDER — MONTELUKAST SODIUM 10 MG/1
10 TABLET ORAL AT BEDTIME
Qty: 30 TABLET | Refills: 3 | Status: SHIPPED | OUTPATIENT
Start: 2020-05-19 | End: 2020-09-04

## 2020-05-19 NOTE — PROGRESS NOTES
Spoke with patient.  Appointment scheduled for follow up.  She will call back to schedule Xray.  Wondering if there is another option besides Breo as it is very expensive and she pays out of pocket for all meds.      RN huddled with provider and he has switched med to AirDuo.  Patient notified.    Maria L Rodriguez RN

## 2020-05-19 NOTE — PROGRESS NOTES
I would like her to start Breo 200/25mcg 1 puff daily and Singulair 10mg daily. Please schedule her on Wednesday or Thursday next week and we will go from there. I will place orders for medications. Stop low dose Breo. Additionally I would like her to get a chest x-ray. Thanks.     Dr. Lafleur   Retention Suture Text: Retention sutures were placed to support the closure and prevent dehiscence.

## 2020-05-20 ENCOUNTER — TELEPHONE (OUTPATIENT)
Dept: FAMILY MEDICINE | Facility: OTHER | Age: 45
End: 2020-05-20

## 2020-05-20 DIAGNOSIS — R06.02 SHORTNESS OF BREATH: ICD-10-CM

## 2020-05-20 PROCEDURE — 71046 X-RAY EXAM CHEST 2 VIEWS: CPT

## 2020-05-20 NOTE — TELEPHONE ENCOUNTER
Patient is on the schedule this afternoon for chest xray due to SOB. Dr. Quiroga gives ok for patient to come in, but she needs west entrance with full PPE precautions.     LM for patient to return call - please advise her to park by the tent on the west side and call [phone] to check in. She should remain in her vehicle until staff comes out to get her.     Annabella Matthews, RN, BSN

## 2020-05-28 ENCOUNTER — VIRTUAL VISIT (OUTPATIENT)
Dept: ALLERGY | Facility: CLINIC | Age: 45
End: 2020-05-28
Payer: COMMERCIAL

## 2020-05-28 DIAGNOSIS — R14.0 ABDOMINAL BLOATING WITH CRAMPS: ICD-10-CM

## 2020-05-28 DIAGNOSIS — R10.9 ABDOMINAL BLOATING WITH CRAMPS: ICD-10-CM

## 2020-05-28 DIAGNOSIS — R06.02 SHORTNESS OF BREATH: ICD-10-CM

## 2020-05-28 PROCEDURE — 99214 OFFICE O/P EST MOD 30 MIN: CPT | Mod: GT | Performed by: ALLERGY & IMMUNOLOGY

## 2020-05-28 NOTE — ASSESSMENT & PLAN NOTE
Abdominal bloating and cramping after consumption of peanut, tree nut, sunflower seed, palm oil.  Seen by GI and unremarkable evaluation.  No other IgE mediated symptoms. Symptoms last 24 hours.      -Return to clinic for allergy skin testing for these foods.  Additionally could do blood testing.  Unable to proceed forward at this time given the endemic.  She will continue to avoid these foods until testing can be completed.  - Will obtain serum tryptase when returns.

## 2020-05-28 NOTE — ASSESSMENT & PLAN NOTE
Shortness of breath with intense activity.  No chest symptoms at any other time.  Symptoms occur immediately and resolve within minutes.  No cardiac evaluation.  Symptoms worsened with weight gain.  Tried on Breo 100/25mcg 1 puff daily and not helpful.   Having symptoms greater than 10 times per week.     - AirDuo 232/14mcg 1 puff twice daily.   - Singulair 10mg by mouth daily at night.   - Speech therapy referral.   - Follow up with primary care for cardiac evaluation.   -Albuterol 2-4 puffs inhaled (use a spacer unless using a Proair Respiclick device) every 4 hours as needed for chest tightness, wheezing, shortness of breath and/or coughing.   -Albuterol 2-4 puffs inhaled (use spacer if not using Proair Respiclick device) 15-20 minutes prior to physical activity.      Please ensure warm up period prior to exercise.

## 2020-05-28 NOTE — PROGRESS NOTES
"Avis Avalos is a 44 year old female who is being evaluated via a billable video visit.      The patient has been notified of following:      \"This video visit will be conducted via a call between you and your physician/provider. We have found that certain health care needs can be provided without the need for an in-person physical exam.  This service lets us provide the care you need with a video conversation.  If a prescription is necessary we can send it directly to your pharmacy.  If lab work is needed we can place an order for that and you can then stop by our lab to have the test done at a later time.     If during the course of the call the physician/provider feels a video visit is not appropriate, you will not be charged for this service.\"    Patient has given verbal consent for Video visit? Yes     Patient would like the video invitation sent by: email to [email or web address]    I have reviewed and updated the patient's Past Medical History, Social History, Family History and Medication List.    Breo 100/25mcg 1 puff daily non helpful. Symptoms with quick activity including fatigue, heart racing, heavy breathing, incomplete air intake. No wheezing or coughing. Resolves quickly.  Albuterol of unclear benefit. No history of VCD evaluation. No cardiac evaluation. I prescribed high dose AirDuo and Singulair. She did not start these medications. She continues to have bloating and cramping after eating peanut, tree nuts, sunflower seeds and palm oil.     Past Medical History:   Diagnosis Date     Crohn disease (H)      Depression     1-2 years ago     Fructose intolerance      High risk HPV infection 4/14/10    normal pap, + HR HPV (59). normal paps since     PONV (postoperative nausea and vomiting)     after colonoscopy     Family History   Problem Relation Age of Onset     Diabetes Mother         borderline     Depression Mother      Gastrointestinal Disease Mother         IBS     Thyroid Disease Mother         " hypothyroidism     Diabetes Father         NIDDM - Type II     Diabetes Maternal Grandmother      Arthritis Maternal Grandfather      Heart Disease Paternal Grandfather      Thyroid Disease Sister         hypothyroidism     Rheumatoid Arthritis Sister      Other Cancer Sister      Asthma Other         niece     Obesity Sister         half-sister/had gastric bypass     Past Surgical History:   Procedure Laterality Date     C APPENDECTOMY           C/SECTION, LOW TRANSVERSE  02/17/10    , Low Transverse      SECTION  2012    Procedure:  SECTION;   SECTION ;  Surgeon: Yehuda Cruz MD;  [facility]     COLONOSCOPY  2011    Procedure:COMBINED COLONOSCOPY, SINGLE BIOPSY/POLYPECTOMY BY BIOPSY; Surgeon:MARTIN MAO; Location:[facility]     COLONOSCOPY  2011    Procedure:COMBINED COLONOSCOPY, SINGLE BIOPSY/POLYPECTOMY BY BIOPSY; Colonoscopy biopsy of distal iiluem, colonoscopy removal of colon polyp with fulguration, colonoscopy and control of biopsy bleed with placement of resolution clip; Surgeon:TESS POLLARD; Location:[facility]     COLONOSCOPY  2011    Procedure:COMBINED COLONOSCOPY, REMOVE TUMOR/POLYP/LESION BY FULGURATION/HOT BIOPSY; Surgeon:TESS POLLARD; Location:[facility]     COLONOSCOPY  2011    Procedure:COMBINED COLONOSCOPY, CONTROL BLEED; Surgeon:TESS POLLARD; Location:[facility]     COLONOSCOPY N/A 2017    Procedure: COMBINED COLONOSCOPY, SINGLE OR MULTIPLE BIOPSY/POLYPECTOMY BY BIOPSY;;  Surgeon: Josué Hawk MD;  Location: [facility]     HC FLEX SIGMOIDOSCOPY W/WO MIKY SPEC BY BRUSH/WASH  09    [facility]     HC TOOTH EXTRACTION W/FORCEP       LAPAROSCOPIC CYSTECTOMY OVARIAN (BENIGN) Right 10/21/2019    Procedure: Laparoscopic cystectomy right ovarian (benign);  Surgeon: Yehuda Cruz MD;  Location: [facility]     LAPAROSCOPIC LYSIS ADHESIONS N/A 10/21/2019    Procedure: LYSIS, ADHESIONS, LAPAROSCOPIC;   Surgeon: Yehuda Cruz MD;  Location: [facility]     VIDEO CAPSULE ENDOSCOPY  07/30/09    [facility]       REVIEW OF SYSTEMS:  General: negative for weight gain. negative for weight loss. negative for changes in sleep.   Ears: negative for fullness. negative for hearing loss. negative for dizziness.   Nose: negative for snoring.negative for changes in smell. negative for drainage.   Eyes: negative for eye watering. negative for eye itching. negative for vision changes. negative for eye redness.  Throat: negative for hoarseness. negative for sore throat. negative for trouble swallowing.   Lungs: positive  for shortness of breath.negative for wheezing. negative for sputum production.   Cardiovascular: negative for chest pain. negative for swelling of ankles. negative for fast or irregular heartbeat.   Gastrointestinal: negative for nausea. negative for heartburn. negative for acid reflux.   Musculoskeletal: negative for joint pain. negative for joint stiffness. negative for joint swelling.   Neurologic: negative for seizures. negative for fainting. negative for weakness.   Psychiatric: negative for changes in mood. negative for anxiety.   Endocrine: negative for cold intolerance. negative for heat intolerance. negative for tremors.   Lymphatic: negative for lower extremity swelling. negative for lymph node swelling.   Hematologic: negative for easy bruising. negative for easy bleeding.  Integumentary: negative for rash. negative for scaling. negative for nail changes.       Current Outpatient Medications:      Acetaminophen (TYLENOL PO), , Disp: , Rfl:      albuterol (PROAIR HFA/PROVENTIL HFA/VENTOLIN HFA) 108 (90 Base) MCG/ACT inhaler, Inhale 2 puffs into the lungs every 4 hours as needed for shortness of breath / dyspnea or wheezing, Disp: 2 Inhaler, Rfl: 3     buPROPion (WELLBUTRIN XL) 150 MG 24 hr tablet, TAKE 1 TABLET BY MOUTH EVERY DAY IN THE MORNING, Disp: 90 tablet, Rfl: 3     clobetasol (TEMOVATE) 0.05 %  external cream, Apply topically 2 times daily, Disp: 60 g, Rfl: 1     DiphenhydrAMINE HCl (BENADRYL PO), , Disp: , Rfl:      fluconazole (DIFLUCAN) 150 MG tablet, Take at the first sign of a yeast infection and again 3 days later., Disp: 1 tablet, Rfl: 0     fluticasone (FLONASE) 50 MCG/ACT spray, Spray 1-2 sprays into both nostrils daily, Disp: 1 Bottle, Rfl: 1     ibuprofen (ADVIL/MOTRIN) 600 MG tablet, Take 1 tablet (600 mg) by mouth every 6 hours as needed for other (mild and/or inflammatory pain), Disp: 30 tablet, Rfl: 0     loratadine (CLARITIN) 10 MG tablet, Take 10 mg by mouth daily, Disp: , Rfl:      montelukast (SINGULAIR) 10 MG tablet, Take 1 tablet (10 mg) by mouth At Bedtime, Disp: 30 tablet, Rfl: 3     Multiple Vitamins-Calcium (ONE-A-DAY WOMENS PO), , Disp: , Rfl:      Omega-3 Fatty Acids (OMEGA 3 PO), , Disp: , Rfl:      omeprazole (PRILOSEC) 40 MG DR capsule, Take 1 capsule (40 mg) by mouth every morning On an empty stomach about 30 minutes before breakfast, Disp: 90 capsule, Rfl: 1     Probiotic Product (PROBIOTIC DAILY PO), , Disp: , Rfl:      senna-docusate (SENOKOT-S/PERICOLACE) 8.6-50 MG tablet, Take 1-2 tablets by mouth daily as needed for constipation, Disp: 30 tablet, Rfl: 3     UNABLE TO FIND, MEDICATION NAME: Eat anything Rx 1-2 times per week, Disp: , Rfl:      valACYclovir (VALTREX) 1000 mg tablet, TAKE ONE TABLET BY MOUTH DAILY AS NEEDED, Disp: 21 tablet, Rfl: 0     fluticasone-salmeterol (AIRDUO RESPICLICK) 232-14 MCG/ACT inhaler, Inhale 1 puff into the lungs 2 times daily (Patient not taking: Reported on 5/28/2020), Disp: 1 Inhaler, Rfl: 3  Immunization History   Administered Date(s) Administered     Influenza (H1N1) 11/10/2009     Influenza (IIV3) PF 02/22/2012, 09/27/2012     Influenza Vaccine IM > 6 months Valent IIV4 02/18/2014, 09/26/2019     TD (ADULT, 7+) 04/14/2008     TDAP Vaccine (Adacel) 06/13/2011     Allergies   Allergen Reactions     Fructose GI Disturbance     Ragweeds       Sorbitol Diarrhea     Liquid Adhesive Rash         EXAM:   Constitutional:  Appears well-developed and well-nourished. No distress.   HEENT:   Head: Normocephalic.   Eyes: Conjunctivae are non-erythematous   Neuro: Oriented to person, place, and time.  Skin: Skin is warm and dry. No rash noted.   Psychiatric: Normal mood and affect.     Nursing note and vitals reviewed.    ASSESSMENT/PLAN:  Problem List Items Addressed This Visit        Respiratory    Shortness of breath     Shortness of breath with intense activity.  No chest symptoms at any other time.  Symptoms occur immediately and resolve within minutes.  No cardiac evaluation.  Symptoms worsened with weight gain.  Tried on Breo 100/25mcg 1 puff daily and not helpful.   Having symptoms greater than 10 times per week.     - AirDuo 232/14mcg 1 puff twice daily.   - Singulair 10mg by mouth daily at night.   - Speech therapy referral.   - Follow up with primary care for cardiac evaluation.   -Albuterol 2-4 puffs inhaled (use a spacer unless using a Proair Respiclick device) every 4 hours as needed for chest tightness, wheezing, shortness of breath and/or coughing.   -Albuterol 2-4 puffs inhaled (use spacer if not using Proair Respiclick device) 15-20 minutes prior to physical activity.      Please ensure warm up period prior to exercise.          Relevant Orders    SPEECH THERAPY REFERRAL       Musculoskeletal and Integumentary    Abdominal bloating with cramps     Abdominal bloating and cramping after consumption of peanut, tree nut, sunflower seed, palm oil.  Seen by GI and unremarkable evaluation.  No other IgE mediated symptoms. Symptoms last 24 hours.      -Return to clinic for allergy skin testing for these foods.  Additionally could do blood testing.  Unable to proceed forward at this time given the endemic.  She will continue to avoid these foods until testing can be completed.  - Will obtain serum tryptase when returns.                Chart  documentation with Dragon Voice recognition Software. Although reviewed after completion, some words and grammatical errors may remain.    I have reviewed the note as documented above.  This accurately captures the substance of my conversation with the patient.    Video visit contact time  Video visit started at 1000  Video visit ended at 1022    Video-Visit Details     Type of service:  Video Visit     Originating Location (pt. Location): Home     Distant Location (provider location):  [facility]      Mode of Communication:  Video Conference via [facility]    DO ANJU Anna  Allergy/Immunology  [place], [place]

## 2020-05-28 NOTE — PATIENT INSTRUCTIONS
Allergy Staff Appt Hours Shot Hours Locations    Physician     Gagandeep Lafleur DO       Support Staff     SHIVA Moore, [facility]  Tuesday:        [place] 7-4:20     Wednesday:        [place]: 7-5     Thursday:                    [place] 7-6:40     Friday:  [place]  7-2:40   [place]        Thursday: 1-5:50        Friday: 7-10:50     [place]        Tuesday: 7- 3:20        Wednesday: 7-4:20     [place] Monday: 7-4:20        Tuesday: 1-6:20         [facility]  [address] [place] [address]  Appt Line: [phone]  Allergy RN:  [phone]    [facility] ([facility]  [address] [place] [address]  Appt Line: [phone]  Allergy RN:  [phone]       Important Scheduling Information  Aspirin Desensitization: Appt will last 2 clinic days. Please call the Allergy RN line for your clinic to schedule. Discontinue antihistamines 7 days prior to the appointment.     Food Challenges: Appt will last 3-4 hours. Please call the Allergy RN line for your clinic to schedule. Discontinue antihistamines 7 days prior to the appointment.     Penicillin Testing: Appt will last 2-3 hours. Please call the Allergy RN line for your clinic to schedule. Discontinue antihistamines 7 days prior to the appointment.     Skin Testing: Appt will about 40 minutes. Call the appointment line for your clinic to schedule. Discontinue antihistamines 7 days prior to the appointment.     Venom Testing: Appt will last 2-3 hours. Please call the Allergy RN line for your clinic to schedule. Discontinue antihistamines 7 days prior to the appointment.     Thank you for trusting us with your Allergy, Asthma, and Immunology care. Please feel free to contact us with any questions or concerns you may have.      - AirDuo 232/14mcg 1 puff twice daily.   - Singulair 10mg by mouth daily at night.   - Speech therapy referral.   - Follow with primary for cardiac evaluation.    [facility] voice clinic referral  [phone]  [facility], ENT Clinic, 4th floor  [address] [place] [address]

## 2020-05-28 NOTE — LETTER
"    5/28/2020         RE: Avis Avalos  [address] [facility] [address]        Dear Colleague,    Thank you for referring your patient, Avis Avalos, to the [facility]. Please see a copy of my visit note below.    Avis Avalos is a 44 year old female who is being evaluated via a billable video visit.      The patient has been notified of following:      \"This video visit will be conducted via a call between you and your physician/provider. We have found that certain health care needs can be provided without the need for an in-person physical exam.  This service lets us provide the care you need with a video conversation.  If a prescription is necessary we can send it directly to your pharmacy.  If lab work is needed we can place an order for that and you can then stop by our lab to have the test done at a later time.     If during the course of the call the physician/provider feels a video visit is not appropriate, you will not be charged for this service.\"    Patient has given verbal consent for Video visit? Yes     Patient would like the video invitation sent by: email to [email or web address]    I have reviewed and updated the patient's Past Medical History, Social History, Family History and Medication List.    Breo 100/25mcg 1 puff daily non helpful. Symptoms with quick activity including fatigue, heart racing, heavy breathing, incomplete air intake. No wheezing or coughing. Resolves quickly.  Albuterol of unclear benefit. No history of VCD evaluation. No cardiac evaluation. I prescribed high dose AirDuo and Singulair. She did not start these medications. She continues to have bloating and cramping after eating peanut, tree nuts, sunflower seeds and palm oil.     Past Medical History:   Diagnosis Date     Crohn disease (H)      Depression     1-2 years ago     Fructose intolerance      High risk HPV infection 4/14/10    normal pap, + HR HPV (59). normal paps since     PONV " (postoperative nausea and vomiting)     after colonoscopy     Family History   Problem Relation Age of Onset     Diabetes Mother         borderline     Depression Mother      Gastrointestinal Disease Mother         IBS     Thyroid Disease Mother         hypothyroidism     Diabetes Father         NIDDM - Type II     Diabetes Maternal Grandmother      Arthritis Maternal Grandfather      Heart Disease Paternal Grandfather      Thyroid Disease Sister         hypothyroidism     Rheumatoid Arthritis Sister      Other Cancer Sister      Asthma Other         niece     Obesity Sister         half-sister/had gastric bypass     Past Surgical History:   Procedure Laterality Date     C APPENDECTOMY           C/SECTION, LOW TRANSVERSE  02/17/10    , Low Transverse      SECTION  2012    Procedure:  SECTION;   SECTION ;  Surgeon: Yehuda Cruz MD;  Location: [facility]     COLONOSCOPY  2011    Procedure:COMBINED COLONOSCOPY, SINGLE BIOPSY/POLYPECTOMY BY BIOPSY; Surgeon:MARTIN MAO; Location:[facility] GI     COLONOSCOPY  2011    Procedure:COMBINED COLONOSCOPY, SINGLE BIOPSY/POLYPECTOMY BY BIOPSY; Colonoscopy biopsy of distal iiluem, colonoscopy removal of colon polyp with fulguration, colonoscopy and control of biopsy bleed with placement of resolution clip; Surgeon:TESS POLLARD; Location:[facility]     COLONOSCOPY  2011    Procedure:COMBINED COLONOSCOPY, REMOVE TUMOR/POLYP/LESION BY FULGURATION/HOT BIOPSY; Surgeon:TESS POLLARD; Location:[facility]     COLONOSCOPY  2011    Procedure:COMBINED COLONOSCOPY, CONTROL BLEED; Surgeon:TESS POLLARD; Location:[facility]     COLONOSCOPY N/A 2017    Procedure: COMBINED COLONOSCOPY, SINGLE OR MULTIPLE BIOPSY/POLYPECTOMY BY BIOPSY;;  Surgeon: Josué Hawk MD;  Location:  GI     HC FLEX SIGMOIDOSCOPY W/WO MIKY SPEC BY BRUSH/WASH  09    [facility]     HC TOOTH EXTRACTION W/FORCEP       LAPAROSCOPIC  CYSTECTOMY OVARIAN (BENIGN) Right 10/21/2019    Procedure: Laparoscopic cystectomy right ovarian (benign);  Surgeon: Yehuda Cruz MD;  Location: [facility] OR     LAPAROSCOPIC LYSIS ADHESIONS N/A 10/21/2019    Procedure: LYSIS, ADHESIONS, LAPAROSCOPIC;  Surgeon: Yehuda Cruz MD;  Location: [facility] OR     VIDEO CAPSULE ENDOSCOPY  07/30/09    [facility]       REVIEW OF SYSTEMS:  General: negative for weight gain. negative for weight loss. negative for changes in sleep.   Ears: negative for fullness. negative for hearing loss. negative for dizziness.   Nose: negative for snoring.negative for changes in smell. negative for drainage.   Eyes: negative for eye watering. negative for eye itching. negative for vision changes. negative for eye redness.  Throat: negative for hoarseness. negative for sore throat. negative for trouble swallowing.   Lungs: positive  for shortness of breath.negative for wheezing. negative for sputum production.   Cardiovascular: negative for chest pain. negative for swelling of ankles. negative for fast or irregular heartbeat.   Gastrointestinal: negative for nausea. negative for heartburn. negative for acid reflux.   Musculoskeletal: negative for joint pain. negative for joint stiffness. negative for joint swelling.   Neurologic: negative for seizures. negative for fainting. negative for weakness.   Psychiatric: negative for changes in mood. negative for anxiety.   Endocrine: negative for cold intolerance. negative for heat intolerance. negative for tremors.   Lymphatic: negative for lower extremity swelling. negative for lymph node swelling.   Hematologic: negative for easy bruising. negative for easy bleeding.  Integumentary: negative for rash. negative for scaling. negative for nail changes.       Current Outpatient Medications:      Acetaminophen (TYLENOL PO), , Disp: , Rfl:      albuterol (PROAIR HFA/PROVENTIL HFA/VENTOLIN HFA) 108 (90 Base) MCG/ACT inhaler, Inhale 2 puffs into the  lungs every 4 hours as needed for shortness of breath / dyspnea or wheezing, Disp: 2 Inhaler, Rfl: 3     buPROPion (WELLBUTRIN XL) 150 MG 24 hr tablet, TAKE 1 TABLET BY MOUTH EVERY DAY IN THE MORNING, Disp: 90 tablet, Rfl: 3     clobetasol (TEMOVATE) 0.05 % external cream, Apply topically 2 times daily, Disp: 60 g, Rfl: 1     DiphenhydrAMINE HCl (BENADRYL PO), , Disp: , Rfl:      fluconazole (DIFLUCAN) 150 MG tablet, Take at the first sign of a yeast infection and again 3 days later., Disp: 1 tablet, Rfl: 0     fluticasone (FLONASE) 50 MCG/ACT spray, Spray 1-2 sprays into both nostrils daily, Disp: 1 Bottle, Rfl: 1     ibuprofen (ADVIL/MOTRIN) 600 MG tablet, Take 1 tablet (600 mg) by mouth every 6 hours as needed for other (mild and/or inflammatory pain), Disp: 30 tablet, Rfl: 0     loratadine (CLARITIN) 10 MG tablet, Take 10 mg by mouth daily, Disp: , Rfl:      montelukast (SINGULAIR) 10 MG tablet, Take 1 tablet (10 mg) by mouth At Bedtime, Disp: 30 tablet, Rfl: 3     Multiple Vitamins-Calcium (ONE-A-DAY WOMENS PO), , Disp: , Rfl:      Omega-3 Fatty Acids (OMEGA 3 PO), , Disp: , Rfl:      omeprazole (PRILOSEC) 40 MG DR capsule, Take 1 capsule (40 mg) by mouth every morning On an empty stomach about 30 minutes before breakfast, Disp: 90 capsule, Rfl: 1     Probiotic Product (PROBIOTIC DAILY PO), , Disp: , Rfl:      senna-docusate (SENOKOT-S/PERICOLACE) 8.6-50 MG tablet, Take 1-2 tablets by mouth daily as needed for constipation, Disp: 30 tablet, Rfl: 3     UNABLE TO FIND, MEDICATION NAME: Eat anything Rx 1-2 times per week, Disp: , Rfl:      valACYclovir (VALTREX) 1000 mg tablet, TAKE ONE TABLET BY MOUTH DAILY AS NEEDED, Disp: 21 tablet, Rfl: 0     fluticasone-salmeterol (AIRDUO RESPICLICK) 232-14 MCG/ACT inhaler, Inhale 1 puff into the lungs 2 times daily (Patient not taking: Reported on 5/28/2020), Disp: 1 Inhaler, Rfl: 3  Immunization History   Administered Date(s) Administered     Influenza (H1N1) 11/10/2009      Influenza (IIV3) PF 02/22/2012, 09/27/2012     Influenza Vaccine IM > 6 months Valent IIV4 02/18/2014, 09/26/2019     TD (ADULT, 7+) 04/14/2008     TDAP Vaccine (Adacel) 06/13/2011     Allergies   Allergen Reactions     Fructose GI Disturbance     Ragweeds      Sorbitol Diarrhea     Liquid Adhesive Rash         EXAM:   Constitutional:  Appears well-developed and well-nourished. No distress.   HEENT:   Head: Normocephalic.   Eyes: Conjunctivae are non-erythematous   Neuro: Oriented to person, place, and time.  Skin: Skin is warm and dry. No rash noted.   Psychiatric: Normal mood and affect.     Nursing note and vitals reviewed.    ASSESSMENT/PLAN:  Problem List Items Addressed This Visit        Respiratory    Shortness of breath     Shortness of breath with intense activity.  No chest symptoms at any other time.  Symptoms occur immediately and resolve within minutes.  No cardiac evaluation.  Symptoms worsened with weight gain.  Tried on Breo 100/25mcg 1 puff daily and not helpful.   Having symptoms greater than 10 times per week.     - AirDuo 232/14mcg 1 puff twice daily.   - Singulair 10mg by mouth daily at night.   - Speech therapy referral.   - Follow up with primary care for cardiac evaluation.   -Albuterol 2-4 puffs inhaled (use a spacer unless using a Proair Respiclick device) every 4 hours as needed for chest tightness, wheezing, shortness of breath and/or coughing.   -Albuterol 2-4 puffs inhaled (use spacer if not using Proair Respiclick device) 15-20 minutes prior to physical activity.      Please ensure warm up period prior to exercise.          Relevant Orders    SPEECH THERAPY REFERRAL       Musculoskeletal and Integumentary    Abdominal bloating with cramps     Abdominal bloating and cramping after consumption of peanut, tree nut, sunflower seed, palm oil.  Seen by GI and unremarkable evaluation.  No other IgE mediated symptoms. Symptoms last 24 hours.      -Return to clinic for allergy skin testing for  these foods.  Additionally could do blood testing.  Unable to proceed forward at this time given the endemic.  She will continue to avoid these foods until testing can be completed.  - Will obtain serum tryptase when returns.                Chart documentation with Dragon Voice recognition Software. Although reviewed after completion, some words and grammatical errors may remain.    I have reviewed the note as documented above.  This accurately captures the substance of my conversation with the patient.    Video visit contact time  Video visit started at 1000  Video visit ended at 1022    Video-Visit Details     Type of service:  Video Visit     Originating Location (pt. Location): Home     Distant Location (provider location):  [facility]      Mode of Communication:  Video Conference via [facility]    Gagandeep Lafleur DO FAAAAI  Allergy/Immunology  [place], [place]      Again, thank you for allowing me to participate in the care of your patient.        Sincerely,        Gagandeep Lafleur DO

## 2020-06-25 ENCOUNTER — VIRTUAL VISIT (OUTPATIENT)
Dept: CARDIOLOGY | Facility: CLINIC | Age: 45
End: 2020-06-25
Payer: COMMERCIAL

## 2020-06-25 DIAGNOSIS — R06.09 DOE (DYSPNEA ON EXERTION): Primary | ICD-10-CM

## 2020-06-25 PROCEDURE — 99204 OFFICE O/P NEW MOD 45 MIN: CPT | Mod: GT | Performed by: INTERNAL MEDICINE

## 2020-06-25 NOTE — PROGRESS NOTES
"The patient has been notified of following:     \"This video visit will be conducted via a call between you and your physician/provider. We have found that certain health care needs can be provided without the need for an in-person physical exam.  This service lets us provide the care you need with a video conversation.  If a prescription is necessary we can send it directly to your pharmacy.  If lab work is needed we can place an order for that and you can then stop by our lab to have the test done at a later time.    Video visits are billed at different rates depending on your insurance coverage.  Please reach out to your insurance provider with any questions.    If during the course of the call the physician/provider feels a video visit is not appropriate, you will not be charged for this service.\"    Patient has given verbal consent for Video visit? Yes    How would you like to obtain your AVS? Cher    Patient would like the video invitation sent by: email to [email or web address]    If can not connect via video you can call her at [phone]    Will anyone else be joining your video visit? No      Blood pressure: 103/73  Pulse: 77  Weight: 170 #      Video-Visit Details    Type of service:  Video Visit    Video call duration 6 minutes, subsequent telephone call duration around 15 minutes.    Originating Location (pt. Location):home    Distant Location (provider location):  [facility]     Platform used for Video Visit: [employer]    Ms Avalos is a pleasant 44-year-old female who is establishing cardiology care.  This was a video visit which in later part was converted into a phone visit due to technical difficulties with video streaming.  Patient has no past cardiac history.  She has been seen by an allergist  and has been referred for cardiology evaluation because of dyspnea on exertion.  Patient tells me that for the last year or so she has noticed that she gets short of " breath with intense physical activity.  For example if she is sprinting while playing with her daughter she can get a little short of breath.  Similarly if she is climbing stairs fast she gets short of breath.  Remarkably she is quite active she exercises 3-5 times a week using elliptical for half an hour covering 3 miles and actually feels quite well doing that.  It is only when she does some thing much more intense even if it is for short duration she feels winded and heart rate racing.  No chest discomfort as such.  Her weights are stable.  No orthopnea.  She smoked about 10 years for about 1 or 2 cigarettes a day but quit about 13 years ago.  Her father had coronary disease and congestive heart failure in late 60s.  She does not have any known diabetes, dyslipidemia or hypertension.    On examination  General patient appears pleasant  Neck visible portion no lumps or JVD noted  Respiratory system respiratory normal, no audible wheezing, to be noted patient tells me sometimes when she does intense physical activity and she gets short of breath she can hear some wheezing.  Neurological alert, oriented  Psych normal affect  Skin no obvious rash  HEENT no pallor    Impression and plan a pleasant 44-year-old female with dyspnea on exertion which remarkably happens only with very intense physical activity as noted above.  No other concerning symptoms like dizziness or chest discomfort with physical activity.  No orthopnea or weight changes.  She does get a little wheezing sometimes with intense physical activity.  I did discuss in detail with patient that heart or lungs can sometimes be responsible for dyspnea on exertion.  The fact that she is able to do elliptical without much issues is very encouraging and somewhat reassuring.  However I do recommend doing exercise stress echocardiogram as but will help us evaluate her resting EKG, her resting echo to look for any valvular abnormalities and based on LV function and  hopefully we can get an RVSP, will also look for any exercise-induced arrhythmia or inducible ischemia.  I am setting up a follow-up in a month with an EVA.  My office is going to update her with the results of the stress echocardiogram and if no significant abnormalities are seen I would recommend the patient sees her primary care physician to evaluate other possibilities of dyspnea on exertion like any underlying lung issues or asthma.    Sukh San MD

## 2020-06-25 NOTE — LETTER
6/25/2020    [facility]  [address] [place] [address]    RE: Avis Blackwellmiguel       Dear Colleague,    I had the pleasure of seeing Avis Avalos in the [facility].      Ms Avalos is a pleasant 44-year-old female who is establishing cardiology care.  This was a video visit which in later part was converted into a phone visit due to technical difficulties with video streaming.  Patient has no past cardiac history.  She has been seen by an allergist  and has been referred for cardiology evaluation because of dyspnea on exertion.  Patient tells me that for the last year or so she has noticed that she gets short of breath with intense physical activity.  For example if she is sprinting while playing with her daughter she can get a little short of breath.  Similarly if she is climbing stairs fast she gets short of breath.  Remarkably she is quite active she exercises 3-5 times a week using elliptical for half an hour covering 3 miles and actually feels quite well doing that.  It is only when she does some thing much more intense even if it is for short duration she feels winded and heart rate racing.  No chest discomfort as such.  Her weights are stable.  No orthopnea.  She smoked about 10 years for about 1 or 2 cigarettes a day but quit about 13 years ago.  Her father had coronary disease and congestive heart failure in late 60s.  She does not have any known diabetes, dyslipidemia or hypertension.    On examination  General patient appears pleasant  Neck visible portion no lumps or JVD noted  Respiratory system respiratory normal, no audible wheezing, to be noted patient tells me sometimes when she does intense physical activity and she gets short of breath she can hear some wheezing.  Neurological alert, oriented  Psych normal affect  Skin no obvious rash  HEENT no pallor    Impression and plan a pleasant 44-year-old female with dyspnea on exertion which  remarkably happens only with very intense physical activity as noted above.  No other concerning symptoms like dizziness or chest discomfort with physical activity.  No orthopnea or weight changes.  She does get a little wheezing sometimes with intense physical activity.  I did discuss in detail with patient that heart or lungs can sometimes be responsible for dyspnea on exertion.  The fact that she is able to do elliptical without much issues is very encouraging and somewhat reassuring.  However I do recommend doing exercise stress echocardiogram as but will help us evaluate her resting EKG, her resting echo to look for any valvular abnormalities and based on LV function and hopefully we can get an RVSP, will also look for any exercise-induced arrhythmia or inducible ischemia.  I am setting up a follow-up in a month with an EVA.  My office is going to update her with the results of the stress echocardiogram and if no significant abnormalities are seen I would recommend the patient sees her primary care physician to evaluate other possibilities of dyspnea on exertion like any underlying lung issues or asthma.      Thank you for allowing me to participate in the care of your patient.    Sincerely,     Sukh San MD     [facility]

## 2020-06-30 ENCOUNTER — HOSPITAL ENCOUNTER (OUTPATIENT)
Dept: MAMMOGRAPHY | Facility: CLINIC | Age: 45
Discharge: HOME | End: 2020-06-30
Attending: STUDENT IN AN ORGANIZED HEALTH CARE EDUCATION/TRAINING PROGRAM | Admitting: STUDENT IN AN ORGANIZED HEALTH CARE EDUCATION/TRAINING PROGRAM
Payer: COMMERCIAL

## 2020-06-30 DIAGNOSIS — Z12.31 SCREENING MAMMOGRAM FOR HIGH-RISK PATIENT: ICD-10-CM

## 2020-06-30 PROCEDURE — 77067 SCR MAMMO BI INCL CAD: CPT

## 2020-07-06 ENCOUNTER — HOSPITAL ENCOUNTER (OUTPATIENT)
Dept: CARDIOLOGY | Facility: CLINIC | Age: 45
Discharge: HOME | End: 2020-07-06
Attending: INTERNAL MEDICINE | Admitting: INTERNAL MEDICINE
Payer: COMMERCIAL

## 2020-07-06 DIAGNOSIS — R06.09 DOE (DYSPNEA ON EXERTION): ICD-10-CM

## 2020-07-06 PROCEDURE — 93350 STRESS TTE ONLY: CPT | Mod: TC

## 2020-07-06 PROCEDURE — 93350 STRESS TTE ONLY: CPT | Mod: 26 | Performed by: INTERNAL MEDICINE

## 2020-07-06 PROCEDURE — 93321 DOPPLER ECHO F-UP/LMTD STD: CPT | Mod: 26 | Performed by: INTERNAL MEDICINE

## 2020-07-06 PROCEDURE — 93325 DOPPLER ECHO COLOR FLOW MAPG: CPT | Mod: 26 | Performed by: INTERNAL MEDICINE

## 2020-07-06 PROCEDURE — 93018 CV STRESS TEST I&R ONLY: CPT | Performed by: INTERNAL MEDICINE

## 2020-07-06 PROCEDURE — 93016 CV STRESS TEST SUPVJ ONLY: CPT | Performed by: INTERNAL MEDICINE

## 2020-07-07 ENCOUNTER — TELEPHONE (OUTPATIENT)
Dept: CARDIOLOGY | Facility: CLINIC | Age: 45
End: 2020-07-07

## 2020-07-07 NOTE — TELEPHONE ENCOUNTER
Left message with patient that there is good news about her stress echo from today that was read as normal. I asked that she call back to discuss.

## 2020-07-08 ENCOUNTER — TELEPHONE (OUTPATIENT)
Dept: CARDIOLOGY | Facility: CLINIC | Age: 45
End: 2020-07-08

## 2020-07-08 NOTE — TELEPHONE ENCOUNTER
Phone call to patient to review normal stress echo done for dyspnea with peak exertion. I told her that all aspects of the study were normal including the TRmax for estimation of pulmonary pressures. Patient stated that she could have gone a bit longer but was stopped because of attainment of max HR. She has a Phone visit with Karl on 7/21. All questions were answered and she was pleased with the results. At this point she would like to keep the 7/21 appointment with Karl. I will forward to Dr. San for his review.            Stress  The patient exercised 9:01.  Exercise was stopped due to dyspnea.  There was a normal BP response to exercise.  The patient exhibited no chest pain during exercise.  A treadmill exercise test according to the Bhupendra protocol was performed.  Target Heart Rate was achieved.  Exercise and EKG portion reported separately.  This was a normal stress echocardiogram with no evidence of stress-induced  ischemia.  Normal resting wall motion and no stress-induced wall motion abnormality.  The visual ejection fraction is estimated at 65-70%.     Baseline  Normal left ventricular function and wall motion at rest.     Stress Results             Protocol:  Bhupendra          Maximum Predicted HR:   177 bpm             Target HR: 150 bpm        % Maximum Predicted HR: 92 %                Stage  DurationHeart Rate  BP           Comment                    (mm:ss)   (bpm)            Stage 1   3:00     112    128/74            Stage 2   3:00     131    134/74            Stage 3   3:01     162    158/74RPP 46999; FAC Avg; [facility] 7            Recovery  6:00      90    132/74                        Stress Duration:   9:01 mm:ss *                  Maximum Stress HR: 162 bpm *           METS: 11     Mitral Valve  There is mild (1+) mitral regurgitation.     Tricuspid Valve  The right ventricular systolic pressure is approximated at 16.2 mmHg plus the  right atrial pressure.     Aortic Valve  No hemodynamically  significant valvular aortic stenosis.     _____________________________________________________________________________  __        Doppler Measurements & Calculations     TR max pierre: 201.0 cm/sec  TR max P.2 mmHg

## 2020-07-13 ENCOUNTER — HOSPITAL ENCOUNTER (OUTPATIENT)
Dept: MAMMOGRAPHY | Facility: CLINIC | Age: 45
End: 2020-07-13
Attending: STUDENT IN AN ORGANIZED HEALTH CARE EDUCATION/TRAINING PROGRAM
Payer: COMMERCIAL

## 2020-07-13 ENCOUNTER — HOSPITAL ENCOUNTER (OUTPATIENT)
Dept: ULTRASOUND IMAGING | Facility: CLINIC | Age: 45
End: 2020-07-13
Attending: STUDENT IN AN ORGANIZED HEALTH CARE EDUCATION/TRAINING PROGRAM
Payer: COMMERCIAL

## 2020-07-13 DIAGNOSIS — R92.8 ABNORMAL MAMMOGRAM: ICD-10-CM

## 2020-07-13 PROCEDURE — 77065 DX MAMMO INCL CAD UNI: CPT

## 2020-07-13 PROCEDURE — 76642 ULTRASOUND BREAST LIMITED: CPT | Mod: LT

## 2020-08-06 ENCOUNTER — HOSPITAL ENCOUNTER (OUTPATIENT)
Dept: NUTRITION | Facility: CLINIC | Age: 45
Discharge: HOME | End: 2020-08-06
Admitting: PHYSICIAN ASSISTANT
Payer: COMMERCIAL

## 2020-08-06 PROCEDURE — 97802 MEDICAL NUTRITION INDIV IN: CPT | Mod: 95

## 2020-08-07 NOTE — PROGRESS NOTES
"[facility]  Medical Nutrition Therapy    Visit Type: Initial Assessment    Avis Avalos referred by Silvio Simms for MNT related to IBS    Avis Avalos is a 44 year old female who is being evaluated via a billable video visit.      The patient has been notified of following:     \"This video visit will be conducted via a call between you and your physician/provider. We have found that certain health care needs can be provided without the need for an in-person physical exam.  This service lets us provide the care you need with a video conversation.     Video visits are billed at different rates depending on your insurance coverage.  Please reach out to your insurance provider with any questions.    If during the course of the call the physician/provider feels a video visit is not appropriate, you will not be charged for this service.\"    Patient has given verbal consent for Video visit? Yes    -transitioned from video visit to telephone visit early in appointment due to technical difficulty. Completed full session with telephone visit     Type of service:  Video Visit/Telephone Visit    Originating Location (pt. Location): Home    Distant Location (provider location):  [facility]     Platform used for Video Visit: [employer]-Telephone      Nutrition Assessment:  Anthropometrics  Height: 5' 3.5\"    BMI:  29.6 kg/m2      Weight: 170 lbs (77.3 kg)          IBW: 115 lbs (52.3 kg) IBW %: 148      Weight Hx:   Wt Readings from Last 8 Encounters:   04/02/20 82.1 kg (181 lb)   02/17/20 82.3 kg (181 lb 6.4 oz)   11/26/19 80.3 kg (177 lb)   11/21/19 82.1 kg (181 lb)   10/30/19 82.2 kg (181 lb 3.2 oz)   10/21/19 82.1 kg (181 lb)   10/11/19 82.4 kg (181 lb 11.2 oz)   09/26/19 81.6 kg (180 lb)   -wt stable    Nutrition History     Patient Active Problem List   Diagnosis     Sessile colonic polyp     Constipation, unspecified constipation type     Moderate episode of recurrent major " depressive disorder (H)     Obsessive-compulsive disorder, unspecified type     Chronic seasonal allergic rhinitis due to pollen     Shortness of breath     Abdominal bloating with cramps   -pt referred for IBS symptoms    Labs: reviewed  IGA (mg/dL)   Date Value   06/02/2017 283     Tissue Transglutaminase Antibody IgA (U/mL)   Date Value   06/02/2017     <1  Negative   The tTG-IgA assay has limited utility for patients with decreased levels of   IgA. Screening for celiac disease should include IgA testing to rule out   selective IgA deficiency and to guide selection and interpretation of   serological testing. tTG-IgG testing may be positive in celiac disease patients   with IgA deficiency.       H Pylori Antigen (no units)   Date Value   05/12/2015     Negative for Helicobacter pylori antigen by enzyme immunoassay. A negative   result indicates the absence of H. pylori antigen or that the level of antigen   is below the level of detection.       Med: reviewed  Current Outpatient Medications   Medication     buPROPion (WELLBUTRIN XL)      DiphenhydrAMINE HCl (BENADRYL PO)     fluconazole (DIFLUCAN)      loratadine (CLARITIN)      Multiple Vitamins-Calcium (ONE-A-DAY WOMENS PO)     Omega-3 Fatty Acids (OMEGA 3 PO)     omeprazole (PRILOSEC)      Probiotic Product (PROBIOTIC DAILY PO)     senna-docusate (SENOKOT-S/PERICOLACE)      valACYclovir (VALTREX)    -often uses miralax not senna  -probiotic with meal or GI distress (up and up 25 billion)     Lifestyle:   -stress 2 out of 5, much better due to completion of work project  -sleeps ~7-8 hours per night, some challenges with adequate breat through sinus  -unwinds with exercise, deep breathing, relaxing with family, walks, tv     GI:   -stomach pain and crohns dx in 2001  -stomach pain again in 2007- diagnosed with fructose intolerance 2007 (breath test)  -past 3-4 yrs growing list of food sensitivities and heightened ragweed allergy  -no RUQ pain   -sometimes pain  right after eating with bloating near rib cage  -sometimes delayed pain in pelvic area (this is where fructose ingestion causes issue)   -sometimes pelvic pain also occurs with pain in shoulder blade area  -heartburn seems to be worse (on and off) for past 6 months  -daughter diagnosed with Celiac   -when pt is too hungry: feelings of gas, pain, cramping   -concerns of not having adequate nutrients due to removing foods and food groups (fruits for example)  -in past, would take glucose tab with eating fructose, does not do this as often anymore  -GI symptoms ranked: bloating (often), gas (often), constipation (often; pelvic floor issues also), stomach cramps (occassionaly), diarrhea (rarely), mucus in stools (typical with fructose), extreme tiredness with some foods with renewed energy upon bowel movement  -other symptoms: muscle tension-stiff joints (hypermobility), SOB (R/t asthma and/or GERD), increased environmental allergy-ragweed  -tested with [employer] for food sensitivity but didn't know what to do with data    Physical Activity  -3-5 days per week 30 minutes (typical elliptical)   -some breathing challenges with quick, anaerobic burst of exercise, back pain  -enjoys movement and walking and does well with aerobic exercise      Nutrition Prescription  Energy: 1500 kcal/day         Protein: 70-85 g/day            Fluid: 2000 kcal/day         Fat: find sources that don't cause issue. Cannot be deficient in fat          Carbohydrate: na          Fiber: make sure to have enough items within elimination phase                 Food Record  -foods of specific issue: peanuts, tree nuts, palm oil, cauliflower, broccoli, spinach, numerous vegetables oils (soybean, coconut), butter, hydrogenated oils, light pain for milk and cheese, drinks with citric acid, juices or drinks with 'natural flavors', specific brand of GF spaghetti    -fast eater     B- cheerios and milk, coffee and oat milk  L- is a struggle to find what  to eat   S/wich, leftovers, go get something  D- taco, spaghetti, chicken, cheese burger, fish or shrimp, pizza    F/V is limited  Unsure of how many grains are WG variety  Does sometimes take benefiber (wheat dextrose, citric acid)    -used to do yogurt but stopped after food sensitivity testing that indicated issue. Also stopped string cheese. Yogurt would sometimes cause nasty taste and bloat     -~40 oz water per day     Nutrition Diagnosis:  Altered gastrointestinal function related to dietary carbohydrate intolerance as evidenced by confirmed fructose intolerance, diagnosis of IBS, GI evaluation otherwise negative, reoccurring abdominal pain, bloat, pt report of having numerous foods that trigger GI distress    Nutrition Intervention:  -Educated pt on the Low FODMAP diet. Talked about what FODMAPs are and what they do in the body.   -Educated pt on foods that contain Fermentable, Oligosaccharides (fructans-wheat, rye, garlic, onion, leeks, and artichokes and GOS- beans, lentils, soybeans, and nuts, including cashews), Disaccharides (lactose, milk sugar), Monosaccharides (excess fructose-certain fruits, honey, and high-fructose corn syrup), and Polyols (sorbitol, mannitol, maltitol, erythritol, xylitol, and isomalt, apricots, avocados, cherries, nectarines, peaches, and plums and mushrooms)  -Discussed carefully planning meals and making shopping lists. Educated pt on reading food labels- talked about if high-FODMAP ingredients are listed at the bottom of an ingredient list, the food may still be considered low-FODMAP and ok  -Recommended choosing low-FODMAP foods containing 3-4 grams of fiber per serving (lesser focus, but given in handout)  -Discussed high and low FODMAPs from all food groups (handout provided)  -Discussed appropriate portion sizes of FODMAPs (lesser focus, but given in handout)  -Recommended following the elimination phase (avoiding all FODMAPs) for 3 weeks, then book appointment to  introduce challenge phase  -Provided a sample menu and a list of resources for recipes and resources  -decrease volume per meal and eat -6 small eating episodes per day  -introduce bone broth during elimination stage to support mucosal lining  -stop probiotic during elimination phase and reintroduce right before challenge phase  -remain seated and not horizontal for 30 minutes following meal  -increase water to 64 oz per day  -if no change in symptoms during elimination phase: other medical test, Celiac biopsy (genetic), SIBO breath test, histamine testing, gut motility (gastroparesis)      Nutrition Goals:  1) Prepare for phase one of Low FODMAP elimination Diet   >stop probiotic  2) Execute Low FODMAP elimination diet for 3 weeks time   >after one week, begin bone broth (collagen)   >3 days before ending elimination phase, re-begin probiotic    Nutrition Follow Up / Monitoring:  GI symptoms, Food intake, Fluid intake    Nutrition Recommendations:  Patient to follow-up with RD in 4 weeks.  Patient has RD contact information to call/email if needed.      Start time: 1400  End time: 1500    Frida Rendon MBA, RD LD  Clinical Dietitian  [phone]

## 2020-08-17 ENCOUNTER — VIRTUAL VISIT (OUTPATIENT)
Dept: OTOLARYNGOLOGY | Facility: CLINIC | Age: 45
End: 2020-08-17
Payer: COMMERCIAL

## 2020-08-17 DIAGNOSIS — R09.81 NASAL CONGESTION: ICD-10-CM

## 2020-08-17 DIAGNOSIS — J34.3 NASAL TURBINATE HYPERTROPHY: Primary | ICD-10-CM

## 2020-08-17 PROCEDURE — 99203 OFFICE O/P NEW LOW 30 MIN: CPT | Mod: 95 | Performed by: OTOLARYNGOLOGY

## 2020-08-17 NOTE — LETTER
"    2020         RE: Avis Avalos  [id number] [place] [address]        Dear Colleague,    Thank you for referring your patient, Avis Avalos, to the [facility]. Please see a copy of my visit note below.    Avis Avalos is a 44 year old female who is being evaluated via a billable video visit.      The patient has been notified of following:     \"This video visit will be conducted via a call between you and your physician/provider. We have found that certain health care needs can be provided without the need for an in-person physical exam.  This service lets us provide the care you need with a video conversation.  If a prescription is necessary we can send it directly to your pharmacy.  If lab work is needed we can place an order for that and you can then stop by our lab to have the test done at a later time.    Video visits are billed at different rates depending on your insurance coverage.  Please reach out to your insurance provider with any questions.    If during the course of the call the physician/provider feels a video visit is not appropriate, you will not be charged for this service.\"    Patient has given verbal consent for Video visit? Yes  How would you like to obtain your AVS? MyChart  If you are dropped from the video visit, the video invite should be resent to: Send to e-mail at: [email or web address]  Will anyone else be joining your video visit? No        Video-Visit Details    Type of service:  Video Visit    Video Start Time: 3:35 pm  Video End Time: 4:00 PM    Originating Location (pt. Location): Home    Distant Location (provider location):  [facility]     Platform used for Video Visit: Nabeel Herrera MD    Otolaryngology Adult Consultation    Patient: Avis Avalos  : 1975        HPI:  Avis Avalos is a 44 year old female seen today in the Otolaryngology Clinic for difficulty breathing through the right side of " her nose.  Patient reports that this is been ongoing for many years.  She does have bilateral nasal congestion but right is worse than left.  She also has a history of positive allergies.  She did see an ENT in 2009 at [facility].  She does not specifically recall what was told her but she does have a copy of her CT scan which she took a picture and sent to us.  For her allergies she treats them with Flonase and Claritin.  Claritin helps with a lot of her allergy symptoms.  Flonase does sometimes help with the congestion but in general she still feels fairly restricted in her nasal breathing.  She feels like she is often mouth breathing.  She does feel some pressure and discomfort between her eyes which extends down alongside her nose.  There are times where that will significantly increase but generally at baseline she always has a little bit of  pressure in this area.  Which concerned her recently says she has been having more ear infections she feels like.  Her ears do sometimes feel full.  She has some sensitivity of her ear when she pushes on her tragus.  She does have TMJ and is getting a mouthguard.    Medications:  Current Outpatient Rx   Medication Sig Dispense Refill     Acetaminophen (TYLENOL PO)        albuterol (PROAIR HFA/PROVENTIL HFA/VENTOLIN HFA) 108 (90 Base) MCG/ACT inhaler Inhale 2 puffs into the lungs every 4 hours as needed for shortness of breath / dyspnea or wheezing 2 Inhaler 3     buPROPion (WELLBUTRIN XL) 150 MG 24 hr tablet TAKE 1 TABLET BY MOUTH EVERY DAY IN THE MORNING 90 tablet 3     clobetasol (TEMOVATE) 0.05 % external cream Apply topically 2 times daily 60 g 1     DiphenhydrAMINE HCl (BENADRYL PO)        fluconazole (DIFLUCAN) 150 MG tablet Take at the first sign of a yeast infection and again 3 days later. 1 tablet 0     fluticasone (FLONASE) 50 MCG/ACT spray Spray 1-2 sprays into both nostrils daily 1 Bottle 1     fluticasone-salmeterol (AIRDUO RESPICLICK) 232-14 MCG/ACT inhaler  Inhale 1 puff into the lungs 2 times daily 1 Inhaler 3     ibuprofen (ADVIL/MOTRIN) 600 MG tablet Take 1 tablet (600 mg) by mouth every 6 hours as needed for other (mild and/or inflammatory pain) 30 tablet 0     loratadine (CLARITIN) 10 MG tablet Take 10 mg by mouth daily       montelukast (SINGULAIR) 10 MG tablet Take 1 tablet (10 mg) by mouth At Bedtime 30 tablet 3     Multiple Vitamins-Calcium (ONE-A-DAY WOMENS PO)        Omega-3 Fatty Acids (OMEGA 3 PO)        omeprazole (PRILOSEC) 40 MG DR capsule Take 1 capsule (40 mg) by mouth every morning On an empty stomach about 30 minutes before breakfast 90 capsule 1     Probiotic Product (PROBIOTIC DAILY PO)        senna-docusate (SENOKOT-S/PERICOLACE) 8.6-50 MG tablet Take 1-2 tablets by mouth daily as needed for constipation 30 tablet 3     UNABLE TO FIND MEDICATION NAME: Eat anything Rx 1-2 times per week       valACYclovir (VALTREX) 1000 mg tablet TAKE ONE TABLET BY MOUTH DAILY AS NEEDED 21 tablet 0       Allergies: Fructose; Ragweeds; Sorbitol; and Liquid adhesive     PMH:  Past Medical History:   Diagnosis Date     Crohn disease (H)      Depression     1-2 years ago     Fructose intolerance      High risk HPV infection 4/14/10    normal pap, + HR HPV (59). normal paps since     PONV (postoperative nausea and vomiting)     after colonoscopy       PSH:  Past Surgical History:   Procedure Laterality Date     C APPENDECTOMY           C/SECTION, LOW TRANSVERSE  02/17/10    , Low Transverse      SECTION  2012    Procedure:  SECTION;   SECTION ;  Surgeon: Yehuda Cruz MD;  Location: [facility] L[facility]     COLONOSCOPY  2011    Procedure:COMBINED COLONOSCOPY, SINGLE BIOPSY/POLYPECTOMY BY BIOPSY; Surgeon:MARTIN MAO; Location: GI     COLONOSCOPY  2011    Procedure:COMBINED COLONOSCOPY, SINGLE BIOPSY/POLYPECTOMY BY BIOPSY; Colonoscopy biopsy of distal iiluem, colonoscopy removal of colon polyp with fulguration,  colonoscopy and control of biopsy bleed with placement of resolution clip; Surgeon:TESS POLLARD; Location:[facility] GI     COLONOSCOPY  9/12/2011    Procedure:COMBINED COLONOSCOPY, REMOVE TUMOR/POLYP/LESION BY FULGURATION/HOT BIOPSY; Surgeon:TESS POLLARD; Location:[facility]     COLONOSCOPY  9/12/2011    Procedure:COMBINED COLONOSCOPY, CONTROL BLEED; Surgeon:TESS POLLARD; Location:[facility]     COLONOSCOPY N/A 6/19/2017    Procedure: COMBINED COLONOSCOPY, SINGLE OR MULTIPLE BIOPSY/POLYPECTOMY BY BIOPSY;;  Surgeon: Josué Hawk MD;  Location: [facility]     HC FLEX SIGMOIDOSCOPY W/WO MIKY SPEC BY BRUSH/WASH  07/02/09    [facility]     HC TOOTH EXTRACTION W/FORCEP       LAPAROSCOPIC CYSTECTOMY OVARIAN (BENIGN) Right 10/21/2019    Procedure: Laparoscopic cystectomy right ovarian (benign);  Surgeon: Yehuda Cruz MD;  Location: [facility] OR     LAPAROSCOPIC LYSIS ADHESIONS N/A 10/21/2019    Procedure: LYSIS, ADHESIONS, LAPAROSCOPIC;  Surgeon: Yehuda Cruz MD;  Location: [facility] OR     VIDEO CAPSULE ENDOSCOPY  07/30/09    [facility]       FH:  Family History   Problem Relation Age of Onset     Diabetes Mother         borderline     Depression Mother      Gastrointestinal Disease Mother         IBS     Thyroid Disease Mother         hypothyroidism     Diabetes Father         NIDDM - Type II     Diabetes Maternal Grandmother      Arthritis Maternal Grandfather      Heart Disease Paternal Grandfather      Thyroid Disease Sister         hypothyroidism     Rheumatoid Arthritis Sister      Other Cancer Sister      Asthma Other         niece     Obesity Sister         half-sister/had gastric bypass        SH:  Social History     Tobacco Use     Smoking status: Former Smoker     Smokeless tobacco: Never Used     Tobacco comment: 2 1/2 year ago   Substance Use Topics     Alcohol use: Yes     Comment: Occ.     Drug use: No       Review of Systems  No flowsheet data found.    Physical Exam:    GEN:  The patient is  alert, oriented and in no acute distress.    Imaging: I reviewed the images the CT scan that she uploaded to her medical record.  Visualized portion of the sinuses appear clear and healthy.  No evidence of acute or chronic sinusitis.  Visualized portions of the septum appear clear and straight.  She does appear to have turbinate hypertrophy.        Assessment/Plan: Patient presents with a main complaint of difficulty breathing through her nose.  Given her history of allergies there likely is a component of turbinate hypertrophy as the cause of this.  What is unknown to me at this time is whether or not she has a deviated septum.  Certainly the midportion to posterior portion of her septum appears straight.  But since she has a little bit more right sided complaints than left I do wonder if she has an anterior deviation.  Is also possible that turbinate hypertrophy could be causing some eustachian tube dysfunction due to the swelling.  I do not think that is what is causing the sensitivity when she touches her tragus.  That is likely more related to the TMJ.  I would like for her to come into clinic so that I can do a proper nasal evaluation to see what her turbinates look like and if she does have a deviated septum.  If she does have turbinate hypertrophy we can do a turbinate reduction procedure since she has not responded to Flonase.  I did discuss in clinic versus going to the operating room.  We discussed pros and cons of each.  She is leaning towards the operating room at this time but with the wait until my final recommendation.            Again, thank you for allowing me to participate in the care of your patient.        Sincerely,        Ann Herrera MD

## 2020-08-17 NOTE — PROGRESS NOTES
"Avis Avalos is a 44 year old female who is being evaluated via a billable video visit.      The patient has been notified of following:     \"This video visit will be conducted via a call between you and your physician/provider. We have found that certain health care needs can be provided without the need for an in-person physical exam.  This service lets us provide the care you need with a video conversation.  If a prescription is necessary we can send it directly to your pharmacy.  If lab work is needed we can place an order for that and you can then stop by our lab to have the test done at a later time.    Video visits are billed at different rates depending on your insurance coverage.  Please reach out to your insurance provider with any questions.    If during the course of the call the physician/provider feels a video visit is not appropriate, you will not be charged for this service.\"    Patient has given verbal consent for Video visit? Yes  How would you like to obtain your AVS? MyChart  If you are dropped from the video visit, the video invite should be resent to: Send to e-mail at: [email or web address]  Will anyone else be joining your video visit? No        Video-Visit Details    Type of service:  Video Visit    Video Start Time: 3:35 pm  Video End Time: 4:00 PM    Originating Location (pt. Location): Home    Distant Location (provider location):  [facility]     Platform used for Video Visit: Nabeel Herrera MD    Otolaryngology Adult Consultation    Patient: Avis Avalos  : 1975        HPI:  Avis Avalos is a 44 year old female seen today in the Otolaryngology Clinic for difficulty breathing through the right side of her nose.  Patient reports that this is been ongoing for many years.  She does have bilateral nasal congestion but right is worse than left.  She also has a history of positive allergies.  She did see an ENT in  at [facility].  She does not " specifically recall what was told her but she does have a copy of her CT scan which she took a picture and sent to us.  For her allergies she treats them with Flonase and Claritin.  Claritin helps with a lot of her allergy symptoms.  Flonase does sometimes help with the congestion but in general she still feels fairly restricted in her nasal breathing.  She feels like she is often mouth breathing.  She does feel some pressure and discomfort between her eyes which extends down alongside her nose.  There are times where that will significantly increase but generally at baseline she always has a little bit of  pressure in this area.  Which concerned her recently says she has been having more ear infections she feels like.  Her ears do sometimes feel full.  She has some sensitivity of her ear when she pushes on her tragus.  She does have TMJ and is getting a mouthguard.    Medications:  Current Outpatient Rx   Medication Sig Dispense Refill     Acetaminophen (TYLENOL PO)        albuterol (PROAIR HFA/PROVENTIL HFA/VENTOLIN HFA) 108 (90 Base) MCG/ACT inhaler Inhale 2 puffs into the lungs every 4 hours as needed for shortness of breath / dyspnea or wheezing 2 Inhaler 3     buPROPion (WELLBUTRIN XL) 150 MG 24 hr tablet TAKE 1 TABLET BY MOUTH EVERY DAY IN THE MORNING 90 tablet 3     clobetasol (TEMOVATE) 0.05 % external cream Apply topically 2 times daily 60 g 1     DiphenhydrAMINE HCl (BENADRYL PO)        fluconazole (DIFLUCAN) 150 MG tablet Take at the first sign of a yeast infection and again 3 days later. 1 tablet 0     fluticasone (FLONASE) 50 MCG/ACT spray Spray 1-2 sprays into both nostrils daily 1 Bottle 1     fluticasone-salmeterol (AIRDUO RESPICLICK) 232-14 MCG/ACT inhaler Inhale 1 puff into the lungs 2 times daily 1 Inhaler 3     ibuprofen (ADVIL/MOTRIN) 600 MG tablet Take 1 tablet (600 mg) by mouth every 6 hours as needed for other (mild and/or inflammatory pain) 30 tablet 0     loratadine (CLARITIN) 10 MG tablet  Take 10 mg by mouth daily       montelukast (SINGULAIR) 10 MG tablet Take 1 tablet (10 mg) by mouth At Bedtime 30 tablet 3     Multiple Vitamins-Calcium (ONE-A-DAY WOMENS PO)        Omega-3 Fatty Acids (OMEGA 3 PO)        omeprazole (PRILOSEC) 40 MG DR capsule Take 1 capsule (40 mg) by mouth every morning On an empty stomach about 30 minutes before breakfast 90 capsule 1     Probiotic Product (PROBIOTIC DAILY PO)        senna-docusate (SENOKOT-S/PERICOLACE) 8.6-50 MG tablet Take 1-2 tablets by mouth daily as needed for constipation 30 tablet 3     UNABLE TO FIND MEDICATION NAME: Eat anything Rx 1-2 times per week       valACYclovir (VALTREX) 1000 mg tablet TAKE ONE TABLET BY MOUTH DAILY AS NEEDED 21 tablet 0       Allergies: Fructose; Ragweeds; Sorbitol; and Liquid adhesive     PMH:  Past Medical History:   Diagnosis Date     Crohn disease (H)      Depression     1-2 years ago     Fructose intolerance      High risk HPV infection 4/14/10    normal pap, + HR HPV (59). normal paps since     PONV (postoperative nausea and vomiting)     after colonoscopy       PSH:  Past Surgical History:   Procedure Laterality Date     C APPENDECTOMY           C/SECTION, LOW TRANSVERSE  02/17/10    , Low Transverse      SECTION  2012    Procedure:  SECTION;   SECTION ;  Surgeon: Yehuda Cruz MD;  Location:  [facility]     COLONOSCOPY  2011    Procedure:COMBINED COLONOSCOPY, SINGLE BIOPSY/POLYPECTOMY BY BIOPSY; Surgeon:MARTIN MAO; Location: GI     COLONOSCOPY  2011    Procedure:COMBINED COLONOSCOPY, SINGLE BIOPSY/POLYPECTOMY BY BIOPSY; Colonoscopy biopsy of distal iiluem, colonoscopy removal of colon polyp with fulguration, colonoscopy and control of biopsy bleed with placement of resolution clip; Surgeon:TESS POLLARD; Location: GI     COLONOSCOPY  2011    Procedure:COMBINED COLONOSCOPY, REMOVE TUMOR/POLYP/LESION BY FULGURATION/HOT BIOPSY; Surgeon:LATRICE  SCOT; Location:[facility]     COLONOSCOPY  9/12/2011    Procedure:COMBINED COLONOSCOPY, CONTROL BLEED; Surgeon:TESS POLLARD; Location:[facility]     COLONOSCOPY N/A 6/19/2017    Procedure: COMBINED COLONOSCOPY, SINGLE OR MULTIPLE BIOPSY/POLYPECTOMY BY BIOPSY;;  Surgeon: Josué Hawk MD;  Location: [facility]     HC FLEX SIGMOIDOSCOPY W/WO MIKY SPEC BY BRUSH/WASH  07/02/09    [facility]     HC TOOTH EXTRACTION W/FORCEP       LAPAROSCOPIC CYSTECTOMY OVARIAN (BENIGN) Right 10/21/2019    Procedure: Laparoscopic cystectomy right ovarian (benign);  Surgeon: Yehuda Cruz MD;  Location: [facility]     LAPAROSCOPIC LYSIS ADHESIONS N/A 10/21/2019    Procedure: LYSIS, ADHESIONS, LAPAROSCOPIC;  Surgeon: Yehuda Cruz MD;  Location: [facility]     VIDEO CAPSULE ENDOSCOPY  07/30/09    [facility]       FH:  Family History   Problem Relation Age of Onset     Diabetes Mother         borderline     Depression Mother      Gastrointestinal Disease Mother         IBS     Thyroid Disease Mother         hypothyroidism     Diabetes Father         NIDDM - Type II     Diabetes Maternal Grandmother      Arthritis Maternal Grandfather      Heart Disease Paternal Grandfather      Thyroid Disease Sister         hypothyroidism     Rheumatoid Arthritis Sister      Other Cancer Sister      Asthma Other         niece     Obesity Sister         half-sister/had gastric bypass        SH:  Social History     Tobacco Use     Smoking status: Former Smoker     Smokeless tobacco: Never Used     Tobacco comment: 2 1/2 year ago   Substance Use Topics     Alcohol use: Yes     Comment: Occ.     Drug use: No       Review of Systems  No flowsheet data found.    Physical Exam:    GEN:  The patient is alert, oriented and in no acute distress.    Imaging: I reviewed the images the CT scan that she uploaded to her medical record.  Visualized portion of the sinuses appear clear and healthy.  No evidence of acute or chronic sinusitis.   Visualized portions of the septum appear clear and straight.  She does appear to have turbinate hypertrophy.        Assessment/Plan: Patient presents with a main complaint of difficulty breathing through her nose.  Given her history of allergies there likely is a component of turbinate hypertrophy as the cause of this.  What is unknown to me at this time is whether or not she has a deviated septum.  Certainly the midportion to posterior portion of her septum appears straight.  But since she has a little bit more right sided complaints than left I do wonder if she has an anterior deviation.  Is also possible that turbinate hypertrophy could be causing some eustachian tube dysfunction due to the swelling.  I do not think that is what is causing the sensitivity when she touches her tragus.  That is likely more related to the TMJ.  I would like for her to come into clinic so that I can do a proper nasal evaluation to see what her turbinates look like and if she does have a deviated septum.  If she does have turbinate hypertrophy we can do a turbinate reduction procedure since she has not responded to Flonase.  I did discuss in clinic versus going to the operating room.  We discussed pros and cons of each.  She is leaning towards the operating room at this time but with the wait until my final recommendation.

## 2020-08-24 ENCOUNTER — OFFICE VISIT (OUTPATIENT)
Dept: OTOLARYNGOLOGY | Facility: CLINIC | Age: 45
End: 2020-08-24
Payer: COMMERCIAL

## 2020-08-24 VITALS — HEART RATE: 78 BPM | OXYGEN SATURATION: 99 % | SYSTOLIC BLOOD PRESSURE: 100 MMHG | DIASTOLIC BLOOD PRESSURE: 69 MMHG

## 2020-08-24 DIAGNOSIS — J34.3 NASAL TURBINATE HYPERTROPHY: Primary | ICD-10-CM

## 2020-08-24 PROCEDURE — 99213 OFFICE O/P EST LOW 20 MIN: CPT | Performed by: OTOLARYNGOLOGY

## 2020-08-24 RX ORDER — OXYMETAZOLINE HYDROCHLORIDE 0.05 G/100ML
2 SPRAY NASAL ONCE
Status: CANCELLED | OUTPATIENT
Start: 2020-08-24 | End: 2020-08-24

## 2020-08-24 NOTE — NURSING NOTE
Avis Avalos's goals for this visit include:   Chief Complaint   Patient presents with     Nasal Congestion     Possible turbinate hypertrophy     She requests these members of her care team be copied on today's visit information: Yes    PCP: Sarah Wilhelm    Referring Provider:  No referring provider defined for this encounter.    /69 (BP Location: Left arm, Patient Position: Sitting, Cuff Size: Adult Large)   Pulse 78   SpO2 99%     Do you need any medication refills at today's visit? No    Tamara Echols LPN

## 2020-08-24 NOTE — PROGRESS NOTES
CC: nasal exam    HPI: Patient is seen in clinic for nasal exam.  To review she has been having difficulty breathing out of her nose bilaterally.  She does have a history of allergies and is taking Flonase and Claritin without significant improvement in her nasal breathing.    PE:  GEN: nad  NOSE: Septum is essentially midline.  She does have bilateral inferior turbinate hypertrophy    A/P:  Patient presents with difficulty breathing through her nose which I believe is due to turbinate hypertrophy.  She has not had a significant response to nasal steroid spray despite taking this for longer than 6 weeks.  We discussed options for management but given the size of her turbinates as well as her subjective congestion I think she would do better with a turbinate reduction in the operating room with endoscopic assistance.  We discussed what is involved the procedure as well as recovery.  Surgical orders were placed today.  She is going to give us a call when she knows what her work schedule is like.  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  But it does get yaron.  Therefore I would recommend that she wear a mask during the recovery period for up to 3 weeks.    I spent a total of 15 minutes face-to-face with Avis Avalos during today's office visit.  Over 50% of this time was spent counseling the patient on and/or coordinating care as documented in my assessment and plan.

## 2020-08-24 NOTE — LETTER
8/24/2020         RE: Avis Avalos  [address] [facility] [address]        Dear Colleague,    Thank you for referring your patient, Avis Avalos, to the [facility] [place]. Please see a copy of my visit note below.    CC: nasal exam    HPI: Patient is seen in clinic for nasal exam.  To review she has been having difficulty breathing out of her nose bilaterally.  She does have a history of allergies and is taking Flonase and Claritin without significant improvement in her nasal breathing.    PE:  GEN: nad  NOSE: Septum is essentially midline.  She does have bilateral inferior turbinate hypertrophy    A/P:  Patient presents with difficulty breathing through her nose which I believe is due to turbinate hypertrophy.  She has not had a significant response to nasal steroid spray despite taking this for longer than 6 weeks.  We discussed options for management but given the size of her turbinates as well as her subjective congestion I think she would do better with a turbinate reduction in the operating room with endoscopic assistance.  We discussed what is involved the procedure as well as recovery.  Surgical orders were placed today.  She is going to give us a call when she knows what her work schedule is like.  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  But it does get yaron.  Therefore I would recommend that she wear a mask during the recovery period for up to 3 weeks.    I spent a total of 15 minutes face-to-face with Avis Avalos during today's office visit.  Over 50% of this time was spent counseling the patient on and/or coordinating care as documented in my assessment and plan.      Again, thank you for allowing me to participate in the care of your patient.        Sincerely,        Ann Herrera MD

## 2020-08-25 DIAGNOSIS — Z11.59 ENCOUNTER FOR SCREENING FOR OTHER VIRAL DISEASES: Primary | ICD-10-CM

## 2020-08-25 PROBLEM — J34.3 NASAL TURBINATE HYPERTROPHY: Status: ACTIVE | Noted: 2020-08-25

## 2020-08-30 ENCOUNTER — MYC MEDICAL ADVICE (OUTPATIENT)
Dept: ALLERGY | Facility: CLINIC | Age: 45
End: 2020-08-30

## 2020-08-31 ENCOUNTER — VIRTUAL VISIT (OUTPATIENT)
Dept: FAMILY MEDICINE | Facility: OTHER | Age: 45
End: 2020-08-31
Payer: COMMERCIAL

## 2020-08-31 DIAGNOSIS — Z20.822 SUSPECTED COVID-19 VIRUS INFECTION: Primary | ICD-10-CM

## 2020-08-31 PROCEDURE — U0003 INFECTIOUS AGENT DETECTION BY NUCLEIC ACID (DNA OR RNA); SEVERE ACUTE RESPIRATORY SYNDROME CORONAVIRUS 2 (SARS-COV-2) (CORONAVIRUS DISEASE [COVID-19]), AMPLIFIED PROBE TECHNIQUE, MAKING USE OF HIGH THROUGHPUT TECHNOLOGIES AS DESCRIBED BY CMS-2020-01-R: HCPCS | Performed by: FAMILY MEDICINE

## 2020-08-31 PROCEDURE — 99421 OL DIG E/M SVC 5-10 MIN: CPT | Performed by: PHYSICIAN ASSISTANT

## 2020-08-31 NOTE — PROGRESS NOTES
"Date: 2020 09:12:16  Clinician: Clifton Gu  Clinician NPI: [id number]  Patient: Avis Avalos  Patient : 1975  Patient Address: [id number] [place] [address], [place] [address]  Patient Phone: [phone]  Visit Protocol: URI  Patient Summary:  Avis is a 44 year old ( : 1975 ) female who initiated a Visit for cold, sinus infection, or influenza. When asked the question \"Please sign me up to receive news, health information and promotions from [employer].\", Avis responded \"No\".    Avis states her symptoms started gradually 7-9 days ago.   Her symptoms consist of ear pain, a headache, malaise, a sore throat, nasal congestion, rhinitis, and facial pain or pressure. She is experiencing difficulty breathing due to nasal congestion but she is not short of breath.   Symptom details     Nasal secretions: The color of her mucus is clear.    Sore throat: Avis reports having mild throat pain (1-3 on a 10 point pain scale), does not have exudate on her tonsils, and can swallow liquids. She is not sure if the lymph nodes in her neck are enlarged. A rash has not appeared on the skin since the sore throat started.     Facial pain or pressure: The facial pain or pressure feels worse when bending over or leaning forward.     Headache: She states the headache is moderate (4-6 on a 10 point pain scale).      Avis denies having chills, fever, wheezing, teeth pain, ageusia, diarrhea, cough, vomiting, nausea, myalgias, and anosmia. She also denies having recent facial or sinus surgery in the past 60 days, taking antibiotic medication in the past month, and double sickening (worsening symptoms after initial improvement).   Precipitating events  Avis is not sure if she has been exposed to someone with strep throat.   Pertinent COVID-19 (Coronavirus) information  In the past 14 days, Avis has not worked in a congregate living setting.   She does not work or volunteer as healthcare worker or a first " responder and does not work or volunteer in a healthcare facility.   Avis also has not lived in a congregate living setting in the past 14 days. She does not live with a healthcare worker.   Avis has not had a close contact with a laboratory-confirmed COVID-19 patient within 14 days of symptom onset.   Since December 2019, Avis and has had upper respiratory infection (URI) or influenza-like illness. Has not been diagnosed with lab-confirmed COVID-19 test      Date(s) of previous URI or influenza-like illness (free-text): 2019     Symptoms Avis experienced during previous URI or influenza-like illness as reported by the patient (free-text): I don't remember all the symptoms.  This question is pretty vague.        Pertinent medical history  Avis typically gets a yeast infection when she takes antibiotics. She has used fluconazole (Diflucan) to treat previous yeast infections. 2 doses of fluconazole (Diflucan) has typically been needed for symptoms to resolve in the past.  Avis needs a return to work/school note.   Weight: 170 lbs   Avis does not smoke or use smokeless tobacco.   She denies pregnancy and denies breastfeeding. She has menstruated in the past month.   Additional information as reported by the patient (free text): My allergies have been pretty bad for the past couple weeks, but I'm concerned this is a cold or some type of virus on top of my allergies, so I'm not sure if or when I should be considered to get tested for COVID 19.   Weight: 170 lbs    MEDICATIONS: Advil oral, Tylenol Extra Strength oral, Benadryl Allergy Plus Congestion oral, Nasal Decongestant (pseudoephedrine) oral, Allegra-D 24 Hour oral, Wellbutrin XL oral, ALLERGIES: NKDA  Clinician Response:  Dear Avis,   Your symptoms show that you may have coronavirus (COVID-19). This illness can cause fever, cough and trouble breathing. Many people get a mild case and get better on their own. Some people can get very sick.   "What should I do?  We would like to test you for this virus.   1. Please call [phone] to schedule your visit. Explain that you were referred by [facility] to have a COVID-19 test. Be ready to share your [facility] visit ID number.  The following will serve as your written order for this COVID Test, ordered by me, for the indication of suspected COVID [Z20.828]: The test will be ordered in [employer], our electronic health record, after you are scheduled. It will show as ordered and authorized by Keven Slaughter MD.  Order: COVID-19 (Coronavirus) PCR for SYMPTOMATIC testing from [facility].      2. When it's time for your COVID test:  Stay at least 6 feet away from others. (If someone will drive you to your test, stay in the backseat, as far away from the  as you can.)   Cover your mouth and nose with a mask, tissue or washcloth.  Go straight to the testing site. Don't make any stops on the way there or back.      3.Starting now: Stay home and away from others (self-isolate) until:   You've had no fever---and no medicine that reduces fever---for one full day (24 hours). And...   Your other symptoms have gotten better. For example, your cough or breathing has improved. And...   At least 10 days have passed since your symptoms started.       During this time, don't leave the house except for testing or medical care.   Stay in your own room, even for meals. Use your own bathroom if you can.   Stay away from others in your home. No hugging, kissing or shaking hands. No visitors.  Don't go to work, school or anywhere else.    Clean \"high touch\" surfaces often (doorknobs, counters, handles, etc.). Use a household cleaning spray or wipes. You'll find a full list of  on the EPA website: www.epa.gov/pesticide-registration/list-n-disinfectants-use-against-sars-cov-2.   Cover your mouth and nose with a mask, tissue or washcloth to avoid spreading germs.  Wash your hands and face often. Use soap and water.  Caregivers in these " groups are at risk for severe illness due to COVID-19:  o People 65 years and older  o People who live in a nursing home or long-term care facility  o People with chronic disease (lung, heart, cancer, diabetes, kidney, liver, immunologic)  o People who have a weakened immune system, including those who:   Are in cancer treatment  Take medicine that weakens the immune system, such as corticosteroids  Had a bone marrow or organ transplant  Have an immune deficiency  Have poorly controlled HIV or AIDS  Are obese (body mass index of 40 or higher)  Smoke regularly   o Caregivers should wear gloves while washing dishes, handling laundry and cleaning bedrooms and bathrooms.  o Use caution when washing and drying laundry: Don't shake dirty laundry, and use the warmest water setting that you can.  o For more tips, go to www.cdc.gov/coronavirus/2019-ncov/downloads/10Things.pdf.    4.Sign up for [employer]. We know it's scary to hear that you might have COVID-19. We want to track your symptoms to make sure you're okay over the next 2 weeks. Please look for an email from [employer]---this is a free, online program that we'll use to keep in touch. To sign up, follow the link in the email. Learn more at [email or web address]  How can I take care of myself?   Get lots of rest. Drink extra fluids (unless a doctor has told you not to).   Take Tylenol (acetaminophen) for fever or pain. If you have liver or kidney problems, ask your family doctor if it's okay to take Tylenol.   Adults can take either:    650 mg (two 325 mg pills) every 4 to 6 hours, or...   1,000 mg (two 500 mg pills) every 8 hours as needed.    Note: Don't take more than 3,000 mg in one day. Acetaminophen is found in many medicines (both prescribed and over-the-counter medicines). Read all labels to be sure you don't take too much.   For children, check the Tylenol bottle for the right dose. The dose is based on the child's age or weight.    If you  have other health problems (like cancer, heart failure, an organ transplant or severe kidney disease): Call your specialty clinic if you don't feel better in the next 2 days.       Know when to call 911. Emergency warning signs include:    Trouble breathing or shortness of breath Pain or pressure in the chest that doesn't go away Feeling confused like you haven't felt before, or not being able to wake up Bluish-colored lips or face.  Where can I get more information?   [facility] -- About COVID-19: [email or web address]   CDC -- What to Do If You're Sick: www.cdc.gov/coronavirus/2019-ncov/about/steps-when-sick.html   CDC -- Ending Home Isolation: www.cdc.gov/coronavirus/2019-ncov/hcp/disposition-in-home-patients.html   [facility] -- Caring for Someone: www.cdc.gov/coronavirus/2019-ncov/if-you-are-sick/care-for-someone.html   [facility] -- Interim Guidance for Hospital Discharge to Home: www.[facility].[place]./diseases/coronavirus/hcp/hospdischarge.pdf   [facility] clinical trials (COVID-19 research studies): clinicalaffairs.[facility]-clinical-trials    Below are the COVID-19 hotlines at the [facility] of Health ([facility]). Interpreters are available.    For health questions: Call 097-027-4307 or 1-203.694.7081 (7 a.m. to 7 p.m.) For questions about schools and childcare: Call 019-792-3941 or 1-553.151.3882 (7 a.m. to 7 p.m.)    Diagnosis: Acute upper respiratory infection, unspecified  Diagnosis ICD: J06.9

## 2020-09-01 ENCOUNTER — MYC MEDICAL ADVICE (OUTPATIENT)
Dept: FAMILY MEDICINE | Facility: OTHER | Age: 45
End: 2020-09-01

## 2020-09-01 LAB
SARS-COV-2 RNA SPEC QL NAA+PROBE: NOT DETECTED
SPECIMEN SOURCE: NORMAL

## 2020-09-04 ENCOUNTER — VIRTUAL VISIT (OUTPATIENT)
Dept: ALLERGY | Facility: CLINIC | Age: 45
End: 2020-09-04
Payer: COMMERCIAL

## 2020-09-04 VITALS — WEIGHT: 165 LBS | BODY MASS INDEX: 28.17 KG/M2 | HEIGHT: 64 IN

## 2020-09-04 DIAGNOSIS — R14.0 ABDOMINAL BLOATING WITH CRAMPS: ICD-10-CM

## 2020-09-04 DIAGNOSIS — R06.02 SHORTNESS OF BREATH: ICD-10-CM

## 2020-09-04 DIAGNOSIS — J34.3 NASAL TURBINATE HYPERTROPHY: ICD-10-CM

## 2020-09-04 DIAGNOSIS — J30.1 CHRONIC SEASONAL ALLERGIC RHINITIS DUE TO POLLEN: Primary | ICD-10-CM

## 2020-09-04 DIAGNOSIS — R10.9 ABDOMINAL BLOATING WITH CRAMPS: ICD-10-CM

## 2020-09-04 PROCEDURE — 99214 OFFICE O/P EST MOD 30 MIN: CPT | Mod: 95 | Performed by: ALLERGY & IMMUNOLOGY

## 2020-09-04 RX ORDER — TRIAMCINOLONE ACETONIDE 55 UG/1
2 SPRAY, METERED NASAL DAILY
COMMUNITY
End: 2020-09-28

## 2020-09-04 RX ORDER — FEXOFENADINE HCL 180 MG/1
180 TABLET ORAL DAILY
COMMUNITY

## 2020-09-04 ASSESSMENT — MIFFLIN-ST. JEOR: SCORE: 1381.77

## 2020-09-04 NOTE — LETTER
"    9/4/2020         RE: Avis Avalos  [id number] [facility] [address]        Dear Colleague,    Thank you for referring your patient, Avis Avalos, to the [facility]. Please see a copy of my visit note below.    Avis Avalos is a 44 year old female who is being evaluated via a billable video visit.      The patient has been notified of following:      \"This video visit will be conducted via a call between you and your physician/provider. We have found that certain health care needs can be provided without the need for an in-person physical exam.  This service lets us provide the care you need with a video conversation.  If a prescription is necessary we can send it directly to your pharmacy.  If lab work is needed we can place an order for that and you can then stop by our lab to have the test done at a later time.     If during the course of the call the physician/provider feels a video visit is not appropriate, you will not be charged for this service.\"    Patient has given verbal consent for Video visit? Yes     Patient would like the video invitation sent by: [phone]     I have reviewed and updated the patient's Past Medical History, Social History, Family History and Medication List.    Over the last 2-3 years during late summer/early fall she will develop ocular itching, sneezing, rhinorrhea, congestion. Using Allegra and/or Claritin. Started on Nasacort 2 sprays/nostril daily. This has been helpful. On Pataday and helpful.     She is being seen by Dr. Herrera and having turbinate reduction surgery. History of congestion.      Has been treated with AirDuo, Breo, Singulair and no change in her symptoms. She stopped these medications. Has used albuterol and not helpful. Typically physical activity induced symptoms that occur immediately and resolve within minutes.     List of foods causing bloating, cramping and gas. Symptoms are immediate and last 24 hours.   Peanuts  Palm " oil-also gets congestion  Sunflower seed.  Soy    Past Medical History:   Diagnosis Date     Crohn disease (H)      Depression     1-2 years ago     Fructose intolerance      High risk HPV infection 4/14/10    normal pap, + HR HPV (59). normal paps since     PONV (postoperative nausea and vomiting)     after colonoscopy     Family History   Problem Relation Age of Onset     Diabetes Mother         borderline     Depression Mother      Gastrointestinal Disease Mother         IBS     Thyroid Disease Mother         hypothyroidism     Diabetes Father         NIDDM - Type II     Diabetes Maternal Grandmother      Arthritis Maternal Grandfather      Heart Disease Paternal Grandfather      Thyroid Disease Sister         hypothyroidism     Rheumatoid Arthritis Sister      Other Cancer Sister      Asthma Other         niece     Obesity Sister         half-sister/had gastric bypass     Past Surgical History:   Procedure Laterality Date     C APPENDECTOMY           C/SECTION, LOW TRANSVERSE  02/17/10    , Low Transverse      SECTION  2012    Procedure:  SECTION;   SECTION ;  Surgeon: Yehuda Cruz MD;  [facility]     COLONOSCOPY  2011    Procedure:COMBINED COLONOSCOPY, SINGLE BIOPSY/POLYPECTOMY BY BIOPSY; Surgeon:MARTIN MAO; [facility]     COLONOSCOPY  2011    Procedure:COMBINED COLONOSCOPY, SINGLE BIOPSY/POLYPECTOMY BY BIOPSY; Colonoscopy biopsy of distal iiluem, colonoscopy removal of colon polyp with fulguration, colonoscopy and control of biopsy bleed with placement of resolution clip; Surgeon:TESS POLLARD; [facility]     COLONOSCOPY  2011    Procedure:COMBINED COLONOSCOPY, REMOVE TUMOR/POLYP/LESION BY FULGURATION/HOT BIOPSY; Surgeon:TESS POLLARD; [facility]     COLONOSCOPY  2011    Procedure:COMBINED COLONOSCOPY, CONTROL BLEED; Surgeon:TESS POLLARD; [facility]     COLONOSCOPY N/A 2017    Procedure: COMBINED  COLONOSCOPY, SINGLE OR MULTIPLE BIOPSY/POLYPECTOMY BY BIOPSY;;  Surgeon: Josué Hawk MD;  [facility]     HC FLEX SIGMOIDOSCOPY W/WO MIKY SPEC BY BRUSH/WASH  07/02/09    [facility]     HC TOOTH EXTRACTION W/FORCEP       LAPAROSCOPIC CYSTECTOMY OVARIAN (BENIGN) Right 10/21/2019    Procedure: Laparoscopic cystectomy right ovarian (benign);  Surgeon: Yehuda Cruz MD;  [facility]     LAPAROSCOPIC LYSIS ADHESIONS N/A 10/21/2019    Procedure: LYSIS, ADHESIONS, LAPAROSCOPIC;  Surgeon: Yehuda Cruz MD;  [facility]: [facility]     VIDEO CAPSULE ENDOSCOPY  07/30/09    [facility]       REVIEW OF SYSTEMS:  General: negative for weight gain. negative for weight loss. negative for changes in sleep.   Ears: negative for fullness. negative for hearing loss. negative for dizziness.   Nose: negative for snoring.negative for changes in smell. negative for drainage.   Eyes: negative for eye watering. negative for eye itching. negative for vision changes. negative for eye redness.  Throat: negative for hoarseness. negative for sore throat. negative for trouble swallowing.   Lungs: negative for shortness of breath.negative for wheezing. negative for sputum production.   Cardiovascular: negative for chest pain. negative for swelling of ankles. negative for fast or irregular heartbeat.   Gastrointestinal: negative for nausea. negative for heartburn. negative for acid reflux.   Musculoskeletal: negative for joint pain. negative for joint stiffness. negative for joint swelling.   Neurologic: negative for seizures. negative for fainting. negative for weakness.   Psychiatric: negative for changes in mood. negative for anxiety.   Endocrine: negative for cold intolerance. negative for heat intolerance. negative for tremors.   Lymphatic: negative for lower extremity swelling. negative for lymph node swelling.   Hematologic: negative for easy bruising. negative for easy bleeding.  Integumentary:  negative for rash. negative for scaling. negative for nail changes.       Current Outpatient Medications:      Acetaminophen (TYLENOL PO), , Disp: , Rfl:      albuterol (PROAIR HFA/PROVENTIL HFA/VENTOLIN HFA) 108 (90 Base) MCG/ACT inhaler, Inhale 2 puffs into the lungs every 4 hours as needed for shortness of breath / dyspnea or wheezing, Disp: 2 Inhaler, Rfl: 3     buPROPion (WELLBUTRIN XL) 150 MG 24 hr tablet, TAKE 1 TABLET BY MOUTH EVERY DAY IN THE MORNING, Disp: 90 tablet, Rfl: 3     clobetasol (TEMOVATE) 0.05 % external cream, Apply topically 2 times daily, Disp: 60 g, Rfl: 1     DiphenhydrAMINE HCl (BENADRYL PO), , Disp: , Rfl:      fexofenadine (ALLEGRA) 180 MG tablet, Take 180 mg by mouth daily, Disp: , Rfl:      fluconazole (DIFLUCAN) 150 MG tablet, Take at the first sign of a yeast infection and again 3 days later., Disp: 1 tablet, Rfl: 0     ibuprofen (ADVIL/MOTRIN) 600 MG tablet, Take 1 tablet (600 mg) by mouth every 6 hours as needed for other (mild and/or inflammatory pain), Disp: 30 tablet, Rfl: 0     Multiple Vitamins-Calcium (ONE-A-DAY WOMENS PO), , Disp: , Rfl:      Omega-3 Fatty Acids (OMEGA 3 PO), , Disp: , Rfl:      omeprazole (PRILOSEC) 40 MG DR capsule, Take 1 capsule (40 mg) by mouth every morning On an empty stomach about 30 minutes before breakfast, Disp: 90 capsule, Rfl: 1     Probiotic Product (PROBIOTIC DAILY PO), , Disp: , Rfl:      senna-docusate (SENOKOT-S/PERICOLACE) 8.6-50 MG tablet, Take 1-2 tablets by mouth daily as needed for constipation, Disp: 30 tablet, Rfl: 3     triamcinolone (NASACORT) 55 MCG/ACT nasal aerosol, Spray 2 sprays into both nostrils daily, Disp: , Rfl:      valACYclovir (VALTREX) 1000 mg tablet, TAKE ONE TABLET BY MOUTH DAILY AS NEEDED, Disp: 21 tablet, Rfl: 0     loratadine (CLARITIN) 10 MG tablet, Take 10 mg by mouth daily, Disp: , Rfl:   Immunization History   Administered Date(s) Administered     Influenza (H1N1) 11/10/2009     Influenza (IIV3) PF  02/22/2012, 09/27/2012     Influenza Vaccine IM > 6 months Valent IIV4 02/18/2014, 09/26/2019     TD (ADULT, 7+) 04/14/2008     TDAP Vaccine (Adacel) 06/13/2011     Allergies   Allergen Reactions     Fructose GI Disturbance     Ragweeds      Sorbitol Diarrhea     Liquid Adhesive Rash         EXAM:   Constitutional:  Appears well-developed and well-nourished. No distress.   HEENT:   Head: Normocephalic.   Neuro: Oriented to person, place, and time.  Skin: Skin is warm and dry. No rash noted.   Psychiatric: Normal mood and affect.     Nursing note and vitals reviewed.    ASSESSMENT/PLAN:  Problem List Items Addressed This Visit        Respiratory    Chronic seasonal allergic rhinitis due to pollen - Primary     Fall symptoms. Nasacort 2 sprays/nostril daily. Has been helpful. Over-the-counter ocular antihistamine has been beneficial.  Allergy testing was positive for weeds.     - Nasacort 2 sprays/nostril daily if needed.   - Zyrtec, Allegra or Claritin daily as needed for allergy symptoms.   - patanol 1 drop/eye twice daily as needed for allergy symptoms.   - Return to clinic for repeat allergy testing. Consider allergy shots.   - Follows with ENT. Possible turbinate hypertrophy.          Relevant Medications    triamcinolone (NASACORT) 55 MCG/ACT nasal aerosol    fexofenadine (ALLEGRA) 180 MG tablet    Shortness of breath     Shortness of breath with intense activity.  No chest symptoms at any other time.  Symptoms occur immediately and resolve within minutes.  Stress echo normal. Breo, AirDuo, Singulair and albuterol not helpful.       - Speech therapy referral. Consideration for vocal cord dysfunction.            Relevant Medications    triamcinolone (NASACORT) 55 MCG/ACT nasal aerosol    fexofenadine (ALLEGRA) 180 MG tablet    Other Relevant Orders    SPEECH THERAPY REFERRAL    Nasal turbinate hypertrophy       Musculoskeletal and Integumentary    Abdominal bloating with cramps     Abdominal bloating and cramping  after consumption of peanut, soy, sunflower seed, palm oil.  Seen by GI and unremarkable evaluation.  No other IgE mediated symptoms. Symptoms last 24 hours.      -Return to clinic for allergy skin testing for these foods.                Chart documentation with Dragon Voice recognition Software. Although reviewed after completion, some words and grammatical errors may remain.    I have reviewed the note as documented above.  This accurately captures the substance of my conversation with the patient.    Video visit contact time  Video visit started at 1239  Video visit ended at 1202    Video-Visit Details     Type of service:  Video Visit     Originating Location (pt. Location): Home     Distant Location (provider location):  [facility]      Mode of Communication:  Video Conference via Doximity    Gagandeep Lafleur DO FAAAAI  Allergy/Immunology  [place], [place]      Again, thank you for allowing me to participate in the care of your patient.        Sincerely,        Gagandeep Lafleur DO

## 2020-09-04 NOTE — ASSESSMENT & PLAN NOTE
Shortness of breath with intense activity.  No chest symptoms at any other time.  Symptoms occur immediately and resolve within minutes.  Stress echo normal. Breo, AirDuo, Singulair and albuterol not helpful.       - Speech therapy referral. Consideration for vocal cord dysfunction.

## 2020-09-04 NOTE — ASSESSMENT & PLAN NOTE
Abdominal bloating and cramping after consumption of peanut, soy, sunflower seed, palm oil.  Seen by GI and unremarkable evaluation.  No other IgE mediated symptoms. Symptoms last 24 hours.      -Return to clinic for allergy skin testing for these foods.

## 2020-09-04 NOTE — PROGRESS NOTES
"Avis Avalos is a 44 year old female who is being evaluated via a billable video visit.      The patient has been notified of following:      \"This video visit will be conducted via a call between you and your physician/provider. We have found that certain health care needs can be provided without the need for an in-person physical exam.  This service lets us provide the care you need with a video conversation.  If a prescription is necessary we can send it directly to your pharmacy.  If lab work is needed we can place an order for that and you can then stop by our lab to have the test done at a later time.     If during the course of the call the physician/provider feels a video visit is not appropriate, you will not be charged for this service.\"    Patient has given verbal consent for Video visit? Yes     Patient would like the video invitation sent by: [phone]     I have reviewed and updated the patient's Past Medical History, Social History, Family History and Medication List.    Over the last 2-3 years during late summer/early fall she will develop ocular itching, sneezing, rhinorrhea, congestion. Using Allegra and/or Claritin. Started on Nasacort 2 sprays/nostril daily. This has been helpful. On Pataday and helpful.     She is being seen by Dr. Herrera and having turbinate reduction surgery. History of congestion.      Has been treated with AirDuo, Breo, Singulair and no change in her symptoms. She stopped these medications. Has used albuterol and not helpful. Typically physical activity induced symptoms that occur immediately and resolve within minutes.     List of foods causing bloating, cramping and gas. Symptoms are immediate and last 24 hours.   Peanuts  Palm oil-also gets congestion  Sunflower seed.  Soy    Past Medical History:   Diagnosis Date     Crohn disease (H)      Depression     1-2 years ago     Fructose intolerance      High risk HPV infection 4/14/10    normal pap, + HR HPV (58). normal " paps since     PONV (postoperative nausea and vomiting)     after colonoscopy     Family History   Problem Relation Age of Onset     Diabetes Mother         borderline     Depression Mother      Gastrointestinal Disease Mother         IBS     Thyroid Disease Mother         hypothyroidism     Diabetes Father         NIDDM - Type II     Diabetes Maternal Grandmother      Arthritis Maternal Grandfather      Heart Disease Paternal Grandfather      Thyroid Disease Sister         hypothyroidism     Rheumatoid Arthritis Sister      Other Cancer Sister      Asthma Other         niece     Obesity Sister         half-sister/had gastric bypass     Past Surgical History:   Procedure Laterality Date     C APPENDECTOMY           C/SECTION, LOW TRANSVERSE  02/17/10    , Low Transverse      SECTION  2012    Procedure:  SECTION;   SECTION ;  Surgeon: Yehuda Cruz MD;  [facility]     COLONOSCOPY  2011    Procedure:COMBINED COLONOSCOPY, SINGLE BIOPSY/POLYPECTOMY BY BIOPSY; Surgeon:MARTIN MAO; [facility]     COLONOSCOPY  2011    Procedure:COMBINED COLONOSCOPY, SINGLE BIOPSY/POLYPECTOMY BY BIOPSY; Colonoscopy biopsy of distal iiluem, colonoscopy removal of colon polyp with fulguration, colonoscopy and control of biopsy bleed with placement of resolution clip; Surgeon:TESS POLLARD; [facility]     COLONOSCOPY  2011    Procedure:COMBINED COLONOSCOPY, REMOVE TUMOR/POLYP/LESION BY FULGURATION/HOT BIOPSY; Surgeon:TESS POLLARD; [facility]     COLONOSCOPY  2011    Procedure:COMBINED COLONOSCOPY, CONTROL BLEED; Surgeon:TESS POLLARD; [facility]     COLONOSCOPY N/A 2017    Procedure: COMBINED COLONOSCOPY, SINGLE OR MULTIPLE BIOPSY/POLYPECTOMY BY BIOPSY;;  Surgeon: Josué Hawk MD;  [facility]     HC FLEX SIGMOIDOSCOPY W/WO MIKY SPEC BY BRUSH/WASH  09    [facility]     HC TOOTH EXTRACTION W/FORCEP        LAPAROSCOPIC CYSTECTOMY OVARIAN (BENIGN) Right 10/21/2019    Procedure: Laparoscopic cystectomy right ovarian (benign);  Surgeon: Yehuda Cruz MD;  [facility]     LAPAROSCOPIC LYSIS ADHESIONS N/A 10/21/2019    Procedure: LYSIS, ADHESIONS, LAPAROSCOPIC;  Surgeon: Yehuda Cruz MD;  [facility]: [facility]     VIDEO CAPSULE ENDOSCOPY  07/30/09    [facility]       REVIEW OF SYSTEMS:  General: negative for weight gain. negative for weight loss. negative for changes in sleep.   Ears: negative for fullness. negative for hearing loss. negative for dizziness.   Nose: negative for snoring.negative for changes in smell. negative for drainage.   Eyes: negative for eye watering. negative for eye itching. negative for vision changes. negative for eye redness.  Throat: negative for hoarseness. negative for sore throat. negative for trouble swallowing.   Lungs: negative for shortness of breath.negative for wheezing. negative for sputum production.   Cardiovascular: negative for chest pain. negative for swelling of ankles. negative for fast or irregular heartbeat.   Gastrointestinal: negative for nausea. negative for heartburn. negative for acid reflux.   Musculoskeletal: negative for joint pain. negative for joint stiffness. negative for joint swelling.   Neurologic: negative for seizures. negative for fainting. negative for weakness.   Psychiatric: negative for changes in mood. negative for anxiety.   Endocrine: negative for cold intolerance. negative for heat intolerance. negative for tremors.   Lymphatic: negative for lower extremity swelling. negative for lymph node swelling.   Hematologic: negative for easy bruising. negative for easy bleeding.  Integumentary: negative for rash. negative for scaling. negative for nail changes.       Current Outpatient Medications:      Acetaminophen (TYLENOL PO), , Disp: , Rfl:      albuterol (PROAIR HFA/PROVENTIL HFA/VENTOLIN HFA) 108 (90 Base) MCG/ACT inhaler, Inhale  2 puffs into the lungs every 4 hours as needed for shortness of breath / dyspnea or wheezing, Disp: 2 Inhaler, Rfl: 3     buPROPion (WELLBUTRIN XL) 150 MG 24 hr tablet, TAKE 1 TABLET BY MOUTH EVERY DAY IN THE MORNING, Disp: 90 tablet, Rfl: 3     clobetasol (TEMOVATE) 0.05 % external cream, Apply topically 2 times daily, Disp: 60 g, Rfl: 1     DiphenhydrAMINE HCl (BENADRYL PO), , Disp: , Rfl:      fexofenadine (ALLEGRA) 180 MG tablet, Take 180 mg by mouth daily, Disp: , Rfl:      fluconazole (DIFLUCAN) 150 MG tablet, Take at the first sign of a yeast infection and again 3 days later., Disp: 1 tablet, Rfl: 0     ibuprofen (ADVIL/MOTRIN) 600 MG tablet, Take 1 tablet (600 mg) by mouth every 6 hours as needed for other (mild and/or inflammatory pain), Disp: 30 tablet, Rfl: 0     Multiple Vitamins-Calcium (ONE-A-DAY WOMENS PO), , Disp: , Rfl:      Omega-3 Fatty Acids (OMEGA 3 PO), , Disp: , Rfl:      omeprazole (PRILOSEC) 40 MG DR capsule, Take 1 capsule (40 mg) by mouth every morning On an empty stomach about 30 minutes before breakfast, Disp: 90 capsule, Rfl: 1     Probiotic Product (PROBIOTIC DAILY PO), , Disp: , Rfl:      senna-docusate (SENOKOT-S/PERICOLACE) 8.6-50 MG tablet, Take 1-2 tablets by mouth daily as needed for constipation, Disp: 30 tablet, Rfl: 3     triamcinolone (NASACORT) 55 MCG/ACT nasal aerosol, Spray 2 sprays into both nostrils daily, Disp: , Rfl:      valACYclovir (VALTREX) 1000 mg tablet, TAKE ONE TABLET BY MOUTH DAILY AS NEEDED, Disp: 21 tablet, Rfl: 0     loratadine (CLARITIN) 10 MG tablet, Take 10 mg by mouth daily, Disp: , Rfl:   Immunization History   Administered Date(s) Administered     Influenza (H1N1) 11/10/2009     Influenza (IIV3) PF 02/22/2012, 09/27/2012     Influenza Vaccine IM > 6 months Valent IIV4 02/18/2014, 09/26/2019     TD (ADULT, 7+) 04/14/2008     TDAP Vaccine (Adacel) 06/13/2011     Allergies   Allergen Reactions     Fructose GI Disturbance     Ragweeds      Sorbitol  Diarrhea     Liquid Adhesive Rash         EXAM:   Constitutional:  Appears well-developed and well-nourished. No distress.   HEENT:   Head: Normocephalic.   Neuro: Oriented to person, place, and time.  Skin: Skin is warm and dry. No rash noted.   Psychiatric: Normal mood and affect.     Nursing note and vitals reviewed.    ASSESSMENT/PLAN:  Problem List Items Addressed This Visit        Respiratory    Chronic seasonal allergic rhinitis due to pollen - Primary     Fall symptoms. Nasacort 2 sprays/nostril daily. Has been helpful. Over-the-counter ocular antihistamine has been beneficial.  Allergy testing was positive for weeds.     - Nasacort 2 sprays/nostril daily if needed.   - Zyrtec, Allegra or Claritin daily as needed for allergy symptoms.   - patanol 1 drop/eye twice daily as needed for allergy symptoms.   - Return to clinic for repeat allergy testing. Consider allergy shots.   - Follows with ENT. Possible turbinate hypertrophy.          Relevant Medications    triamcinolone (NASACORT) 55 MCG/ACT nasal aerosol    fexofenadine (ALLEGRA) 180 MG tablet    Shortness of breath     Shortness of breath with intense activity.  No chest symptoms at any other time.  Symptoms occur immediately and resolve within minutes.  Stress echo normal. Breo, AirDuo, Singulair and albuterol not helpful.       - Speech therapy referral. Consideration for vocal cord dysfunction.            Relevant Medications    triamcinolone (NASACORT) 55 MCG/ACT nasal aerosol    fexofenadine (ALLEGRA) 180 MG tablet    Other Relevant Orders    SPEECH THERAPY REFERRAL    Nasal turbinate hypertrophy       Musculoskeletal and Integumentary    Abdominal bloating with cramps     Abdominal bloating and cramping after consumption of peanut, soy, sunflower seed, palm oil.  Seen by GI and unremarkable evaluation.  No other IgE mediated symptoms. Symptoms last 24 hours.      -Return to clinic for allergy skin testing for these foods.                Chart  documentation with Dragon Voice recognition Software. Although reviewed after completion, some words and grammatical errors may remain.    I have reviewed the note as documented above.  This accurately captures the substance of my conversation with the patient.    Video visit contact time  Video visit started at 1239  Video visit ended at 1202    Video-Visit Details     Type of service:  Video Visit     Originating Location (pt. Location): Home     Distant Location (provider location):  [facility]      Mode of Communication:  Video Conference via Doximity    DO ANJU Anna  Allergy/Immunology  [place], [place]

## 2020-09-04 NOTE — ASSESSMENT & PLAN NOTE
Fall symptoms. Nasacort 2 sprays/nostril daily. Has been helpful. Over-the-counter ocular antihistamine has been beneficial.  Allergy testing was positive for weeds.     - Nasacort 2 sprays/nostril daily if needed.   - Zyrtec, Allegra or Claritin daily as needed for allergy symptoms.   - patanol 1 drop/eye twice daily as needed for allergy symptoms.   - Return to clinic for repeat allergy testing. Consider allergy shots.   - Follows with ENT. Possible turbinate hypertrophy.

## 2020-09-04 NOTE — PATIENT INSTRUCTIONS
Allergy Staff Appt Hours Shot Hours Locations    Physician     Gagandeep Lafleur DO       Support Staff     SHIVA Moore, SHAMIKA  Tuesday:        [place] 7-4:20     Wednesday:        [place]: 7-5     Thursday:                    [place] 7-6:40     Friday:  [place]  7-2:40   [place]        Thursday: 1-5:50        Friday: 7-10:50     [place]        Tuesday: 7- 3:20        Wednesday: 7-4:20     [place] Monday: 7-4:20        Tuesday: 1-6:20         [facility]  [address] [place] [address]  Appt Line: [phone]  Allergy RN:  [phone]    [facility]  [address] [place] [address]  Appt Line: [phone]  Allergy RN:  [phone]       Important Scheduling Information  Aspirin Desensitization: Appt will last 2 clinic days. Please call the Allergy RN line for your clinic to schedule. Discontinue antihistamines 7 days prior to the appointment.     Food Challenges: Appt will last 3-4 hours. Please call the Allergy RN line for your clinic to schedule. Discontinue antihistamines 7 days prior to the appointment.     Penicillin Testing: Appt will last 2-3 hours. Please call the Allergy RN line for your clinic to schedule. Discontinue antihistamines 7 days prior to the appointment.     Skin Testing: Appt will about 40 minutes. Call the appointment line for your clinic to schedule. Discontinue antihistamines 7 days prior to the appointment.     Venom Testing: Appt will last 2-3 hours. Please call the Allergy RN line for your clinic to schedule. Discontinue antihistamines 7 days prior to the appointment.     Thank you for trusting us with your Allergy, Asthma, and Immunology care. Please feel free to contact us with any questions or concerns you may have.

## 2020-09-21 NOTE — PATIENT INSTRUCTIONS

## 2020-09-23 ENCOUNTER — OFFICE VISIT (OUTPATIENT)
Dept: FAMILY MEDICINE | Facility: OTHER | Age: 45
End: 2020-09-23
Payer: COMMERCIAL

## 2020-09-23 VITALS
OXYGEN SATURATION: 97 % | HEART RATE: 78 BPM | DIASTOLIC BLOOD PRESSURE: 72 MMHG | HEIGHT: 64 IN | SYSTOLIC BLOOD PRESSURE: 112 MMHG | BODY MASS INDEX: 29.71 KG/M2 | RESPIRATION RATE: 16 BRPM | WEIGHT: 174 LBS | TEMPERATURE: 97.8 F

## 2020-09-23 DIAGNOSIS — Z13.1 SCREENING FOR DIABETES MELLITUS: ICD-10-CM

## 2020-09-23 DIAGNOSIS — Z01.818 PREOP GENERAL PHYSICAL EXAM: Primary | ICD-10-CM

## 2020-09-23 DIAGNOSIS — J34.3 NASAL TURBINATE HYPERTROPHY: ICD-10-CM

## 2020-09-23 LAB
ANION GAP SERPL CALCULATED.3IONS-SCNC: 4 MMOL/L (ref 3–14)
BUN SERPL-MCNC: 10 MG/DL (ref 7–30)
CALCIUM SERPL-MCNC: 9 MG/DL (ref 8.5–10.1)
CHLORIDE SERPL-SCNC: 107 MMOL/L (ref 94–109)
CO2 SERPL-SCNC: 29 MMOL/L (ref 20–32)
CREAT SERPL-MCNC: 0.84 MG/DL (ref 0.52–1.04)
GFR SERPL CREATININE-BSD FRML MDRD: 85 ML/MIN/{1.73_M2}
GLUCOSE SERPL-MCNC: 99 MG/DL (ref 70–99)
HGB BLD-MCNC: 12.9 G/DL (ref 11.7–15.7)
POTASSIUM SERPL-SCNC: 4.4 MMOL/L (ref 3.4–5.3)
SODIUM SERPL-SCNC: 140 MMOL/L (ref 133–144)

## 2020-09-23 PROCEDURE — 80048 BASIC METABOLIC PNL TOTAL CA: CPT | Performed by: STUDENT IN AN ORGANIZED HEALTH CARE EDUCATION/TRAINING PROGRAM

## 2020-09-23 PROCEDURE — 36415 COLL VENOUS BLD VENIPUNCTURE: CPT | Performed by: STUDENT IN AN ORGANIZED HEALTH CARE EDUCATION/TRAINING PROGRAM

## 2020-09-23 PROCEDURE — 99214 OFFICE O/P EST MOD 30 MIN: CPT | Performed by: STUDENT IN AN ORGANIZED HEALTH CARE EDUCATION/TRAINING PROGRAM

## 2020-09-23 PROCEDURE — 85018 HEMOGLOBIN: CPT | Performed by: STUDENT IN AN ORGANIZED HEALTH CARE EDUCATION/TRAINING PROGRAM

## 2020-09-23 ASSESSMENT — MIFFLIN-ST. JEOR: SCORE: 1420.77

## 2020-09-23 ASSESSMENT — ANXIETY QUESTIONNAIRES
7. FEELING AFRAID AS IF SOMETHING AWFUL MIGHT HAPPEN: NOT AT ALL
GAD7 TOTAL SCORE: 2
GAD7 TOTAL SCORE: 2
5. BEING SO RESTLESS THAT IT IS HARD TO SIT STILL: NOT AT ALL
GAD7 TOTAL SCORE: 2
4. TROUBLE RELAXING: NOT AT ALL
7. FEELING AFRAID AS IF SOMETHING AWFUL MIGHT HAPPEN: NOT AT ALL
1. FEELING NERVOUS, ANXIOUS, OR ON EDGE: NOT AT ALL
2. NOT BEING ABLE TO STOP OR CONTROL WORRYING: NOT AT ALL
6. BECOMING EASILY ANNOYED OR IRRITABLE: SEVERAL DAYS
3. WORRYING TOO MUCH ABOUT DIFFERENT THINGS: SEVERAL DAYS

## 2020-09-23 NOTE — PROGRESS NOTES
[address]  [facility] [address]  Phone: [phone]  Primary Provider: Sarah Daniels  Pre-op Performing Provider: SARAH DANIELS    PREOPERATIVE EVALUATION:  Today's date: 9/23/2020    Avis Avalos is a 45 year old female who presents for a preoperative evaluation.    Surgical Information:  Surgery Details 9/23/2020   Surgery/Procedure: Turbinoplasty   Surgery Location: [facility]   Surgeon: Dr. Herrera   Surgery Date: 9/28/2020   Time of Surgery: 12:50pm   Where patient plans to recover: At home with family     Fax number for surgical facility: Note does not need to be faxed, will be available electronically in Epic.  Type of Anesthesia Anticipated: General    Subjective     HPI related to upcoming procedure: Difficulty breathing thought to be due to nasal turbinate hypertrophy.  Plan for turbinoplasty.  Preop Questions 9/23/2020   1. Have you ever had a heart attack or stroke? No   2. Have you ever had surgery on your heart or blood vessels, such as a stent placement, a coronary artery bypass, or surgery on an artery in your head, neck, heart, or legs? No   3. Do you have chest pain with activity? No   4. Do you have a history of  heart failure? No   5. Do you currently have a cold, bronchitis or symptoms of other infection? No   6. Do you have a cough, shortness of breath, or wheezing? No   7. Do you or anyone in your family have previous history of blood clots? No   8. Do you or does anyone in your family have a serious bleeding problem such as prolonged bleeding following surgeries or cuts? No   9. Have you ever had problems with anemia or been told to take iron pills? No   10. Have you had any abnormal blood loss such as black, tarry or bloody stools, or abnormal vaginal bleeding? No   11. Have you ever had a blood transfusion? No   Are you willing to have a blood transfusion if it is medically needed before, during, or after  your surgery? Yes   13. Have you or any of your relatives ever had problems with anesthesia? No   14. Do you have sleep apnea, excessive snoring or daytime drowsiness? No   15. Do you have any artifical heart valves or other implanted medical devices like a pacemaker, defibrillator, or continuous glucose monitor? No   16. Do you have artificial joints? No   17. Are you allergic to latex? No   18. Is there any chance that you may be pregnant? No     Patient does not have a Health Care Directive or Living Will: Discussed advance care planning with patient; however, patient declined at this time.    956}  See problem list for active medical problems.  Problems all longstanding and stable, except as noted/documented.  See ROS for pertinent symptoms related to these conditions.      Review of Systems  CONSTITUTIONAL: NEGATIVE for fever, chills, change in weight  INTEGUMENTARY/SKIN: NEGATIVE for worrisome rashes, moles or lesions  EYES: NEGATIVE for vision changes or irritation  ENT/MOUTH: NEGATIVE for ear, mouth and throat problems  RESP: NEGATIVE for significant cough or SOB  BREAST: NEGATIVE for masses, tenderness or discharge  CV: NEGATIVE for chest pain, palpitations or peripheral edema  GI: NEGATIVE for nausea, abdominal pain, heartburn, or change in bowel habits  : NEGATIVE for frequency, dysuria, or hematuria  MUSCULOSKELETAL: NEGATIVE for significant arthralgias or myalgia  NEURO: NEGATIVE for weakness, dizziness or paresthesias  ENDOCRINE: NEGATIVE for temperature intolerance, skin/hair changes  HEME: NEGATIVE for bleeding problems  PSYCHIATRIC: NEGATIVE for changes in mood or affect    Patient Active Problem List    Diagnosis Date Noted     Nasal turbinate hypertrophy 08/25/2020     Priority: Medium     Added automatically from request for surgery [id number]       Shortness of breath 04/03/2020     Priority: Medium     Abdominal bloating with cramps 04/03/2020     Priority: Medium     Pelvic pain in female  10/07/2019     Priority: Medium     Added automatically from request for surgery [id number]       Chronic seasonal allergic rhinitis due to pollen 2017     Priority: Medium     Lichen sclerosus of female genitalia 10/19/2017     Priority: Medium     Moderate episode of recurrent major depressive disorder (H) 2017     Priority: Medium     Obsessive-compulsive disorder, unspecified type 2017     Priority: Medium     Sessile colonic polyp 2017     Priority: Medium     Constipation, unspecified constipation type 2017     Priority: Medium     Knee pain 2015     Priority: Medium     High risk HPV infection 2010     Priority: Medium     4/14/10 NIL pap, + HR HPV (58). normal paps since  , , ,  all NIL paps  17 NIL pap/neg HR HPV.       Herpes simplex type 2 infection 2009     Priority: Medium      Past Medical History:   Diagnosis Date     Crohn disease (H)      Depression     1-2 years ago     Fructose intolerance      High risk HPV infection 4/14/10    normal pap, + HR HPV (58). normal paps since     PONV (postoperative nausea and vomiting)     after colonoscopy     Past Surgical History:   Procedure Laterality Date     C APPENDECTOMY           C/SECTION, LOW TRANSVERSE  02/17/10    , Low Transverse      SECTION  2012    Procedure:  SECTION;   SECTION ;  Surgeon: Yehuda Cruz MD;  Location:  L+D     COLONOSCOPY  2011    Procedure:COMBINED COLONOSCOPY, SINGLE BIOPSY/POLYPECTOMY BY BIOPSY; Surgeon:MARTIN MAO; Location: GI     COLONOSCOPY  2011    Procedure:COMBINED COLONOSCOPY, SINGLE BIOPSY/POLYPECTOMY BY BIOPSY; Colonoscopy biopsy of distal iiluem, colonoscopy removal of colon polyp with fulguration, colonoscopy and control of biopsy bleed with placement of resolution clip; Surgeon:TESS POLLARD; Location: GI     COLONOSCOPY  2011    Procedure:COMBINED COLONOSCOPY, REMOVE  TUMOR/POLYP/LESION BY FULGURATION/HOT BIOPSY; Surgeon:TESS POLLARD; Location:[facility]     COLONOSCOPY  9/12/2011    Procedure:COMBINED COLONOSCOPY, CONTROL BLEED; Surgeon:TESS POLLARD; [facility]     COLONOSCOPY N/A 6/19/2017    Procedure: COMBINED COLONOSCOPY, SINGLE OR MULTIPLE BIOPSY/POLYPECTOMY BY BIOPSY;;  Surgeon: Josué Hawk MD;  Location: [facility]     HC FLEX SIGMOIDOSCOPY W/WO MIKY SPEC BY BRUSH/WASH  07/02/09    [facility]     HC TOOTH EXTRACTION W/FORCEP       LAPAROSCOPIC CYSTECTOMY OVARIAN (BENIGN) Right 10/21/2019    Procedure: Laparoscopic cystectomy right ovarian (benign);  Surgeon: Yehuda Cruz MD;  [facility]     LAPAROSCOPIC LYSIS ADHESIONS N/A 10/21/2019    Procedure: LYSIS, ADHESIONS, LAPAROSCOPIC;  Surgeon: Yehuda Cruz MD;  [facility]: [facility]     VIDEO CAPSULE ENDOSCOPY  07/30/09    [facility]     Current Outpatient Medications   Medication Sig Dispense Refill     Acetaminophen (TYLENOL PO)        albuterol (PROAIR HFA/PROVENTIL HFA/VENTOLIN HFA) 108 (90 Base) MCG/ACT inhaler Inhale 2 puffs into the lungs every 4 hours as needed for shortness of breath / dyspnea or wheezing 2 Inhaler 3     buPROPion (WELLBUTRIN XL) 150 MG 24 hr tablet TAKE 1 TABLET BY MOUTH EVERY DAY IN THE MORNING 90 tablet 3     clobetasol (TEMOVATE) 0.05 % external cream Apply topically 2 times daily 60 g 1     DiphenhydrAMINE HCl (BENADRYL PO)        fexofenadine (ALLEGRA) 180 MG tablet Take 180 mg by mouth daily       fluconazole (DIFLUCAN) 150 MG tablet Take at the first sign of a yeast infection and again 3 days later. 1 tablet 0     ibuprofen (ADVIL/MOTRIN) 600 MG tablet Take 1 tablet (600 mg) by mouth every 6 hours as needed for other (mild and/or inflammatory pain) 30 tablet 0     loratadine (CLARITIN) 10 MG tablet Take 10 mg by mouth daily       Multiple Vitamins-Calcium (ONE-A-DAY WOMENS PO)        Omega-3 Fatty Acids (OMEGA 3 PO)        omeprazole (PRILOSEC) 40 MG  "DR capsule Take 1 capsule (40 mg) by mouth every morning On an empty stomach about 30 minutes before breakfast 90 capsule 1     Probiotic Product (PROBIOTIC DAILY PO)        senna-docusate (SENOKOT-S/PERICOLACE) 8.6-50 MG tablet Take 1-2 tablets by mouth daily as needed for constipation 30 tablet 3     triamcinolone (NASACORT) 55 MCG/ACT nasal aerosol Spray 2 sprays into both nostrils daily       valACYclovir (VALTREX) 1000 mg tablet TAKE ONE TABLET BY MOUTH DAILY AS NEEDED 21 tablet 0       Allergies   Allergen Reactions     Fructose GI Disturbance     Ragweeds      Sorbitol Diarrhea     Liquid Adhesive Rash        Social History     Tobacco Use     Smoking status: Former Smoker     Smokeless tobacco: Never Used     Tobacco comment: 2 1/2 year ago   Substance Use Topics     Alcohol use: Yes     Comment: Occ.     Family History   Problem Relation Age of Onset     Diabetes Mother         borderline     Depression Mother      Gastrointestinal Disease Mother         IBS     Thyroid Disease Mother         hypothyroidism     Diabetes Father         NIDDM - Type II     Diabetes Maternal Grandmother      Arthritis Maternal Grandfather      Heart Disease Paternal Grandfather      Thyroid Disease Sister         hypothyroidism     Rheumatoid Arthritis Sister      Other Cancer Sister      Asthma Other         niece     Obesity Sister         half-sister/had gastric bypass     History   Drug Use No            Objective   /72   Pulse 78   Temp 97.8  F (36.6  C) (Temporal)   Resp 16   Ht 1.62 m (5' 3.78\")   Wt 78.9 kg (174 lb)   LMP 09/23/2020   SpO2 97%   BMI 30.07 kg/m    Physical Exam    GENERAL APPEARANCE: healthy, alert and no distress     EYES: EOMI, PERRL     HENT: ear canals and TM's normal and nose and mouth without ulcers or lesions     NECK: no adenopathy, no asymmetry, masses, or scars and thyroid normal to palpation     RESP: lungs clear to auscultation - no rales, rhonchi or wheezes     CV: regular " rates and rhythm, normal S1 S2, no S3 or S4 and no murmur, click or rub     ABDOMEN:  soft, nontender, no HSM or masses and bowel sounds normal     MS: extremities normal- no gross deformities noted, no evidence of inflammation in joints, FROM in all extremities.     SKIN: no suspicious lesions or rashes     NEURO: Normal strength and tone, sensory exam grossly normal, mentation intact and speech normal     PSYCH: mentation appears normal. and affect normal/bright     LYMPHATICS: No cervical adenopathy    No results for input(s): HGB, PLT, INR, NA, POTASSIUM, CR, A1C in the last 84590 hours.     PRE-OP Diagnostics:  Labs pending at this time.  Results will be reviewed when available.  No EKG required, no history of coronary heart disease, significant arrhythmia, peripheral arterial disease or other structural heart disease.         Assessment & Plan   The proposed surgical procedure is considered INTERMEDIATE risk.    REVISED CARDIAC RISK INDEX  The patient has the following serious cardiovascular risks for perioperative complications:  No serious cardiac risks = 0 points    INTERPRETATION: 0 points: Class I (very low risk - 0.4% complication rate)         ICD-10-CM    1. Preop general physical exam  Z01.818 Hemoglobin   2. Nasal turbinate hypertrophy  J34.3    3. Screening for diabetes mellitus  Z13.1 Basic metabolic panel  (Ca, Cl, CO2, Creat, Gluc, K, Na, BUN)       The patient has the following additional risks and recommendations for perioperative complications:     - No identified additional risk factors other than previously addressed     MEDICATION INSTRUCTIONS:  Patient is to take all scheduled medications on the day of surgery with a few sips of water. May skip Wellbutrin dose if she decides to do so.     RECOMMENDATION:  APPROVAL GIVEN to proceed with proposed procedure, without further diagnostic evaluation.    No follow-ups on file.    Signed Electronically by: JEM Lindsay CNP    Copy of  this evaluation report is provided to requesting physician.    [facility]    [facility] Pre Guidelines    Revised Cardiac Risk Index    Answers for HPI/ROS submitted by the patient on 9/23/2020   If you checked off any problems, how difficult have these problems made it for you to do your work, take care of things at home, or get along with other people?: Not difficult at all  PHQ9 TOTAL SCORE: 1  LYNDSAY 7 TOTAL SCORE: 2

## 2020-09-24 DIAGNOSIS — Z11.59 ENCOUNTER FOR SCREENING FOR OTHER VIRAL DISEASES: ICD-10-CM

## 2020-09-24 PROCEDURE — U0003 INFECTIOUS AGENT DETECTION BY NUCLEIC ACID (DNA OR RNA); SEVERE ACUTE RESPIRATORY SYNDROME CORONAVIRUS 2 (SARS-COV-2) (CORONAVIRUS DISEASE [COVID-19]), AMPLIFIED PROBE TECHNIQUE, MAKING USE OF HIGH THROUGHPUT TECHNOLOGIES AS DESCRIBED BY CMS-2020-01-R: HCPCS | Performed by: OTOLARYNGOLOGY

## 2020-09-24 ASSESSMENT — PATIENT HEALTH QUESTIONNAIRE - PHQ9: SUM OF ALL RESPONSES TO PHQ QUESTIONS 1-9: 1

## 2020-09-24 ASSESSMENT — ANXIETY QUESTIONNAIRES: GAD7 TOTAL SCORE: 2

## 2020-09-25 ENCOUNTER — ANESTHESIA EVENT (OUTPATIENT)
Dept: SURGERY | Facility: AMBULATORY SURGERY CENTER | Age: 45
End: 2020-09-25

## 2020-09-25 LAB
SARS-COV-2 RNA SPEC QL NAA+PROBE: NOT DETECTED
SPECIMEN SOURCE: NORMAL

## 2020-09-28 ENCOUNTER — ANESTHESIA (OUTPATIENT)
Dept: SURGERY | Facility: AMBULATORY SURGERY CENTER | Age: 45
End: 2020-09-28
Payer: COMMERCIAL

## 2020-09-28 ENCOUNTER — TELEPHONE (OUTPATIENT)
Dept: SURGERY | Facility: CLINIC | Age: 45
End: 2020-09-28

## 2020-09-28 ENCOUNTER — HOSPITAL ENCOUNTER (OUTPATIENT)
Facility: AMBULATORY SURGERY CENTER | Age: 45
Discharge: HOME | End: 2020-09-28
Attending: OTOLARYNGOLOGY | Admitting: OTOLARYNGOLOGY
Payer: COMMERCIAL

## 2020-09-28 VITALS
SYSTOLIC BLOOD PRESSURE: 122 MMHG | RESPIRATION RATE: 16 BRPM | DIASTOLIC BLOOD PRESSURE: 80 MMHG | HEART RATE: 60 BPM | OXYGEN SATURATION: 98 % | TEMPERATURE: 96.5 F

## 2020-09-28 DIAGNOSIS — J34.3 NASAL TURBINATE HYPERTROPHY: ICD-10-CM

## 2020-09-28 LAB — HCG UR QL: NEGATIVE

## 2020-09-28 PROCEDURE — G8907 PT DOC NO EVENTS ON DISCHARG: HCPCS

## 2020-09-28 PROCEDURE — 81025 URINE PREGNANCY TEST: CPT | Performed by: ANESTHESIOLOGY

## 2020-09-28 PROCEDURE — 30140 RESECT INFERIOR TURBINATE: CPT | Mod: 50 | Performed by: OTOLARYNGOLOGY

## 2020-09-28 PROCEDURE — 30140 RESECT INFERIOR TURBINATE: CPT | Mod: 50

## 2020-09-28 PROCEDURE — G8918 PT W/O PREOP ORDER IV AB PRO: HCPCS

## 2020-09-28 RX ORDER — PROPOFOL 10 MG/ML
INJECTION, EMULSION INTRAVENOUS PRN
Status: DISCONTINUED | OUTPATIENT
Start: 2020-09-28 | End: 2020-09-28

## 2020-09-28 RX ORDER — FENTANYL CITRATE 50 UG/ML
INJECTION, SOLUTION INTRAMUSCULAR; INTRAVENOUS PRN
Status: DISCONTINUED | OUTPATIENT
Start: 2020-09-28 | End: 2020-09-28

## 2020-09-28 RX ORDER — FENTANYL CITRATE 50 UG/ML
25-50 INJECTION, SOLUTION INTRAMUSCULAR; INTRAVENOUS
Status: DISCONTINUED | OUTPATIENT
Start: 2020-09-28 | End: 2020-09-29 | Stop reason: HOSPADM

## 2020-09-28 RX ORDER — HYDROCODONE BITARTRATE AND ACETAMINOPHEN 5; 325 MG/1; MG/1
1 TABLET ORAL EVERY 6 HOURS PRN
Qty: 4 TABLET | Refills: 0 | Status: SHIPPED | OUTPATIENT
Start: 2020-09-28 | End: 2020-11-24

## 2020-09-28 RX ORDER — ONDANSETRON 4 MG/1
4 TABLET, ORALLY DISINTEGRATING ORAL EVERY 30 MIN PRN
Status: DISCONTINUED | OUTPATIENT
Start: 2020-09-28 | End: 2020-09-29 | Stop reason: HOSPADM

## 2020-09-28 RX ORDER — MEPERIDINE HYDROCHLORIDE 25 MG/ML
12.5 INJECTION INTRAMUSCULAR; INTRAVENOUS; SUBCUTANEOUS
Status: DISCONTINUED | OUTPATIENT
Start: 2020-09-28 | End: 2020-09-29 | Stop reason: HOSPADM

## 2020-09-28 RX ORDER — ALBUTEROL SULFATE 0.83 MG/ML
2.5 SOLUTION RESPIRATORY (INHALATION) EVERY 4 HOURS PRN
Status: DISCONTINUED | OUTPATIENT
Start: 2020-09-28 | End: 2020-09-29 | Stop reason: HOSPADM

## 2020-09-28 RX ORDER — OXYMETAZOLINE HYDROCHLORIDE 0.05 G/100ML
2 SPRAY NASAL ONCE
Status: COMPLETED | OUTPATIENT
Start: 2020-09-28 | End: 2020-09-28

## 2020-09-28 RX ORDER — LIDOCAINE HYDROCHLORIDE AND EPINEPHRINE 10; 10 MG/ML; UG/ML
INJECTION, SOLUTION INFILTRATION; PERINEURAL PRN
Status: DISCONTINUED | OUTPATIENT
Start: 2020-09-28 | End: 2020-09-28 | Stop reason: HOSPADM

## 2020-09-28 RX ORDER — LIDOCAINE 40 MG/G
CREAM TOPICAL
Status: DISCONTINUED | OUTPATIENT
Start: 2020-09-28 | End: 2020-09-29 | Stop reason: HOSPADM

## 2020-09-28 RX ORDER — PHYSOSTIGMINE SALICYLATE 1 MG/ML
1.2 INJECTION INTRAVENOUS
Status: DISCONTINUED | OUTPATIENT
Start: 2020-09-28 | End: 2020-09-29 | Stop reason: HOSPADM

## 2020-09-28 RX ORDER — ACETAMINOPHEN 325 MG/1
975 TABLET ORAL ONCE
Status: COMPLETED | OUTPATIENT
Start: 2020-09-28 | End: 2020-09-28

## 2020-09-28 RX ORDER — ONDANSETRON 2 MG/ML
INJECTION INTRAMUSCULAR; INTRAVENOUS PRN
Status: DISCONTINUED | OUTPATIENT
Start: 2020-09-28 | End: 2020-09-28

## 2020-09-28 RX ORDER — HYDRALAZINE HYDROCHLORIDE 20 MG/ML
2.5-5 INJECTION INTRAMUSCULAR; INTRAVENOUS EVERY 10 MIN PRN
Status: DISCONTINUED | OUTPATIENT
Start: 2020-09-28 | End: 2020-09-29 | Stop reason: HOSPADM

## 2020-09-28 RX ORDER — SODIUM CHLORIDE, SODIUM LACTATE, POTASSIUM CHLORIDE, CALCIUM CHLORIDE 600; 310; 30; 20 MG/100ML; MG/100ML; MG/100ML; MG/100ML
INJECTION, SOLUTION INTRAVENOUS CONTINUOUS
Status: DISCONTINUED | OUTPATIENT
Start: 2020-09-28 | End: 2020-09-29 | Stop reason: HOSPADM

## 2020-09-28 RX ORDER — OXYCODONE HYDROCHLORIDE 5 MG/1
10 TABLET ORAL EVERY 4 HOURS PRN
Status: DISCONTINUED | OUTPATIENT
Start: 2020-09-28 | End: 2020-09-29 | Stop reason: HOSPADM

## 2020-09-28 RX ORDER — METOPROLOL TARTRATE 1 MG/ML
1-2 INJECTION, SOLUTION INTRAVENOUS EVERY 5 MIN PRN
Status: DISCONTINUED | OUTPATIENT
Start: 2020-09-28 | End: 2020-09-29 | Stop reason: HOSPADM

## 2020-09-28 RX ORDER — OXYMETAZOLINE HYDROCHLORIDE 0.05 G/100ML
SPRAY NASAL PRN
Status: DISCONTINUED | OUTPATIENT
Start: 2020-09-28 | End: 2020-09-28 | Stop reason: HOSPADM

## 2020-09-28 RX ORDER — NALOXONE HYDROCHLORIDE 0.4 MG/ML
.1-.4 INJECTION, SOLUTION INTRAMUSCULAR; INTRAVENOUS; SUBCUTANEOUS
Status: DISCONTINUED | OUTPATIENT
Start: 2020-09-28 | End: 2020-09-29 | Stop reason: HOSPADM

## 2020-09-28 RX ORDER — DEXAMETHASONE SODIUM PHOSPHATE 4 MG/ML
INJECTION, SOLUTION INTRA-ARTICULAR; INTRALESIONAL; INTRAMUSCULAR; INTRAVENOUS; SOFT TISSUE PRN
Status: DISCONTINUED | OUTPATIENT
Start: 2020-09-28 | End: 2020-09-28

## 2020-09-28 RX ORDER — HYDROMORPHONE HYDROCHLORIDE 1 MG/ML
.3-.5 INJECTION, SOLUTION INTRAMUSCULAR; INTRAVENOUS; SUBCUTANEOUS EVERY 10 MIN PRN
Status: DISCONTINUED | OUTPATIENT
Start: 2020-09-28 | End: 2020-09-29 | Stop reason: HOSPADM

## 2020-09-28 RX ORDER — ONDANSETRON 2 MG/ML
4 INJECTION INTRAMUSCULAR; INTRAVENOUS EVERY 30 MIN PRN
Status: DISCONTINUED | OUTPATIENT
Start: 2020-09-28 | End: 2020-09-29 | Stop reason: HOSPADM

## 2020-09-28 RX ORDER — LIDOCAINE HYDROCHLORIDE 20 MG/ML
INJECTION, SOLUTION INFILTRATION; PERINEURAL PRN
Status: DISCONTINUED | OUTPATIENT
Start: 2020-09-28 | End: 2020-09-28

## 2020-09-28 RX ORDER — DEXAMETHASONE SODIUM PHOSPHATE 4 MG/ML
4 INJECTION, SOLUTION INTRA-ARTICULAR; INTRALESIONAL; INTRAMUSCULAR; INTRAVENOUS; SOFT TISSUE EVERY 10 MIN PRN
Status: DISCONTINUED | OUTPATIENT
Start: 2020-09-28 | End: 2020-09-29 | Stop reason: HOSPADM

## 2020-09-28 RX ADMIN — OXYMETAZOLINE HYDROCHLORIDE 2 SPRAY: 0.05 SPRAY NASAL at 11:39

## 2020-09-28 RX ADMIN — DEXAMETHASONE SODIUM PHOSPHATE 4 MG: 4 INJECTION, SOLUTION INTRA-ARTICULAR; INTRALESIONAL; INTRAMUSCULAR; INTRAVENOUS; SOFT TISSUE at 12:49

## 2020-09-28 RX ADMIN — LIDOCAINE HYDROCHLORIDE 80 MG: 20 INJECTION, SOLUTION INFILTRATION; PERINEURAL at 12:54

## 2020-09-28 RX ADMIN — FENTANYL CITRATE 25 MCG: 50 INJECTION, SOLUTION INTRAMUSCULAR; INTRAVENOUS at 14:09

## 2020-09-28 RX ADMIN — OXYCODONE HYDROCHLORIDE 5 MG: 5 TABLET ORAL at 14:09

## 2020-09-28 RX ADMIN — PROPOFOL 150 MG: 10 INJECTION, EMULSION INTRAVENOUS at 12:54

## 2020-09-28 RX ADMIN — FENTANYL CITRATE 50 MCG: 50 INJECTION, SOLUTION INTRAMUSCULAR; INTRAVENOUS at 12:49

## 2020-09-28 RX ADMIN — ACETAMINOPHEN 975 MG: 325 TABLET ORAL at 11:39

## 2020-09-28 RX ADMIN — SODIUM CHLORIDE, SODIUM LACTATE, POTASSIUM CHLORIDE, CALCIUM CHLORIDE: 600; 310; 30; 20 INJECTION, SOLUTION INTRAVENOUS at 11:55

## 2020-09-28 RX ADMIN — PROPOFOL 50 MG: 10 INJECTION, EMULSION INTRAVENOUS at 12:56

## 2020-09-28 RX ADMIN — Medication 50 MG: at 12:54

## 2020-09-28 RX ADMIN — PROPOFOL 50 MG: 10 INJECTION, EMULSION INTRAVENOUS at 13:55

## 2020-09-28 RX ADMIN — ONDANSETRON 4 MG: 2 INJECTION INTRAMUSCULAR; INTRAVENOUS at 13:12

## 2020-09-28 ASSESSMENT — LIFESTYLE VARIABLES: TOBACCO_USE: 1

## 2020-09-28 NOTE — ANESTHESIA PREPROCEDURE EVALUATION
"Anesthesia Pre-Procedure Evaluation    Patient: Avis Avalos   MRN:     [id number] Gender:   female   Age:    45 year old :      1975        Preoperative Diagnosis: Nasal turbinate hypertrophy [J34.3]   Procedure(s):  TURBINOPLASTY, ENDOSCOPIC     LABS:  CBC:   Lab Results   Component Value Date    WBC 5.0 2017    WBC 6.6 2014    HGB 12.9 2020    HGB 13.7 2017    HCT 38.4 2017    HCT 41.4 2014     2017     2014     BMP:   Lab Results   Component Value Date     2020     2018    POTASSIUM 4.4 2020    POTASSIUM 4.4 2018    CHLORIDE 107 2020    CHLORIDE 107 2018    CO2 29 2020    CO2 28 2018    BUN 10 2020    BUN 12 2018    CR 0.84 2020    CR 0.69 2018    GLC 99 2020    GLC 82 2018     COAGS: No results found for: PTT, INR, FIBR  POC:   Lab Results   Component Value Date    HCG Negative 2017     OTHER:   Lab Results   Component Value Date    THI 9.0 2020    ALBUMIN 3.7 2017    PROTTOTAL 6.9 2017    ALT 25 2017    AST 18 2017    ALKPHOS 54 2017    BILITOTAL 0.2 2017    TSH 1.48 2019    CRP <5.0 08/10/2011        Preop Vitals    BP Readings from Last 3 Encounters:   20 112/72   20 100/69   20 116/73    Pulse Readings from Last 3 Encounters:   20 78   20 78   20 81      Resp Readings from Last 3 Encounters:   20 16   19 16   19 16    SpO2 Readings from Last 3 Encounters:   20 97%   20 99%   20 97%      Temp Readings from Last 1 Encounters:   20 97.8  F (36.6  C) (Temporal)    Ht Readings from Last 1 Encounters:   20 1.62 m (5' 3.78\")      Wt Readings from Last 1 Encounters:   20 78.9 kg (174 lb)    Estimated body mass index is 30.07 kg/m  as calculated from the following:    Height as of 20: 1.62 m (5' " "3.78\").    Weight as of 20: 78.9 kg (174 lb).     LDA:  Airway - Infant 9 oral (Active)   Number of days: 343       NG/OG Tube Orogastric 18 fr Center mouth (Active)   Number of days: 343        Past Medical History:   Diagnosis Date     Crohn disease (H)      Depression     1-2 years ago     Fructose intolerance      High risk HPV infection 4/14/10    normal pap, + HR HPV (58). normal paps since     PONV (postoperative nausea and vomiting)     after colonoscopy      Past Surgical History:   Procedure Laterality Date     C APPENDECTOMY           C/SECTION, LOW TRANSVERSE  02/17/10    , Low Transverse      SECTION  2012    Procedure:  SECTION;   SECTION ;  Surgeon: Yehuda Cruz MD;  [facility]     COLONOSCOPY  2011    Procedure:COMBINED COLONOSCOPY, SINGLE BIOPSY/POLYPECTOMY BY BIOPSY; Surgeon:MARTIN MAO; Location: GI     COLONOSCOPY  2011    Procedure:COMBINED COLONOSCOPY, SINGLE BIOPSY/POLYPECTOMY BY BIOPSY; Colonoscopy biopsy of distal iiluem, colonoscopy removal of colon polyp with fulguration, colonoscopy and control of biopsy bleed with placement of resolution clip; Surgeon:TESS POLLARD; Location: GI     COLONOSCOPY  2011    Procedure:COMBINED COLONOSCOPY, REMOVE TUMOR/POLYP/LESION BY FULGURATION/HOT BIOPSY; Surgeon:TESS POLLARD; Location:[facility]     COLONOSCOPY  2011    Procedure:COMBINED COLONOSCOPY, CONTROL BLEED; Surgeon:TESS POLLARD; Location: GI     COLONOSCOPY N/A 2017    Procedure: COMBINED COLONOSCOPY, SINGLE OR MULTIPLE BIOPSY/POLYPECTOMY BY BIOPSY;;  Surgeon: Josué Hawk MD;  Location:  GI     HC FLEX SIGMOIDOSCOPY W/WO MIKY SPEC BY BRUSH/WASH  09    [facility]     HC TOOTH EXTRACTION W/FORCEP       LAPAROSCOPIC CYSTECTOMY OVARIAN (BENIGN) Right 10/21/2019    Procedure: Laparoscopic cystectomy right ovarian (benign);  Surgeon: Yehuda Cruz MD;  " Location: [facility]     LAPAROSCOPIC LYSIS ADHESIONS N/A 10/21/2019    Procedure: LYSIS, ADHESIONS, LAPAROSCOPIC;  Surgeon: Yehuda Cruz MD;  [facility]     VIDEO CAPSULE ENDOSCOPY  07/30/09    [facility]      Allergies   Allergen Reactions     Fructose GI Disturbance     Ragweeds      Sorbitol Diarrhea     Liquid Adhesive Rash        Anesthesia Evaluation     . Pt has had prior anesthetic. Type: General and MAC    History of anesthetic complications   - PONV        ROS/MED HX    ENT/Pulmonary:     (+)tobacco use, Past use , . .    Neurologic:  - neg neurologic ROS     Cardiovascular:  - neg cardiovascular ROS   (+) ----. : . . . :. . No previous cardiac testing       METS/Exercise Tolerance:     Hematologic:  - neg hematologic  ROS       Musculoskeletal:   (+) arthritis,  other musculoskeletal- chronic knee pain      GI/Hepatic:     (+) Other GI/Hepatic chrons disease /constipation      Renal/Genitourinary:  - ROS Renal section negative   (+) Pt has no history of transplant,       Endo:  - neg endo ROS       Psychiatric:     (+) psychiatric history anxiety, depression and other (comment) (OCD)      Infectious Disease:   (+) Other Infectious Disease herpes simplex type @      Malignancy:      - no malignancy   Other:    (+) No chance of pregnancy C-spine cleared: N/A, H/O Chronic Pain,                       PHYSICAL EXAM:   Mental Status/Neuro: A/A/O   Airway: Facies: Feasible  Mallampati: I  Mouth/Opening: Full  TM distance: > 6 cm  Neck ROM: Full   Respiratory: Auscultation: CTAB     Resp. Rate: Normal     Resp. Effort: Normal      CV: Rhythm: Regular  Rate: Age appropriate  Heart: Normal Sounds  Edema: None   Comments:      Dental: Normal Dentition                Assessment:   ASA SCORE: 2    H&P: History and physical reviewed and following examination; no interval change.   Smoking Status:  Non-Smoker/Unknown   NPO Status: Will be NPO Appropriate at ... 9/28/2020 12:45 PM     Plan:   Anes. Type:   General   Pre-Medication: Acetaminophen; Gabapentin   Induction:  IV (Standard)   Airway: ETT; Oral   Access/Monitoring: PIV   Maintenance: Balanced     Postop Plan:   Postop Pain: Opioids  Postop Sedation/Airway: Not planned  Disposition: Outpatient     PONV Management:   Adult Risk Factors: Female, H/o PONV or Motion Sickness, Non-Smoker, Postop Opioids   Prevention: Ondansetron, Dexamethasone     CONSENT: Direct conversation   Plan and risks discussed with: Patient   Blood Products: N/a                   Gagandeep Dennis DO

## 2020-09-28 NOTE — DISCHARGE INSTRUCTIONS
[facility]   Adult Discharge Orders & Instructions     For 24 hours after surgery    1. Get plenty of rest.  A responsible adult must stay with you for at least 24 hours after you leave the hospital.   2. Do not drive or use heavy equipment.  If you have weakness or tingling, don't drive or use heavy equipment until this feeling goes away.  3. Do not drink alcohol.  4. Avoid strenuous or risky activities.  Ask for help when climbing stairs.   5. You may feel lightheaded.  IF so, sit for a few minutes before standing.  Have someone help you get up.   6. If you have nausea (feel sick to your stomach): Drink only clear liquids such as apple juice, ginger ale, broth or 7-Up.  Rest may also help.  Be sure to drink enough fluids.  Move to a regular diet as you feel able.  7. You may have a slight fever. Call the doctor if your fever is over 100 F (37.7 C) (taken under the tongue) or lasts longer than 24 hours.  8. You may have a dry mouth, a sore throat, muscle aches or trouble sleeping.  These should go away after 24 hours.  9. Do not make important or legal decisions.     Call your doctor for any of the followin.  Signs of infection (fever, growing tenderness at the surgery site, a large amount of drainage or bleeding, severe pain, foul-smelling drainage, redness, swelling).    2. It has been over 8 to 10 hours since surgery and you are still not able to urinate (pass water).    3.  Headache for over 24 hours.    4.  Numbness, tingling or weakness the day after surgery (if you had spinal anesthesia).                  5. Signs of Covid-19 infection (temperature over 100 degrees, shortness of breath, cough, loss of taste/smell, generalized body aches, persistent headache,                  chills, sore throat, nausea/vomiting/diarrhea).    To contact Dr Herrera call:  Day - [phone]  Outside clinic hours - [phone], ask for the ENT resident on call

## 2020-09-28 NOTE — ANESTHESIA POSTPROCEDURE EVALUATION
Anesthesia POST Procedure Evaluation    Patient: Avis Avalos   MRN:     [id number] Gender:   female   Age:    45 year old :      1975        Preoperative Diagnosis: Nasal turbinate hypertrophy [J34.3]   Procedure(s):  BILATERAL ENDOSCOPIC TURBINOPLASTY   Postop Comments: No value filed.     Anesthesia Type: General       Disposition: Outpatient   Postop Pain Control: Uneventful            Sign Out: Well controlled pain   PONV: No   Neuro/Psych: Uneventful            Sign Out: Acceptable/Baseline neuro status   Airway/Respiratory: Uneventful            Sign Out: Acceptable/Baseline resp. status   CV/Hemodynamics: Uneventful            Sign Out: Acceptable CV status   Other NRE: NONE   DID A NON-ROUTINE EVENT OCCUR? No         Last Anesthesia Record Vitals:  CRNA VITALS  2020 1331 - 2020 1431      2020             NIBP:  (!) 237/203    NIBP Mean:  213          Last PACU Vitals:  Vitals Value Taken Time   /79 2020  2:25 PM   Temp 96.5  F (35.8  C) 2020  2:05 PM   Pulse 58 2020  2:27 PM   Resp 6 2020  2:27 PM   SpO2 97 % 2020  2:30 PM   Temp src     NIBP     Pulse     SpO2     Resp     Temp     Ht Rate     Temp 2     Vitals shown include unvalidated device data.      Electronically Signed By: Gagandeep Dennis DO, 2020, 2:31 PM

## 2020-09-28 NOTE — OP NOTE
PREOPERATIVE DIAGNOSES:   1.  Bilateral inferior turbinate hypertrophy.   2.  Nasal congestion.       POSTOPERATIVE DIAGNOSES:     1.  Bilateral inferior turbinate hypertrophy.   2.  Nasal congestion.       OPERATIVE PROCEDURES:   Bilateral inferior turbinate reductions, submucous resections        SURGEON:  Ann Herrera MD       ASSISTANT:  none      ANESTHESIA:  General endotracheal.       SPECIMENS REMOVED:  None.       COMPLICATIONS:  None.       ESTIMATED BLOOD LOSS:  Approximately 20 mL.       INDICATIONS:  Patient is a 45 year old female who presented to clinic with difficulty breathing through their nose.  They had no response to an appropriate trial of medical management with a nasal steroid spray.         FINDINGS:  Intraoperatively, there was bilateral inferior turbinate hypertrophy.       DESCRIPTION: Patient was brought into the operating room placed on the operative table in a supine position.  A member of the department of anesthesia was present and intubated the patient without difficulty. 6 ml o f 1% lidocaine with epi was injected into the inferior turbinates bilaterally.  Patient was then prepped and draped in her normal sterile fashion.  A timeout was taken to correctly identify the patient and the procedure.  A 0 degree rigid endoscope was used to visualize the nasal cavity.  Starting on the right side the 0 degree endoscope was used to visualize the right inferior turbinate.  A microdebrider was used to microdebrider mucosa at the head of the inferior turbinate.  We then debrided mucosa off of the lateral aspect of the inferior turbinate.  Mucosa was also debrided off of the inferior turbinate bone inferiorly.  Once the inferior turbinate bone was exposed, the medial mucosa was dissected free from the bone using a combination of suction and suze elevator.  Kalyani forceps were then used to remove the inferior turbinate bone thus completing the submucous resection.  Posterior inferior  turbinate mucosa was also debrided medially.  We repeated the procedure in a similar fashion on the left side.  We ensure there was good hemostasis.  The inferior turbinate mucosa was then rolled up onto itself.  The nose was irrigated with sterile normal saline and suctioned out.  Patient was handed back over to anesthesia allowed to extubate and transferred to the PACU in stable condition.

## 2020-09-28 NOTE — ANESTHESIA CARE TRANSFER NOTE
Patient: Avis Avalos    Procedure(s):  BILATERAL ENDOSCOPIC TURBINOPLASTY    Diagnosis: Nasal turbinate hypertrophy [J34.3]  Diagnosis Additional Information: No value filed.    Anesthesia Type:   General     Note:  Airway :Face Mask  Patient transferred to:PACU  Handoff Report: Identifed the Patient, Identified the Reponsible Provider, Reviewed the pertinent medical history, Discussed the surgical course, Reviewed Intra-OP anesthesia mangement and issues during anesthesia, Set expectations for post-procedure period and Allowed opportunity for questions and acknowledgement of understanding      Vitals: (Last set prior to Anesthesia Care Transfer)    CRNA VITALS  9/28/2020 1331 - 9/28/2020 1406      9/28/2020             NIBP:  (!) 237/203    NIBP Mean:  213                Electronically Signed By: JEM Morris CRNA  September 28, 2020  2:06 PM

## 2020-09-30 ENCOUNTER — MYC MEDICAL ADVICE (OUTPATIENT)
Dept: OTOLARYNGOLOGY | Facility: CLINIC | Age: 45
End: 2020-09-30

## 2020-10-12 ENCOUNTER — OFFICE VISIT (OUTPATIENT)
Dept: OTOLARYNGOLOGY | Facility: CLINIC | Age: 45
End: 2020-10-12
Payer: COMMERCIAL

## 2020-10-12 VITALS
OXYGEN SATURATION: 99 % | RESPIRATION RATE: 20 BRPM | DIASTOLIC BLOOD PRESSURE: 91 MMHG | HEART RATE: 86 BPM | SYSTOLIC BLOOD PRESSURE: 133 MMHG

## 2020-10-12 DIAGNOSIS — R09.81 NASAL CONGESTION: ICD-10-CM

## 2020-10-12 DIAGNOSIS — J34.89 SINUS PRESSURE: Primary | ICD-10-CM

## 2020-10-12 DIAGNOSIS — J34.3 NASAL TURBINATE HYPERTROPHY: ICD-10-CM

## 2020-10-12 PROCEDURE — 99213 OFFICE O/P EST LOW 20 MIN: CPT | Performed by: OTOLARYNGOLOGY

## 2020-10-12 ASSESSMENT — PAIN SCALES - GENERAL: PAINLEVEL: NO PAIN (0)

## 2020-10-12 NOTE — PROGRESS NOTES
CC: Status post bilateral endoscopic turbinoplasty on September 28, 2020    HPI: Patient reports that since the procedure, she has been experiencing sore throat and postnasal drip.  She is also had teeth discomfort and facial pressure.  She also has not had a significant amount of improvement in her nasal breathing.  Over the weekend she actually felt the best she has had and felt like she was turning a corner but today she feels like some of her pressure and teeth discomfort are returning.  She is rinsing her nose as directed.  It is been mostly clear with some may be some discoloration on occasion.    PE:  GEN: nad  NOSE: Septum midline.  Turbinates are much thinner.  There is good space between the septum and the turbinates.  Turbinates itself look well-healed.    A/P:  Patient is healing well from her endoscopic turbinoplasty but we have not achieved improvement in her nasal breathing.  Her symptoms that she also describes in terms of sore throat, postnasal drip, teeth discomfort, and facial pressure are uncommon in the recovery period.  It does make me concerned for possible sinus infection.  I discussed with her that we could get a CT scan of her sinuses to make sure there is not a sinus infection present.  This could also be how she is recovering and this would be normal for her.  We could wait and see how she does in a couple weeks.  Patient is electing to get a CT scan of her sinuses.  I will arrange for a telephone visit so we can go over the results and see how she is doing with her nasal congestion as well.  She just may need more time to heal and see benefit.  I did recommend that we switch her over from irrigations to nasal saline.    I spent a total of 15 minutes face-to-face with Avis Avalos during today's office visit.  Over 50% of this time was spent counseling the patient on and/or coordinating care as documented in my assessment and plan.

## 2020-10-12 NOTE — PATIENT INSTRUCTIONS
Transition to nasal saline spray: 2 sprays each side, twice a day for 2 weeks.  Then once a day for 2 weeks

## 2020-10-12 NOTE — LETTER
10/12/2020         RE: Avis Avalos  [id number] [facility] [id number]        Dear Colleague,    Thank you for referring your patient, Avis Avalos, to the [facility]. Please see a copy of my visit note below.    CC: Status post bilateral endoscopic turbinoplasty on September 28, 2020    HPI: Patient reports that since the procedure, she has been experiencing sore throat and postnasal drip.  She is also had teeth discomfort and facial pressure.  She also has not had a significant amount of improvement in her nasal breathing.  Over the weekend she actually felt the best she has had and felt like she was turning a corner but today she feels like some of her pressure and teeth discomfort are returning.  She is rinsing her nose as directed.  It is been mostly clear with some may be some discoloration on occasion.    PE:  GEN: nad  NOSE: Septum midline.  Turbinates are much thinner.  There is good space between the septum and the turbinates.  Turbinates itself look well-healed.    A/P:  Patient is healing well from her endoscopic turbinoplasty but we have not achieved improvement in her nasal breathing.  Her symptoms that she also describes in terms of sore throat, postnasal drip, teeth discomfort, and facial pressure are uncommon in the recovery period.  It does make me concerned for possible sinus infection.  I discussed with her that we could get a CT scan of her sinuses to make sure there is not a sinus infection present.  This could also be how she is recovering and this would be normal for her.  We could wait and see how she does in a couple weeks.  Patient is electing to get a CT scan of her sinuses.  I will arrange for a telephone visit so we can go over the results and see how she is doing with her nasal congestion as well.  She just may need more time to heal and see benefit.  I did recommend that we switch her over from irrigations to nasal saline.    I spent a  total of 15 minutes face-to-face with Avis Avalos during today's office visit.  Over 50% of this time was spent counseling the patient on and/or coordinating care as documented in my assessment and plan.        Again, thank you for allowing me to participate in the care of your patient.        Sincerely,        Ann Herrera MD

## 2020-10-12 NOTE — NURSING NOTE
Avis Avalos's goals for this visit include:   Chief Complaint   Patient presents with     Surgical Followup     Still has sore throat, thinking has post nasal drip. Is for the most part starting to feel better though     She requests these members of her care team be copied on today's visit information:     PCP: Sarah Wilhelm    Referring Provider:  No referring provider defined for this encounter.    BP (!) 133/91 (BP Location: Right arm, Patient Position: Sitting, Cuff Size: Adult Regular)   Pulse 86   Resp 20   LMP 09/23/2020   SpO2 99%     Do you need any medication refills at today's visit? No    Renetta Calero LPN

## 2020-10-13 ENCOUNTER — ANCILLARY PROCEDURE (OUTPATIENT)
Dept: CT IMAGING | Facility: CLINIC | Age: 45
End: 2020-10-13
Attending: OTOLARYNGOLOGY
Payer: COMMERCIAL

## 2020-10-13 PROCEDURE — 70486 CT MAXILLOFACIAL W/O DYE: CPT | Performed by: RADIOLOGY

## 2020-10-19 ENCOUNTER — VIRTUAL VISIT (OUTPATIENT)
Dept: OTOLARYNGOLOGY | Facility: CLINIC | Age: 45
End: 2020-10-19
Payer: COMMERCIAL

## 2020-10-19 DIAGNOSIS — J34.89 SINUS PRESSURE: ICD-10-CM

## 2020-10-19 DIAGNOSIS — R09.81 NASAL CONGESTION: Primary | ICD-10-CM

## 2020-10-19 PROCEDURE — 99212 OFFICE O/P EST SF 10 MIN: CPT | Mod: 95 | Performed by: OTOLARYNGOLOGY

## 2020-10-19 NOTE — LETTER
"    10/19/2020         RE: Avis Avalos  [address] [facility] [address]        Dear Colleague,    Thank you for referring your patient, Avis Avalos, to the [facility]. Please see a copy of my visit note below.    Avis Avalos is a 45 year old female who is being evaluated via a billable video visit.      The patient has been notified of following:     \"This video visit will be conducted via a call between you and your physician/provider. We have found that certain health care needs can be provided without the need for an in-person physical exam.  This service lets us provide the care you need with a video conversation.  If a prescription is necessary we can send it directly to your pharmacy.  If lab work is needed we can place an order for that and you can then stop by our lab to have the test done at a later time.    Video visits are billed at different rates depending on your insurance coverage.  Please reach out to your insurance provider with any questions.    If during the course of the call the physician/provider feels a video visit is not appropriate, you will not be charged for this service.\"    Patient has given verbal consent for Video visit? Yes  How would you like to obtain your AVS? MyChart  If you are dropped from the video visit, the video invite should be resent to: text to cell  Will anyone else be joining your video visit? No        Video-Visit Details    Type of service:  Video Visit    Video Start Time: 3:20pm  Video End Time: 3:27 PM    Originating Location (pt. Location): Other work    Distant Location (provider location):  [facility]     Platform used for Video Visit: Michael Herrera MD    CC: f/u CT sinus scan    HPI: Patient had a CT scan of her sinuses performed on October 13, 2020.  Patient reports no significant changes in her arm symptoms since I last saw her.  She does have a little bit less teeth " sensitivity she reports that seems to be getting better breathing through the left side of the nose is much easier than the right side.  She still having some facial pain and pressure.    I personally reviewed the images of her CT scan from October 13, 2020.  Sinuses show no evidence of inflammation or infection.    A/P:  I reviewed the CT scan with the patient.  At this time I would recommend observation as there is not much else that we can do to improve her nasal breathing.  I think some of her symptoms are likely due to the surgery but she appears to be a little bit more of a slow healer.  She is showing some signs that she is making some progress in healing.  I would recommend that she continue with a nasal saline.  She may follow-up with us as needed.        Again, thank you for allowing me to participate in the care of your patient.        Sincerely,        Ann Herrera MD

## 2020-10-19 NOTE — NURSING NOTE
Avis Avalos's goals for this visit include:   Chief Complaint   Patient presents with     Follow Up     discuss CT results     She requests these members of her care team be copied on today's visit information:     PCP: Sarah Wilhelm    Referring Provider:  No referring provider defined for this encounter.    LMP 09/23/2020     Do you need any medication refills at today's visit? No    Renetta Calero LPN

## 2020-10-19 NOTE — PROGRESS NOTES
"Avis Avalos is a 45 year old female who is being evaluated via a billable video visit.      The patient has been notified of following:     \"This video visit will be conducted via a call between you and your physician/provider. We have found that certain health care needs can be provided without the need for an in-person physical exam.  This service lets us provide the care you need with a video conversation.  If a prescription is necessary we can send it directly to your pharmacy.  If lab work is needed we can place an order for that and you can then stop by our lab to have the test done at a later time.    Video visits are billed at different rates depending on your insurance coverage.  Please reach out to your insurance provider with any questions.    If during the course of the call the physician/provider feels a video visit is not appropriate, you will not be charged for this service.\"    Patient has given verbal consent for Video visit? Yes  How would you like to obtain your AVS? MyChart  If you are dropped from the video visit, the video invite should be resent to: text to cell  Will anyone else be joining your video visit? No        Video-Visit Details    Type of service:  Video Visit    Video Start Time: 3:20pm  Video End Time: 3:27 PM    Originating Location (pt. Location): Other work    Distant Location (provider location):  [facility]     Platform used for Video Visit: [employer]    Ann Herrera MD    CC: f/u CT sinus scan    HPI: Patient had a CT scan of her sinuses performed on October 13, 2020.  Patient reports no significant changes in her arm symptoms since I last saw her.  She does have a little bit less teeth sensitivity she reports that seems to be getting better breathing through the left side of the nose is much easier than the right side.  She still having some facial pain and pressure.    I personally reviewed the images of her CT scan from October 13, 2020. "  Sinuses show no evidence of inflammation or infection.    A/P:  I reviewed the CT scan with the patient.  At this time I would recommend observation as there is not much else that we can do to improve her nasal breathing.  I think some of her symptoms are likely due to the surgery but she appears to be a little bit more of a slow healer.  She is showing some signs that she is making some progress in healing.  I would recommend that she continue with a nasal saline.  She may follow-up with us as needed.

## 2020-11-05 ENCOUNTER — MYC MEDICAL ADVICE (OUTPATIENT)
Dept: FAMILY MEDICINE | Facility: OTHER | Age: 45
End: 2020-11-05

## 2020-11-06 ENCOUNTER — VIRTUAL VISIT (OUTPATIENT)
Dept: FAMILY MEDICINE | Facility: OTHER | Age: 45
End: 2020-11-06
Payer: COMMERCIAL

## 2020-11-06 DIAGNOSIS — M79.622 LEFT UPPER ARM PAIN: Primary | ICD-10-CM

## 2020-11-06 DIAGNOSIS — R20.2 PARESTHESIA OF LEFT ARM: ICD-10-CM

## 2020-11-06 PROCEDURE — 99213 OFFICE O/P EST LOW 20 MIN: CPT | Mod: 95 | Performed by: PHYSICIAN ASSISTANT

## 2020-11-06 NOTE — PROGRESS NOTES
"Avis Avalos is a 45 year old female who is being evaluated via a billable video visit.      The patient has been notified of following:     \"This video visit will be conducted via a call between you and your physician/provider. We have found that certain health care needs can be provided without the need for an in-person physical exam.  This service lets us provide the care you need with a video conversation.  If a prescription is necessary we can send it directly to your pharmacy.  If lab work is needed we can place an order for that and you can then stop by our lab to have the test done at a later time.    Video visits are billed at different rates depending on your insurance coverage.  Please reach out to your insurance provider with any questions.    If during the course of the call the physician/provider feels a video visit is not appropriate, you will not be charged for this service.\"    Patient has given verbal consent for Video visit? Yes  How would you like to obtain your AVS? MyChart  If you are dropped from the video visit, the video invite should be resent to: Text to cell phone: [phone]  Will anyone else be joining your video visit? No    Subjective     Avis Avalos is a 45 year old female who presents today via video visit for the following health issues:    HPI     Injection pain   Flu shot Oct 12th left arm (not at our clinic). Pain numbness that radiates down arm and into hand. This started within 24 hours of the injection seem to be sticking around.     Avis developed pain in her left arm following a flu shot in her left arm on 10/12. The pain started within 24 hours of the shot and has continued to this point although is maybe 50% of what it was initially. She has left arm and hand pain, numbness, and tingling on/off with radiation from the location of the flu shot down - no radiation up. The symptoms occur over her entire left hand, and her arm aches with movement. Denies bruising, " swelling, redness, warmth, weakness of the arm/hand, neck stiffness/pain, and tenderness to touch. No treatment attempted. Being it has been a few weeks since the vaccination, she questions whether her symptoms are from that or something else. She does have a history of muscle tension in her neck; however, believes this is unrelated as she has not had recent neck problems.       Video Start Time: 9:38 am     Review of Systems   Constitutional, HEENT, cardiovascular, pulmonary, gi and gu systems are negative, except as otherwise noted.  MS - left arm and hand pain, numbness, and tingling.       Objective           Vitals:  No vitals were obtained today due to virtual visit.    Physical Exam     GENERAL: Healthy, alert and no distress  EYES: Eyes grossly normal to inspection.  No discharge or erythema, or obvious scleral/conjunctival abnormalities.  RESP: No audible wheeze, cough, or visible cyanosis.  No visible retractions or increased work of breathing.    SKIN: Visible skin clear. No significant rash, abnormal pigmentation or lesions.  NEURO: Cranial nerves grossly intact.  Mentation and speech appropriate for age.  PSYCH: Mentation appears normal, affect normal/bright, judgement and insight intact, normal speech and appearance well-groomed.        Assessment & Plan       ICD-10-CM    1. Left upper arm pain  M79.622    2. Paresthesia of left arm  R20.2        1-2. Symptoms of the left upper arm pain certainly may be related to the flu vaccine on 10/12, but unsure if the paraesthesias are related. Symptoms inconsistent with [place] or other severe neurological disorders. Patient has a history of muscle tension in her neck; however, believes this is unrelated although this could be causing some nerve irritation which is leading to the paraesthesias. Recommend arm stretching to loosen muscles and maintain range of motion. Take ibuprofen as needed to reduce pain and inflammation, 400-600 mg every 6-8 hours. If  worsening pain, numbness, or tingling, should be seen in the clinic for further evaluation.      Seen in conjunction with STONE Gilmore PA-C  [facility]      Video-Visit Details    Type of service:  Video Visit    Video End Time: 9:49 am    Originating Location (pt. Location): Home    Distant Location (provider location):  [facility]     Platform used for Video Visit: [employer]abe[employer]
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
## 2020-11-24 ENCOUNTER — TELEPHONE (OUTPATIENT)
Dept: FAMILY MEDICINE | Facility: OTHER | Age: 45
End: 2020-11-24

## 2020-11-24 ENCOUNTER — OFFICE VISIT (OUTPATIENT)
Dept: FAMILY MEDICINE | Facility: OTHER | Age: 45
End: 2020-11-24
Payer: COMMERCIAL

## 2020-11-24 VITALS
HEART RATE: 93 BPM | RESPIRATION RATE: 14 BRPM | HEIGHT: 64 IN | SYSTOLIC BLOOD PRESSURE: 126 MMHG | TEMPERATURE: 97 F | DIASTOLIC BLOOD PRESSURE: 70 MMHG | OXYGEN SATURATION: 98 % | BODY MASS INDEX: 29.88 KG/M2 | WEIGHT: 175 LBS

## 2020-11-24 DIAGNOSIS — Z12.4 SCREENING FOR MALIGNANT NEOPLASM OF CERVIX: ICD-10-CM

## 2020-11-24 DIAGNOSIS — Z00.00 ENCOUNTER FOR ROUTINE ADULT HEALTH EXAMINATION WITHOUT ABNORMAL FINDINGS: Primary | ICD-10-CM

## 2020-11-24 PROCEDURE — 87624 HPV HI-RISK TYP POOLED RSLT: CPT | Performed by: STUDENT IN AN ORGANIZED HEALTH CARE EDUCATION/TRAINING PROGRAM

## 2020-11-24 PROCEDURE — G0145 SCR C/V CYTO,THINLAYER,RESCR: HCPCS | Performed by: STUDENT IN AN ORGANIZED HEALTH CARE EDUCATION/TRAINING PROGRAM

## 2020-11-24 PROCEDURE — 99396 PREV VISIT EST AGE 40-64: CPT | Performed by: STUDENT IN AN ORGANIZED HEALTH CARE EDUCATION/TRAINING PROGRAM

## 2020-11-24 ASSESSMENT — ENCOUNTER SYMPTOMS
SHORTNESS OF BREATH: 0
COUGH: 0
HEMATOCHEZIA: 0
NERVOUS/ANXIOUS: 0
PALPITATIONS: 0
ABDOMINAL PAIN: 0
HEMATURIA: 0
DIARRHEA: 0
MYALGIAS: 0
ARTHRALGIAS: 0
NAUSEA: 0
WEAKNESS: 0
JOINT SWELLING: 0
DYSURIA: 0
FEVER: 0
BREAST MASS: 0
SORE THROAT: 0
FREQUENCY: 0
CHILLS: 0
HEARTBURN: 0
CONSTIPATION: 0
DIZZINESS: 0
HEADACHES: 0
PARESTHESIAS: 0
EYE PAIN: 0

## 2020-11-24 ASSESSMENT — ANXIETY QUESTIONNAIRES
1. FEELING NERVOUS, ANXIOUS, OR ON EDGE: NOT AT ALL
GAD7 TOTAL SCORE: 1
5. BEING SO RESTLESS THAT IT IS HARD TO SIT STILL: SEVERAL DAYS
7. FEELING AFRAID AS IF SOMETHING AWFUL MIGHT HAPPEN: NOT AT ALL
6. BECOMING EASILY ANNOYED OR IRRITABLE: NOT AT ALL
2. NOT BEING ABLE TO STOP OR CONTROL WORRYING: NOT AT ALL
3. WORRYING TOO MUCH ABOUT DIFFERENT THINGS: NOT AT ALL
4. TROUBLE RELAXING: NOT AT ALL
7. FEELING AFRAID AS IF SOMETHING AWFUL MIGHT HAPPEN: NOT AT ALL

## 2020-11-24 ASSESSMENT — PATIENT HEALTH QUESTIONNAIRE - PHQ9
SUM OF ALL RESPONSES TO PHQ QUESTIONS 1-9: 1
SUM OF ALL RESPONSES TO PHQ QUESTIONS 1-9: 1
10. IF YOU CHECKED OFF ANY PROBLEMS, HOW DIFFICULT HAVE THESE PROBLEMS MADE IT FOR YOU TO DO YOUR WORK, TAKE CARE OF THINGS AT HOME, OR GET ALONG WITH OTHER PEOPLE: NOT DIFFICULT AT ALL

## 2020-11-24 ASSESSMENT — MIFFLIN-ST. JEOR: SCORE: 1422.79

## 2020-11-25 ASSESSMENT — PATIENT HEALTH QUESTIONNAIRE - PHQ9: SUM OF ALL RESPONSES TO PHQ QUESTIONS 1-9: 1

## 2020-11-25 ASSESSMENT — ANXIETY QUESTIONNAIRES: GAD7 TOTAL SCORE: 1

## 2020-11-27 LAB
COPATH REPORT: NORMAL
PAP: NORMAL

## 2020-11-29 ENCOUNTER — HEALTH MAINTENANCE LETTER (OUTPATIENT)
Age: 45
End: 2020-11-29

## 2020-11-30 LAB
FINAL DIAGNOSIS: NORMAL
HPV HR 12 DNA CVX QL NAA+PROBE: NEGATIVE
HPV16 DNA SPEC QL NAA+PROBE: NEGATIVE
HPV18 DNA SPEC QL NAA+PROBE: NEGATIVE
SPECIMEN DESCRIPTION: NORMAL
SPECIMEN SOURCE CVX/VAG CYTO: NORMAL

## 2021-01-01 NOTE — TELEPHONE ENCOUNTER
Patient had questions regarding bleeding after surgery. She is changing the nasal dressing every hour. She has not used afrin yet. She notes the bleeding gets worse when she lays down.     I reassured her that her drainage sounds normal and she can use afrin one squirt bilaterally every three hours as needed to help with the drainage and sleeping at an incline of greater than 30 degrees could help minimize the drainage as well.    Agus Marsh MD   DISCHARGE

## 2021-01-11 ENCOUNTER — OFFICE VISIT (OUTPATIENT)
Dept: FAMILY MEDICINE | Facility: OTHER | Age: 46
End: 2021-01-11
Payer: COMMERCIAL

## 2021-01-11 ENCOUNTER — NURSE TRIAGE (OUTPATIENT)
Dept: FAMILY MEDICINE | Facility: OTHER | Age: 46
End: 2021-01-11

## 2021-01-11 VITALS
SYSTOLIC BLOOD PRESSURE: 124 MMHG | OXYGEN SATURATION: 100 % | HEART RATE: 72 BPM | HEIGHT: 64 IN | BODY MASS INDEX: 29.79 KG/M2 | RESPIRATION RATE: 16 BRPM | DIASTOLIC BLOOD PRESSURE: 80 MMHG | WEIGHT: 174.5 LBS | TEMPERATURE: 97.7 F

## 2021-01-11 DIAGNOSIS — R06.02 SHORTNESS OF BREATH: ICD-10-CM

## 2021-01-11 DIAGNOSIS — R07.89 CHEST WALL PAIN: ICD-10-CM

## 2021-01-11 DIAGNOSIS — R07.89 ATYPICAL CHEST PAIN: Primary | ICD-10-CM

## 2021-01-11 DIAGNOSIS — M26.609 TMJ (TEMPOROMANDIBULAR JOINT SYNDROME): ICD-10-CM

## 2021-01-11 PROCEDURE — 99215 OFFICE O/P EST HI 40 MIN: CPT | Performed by: FAMILY MEDICINE

## 2021-01-11 PROCEDURE — 93000 ELECTROCARDIOGRAM COMPLETE: CPT | Performed by: FAMILY MEDICINE

## 2021-01-11 RX ORDER — MULTIVITAMIN WITH IRON
1 TABLET ORAL DAILY
COMMUNITY
End: 2021-01-11

## 2021-01-11 RX ORDER — CYCLOBENZAPRINE HCL 10 MG
10 TABLET ORAL 3 TIMES DAILY PRN
Qty: 30 TABLET | Refills: 0 | Status: SHIPPED | OUTPATIENT
Start: 2021-01-11 | End: 2021-11-19

## 2021-01-11 RX ORDER — CALCIUM CARBONATE/VITAMIN D3 500-10/5ML
LIQUID (ML) ORAL
COMMUNITY

## 2021-01-11 ASSESSMENT — PAIN SCALES - GENERAL: PAINLEVEL: MILD PAIN (2)

## 2021-01-11 ASSESSMENT — MIFFLIN-ST. JEOR: SCORE: 1423.04

## 2021-01-11 NOTE — PATIENT INSTRUCTIONS
Thank you for visiting [facility]    Dr. Lafleur wanted you to try speech therapy.  Here's the contact  Information for that.  [facility] referral  [phone]     [facility], ENT Clinic, 4th floor  [address] [place] [address]    Try PT to help with your chest discomfort and other symptoms.    Can try stretches, otc pain medications, and ice/heat.  Massage can help.    Be sure you have a good supportive bra when up and about.     Can try the flexeril to help with muscle spasms.      If worsening anxiety, we should reconsider resuming medication for that.       Contact us or return if questions or concerns.     Have a nice day!    Dr. Watson     Return in about 4 weeks (around 2/8/2021) for Recheck.      If you need medication refills, please contact your pharmacy 3 days before your prescriptions runs out or download the [facility] noah for your smart phone.   If you are out of refills, your pharmacy will contact contact the clinic.                                     [facility] assistance [phone]

## 2021-01-11 NOTE — PROGRESS NOTES
Assessment & Plan       ICD-10-CM    1. Atypical chest pain  R07.89 EKG 12-lead complete w/read - Clinics     PHYSICAL THERAPY REFERRAL     cyclobenzaprine (FLEXERIL) 10 MG tablet   2. Chest wall pain  R07.89 PHYSICAL THERAPY REFERRAL     cyclobenzaprine (FLEXERIL) 10 MG tablet   3. Shortness of breath  R06.02 PHYSICAL THERAPY REFERRAL   4. TMJ (temporomandibular joint syndrome)  M26.609 PHYSICAL THERAPY REFERRAL     cyclobenzaprine (FLEXERIL) 10 MG tablet      1, 2.  EKG today is reassuring.  Stress test was also reassuring.  Her shortness of breath does not correlate with her chest discomfort.  This makes a pulmonary etiology less likely.  Her history is not highly suspicious for a PE.  Esophageal etiology also seems less likely.  Given entire clinical picture, I suspect chest wall pain as etiology of her symptoms.  I recommended that she get a more supportive bra as she reports that this is contributing to her pain.  Discussed possible over-the-counter analgesics as well as physical therapy.  Patient is interested in pursuing these.  She is also interested in a trial of muscle relaxant to see if this is helpful.  Discussed that this will probably not be a long-term solution.  Also discussed that anxiety may be contributing significantly to this.  Patient felt that her anxiety was well controlled at this time.  I am not completely sure of this, but this is my first interaction with the patient.  Follow-up if not improving.  3.  Patient has had extensive evaluation for this.  Allergist recommended speech therapy for possible vocal cord dysfunction.  Clinical picture does not seem highly suspicious for this, but this would be reasonable.  She has a very modest history of smoking, but spirometry might be appropriate as a neck step to evaluate.  This could also simply be deconditioning.  Encourage patient to gradually increase her exercise capacity through her work with physical therapy.  Follow-up as needed.  4.   Patient reports that she was to start physical therapy for this.  Recommended that she do so.  Flexeril may also offer some relief with this.    Review of the result(s) of each unique test - stress test, labs, allergy note    Portions of this note were completed using Dragon dictation software.  Although reviewed, there may be typographical and other inadvertent errors that remain.       45 minutes spent on the date of the encounter doing chart review, history and exam, documentation and further activities as noted above           Patient Instructions   Thank you for visiting [facility]    Dr. Lafleur wanted you to try speech therapy.  Here's the contact  Information for that.  [facility] voice clinic referral  [phone]     [facility], ENT Clinic, 4th floor  [address] [place] [address]    Try PT to help with your chest discomfort and other symptoms.    Can try stretches, otc pain medications, and ice/heat.  Massage can help.    Be sure you have a good supportive bra when up and about.     Can try the flexeril to help with muscle spasms.      If worsening anxiety, we should reconsider resuming medication for that.       Contact us or return if questions or concerns.     Have a nice day!    Dr. Watson     Return in about 4 weeks (around 2/8/2021) for Recheck.      If you need medication refills, please contact your pharmacy 3 days before your prescriptions runs out or download the [facility] noah for your smart phone.   If you are out of refills, your pharmacy will contact contact the clinic.                                     [facility] assistance [phone]                       Return in about 4 weeks (around 2/8/2021) for Recheck.    Constantin Watson MD, MD  [facility] ANTONIO Albarran is a 45 year old who presents to clinic today for the following health issues     HPI     BACKGROUND:   Sharp shooting chest pain  started on 1/2/21 and lasted for two days. Patient states symptoms now are a dull ache. The week before 1/2/21 patient was told lawrence father was admitted to the hospital and was not going to make it. She stated that she started hyperventilating and then the chest pain appeared the week after. She is unsure if the symptoms are related.   Earlier last week she was using her elliptical and she felt like she was going to blackout, but didn't. Informed writer that if she feels that way again that she needs to call 911 or go to the ED.   Patient had a stress test and ECHO done on 07/6/2020. Please review results.     Pt had chest discomfort in her mid-chest on 1/2/21.  Lasted  About 2 days.  Then, the following Monday, changed from a sharp pain to a pressure or ache in her chest.  Feels like it radiates through her chest to her right back.      Nothing seems to make this better or worse.      She felt like her muscles were achy.  Also noted more pain in her right leg.  Her chest has also felt more like she'd been working out.  Also getting pain in the base of her skull/back of her neck.  Sinuses have been congested as well.  Has been sneezing more.      Has been having some pain on the right side of her back, which may or may not have been related.      Breathing has been an issue for a year and a half.  Has done a stress test, worked with an allergist without a clear etiology identified.      Has a h/o anxiety, has a h/o joint hypermobility, and TMJ.      Feels like she retains water easily.      Stopped her mood medications at her physical in November.  She feels this has gone well.      On 12/30, her father was admitted.  They were told that he was dying.  This was very traumatic for her.  He's still doing poorly.  She had a hard time breathing.  Was hyperventilating for a bit.  Her stomach hurt as well.        Chest pain is not brought  On by anything.  No worse with activity.  Has gotten dizzy with quick sprints at  "times.  Sprints make her breathing problematic.    LYNDSAY-7 SCORE 4/27/2020 9/23/2020 11/24/2020   Total Score - 2 (minimal anxiety) 1 (minimal anxiety)   Total Score 2 2 1             Review of Systems   Constitutional, HEENT, cardiovascular, pulmonary, gi and gu systems are negative, except as otherwise noted.      Objective    Pulse 72   Resp 16   Ht 1.62 m (5' 3.78\")   Wt 79.2 kg (174 lb 8 oz)   SpO2 100%   BMI 30.16 kg/m    Body mass index is 30.16 kg/m .  Physical Exam   GENERAL: healthy, alert and no distress  NECK: no adenopathy, no asymmetry, masses, or scars and thyroid normal to palpation  RESP: lungs clear to auscultation - no rales, rhonchi or wheezes  RESP: chest wall discomfort, anterior  Right chest.   Tender to palpation.    CV: regular rate and rhythm, normal S1 S2, no S3 or S4, no murmur, click or rub, no peripheral edema and peripheral pulses strong  ABDOMEN: soft, nontender, no hepatosplenomegaly, no masses and bowel sounds normal  MS: no gross musculoskeletal defects noted, no edema    Office Visit on 11/24/2020   Component Date Value Ref Range Status     PAP 11/24/2020 NIL   Final     Copath Report 11/24/2020    Final                    Value:  Patient Name: RANDY MCBRIDE  MR#: [id number]  Specimen #: [id number]  Collected: 11/24/2020  Received: 11/25/2020  Reported: 11/27/2020 15:14  Ordering Phy(s): IVANIA DANIELS    For improved result formatting, select 'View Enhanced Report Format' under   Linked Documents section.    SPECIMEN/STAIN PROCESS:  Pap imaged thin layer prep screening (Surepath, FocalPoint with guided   screening)       Pap-Cyto x 1, HPV ordered x 1    SOURCE: Cervical, endocervical  ----------------------------------------------------------------   Pap imaged thin layer prep screening (Surepath, FocalPoint with guided   screening)  SPECIMEN ADEQUACY:  Satisfactory for evaluation.  -Transformation zone component present.    CYTOLOGIC INTERPRETATION:    Negative " for intraepithelial lesion or malignancy    Electronically signed out by:  AMANDA Xiong (ASCP)    CLINICAL HISTORY:  LMP: 11/17/20  A previous normal pap  Date of Last Pap: 6/16/17,    Papanicolaou Test Limitations:  Cervical cytology i                          s a screening test with   limited sensitivity; regular  screening is critical for cancer prevention; Pap tests are primarily   effective for the diagnosis/prevention of  squamous cell carcinoma, not adenocarcinomas or other cancers.    COLLECTION SITE:  Client:  [facility]  Location: [facility] (P)    The technical component of this testing was completed at the [facility], with the professional component performed   at the [facility], [address] [place] [phone] ([phone])         HPV Source 11/24/2020 SurePath   Final     HPV 16 DNA 11/24/2020 Negative  NEG^Negative Final     HPV 18 DNA 11/24/2020 Negative  NEG^Negative Final     Other HR HPV 11/24/2020 Negative  NEG^Negative Final     Final Diagnosis 11/24/2020 This patient's sample is negative for HPV DNA.   Final    Comment: This test was developed and its performance characteristics determined by the   [facility], [facility]. It   has not been cleared or approved by the FDA. The laboratory is regulated under   CLIA as qualified to perform high-complexity testing. This test is used for   clinical purposes. It should not be regarded as investigational or for   research.  (Note)  METHODOLOGY:  The Roche margarito 4800 system uses automated extraction,   simultaneous amplification of HPV (L1 region) and beta-globin,    followed by  real time detection of fluorescent labeled HPV and beta   globin using specific oligonucleotide probes . The test specifically   identifies types HPV 16 DNA and HPV 18 DNA while  concurrently   detecting the rest of the high risk types (31, 33, 35, 39, 45, 51,   52, 56, 58, 59, 66 or 68).  COMMENTS:  This test is not intended for use as a screening device   for women under age 30 with normal cervical cytology.  Results should   be correl                           ated with cytologic and histologic findings. Close clinical   followup is recommended.       Specimen Description 11/24/2020 Cervical Cells   Final     No results found for this or any previous visit (from the past 24 hour(s)).

## 2021-01-11 NOTE — TELEPHONE ENCOUNTER
This was a my chart request  Please triage.      Appointment Request From: Avis Avalos     With Provider: Amanda Contreras MD, MD [[facility]]     Preferred Date Range: 1/8/2021 - 1/11/2021     Preferred Times: Any Time     Reason for visit: Request an Appointment     Comments:  Chest pain

## 2021-01-11 NOTE — TELEPHONE ENCOUNTER
"Called to triage the patient.   Dr.Jones RHODA this is your 11:30AM patient.     BACKGROUND:   Sharp shooting chest pain started on 1/2/21 and lasted for two days. Patient states symptoms now are a dull ache. The week before 1/2/21 patient was told lawrence father was admitted to the hospital and was not going to make it. She stated that she started hyperventilating and then the chest pain appeared the week after. She is unsure if the symptoms are related.   Earlier last week she was using her elliptical and she felt like she was going to blackout, but didn't. Informed writer that if she feels that way again that she needs to call 911 or go to the ED.   Patient had a stress test and ECHO done on 07/6/2020. Please review results.       1. LOCATION: \"Where does it hurt?\"        1/2/21 and patient was having sharp pains in chest. To the right and up. Intermittent for two days. The following Monday sore constant. Not the same sharp pain, but it hurts. Feels like there is other muscle tension. Feels like it goes through her chest through the chest from front to back.   2. RADIATION: \"Does the pain go anywhere else?\" (e.g., into neck, jaw, arms, back)      Base of neck on right hand side there has also been a pain in the back of her head. Right side of leg (inside behind knee.   3. ONSET: \"When did the chest pain begin?\" (Minutes, hours or days)       1/2/21  4. PATTERN \"Does the pain come and go, or has it been constant since it started?\"  \"Does it get worse with exertion?\"       Constant -Worse with activity.   5. DURATION: \"How long does it last\" (e.g., seconds, minutes, hours)      Sharp pain was constant for two days. Now dull pain in chest. Patient feels her symptoms are decreasing.   6. SEVERITY: \"How bad is the pain?\"  (e.g., Scale 1-10; mild, moderate, or severe)     - MILD (1-3): doesn't interfere with normal activities      - MODERATE (4-7): interferes with normal activities or awakens from sleep     - SEVERE " "(8-10): excruciating pain, unable to do any normal activities        3-4/10  7. CARDIAC RISK FACTORS: \"Do you have any history of heart problems or risk factors for heart disease?\" (e.g., prior heart attack, angina; high blood pressure, diabetes, being overweight, high cholesterol, smoking, or strong family history of heart disease)      NA  8. PULMONARY RISK FACTORS: \"Do you have any history of lung disease?\"  (e.g., blood clots in lung, asthma, emphysema, birth control pills)      NA  9. CAUSE: \"What do you think is causing the chest pain?\"      unsure  10. OTHER SYMPTOMS: \"Do you have any other symptoms?\" (e.g., dizziness, nausea, vomiting, sweating, fever, difficulty breathing, cough)        Reflux- Taken Tums   Sinus seem plugged on right side. Migraine yesterday  (not abnormal). Difficult breathing and patient feels like she can't get a full breath of air in and gets winded quickly.   11. PREGNANCY: \"Is there any chance you are pregnant?\" \"When was your last menstrual period?\"        NA    PLAN:   Patient states that she has a dull ache and symptoms have lasted longer than three days. She has no other concerning symptoms.   Patient was scheduled with  on 1/11/21 today at 11:30AM in [place]. Advised patient if her symptoms worsen and her pain becomes crushing, difficult breathing, dizzy, weak, lightheaded, faint, numbness, tingling, or she passes out then she needs to call 911 and go to the emergency room. Patient stated that she agreed with plan.     Henry Amato RN, BSN  [facility]  January 11, 2021    Additional Information    Negative: Severe difficulty breathing (e.g., struggling for each breath, speaks in single words)    Negative: Passed out (i.e., fainted, collapsed and was not responding)    Negative: Chest pain lasting longer than 5 minutes and ANY of the following:* Over 50 years old* Over 30 years old and at least one cardiac risk factor (i.e., high blood " pressure, diabetes, high cholesterol, obesity, smoker or strong family history of heart disease)* Pain is crushing, pressure-like, or heavy * Took nitroglycerin and chest pain was not relieved* History of heart disease (i.e., angina, heart attack, bypass surgery, angioplasty, CHF)    Negative: Visible sweat on face or sweat dripping down face    Negative: Sounds like a life-threatening emergency to the triager    Negative: Followed an injury to chest    Negative: SEVERE chest pain    Negative: Pain also present in shoulder(s) or arm(s) or jaw    Negative: Difficulty breathing    Negative: Cocaine use within last 3 days    Negative: History of prior 'blood clot' in leg or lungs (i.e., deep vein thrombosis, pulmonary embolism)    Negative: Recent illness requiring prolonged bed rest (i.e., immobilization)    Negative: Hip or leg fracture in past 2 months (e.g, or had cast on leg or ankle)    Negative: Major surgery in the past month    Negative: Recent long-distance travel with prolonged time in car, bus, plane, or train (i.e., within past 2 weeks; 6 or more hours duration)    Negative: Heart beating irregularly or very rapidly    Negative: Chest pain lasting longer than 5 minutes    Negative: Intermittent chest pain and pain has been increasing in severity or frequency    Negative: Dizziness or lightheadedness    Negative: Coughing up blood    Negative: Patient sounds very sick or weak to the triager    Intermittent chest pains persist > 3 days    Protocols used: CHEST PAIN-A-OH

## 2021-04-22 ENCOUNTER — IMMUNIZATION (OUTPATIENT)
Dept: FAMILY MEDICINE | Facility: CLINIC | Age: 46
End: 2021-04-22
Payer: COMMERCIAL

## 2021-04-22 PROCEDURE — 91301 PR COVID VAC MODERNA 100 MCG/0.5 ML IM: CPT

## 2021-04-22 PROCEDURE — 0011A PR COVID VAC MODERNA 100 MCG/0.5 ML IM: CPT

## 2021-05-19 ENCOUNTER — NURSE TRIAGE (OUTPATIENT)
Dept: NURSING | Facility: CLINIC | Age: 46
End: 2021-05-19

## 2021-05-19 NOTE — TELEPHONE ENCOUNTER
Middle of night on Monday, coughing and chest pressure. Feeling sick, had difficulty breathing, body  Ache, headache, fever, fatigue.  Today she feels better. Low on energy, chest feels 70% better, not coughing. Bigger concern is due for coronavirus vaccine tomorrow. I connected for a virtual visit today. I advised she postpone the 2nd vaccination shot if she's not feeling back to normal tomorrow.  Alivia Tillman RN  [facility]      Reason for Disposition    SEVERE or constant chest pain or pressure (Exception: mild central chest pain, present only when coughing)    Additional Information    Negative: SEVERE difficulty breathing (e.g., struggling for each breath, speaks in single words)    Negative: Difficult to awaken or acting confused (e.g., disoriented, slurred speech)    Negative: Bluish (or gray) lips or face now    Negative: Shock suspected (e.g., cold/pale/clammy skin, too weak to stand, low BP, rapid pulse)    Negative: Sounds like a life-threatening emergency to the triager    Negative: [1] COVID-19 exposure AND [2] has not completed COVID-19 vaccine series AND [3] no symptoms    Negative: [1] COVID-19 exposure AND [2] completed COVID-19 vaccine series (fully vaccinated) AND [3] no symptoms    Negative: COVID-19 vaccine reaction suspected (e.g., fever, headache, muscle aches) occurring during days 1-3 after getting vaccine    Negative: COVID-19 vaccine, questions about    Negative: [1] COVID-19 vaccine series completed (fully vaccinated) in past 3 months AND [2] new-onset of COVID-19 symptoms BUT [3] no known exposure    Negative: [1] Had lab test confirmed COVID-19 infection within last 3 monthsAND [2] new-onset of COVID-19 symptoms BUT [3] no known exposure    Negative: [1] Lives with someone known to have influenza (flu test positive) AND [2] flu-like symptoms (e.g., cough, runny nose, sore throat, SOB; with or without fever)    Negative: [1] Adult with possible COVID-19 symptoms AND [2]  triager concerned about severity of symptoms or other causes    Negative: COVID-19 and breastfeeding, questions about    Protocols used: CORONAVIRUS (COVID-19) DIAGNOSED OR [id number]-WC 3.25

## 2021-05-26 ENCOUNTER — IMMUNIZATION (OUTPATIENT)
Dept: FAMILY MEDICINE | Facility: CLINIC | Age: 46
End: 2021-05-26
Attending: FAMILY MEDICINE
Payer: COMMERCIAL

## 2021-05-26 PROCEDURE — 91301 PR COVID VAC MODERNA 100 MCG/0.5 ML IM: CPT

## 2021-05-26 PROCEDURE — 0012A PR COVID VAC MODERNA 100 MCG/0.5 ML IM: CPT

## 2021-06-04 ENCOUNTER — MYC MEDICAL ADVICE (OUTPATIENT)
Dept: FAMILY MEDICINE | Facility: OTHER | Age: 46
End: 2021-06-04

## 2021-06-04 ENCOUNTER — VIRTUAL VISIT (OUTPATIENT)
Dept: FAMILY MEDICINE | Facility: OTHER | Age: 46
End: 2021-06-04
Payer: COMMERCIAL

## 2021-06-04 DIAGNOSIS — B00.9 HERPES SIMPLEX TYPE 2 INFECTION: ICD-10-CM

## 2021-06-04 PROCEDURE — 99213 OFFICE O/P EST LOW 20 MIN: CPT | Mod: 95 | Performed by: STUDENT IN AN ORGANIZED HEALTH CARE EDUCATION/TRAINING PROGRAM

## 2021-06-04 RX ORDER — VALACYCLOVIR HYDROCHLORIDE 1 G/1
TABLET, FILM COATED ORAL
Qty: 20 TABLET | Refills: 0 | Status: SHIPPED | OUTPATIENT
Start: 2021-06-04

## 2021-06-04 ASSESSMENT — PATIENT HEALTH QUESTIONNAIRE - PHQ9: SUM OF ALL RESPONSES TO PHQ QUESTIONS 1-9: 0

## 2021-06-04 NOTE — PROGRESS NOTES
Avis is a 45 year old who is being evaluated via a billable telephone visit.      What phone number would you like to be contacted at? [phone]  How would you like to obtain your AVS? MyChart    Assessment & Plan     Herpes simplex type 2 infection  Will treat current flare with anti-viral medication. Follow up if symptoms persist or worsen.   - valACYclovir (VALTREX) 1000 mg tablet; TAKE ONE TABLET BY MOUTH DAILY for 3-7 days. Repeat as needed for flares           No follow-ups on file.    Sarah Wilhelm, JEM CNP  [facility]   Avis is a 45 year old who presents for the following health issues     HPI     Rash  Onset/Duration: May 27  Description  Location: One sore on her genital region  Character: Started out as a small little bump, might of scrapped it off, then felt like a painful lump, tried to squeeze and nothing came out. Open sore on the skin, white patch over it.   Itching: moderate  Intensity:  moderate  Progression of Symptoms:  worsening  Accompanying signs and symptoms:   Fever: no  Body aches or joint pain: no  Sore throat symptoms: no  Recent cold symptoms: no  History:           Previous episodes of similar rash: Dx of Herpes from many years ago  New exposures:  Had Covid vaccine on 5/26/2021  Recent travel: no  Exposure to similar rash: no  Precipitating or alleviating factors: None  Therapies tried and outcome: none      Review of Systems   Constitutional, HEENT, cardiovascular, pulmonary, gi and gu systems are negative, except as otherwise noted.      Objective           Vitals:  No vitals were obtained today due to virtual visit.    Physical Exam   healthy, alert and no distress  PSYCH: Alert and oriented times 3; coherent speech, normal   rate and volume, able to articulate logical thoughts, able   to abstract reason, no tangential thoughts, no hallucinations   or delusions  Her affect is normal  RESP: No cough, no audible wheezing, able to  talk in full sentences  Remainder of exam unable to be completed due to telephone visits              Phone call duration: 8 minutes

## 2021-07-07 NOTE — PROGRESS NOTES
"Avis Avalos is a 44 year old female who is being evaluated via a billable telephone visit.      The patient has been notified of following:     \"This telephone visit will be conducted via a call between you and your physician/provider. We have found that certain health care needs can be provided without the need for a physical exam.  This service lets us provide the care you need with a short phone conversation.  If a prescription is necessary we can send it directly to your pharmacy.  If lab work is needed we can place an order for that and you can then stop by our lab to have the test done at a later time.    Telephone visits are billed at different rates depending on your insurance coverage. During this emergency period, for some insurers they may be billed the same as an in-person visit.  Please reach out to your insurance provider with any questions.    If during the course of the call the physician/provider feels a telephone visit is not appropriate, you will not be charged for this service.\"    Patient has given verbal consent for Telephone visit?  Yes    How would you like to obtain your AVS? Goyot    Subjective     Avis Avalos is a 44 year old female who presents to clinic today for the following health issues:    HPI  Depression Followup    How are you doing with your depression since your last visit? Improved . She stopped zoloft completely and has been doing good. Taking bupropion and feels this is working pretty good.     Are you having other symptoms that might be associated with depression? No    Have you had a significant life event?  No     Are you feeling anxious or having panic attacks?   No    Do you have any concerns with your use of alcohol or other drugs? No    Social History     Tobacco Use     Smoking status: Former Smoker     Smokeless tobacco: Never Used     Tobacco comment: 2 1/2 year ago   Substance Use Topics     Alcohol use: Yes     Comment: Occ.     Drug use: No     PHQ " "Chief Complaint  Anxiety    Subjective          Brigid Coelho presents to [facility] for     Anxiety  Presents for follow-up (Continues to take Zoloft, uses xanax prn) visit. Symptoms include excessive worry, nervous/anxious behavior and panic. Patient reports no chest pain, depressed mood, dizziness, insomnia, irritability, malaise, nausea, palpitations, restlessness, shortness of breath or suicidal ideas. Symptoms occur rarely. The severity of symptoms is mild. The patient sleeps 7 hours per night. The quality of sleep is good. Nighttime awakenings: occasional.     Compliance with medications is %.     C/O increased vein in right lower extremity which is causing pain and pain with walking.  Objective   Vital Signs:   /88   Pulse 76   Temp 97.5 °F (36.4 °C)   Resp 18   Ht 170.2 cm (67\")   Wt 118 kg (261 lb)   SpO2 98%   BMI 40.88 kg/m²       Review of Systems   Constitutional: Negative.  Negative for activity change, fatigue, fever, irritability and unexpected weight change.   HENT: Negative.  Negative for congestion, sneezing and sore throat.    Eyes: Negative.  Negative for visual disturbance.   Respiratory: Negative.  Negative for cough, chest tightness, shortness of breath and wheezing.    Cardiovascular: Negative.  Negative for chest pain, palpitations and leg swelling.   Gastrointestinal: Negative.  Negative for abdominal distention, abdominal pain, blood in stool, constipation, diarrhea and nausea.   Endocrine: Negative.  Negative for cold intolerance and heat intolerance.   Genitourinary: Negative.  Negative for dysuria, frequency and urgency.   Musculoskeletal: Negative.  Negative for arthralgias and myalgias.   Skin: Negative.  Negative for rash.   Allergic/Immunologic: Negative.    Neurological: Negative.  Negative for dizziness, syncope and numbness.   Hematological: Negative.  Negative for adenopathy.   Psychiatric/Behavioral: Negative " 5/23/2019 5/28/2019 11/26/2019   PHQ-9 Total Score 13 7 7   Q9: Thoughts of better off dead/self-harm past 2 weeks Not at all Not at all Not at all     LYNDSAY-7 SCORE 7/6/2018 5/23/2019 11/26/2019   Total Score 6 (mild anxiety) 2 (minimal anxiety) 2 (minimal anxiety)   Total Score 6 2 2     Last PHQ-9 4/27/2020   1.  Little interest or pleasure in doing things 0   2.  Feeling down, depressed, or hopeless 0   3.  Trouble falling or staying asleep, or sleeping too much 0   4.  Feeling tired or having little energy 1   5.  Poor appetite or overeating 0   6.  Feeling bad about yourself 0   7.  Trouble concentrating 0   8.  Moving slowly or restless 0   Q9: Thoughts of better off dead/self-harm past 2 weeks 0   PHQ-9 Total Score 1   Difficulty at work, home, or with people Not difficult at all     LYNDSAY-7  4/27/2020   1. Feeling nervous, anxious, or on edge 0   2. Not being able to stop or control worrying 0   3. Worrying too much about different things 0   4. Trouble relaxing 0   5. Being so restless that it is hard to sit still 1   6. Becoming easily annoyed or irritable 1   7. Feeling afraid, as if something awful might happen 0   LYNDSAY-7 Total Score 2   If you checked any problems, how difficult have they made it for you to do your work, take care of things at home, or get along with other people? Not difficult at all         How many servings of fruits and vegetables do you eat daily?  0-1    On average, how many sweetened beverages do you drink each day (Examples: soda, juice, sweet tea, etc.  Do NOT count diet or artificially sweetened beverages)?   1-2 coffees with oat milk creamer flavored, and 2 nights a week for alcohol with pop     How many days per week do you exercise enough to make your heart beat faster? 5    How many minutes a day do you exercise enough to make your heart beat faster? 30 - 60    How many days per week do you miss taking your medication? 0    Derm Symptoms  Onset: symptoms are gone but still  for suicidal ideas. The patient is nervous/anxious. The patient does not have insomnia.      Physical Exam  Vitals and nursing note reviewed.   Constitutional:       General: She is not in acute distress.     Appearance: She is well-developed. She is not diaphoretic.   HENT:      Right Ear: External ear normal.      Left Ear: External ear normal.      Nose: Nose normal.   Eyes:      Conjunctiva/sclera: Conjunctivae normal.      Pupils: Pupils are equal, round, and reactive to light.   Neck:      Thyroid: No thyromegaly.   Cardiovascular:      Rate and Rhythm: Normal rate and regular rhythm.      Heart sounds: Normal heart sounds. No murmur heard.     Pulmonary:      Effort: Pulmonary effort is normal. No respiratory distress.      Breath sounds: Normal breath sounds. No wheezing.   Abdominal:      General: Bowel sounds are normal. There is no distension.      Palpations: Abdomen is soft. There is no mass.      Tenderness: There is no abdominal tenderness. There is no guarding or rebound.      Hernia: No hernia is present.   Musculoskeletal:         General: No tenderness. Normal range of motion.      Cervical back: Normal range of motion and neck supple.   Lymphadenopathy:      Cervical: No cervical adenopathy.   Skin:     General: Skin is warm and dry.      Coloration: Skin is not pale.      Findings: No erythema or rash.   Neurological:      Mental Status: She is alert and oriented to person, place, and time.      Deep Tendon Reflexes: Reflexes are normal and symmetric.   Psychiatric:         Behavior: Behavior normal.         Thought Content: Thought content normal.         Judgment: Judgment normal.        Result Review :                 Assessment and Plan    Problem List Items Addressed This Visit        Mental Health    Anxiety - Primary    Relevant Medications    ALPRAZolam (Xanax) 0.5 MG tablet    sertraline (Zoloft) 50 MG tablet      Other Visit Diagnoses     Varicose veins of leg with pain, right         "wants to talk about what she had, was about a week long or so she had symptoms, last 3-4 days had a really red \"rash or ring\" around anus and used cream of her daughters is used for eczema and some valtrex that seemed to help.  She describes the rash as a red ring approximately 1.5 in away from the anus. She tried clobetasol but that did not work so tried mometasone cream and that helped a little then tried taking Valtrex and it went away.     She is also wondering about her period coming a week early a few times over the past year. Wondering if she is starting menopause. She had a couple weeks around her last period where she felt great energy and felt \"normal\" but then a week and a half before her period she started to feel her energy wane and then felt tired again. Her LMP was 3/30th. She is due for her period now. Is starting to feel her energy coming back.     Patient Active Problem List   Diagnosis     Herpes simplex type 2 infection     Knee pain     Sessile colonic polyp     Constipation, unspecified constipation type     High risk HPV infection     Moderate episode of recurrent major depressive disorder (H)     Obsessive-compulsive disorder, unspecified type     Lichen sclerosus of female genitalia     Chronic seasonal allergic rhinitis due to pollen     Pelvic pain in female     Shortness of breath     Abdominal bloating with cramps     Past Surgical History:   Procedure Laterality Date     C APPENDECTOMY           C/SECTION, LOW TRANSVERSE  02/17/10    , Low Transverse      SECTION  2012    Procedure:  SECTION;   SECTION ;  Surgeon: Yehuda Cruz MD;  Location: [facility] L[facility]     COLONOSCOPY  2011    Procedure:COMBINED COLONOSCOPY, SINGLE BIOPSY/POLYPECTOMY BY BIOPSY; Surgeon:MARTIN MAO; Location: GI     COLONOSCOPY  2011    Procedure:COMBINED COLONOSCOPY, SINGLE BIOPSY/POLYPECTOMY BY BIOPSY; Colonoscopy biopsy of distal iiluem, " Relevant Orders    Ambulatory Referral to General Surgery (Completed)    Acquired hypothyroidism        Relevant Medications    levothyroxine (SYNTHROID, LEVOTHROID) 88 MCG tablet    Other Relevant Orders    TSH    Type 2 diabetes mellitus without complication, without long-term current use of insulin (CMS/[facility])        Relevant Medications    metFORMIN ER (GLUCOPHAGE-XR) 500 MG 24 hr tablet    Other Relevant Orders    CBC & Differential    Comprehensive Metabolic Panel    Hemoglobin A1c    Lipid Panel          Follow Up   No follow-ups on file.  Patient was given instructions and counseling regarding her condition or for health maintenance advice. Please see specific information pulled into the AVS if appropriate.         colonoscopy removal of colon polyp with fulguration, colonoscopy and control of biopsy bleed with placement of resolution clip; Surgeon:TESS POLLARD; Location:[facility]     COLONOSCOPY  9/12/2011    Procedure:COMBINED COLONOSCOPY, REMOVE TUMOR/POLYP/LESION BY FULGURATION/HOT BIOPSY; Surgeon:TESS POLLARD; Location:[facility]     COLONOSCOPY  9/12/2011    Procedure:COMBINED COLONOSCOPY, CONTROL BLEED; Surgeon:TESS POLLARD; Location:[facility]     COLONOSCOPY N/A 6/19/2017    Procedure: COMBINED COLONOSCOPY, SINGLE OR MULTIPLE BIOPSY/POLYPECTOMY BY BIOPSY;;  Surgeon: Josué Hawk MD;  Location: [facility]     HC FLEX SIGMOIDOSCOPY W/WO MIKY SPEC BY BRUSH/WASH  07/02/09    [facility]     HC TOOTH EXTRACTION W/FORCEP       LAPAROSCOPIC CYSTECTOMY OVARIAN (BENIGN) Right 10/21/2019    Procedure: Laparoscopic cystectomy right ovarian (benign);  Surgeon: Yehuda Cruz MD;  Location: [facility] OR     LAPAROSCOPIC LYSIS ADHESIONS N/A 10/21/2019    Procedure: LYSIS, ADHESIONS, LAPAROSCOPIC;  Surgeon: Yehuda Cruz MD;  Location: [facility] OR     VIDEO CAPSULE ENDOSCOPY  07/30/09    [facility]       Social History     Tobacco Use     Smoking status: Former Smoker     Smokeless tobacco: Never Used     Tobacco comment: 2 1/2 year ago   Substance Use Topics     Alcohol use: Yes     Comment: Occ.     Family History   Problem Relation Age of Onset     Diabetes Mother         borderline     Depression Mother      Gastrointestinal Disease Mother         IBS     Thyroid Disease Mother         hypothyroidism     Diabetes Father         NIDDM - Type II     Diabetes Maternal Grandmother      Arthritis Maternal Grandfather      Heart Disease Paternal Grandfather      Thyroid Disease Sister         hypothyroidism     Rheumatoid Arthritis Sister      Other Cancer Sister      Asthma Other         niece     Obesity Sister         half-sister/had gastric bypass         Current Outpatient Medications   Medication Sig Dispense  Refill     Acetaminophen (TYLENOL PO)        albuterol (PROAIR HFA/PROVENTIL HFA/VENTOLIN HFA) 108 (90 Base) MCG/ACT inhaler Inhale 2 puffs into the lungs every 4 hours as needed for shortness of breath / dyspnea or wheezing 2 Inhaler 3     buPROPion (WELLBUTRIN XL) 150 MG 24 hr tablet TAKE 1 TABLET BY MOUTH EVERY DAY IN THE MORNING 90 tablet 3     clobetasol (TEMOVATE) 0.05 % external cream Apply topically 2 times daily 60 g 1     DiphenhydrAMINE HCl (BENADRYL PO)        fluconazole (DIFLUCAN) 150 MG tablet Take at the first sign of a yeast infection and again 3 days later. 1 tablet 0     fluticasone (FLONASE) 50 MCG/ACT spray Spray 1-2 sprays into both nostrils daily 1 Bottle 1     fluticasone-vilanterol (BREO ELLIPTA) 100-25 MCG/INH inhaler Inhale 1 puff into the lungs daily 1 Inhaler 3     ibuprofen (ADVIL/MOTRIN) 600 MG tablet Take 1 tablet (600 mg) by mouth every 6 hours as needed for other (mild and/or inflammatory pain) 30 tablet 0     loratadine (CLARITIN) 10 MG tablet Take 10 mg by mouth daily       Multiple Vitamins-Calcium (ONE-A-DAY WOMENS PO)        Omega-3 Fatty Acids (OMEGA 3 PO)        omeprazole (PRILOSEC) 40 MG DR capsule Take 1 capsule (40 mg) by mouth every morning On an empty stomach about 30 minutes before breakfast 90 capsule 1     Probiotic Product (PROBIOTIC DAILY PO)        senna-docusate (SENOKOT-S/PERICOLACE) 8.6-50 MG tablet Take 1-2 tablets by mouth daily as needed for constipation 30 tablet 3     UNABLE TO FIND MEDICATION NAME: Eat anything Rx 1-2 times per week       valACYclovir (VALTREX) 1000 mg tablet TAKE ONE TABLET BY MOUTH DAILY AS NEEDED 21 tablet 0     Allergies   Allergen Reactions     Fructose GI Disturbance     Ragweeds      Sorbitol Diarrhea     Liquid Adhesive Rash     BP Readings from Last 3 Encounters:   02/17/20 116/73   12/06/19 118/81   11/26/19 90/62    Wt Readings from Last 3 Encounters:   04/02/20 82.1 kg (181 lb)   02/17/20 82.3 kg (181 lb 6.4 oz)   11/26/19  80.3 kg (177 lb)                    Reviewed and updated as needed this visit by Provider         Review of Systems   ROS COMP: Constitutional, HEENT, cardiovascular, pulmonary, gi and gu systems are negative, except as otherwise noted.       Objective   Reported vitals:  There were no vitals taken for this visit.   healthy, alert and no distress  PSYCH: Alert and oriented times 3; coherent speech, normal   rate and volume, able to articulate logical thoughts, able   to abstract reason, no tangential thoughts  RESP: No cough, no audible wheezing, able to talk in full sentences  Remainder of exam unable to be completed due to telephone visits    Diagnostic Test Results:  Labs reviewed in Epic        Assessment/Plan:  1. Obsessive-compulsive disorder, unspecified type  Stable. Continue current medication. Follow up in one year or sooner if needed.   - buPROPion (WELLBUTRIN XL) 150 MG 24 hr tablet; TAKE 1 TABLET BY MOUTH EVERY DAY IN THE MORNING  Dispense: 90 tablet; Refill: 3    2. Moderate episode of recurrent major depressive disorder (H)  See note #1  - buPROPion (WELLBUTRIN XL) 150 MG 24 hr tablet; TAKE 1 TABLET BY MOUTH EVERY DAY IN THE MORNING  Dispense: 90 tablet; Refill: 3    3. Herpes simplex type 2 infection  Using intermittently. Unsure if rash around anus recently was herpes but it cleared after taking Valtrex for 3 days. Refills granted.   - valACYclovir (VALTREX) 1000 mg tablet; TAKE ONE TABLET BY MOUTH DAILY AS NEEDED  Dispense: 21 tablet; Refill: 0    No follow-ups on file.      Phone call duration:  15 minutes    LOREN Abdullahi

## 2021-07-09 ENCOUNTER — MEDICAL CORRESPONDENCE (OUTPATIENT)
Dept: HEALTH INFORMATION MANAGEMENT | Facility: CLINIC | Age: 46
End: 2021-07-09

## 2021-07-09 ENCOUNTER — TRANSFERRED RECORDS (OUTPATIENT)
Dept: HEALTH INFORMATION MANAGEMENT | Facility: CLINIC | Age: 46
End: 2021-07-09

## 2021-07-20 DIAGNOSIS — K64.9 HEMORRHOIDS, UNSPECIFIED HEMORRHOID TYPE: ICD-10-CM

## 2021-07-20 DIAGNOSIS — R10.9 ABDOMINAL PAIN, UNSPECIFIED ABDOMINAL LOCATION: ICD-10-CM

## 2021-07-20 DIAGNOSIS — R14.0 BLOATING: Primary | ICD-10-CM

## 2021-07-23 ENCOUNTER — THERAPY VISIT (OUTPATIENT)
Dept: PHYSICAL THERAPY | Facility: CLINIC | Age: 46
End: 2021-07-23
Payer: COMMERCIAL

## 2021-07-23 DIAGNOSIS — K59.02 CONSTIPATION DUE TO OUTLET DYSFUNCTION: Primary | ICD-10-CM

## 2021-07-23 DIAGNOSIS — M62.89 PELVIC FLOOR DYSFUNCTION: ICD-10-CM

## 2021-07-23 PROCEDURE — 97140 MANUAL THERAPY 1/> REGIONS: CPT | Mod: GP | Performed by: PHYSICAL THERAPIST

## 2021-07-23 PROCEDURE — 97161 PT EVAL LOW COMPLEX 20 MIN: CPT | Mod: GP | Performed by: PHYSICAL THERAPIST

## 2021-07-23 PROCEDURE — 97112 NEUROMUSCULAR REEDUCATION: CPT | Mod: GP | Performed by: PHYSICAL THERAPIST

## 2021-07-23 NOTE — PROGRESS NOTES
Physical Therapy Initial Evaluation  Subjective:  Avis Avalos is a 45 year old year old female with a pelvic floor condition. Patient reports onset of symptoms  For past 4-5 years, but exacerbation of sx around January 2021 for unknown reason. Symptoms include constipation, pelvic floor pain, and frequency. Since onset symptoms have been getting worse (over past few months).   Urination:  Do you leak on the way to the bathroom or with a strong urge to void? No   Do you leak with cough,sneeze, jumping, running?No    Do you have triggers that make you feel you can't wait to go to the bathroom? No.  Type of pad and number used per day? None  How long can you delay the need to urinate? 2 hours.   How many times do you get up to urinate at night? 0   Can you stop the flow of urine when on the toilet? Yes  Is the volume of urine passed usually: medium. (8sec rule= 250ml with average bladder storing 400-600ml)  Do you strain to pass urine? No  Do you have a slow or hesitant urinary stream? No  Do you have difficulty initiating the urine stream? No  Is urination painful? No  How many bladder infections have you had in last 12 months? 0  Fluid intake(one glass is 8oz or one cup) 3-6 glasses/day, 1 caffinated glasses/day  1-2 alcohol glasses/week.  Bowel habits:  Frequency of bowel movements? <2 times a week  Consistancy of stool? soft formed, [place] Stool Scale   Do you ignore the urge to defecate? No  Do you strain to pass stool? Yes  Pelvic Pain:  Do you have any pelvic pain with intercourse, exams, use of tampons? Yes  Pain level rate at 4/10  Are you sexually active?Yes  Is initial penetration during intercourse painful? Yes, sometimes  Is deeper penetration painful? No  Do you use lubricant? Yes What kind? Cetaphil lotion  ?  Given birth? Yes   Any complications? No  # of vaginal delieveries? 0  # of C-sections? 2  # of episiotomies? 0.  Have you ever been worried for your physical safety? No  Any abdominal or pelvic  surgeries? Yes, laparoscopy for scar tissue removal  (from  scars  and ); appendectomy  Are you having any regular exercise? Yes  Have you practiced the PF(kegel) exercises for 4 or more weeks? No  Thyroid checked? Yes (related to hair loss, flu-like symptoms, wt gain/loss, fatigue, menopause)  Changed diet lately? Looking into food sensitivities    The history is provided by the patient. No  was used.   Patient Health History           General health as reported by patient is fair.  Pertinent medical history includes: depression, migraines/headaches and overweight.   Red flags:  Changes in bowel and bladder habits and pain at rest/night.  Medical allergies: other.   Surgeries include:  Other.                                               Objective:  OBSERVATION  Lumbar Posture: Negative  Introitus: unremarkable  Prolapse: Cystocele/Rectocele/Uterine ndgndrndanddndend:nd nd2nd Urethrocele: none, Enterocele: none.  DIAPHRAGM BREATHING: Fair-good abdominal excursion  PALPATION: Pain: levator ani, transverse perineal muscle and rectal sphincter (deep)  Hypomobile abdominal fascia (worst lower abdomen) and tenderness B diaphragm insertion    MUSCLE PERFORMANCE  Baseline PF tone:hyper  PF Tone with cough: bulge  Valsalva: bulge  PF Response quality: moderate contraction w/ sluggish relaxation  PF Power: Center: 3 Stronger on right.  Endurance: Maximum contraction in seconds: 10  # of endurance contractions before fatigue: stopped after 3 due to increasing pain  Quick contraction repetitions prior to fatigue: Not tested.  Specificity/accessory muscles: Changing in breathing pattern   Synergy with abdominals: Not Tested.  BIOFEEDBACK:   Baseline      Endurance Holds (stopped due to increasing pain w/ contractions; no pain at rest)        Quick Flicks (NOT TESTED TODAY)    After MT: Endurance holds (stopped as difficulty maintaining contraction, but no longer painful)        FUNCTIONAL  QUESTIONNAIRES:  Marinoff Scale:Level 2  (Level 3: Abstinence from intercourse because of severe pain. Level 2: Painful intercourse which limites frequency of activity. Level 1: Painful intercourse not severe enough to prevent activity.)        System    Physical Exam    General     ROS    Assessment/Plan:    Patient is a 45 year old female with pelvic complaints.    Patient has the following significant findings with corresponding treatment plan.                Diagnosis 1:  Pelvic floor dysfunction w/ constipation  Pain -  hot/cold therapy, US, electric stimulation, manual therapy, self management, education and home program  Decreased strength - therapeutic exercise, therapeutic activities and home program  Impaired muscle performance - biofeedback, neuro re-education and home program  Decreased function - therapeutic activities and home program    Therapy Evaluation Codes:   Cumulative Therapy Evaluation is: Low complexity.    Previous and current functional limitations:  (See Goal Flow Sheet for this information)    Short term and Long term goals: (See Goal Flow Sheet for this information)     Communication ability:  Patient appears to be able to clearly communicate and understand verbal and written communication and follow directions correctly.  Treatment Explanation - The following has been discussed with the patient:   RX ordered/plan of care  Anticipated outcomes  Possible risks and side effects  This patient would benefit from PT intervention to resume normal activities.   Rehab potential is good.    Frequency:  1 X week, once daily  Duration:  for 8 weeks  Discharge Plan:  Achieve all LTG.  Independent in home treatment program.  Reach maximal therapeutic benefit.    Please refer to the daily flowsheet for treatment today, total treatment time and time spent performing 1:1 timed codes.

## 2021-07-26 ENCOUNTER — HOSPITAL ENCOUNTER (OUTPATIENT)
Dept: MAMMOGRAPHY | Facility: CLINIC | Age: 46
Discharge: HOME | End: 2021-07-26
Attending: STUDENT IN AN ORGANIZED HEALTH CARE EDUCATION/TRAINING PROGRAM | Admitting: STUDENT IN AN ORGANIZED HEALTH CARE EDUCATION/TRAINING PROGRAM
Payer: COMMERCIAL

## 2021-07-26 DIAGNOSIS — Z12.31 VISIT FOR SCREENING MAMMOGRAM: ICD-10-CM

## 2021-07-26 PROCEDURE — 77063 BREAST TOMOSYNTHESIS BI: CPT

## 2021-08-06 ENCOUNTER — THERAPY VISIT (OUTPATIENT)
Dept: PHYSICAL THERAPY | Facility: CLINIC | Age: 46
End: 2021-08-06
Payer: COMMERCIAL

## 2021-08-06 DIAGNOSIS — K59.02 CONSTIPATION DUE TO OUTLET DYSFUNCTION: ICD-10-CM

## 2021-08-06 DIAGNOSIS — M62.89 PELVIC FLOOR DYSFUNCTION: ICD-10-CM

## 2021-08-06 PROCEDURE — 97140 MANUAL THERAPY 1/> REGIONS: CPT | Mod: GP | Performed by: PHYSICAL THERAPIST

## 2021-08-25 ENCOUNTER — THERAPY VISIT (OUTPATIENT)
Dept: PHYSICAL THERAPY | Facility: CLINIC | Age: 46
End: 2021-08-25
Payer: COMMERCIAL

## 2021-08-25 DIAGNOSIS — M26.609 TMJ (TEMPOROMANDIBULAR JOINT SYNDROME): ICD-10-CM

## 2021-08-25 DIAGNOSIS — R07.89 CHEST WALL PAIN: ICD-10-CM

## 2021-08-25 PROCEDURE — 97110 THERAPEUTIC EXERCISES: CPT | Mod: GP | Performed by: PHYSICAL THERAPIST

## 2021-08-25 PROCEDURE — 97161 PT EVAL LOW COMPLEX 20 MIN: CPT | Mod: GP | Performed by: PHYSICAL THERAPIST

## 2021-08-25 PROCEDURE — 97112 NEUROMUSCULAR REEDUCATION: CPT | Mod: GP | Performed by: PHYSICAL THERAPIST

## 2021-08-25 PROCEDURE — 97140 MANUAL THERAPY 1/> REGIONS: CPT | Mod: GP | Performed by: PHYSICAL THERAPIST

## 2021-08-25 NOTE — PROGRESS NOTES
"Physical Therapy Initial Evaluation  Subjective:  The history is provided by the patient. No  was used.   Therapist Generated HPI Evaluation  Problem details: Pt has been dealing with jaw pain for >1 year (initially though it was tooth ache in Jan 2020). Also feels like she has been having difficulty breathing and it feels like her lungs are \"restricted\" and gets chest wall pain. Was cleared for cardiac issues. Referral from July 2021.         Type of problem:  TMJ bilateral.    This is a chronic condition.  Condition occurred with:  Insidious onset.  Where condition occurred: other.  Patient reports pain:  TMJ left, TMJ right, face left and face right.  Pain is described as aching and is intermittent.  Pain radiates to:  Ear and head. Pain is the same all the time.  Since onset symptoms are unchanged.  Associated symptoms:  Headache cervical, headache migraine, headache temporal, muscle tightness and tired or painful jaw muscles. Symptoms are exacerbated by chewing  and relieved by rest, NSAID's and heat (massage).  Imaging testing: unsure.  Past treatment: none yet.   Restrictions due to condition include:  Working in normal job without restrictions.  Barriers include:  None as reported by patient.  Parafunctional Habits:    Bruxism:  Unsure  Mandibular Habits:  Chewing gum  Chewing/Biting Nails:  No    Clenching:  At night    Caffeine:  1 coffee/day    Aerobic Exercise:  Walking   Sleep Quality:  Poor-fair (frequent interruptions for repositioning)    General health as reported by patient is fair.  Pertinent medical history includes: depression, migraines/headaches and overweight.   Red flags:  Changes in bowel and bladder habits and pain at rest/night.  Medical allergies: other.   Surgeries include:  Other.    Occupation: ; Lives at home w/  and 2 kids (ages 9, and 11)                     Objective:  Standing Alignment:    Cervical/Thoracic:  Forward head, Dowager's hump and " cervical lordosis decreased  Shoulder/UE:  Rounded shoulders                                  Cervical/Thoracic Evaluation  Arom wnl cervical: see below for cervical AROM.                         Spinal Segmental Conclusions:  General hypomobility thoracic spine                                  TMJ Evaluation  ROM:     AROM Cervical:    Flexion: 50 Overpressure: Pain:  Extension: 70 Overpressure: Pain:  Left Side Bend: 40 Overpressure: Pain:  Right Side Bend: 40 Overpressure: Pain:  Left Rotation: WNL Overpressure: Pain:  Right Rotation: WNL Overpressure: Pain:  AROM TMJ:  Openin (30 before translational movements)     Left Laterotrusion:  15    Right Laterotrusion:  15    Protrusion: 10    Retrusion:  Neutral    Associated Findings: Headaches:  Cervical, migraine and temporal    Previous Interventions:    Intraoral Appliances:  Night splint    Palpation:  Palpation/muscle tone tmj: suboccipitals.  Left side tenderness present at:  Scalenes; Levator; Erector Spinae and Temporalis  Right side tenderness present at:  Scalenes; Levator; Erector Spinae and Temporalis    Movement Characteristics:        Deviations:  (+) lateral shift    Early Translation:  (+)                    General     ROS    Assessment/Plan:    Patient is a 45 year old female with thoracic and both sides TMJ complaints.    Patient has the following significant findings with corresponding treatment plan.                Diagnosis 1:  B TMJ dysfunction and Chest wall pain w/ thoracic hypomobility w/ posture dysfunction  Pain -  hot/cold therapy, electric stimulation, manual therapy, splint/taping/bracing/orthotics, self management, education, directional preference exercise and home program  Decreased ROM/flexibility - manual therapy, therapeutic exercise, therapeutic activity and home program  Decreased joint mobility - manual therapy, therapeutic exercise, therapeutic activity and home program  Impaired muscle performance - neuro re-education  and home program  Impaired posture - neuro re-education, therapeutic activities and home program    Therapy Evaluation Codes:   Cumulative Therapy Evaluation is: Low complexity.    Previous and current functional limitations:  (See Goal Flow Sheet for this information)    Short term and Long term goals: (See Goal Flow Sheet for this information)     Communication ability:  Patient appears to be able to clearly communicate and understand verbal and written communication and follow directions correctly.  Treatment Explanation - The following has been discussed with the patient:   RX ordered/plan of care  Anticipated outcomes  Possible risks and side effects  This patient would benefit from PT intervention to resume normal activities.   Rehab potential is good.    Frequency:  1 X week, once daily  Duration:  for 8 weeks  Discharge Plan:  Achieve all LTG.  Independent in home treatment program.  Reach maximal therapeutic benefit.    Please refer to the daily flowsheet for treatment today, total treatment time and time spent performing 1:1 timed codes.

## 2021-08-25 NOTE — LETTER
"[facility]  [address]  [place] [address] [phone]    2021    Re: Avis Avalos   :   1975  MRN:  [id number]   REFERRING PHYSICIAN:   Nona CHICAS [facility]    Date of Initial Evaluation:  2021   Visits:  Rxs Used: 2 (not treared today)  Reason for Referral:     TMJ (temporomandibular joint syndrome)  Chest wall pain    EVALUATION SUMMARY    Physical Therapy Initial Evaluation  Subjective:  The history is provided by the patient. No  was used.   Therapist Generated HPI Evaluation  Problem details: Pt has been dealing with jaw pain for >1 year (initially though it was tooth ache in 2020). Also feels like she has been having difficulty breathing and it feels like her lungs are \"restricted\" and gets chest wall pain. Was cleared for cardiac issues. Referral from 2021.         Type of problem:  TMJ bilateral.  This is a chronic condition.  Condition occurred with:  Insidious onset.  Where condition occurred: other.  Patient reports pain:  TMJ left, TMJ right, face left and face right.  Pain is described as aching and is intermittent.  Pain radiates to:  Ear and head. Pain is the same all the time.  Since onset symptoms are unchanged.  Associated symptoms:  Headache cervical, headache migraine, headache temporal, muscle tightness and tired or painful jaw muscles. Symptoms are exacerbated by chewing  and relieved by rest, NSAID's and heat (massage).  Imaging testing: unsure.  Past treatment: none yet.   Restrictions due to condition include:  Working in normal job without restrictions.  Barriers include:  None as reported by patient.    Parafunctional Habits:    Bruxism:  Unsure  Mandibular Habits:  Chewing gum  Chewing/Biting Nails:  No    Clenching:  At night    Caffeine:  1 coffee/day    Aerobic Exercise:  Walking   Sleep Quality:  Poor-fair (frequent " interruptions for repositioning)  Re: Avis Avalos   :   1975    General health as reported by patient is fair.  Pertinent medical history includes: depression, migraines/headaches and overweight.   Red flags:  Changes in bowel and bladder habits and pain at rest/night.  Medical allergies: other.   Surgeries include:  Other.    Occupation: ; Lives at home w/  and 2 kids (ages 9, and 11)                   Objective:  Standing Alignment:    Cervical/Thoracic:  Forward head, Dowager's hump and cervical lordosis decreased  Shoulder/UE:  Rounded shoulders       Cervical/Thoracic Evaluation  Arom wnl cervical: see below for cervical AROM.       Spinal Segmental Conclusions:  General hypomobility thoracic spine       TMJ Evaluation  ROM:     AROM Cervical:    Flexion: 50 Overpressure: Pain:  Extension: 70 Overpressure: Pain:  Left Side Bend: 40 Overpressure: Pain:  Right Side Bend: 40 Overpressure: Pain:  Left Rotation: WNL Overpressure: Pain:  Right Rotation: WNL Overpressure: Pain:    AROM TMJ:  Openin (30 before translational movements)     Left Laterotrusion:  15    Right Laterotrusion:  15    Protrusion: 10    Retrusion:  Neutral    Associated Findings: Headaches:  Cervical, migraine and temporal    Previous Interventions:    Intraoral Appliances:  Night splint    Palpation:  Palpation/muscle tone tmj: suboccipitals.  Left side tenderness present at:  Scalenes; Levator; Erector Spinae and Temporalis  Right side tenderness present at:  Scalenes; Levator; Erector Spinae and Temporalis    Movement Characteristics:    Deviations:  (+) lateral shift  Early Translation:  (+)        Assessment/Plan:    Patient is a 45 year old female with thoracic and both sides TMJ complaints.    Re: Avis Avalos   :   1975    Patient has the following significant findings with corresponding treatment plan.                Diagnosis 1:  B TMJ dysfunction and Chest wall pain w/ thoracic  hypomobility w/ posture dysfunction  Pain -  hot/cold therapy, electric stimulation, manual therapy, splint/taping/bracing/orthotics, self management, education, directional preference exercise and home program  Decreased ROM/flexibility - manual therapy, therapeutic exercise, therapeutic activity and home program  Decreased joint mobility - manual therapy, therapeutic exercise, therapeutic activity and home program  Impaired muscle performance - neuro re-education and home program  Impaired posture - neuro re-education, therapeutic activities and home program    Therapy Evaluation Codes:   Cumulative Therapy Evaluation is: Low complexity.    Previous and current functional limitations:  (See Goal Flow Sheet for this information)    Short term and Long term goals: (See Goal Flow Sheet for this information)   Communication ability:  Patient appears to be able to clearly communicate and understand verbal and written communication and follow directions correctly.  Treatment Explanation - The following has been discussed with the patient:   RX ordered/plan of care  Anticipated outcomes  Possible risks and side effects  This patient would benefit from PT intervention to resume normal activities.   Rehab potential is good.    Frequency:  1 X week, once daily  Duration:  for 8 weeks  Discharge Plan:  Achieve all LTG.  Independent in home treatment program.  Reach maximal therapeutic benefit.    Thank you for your referral.    INQUIRIES  Therapist: Amanda Hilligoss, PT, DPT  [address]  [facility] [address]  Phone: [phone]  Fax: [phone]

## 2021-08-27 PROBLEM — M26.609 TMJ (TEMPOROMANDIBULAR JOINT SYNDROME): Status: ACTIVE | Noted: 2021-08-27

## 2021-08-27 PROBLEM — R07.89 CHEST WALL PAIN: Status: ACTIVE | Noted: 2021-08-27

## 2021-09-03 ENCOUNTER — THERAPY VISIT (OUTPATIENT)
Dept: PHYSICAL THERAPY | Facility: CLINIC | Age: 46
End: 2021-09-03
Payer: COMMERCIAL

## 2021-09-03 DIAGNOSIS — M62.89 PELVIC FLOOR DYSFUNCTION: ICD-10-CM

## 2021-09-03 DIAGNOSIS — M26.609 TMJ (TEMPOROMANDIBULAR JOINT SYNDROME): ICD-10-CM

## 2021-09-03 DIAGNOSIS — K59.02 CONSTIPATION DUE TO OUTLET DYSFUNCTION: ICD-10-CM

## 2021-09-03 DIAGNOSIS — R07.89 CHEST WALL PAIN: ICD-10-CM

## 2021-09-03 PROCEDURE — 97110 THERAPEUTIC EXERCISES: CPT | Mod: GP | Performed by: PHYSICAL THERAPIST

## 2021-09-03 PROCEDURE — 97140 MANUAL THERAPY 1/> REGIONS: CPT | Mod: GP | Performed by: PHYSICAL THERAPIST

## 2021-09-17 ENCOUNTER — THERAPY VISIT (OUTPATIENT)
Dept: PHYSICAL THERAPY | Facility: CLINIC | Age: 46
End: 2021-09-17
Payer: COMMERCIAL

## 2021-09-17 DIAGNOSIS — M26.609 TMJ (TEMPOROMANDIBULAR JOINT SYNDROME): ICD-10-CM

## 2021-09-17 DIAGNOSIS — R07.89 CHEST WALL PAIN: ICD-10-CM

## 2021-09-17 DIAGNOSIS — M62.89 PELVIC FLOOR DYSFUNCTION: ICD-10-CM

## 2021-09-17 DIAGNOSIS — K59.02 CONSTIPATION DUE TO OUTLET DYSFUNCTION: ICD-10-CM

## 2021-09-17 PROCEDURE — 97140 MANUAL THERAPY 1/> REGIONS: CPT | Mod: GP | Performed by: PHYSICAL THERAPIST

## 2021-09-17 PROCEDURE — 97110 THERAPEUTIC EXERCISES: CPT | Mod: GP | Performed by: PHYSICAL THERAPIST

## 2021-09-17 PROCEDURE — 97112 NEUROMUSCULAR REEDUCATION: CPT | Mod: GP | Performed by: PHYSICAL THERAPIST

## 2021-09-25 ENCOUNTER — HEALTH MAINTENANCE LETTER (OUTPATIENT)
Age: 46
End: 2021-09-25

## 2021-10-06 ENCOUNTER — TELEPHONE (OUTPATIENT)
Dept: FAMILY MEDICINE | Facility: OTHER | Age: 46
End: 2021-10-06
Payer: COMMERCIAL

## 2021-10-06 NOTE — TELEPHONE ENCOUNTER
Patient requesting to see Dr Watson.  She is scheduled for Monday 10-11 at 11:15 per below message.  She still would like to speak with someone about her lab work and about pain meds.   She states her hemoglobin and blood count came back low.  Thank you

## 2021-10-06 NOTE — TELEPHONE ENCOUNTER
Verify that patient does not want to follow-up with her PCP rather than me.    If she wants to see me specifically, can use any of my rounding spots next week.

## 2021-10-06 NOTE — TELEPHONE ENCOUNTER
Patient notified or provider's advice.    She has an appointment scheduled on Monday and has 5 pills left.    She is trying to take only 1 every 6 hours.  She is also taking hydrizine with this.    Please advise if she can get in with you sooner?    Raisa Zuñiga RN on 10/6/2021 at 5:06 PM

## 2021-10-06 NOTE — TELEPHONE ENCOUNTER
Avis is calling to request to be worked into Dr Watson schedule in the next week for hospital follow up.  She is also wondering if someone could explain her recent lab results from 10/04.  Please call with availability ok to leave a message.

## 2021-10-06 NOTE — TELEPHONE ENCOUNTER
Since she was seen at [facility], I cannot access her lab results until she is in a visit or signs a release.    She should have received pain medications at the time of discharge.  Any opiate pain medications will require a formal visit.

## 2021-10-07 ENCOUNTER — ANCILLARY PROCEDURE (OUTPATIENT)
Dept: GENERAL RADIOLOGY | Facility: CLINIC | Age: 46
End: 2021-10-07
Attending: FAMILY MEDICINE
Payer: COMMERCIAL

## 2021-10-07 ENCOUNTER — OFFICE VISIT (OUTPATIENT)
Dept: FAMILY MEDICINE | Facility: CLINIC | Age: 46
End: 2021-10-07
Payer: COMMERCIAL

## 2021-10-07 VITALS
TEMPERATURE: 98 F | BODY MASS INDEX: 31.28 KG/M2 | DIASTOLIC BLOOD PRESSURE: 74 MMHG | SYSTOLIC BLOOD PRESSURE: 132 MMHG | WEIGHT: 181 LBS | RESPIRATION RATE: 14 BRPM | HEART RATE: 77 BPM | OXYGEN SATURATION: 97 %

## 2021-10-07 DIAGNOSIS — S22.42XA CLOSED FRACTURE OF MULTIPLE RIBS OF LEFT SIDE, INITIAL ENCOUNTER: ICD-10-CM

## 2021-10-07 DIAGNOSIS — M25.511 ACUTE PAIN OF RIGHT SHOULDER: ICD-10-CM

## 2021-10-07 DIAGNOSIS — S82.402D CLOSED FRACTURE OF LEFT TIBIA AND FIBULA WITH ROUTINE HEALING, SUBSEQUENT ENCOUNTER: ICD-10-CM

## 2021-10-07 DIAGNOSIS — S22.42XA CLOSED FRACTURE OF MULTIPLE RIBS OF LEFT SIDE, INITIAL ENCOUNTER: Primary | ICD-10-CM

## 2021-10-07 DIAGNOSIS — S82.202D CLOSED FRACTURE OF LEFT TIBIA AND FIBULA WITH ROUTINE HEALING, SUBSEQUENT ENCOUNTER: ICD-10-CM

## 2021-10-07 PROCEDURE — 99214 OFFICE O/P EST MOD 30 MIN: CPT | Performed by: FAMILY MEDICINE

## 2021-10-07 PROCEDURE — 71046 X-RAY EXAM CHEST 2 VIEWS: CPT | Performed by: RADIOLOGY

## 2021-10-07 RX ORDER — OXYCODONE HYDROCHLORIDE 5 MG/1
5-10 TABLET ORAL
COMMUNITY
Start: 2021-10-04 | End: 2021-10-07

## 2021-10-07 RX ORDER — POLYETHYLENE GLYCOL 3350 17 G/17G
1 POWDER, FOR SOLUTION ORAL DAILY
COMMUNITY

## 2021-10-07 RX ORDER — ONDANSETRON 4 MG/1
8 TABLET, ORALLY DISINTEGRATING ORAL
COMMUNITY
Start: 2021-10-04 | End: 2022-03-04

## 2021-10-07 RX ORDER — OXYCODONE HYDROCHLORIDE 5 MG/1
5 TABLET ORAL EVERY 8 HOURS PRN
Qty: 16 TABLET | Refills: 0 | Status: SHIPPED | OUTPATIENT
Start: 2021-10-07 | End: 2022-03-04

## 2021-10-07 RX ORDER — LIDOCAINE 50 MG/G
1 PATCH TOPICAL EVERY 24 HOURS
Qty: 30 PATCH | Refills: 0 | Status: SHIPPED | OUTPATIENT
Start: 2021-10-07 | End: 2023-03-01

## 2021-10-07 RX ORDER — HYDROXYZINE PAMOATE 25 MG/1
25-50 CAPSULE ORAL
COMMUNITY
Start: 2021-10-04 | End: 2022-03-04

## 2021-10-07 RX ORDER — IBUPROFEN 200 MG
400 TABLET ORAL
COMMUNITY

## 2021-10-07 RX ORDER — AMOXICILLIN 875 MG
875 TABLET ORAL
COMMUNITY
Start: 2021-10-01 | End: 2021-11-19

## 2021-10-07 RX ORDER — AMOXICILLIN 250 MG
1 CAPSULE ORAL DAILY
COMMUNITY

## 2021-10-07 ASSESSMENT — PAIN SCALES - GENERAL: PAINLEVEL: SEVERE PAIN (7)

## 2021-10-07 NOTE — PROGRESS NOTES
Assessment & Plan   1. Closed fracture of multiple ribs of left side, initial encounter: Patient stating she is having some shortness of breath your chest x-ray done today without evidence of pneumonia or pneumothorax.  Reassured.  Continued use of incentive spirometer.  Recommend using topical lidocaine in addition to Tylenol/NSAIDs for pain control.  Discussed weaning off oxycodone as able try to cut down to twice a day.  Is having some constipation recommend increase MiraLAX use.  - XR Chest 2 Views; Future  - lidocaine (LIDODERM) 5 % patch; Place 1 patch onto the skin every 24 hours To prevent lidocaine toxicity, patient should be patch free for 12 hrs daily.  Dispense: 30 patch; Refill: 0  - oxyCODONE (ROXICODONE) 5 MG tablet; Take 1 tablet (5 mg) by mouth every 8 hours as needed for severe pain  Dispense: 16 tablet; Refill: 0    2. Acute pain of right shoulder: Likely shoulder impingement.  Strength intact.  Recommend referral to physical therapy.  Follow-up if not improving.  - Physical Therapy Referral; Future    3. Closed fracture of left tibia and fibula with routine healing, subsequent encounter: Follow-up with Ortho as scheduled.  Wearing knee immobilizer.      No follow-ups on file.    Keven Barraza MD  [facility]    This chart is completed utilizing dictation software; typos and/or incorrect word substitutions may unintentionally occur.      Maya Albarran is a 46 year old who presents for the following health issues:    [facility]       Hospital Follow-up Visit:    Hospital/Nursing Home/IP Rehab Facility: [facility]   Date of Admission: 10/3/21  Date of Discharge: 10/4/21  Reason(s) for Admission: Struck by horse- horse was a a full run and ran her over while she was standing.       She is worried about internal bleeding. Was never told in the hospital that this was checked for. She has not had a BM since before the accident. Worried about the blood  "work that they did in the hospital.     Was your hospitalization related to COVID-19? No   Problems taking medications regularly:  The only thing she is not taking was the Roboxin- she does not think she needs that much. But she is doing another muscle relaxer that she has been given earlier this year. She is also only doing 1 of the pain meds every 6 hours instead of of 2  Medication changes since discharge: None  Problems adhering to non-medication therapy:  None    Summary of hospitalization:  CareEverywhere information obtained and reviewed  Diagnostic Tests/Treatments reviewed.  Follow up needed: Pain control and follow-up with orthopedics  Other Healthcare Providers Involved in Patient s Care:         Specialist appointment - Follow-up with [facility].  Update since discharge: stable.    Post Discharge Medication Reconciliation: discharge medications reconciled, continue medications without change.  Plan of care communicated with patient          \"HOSPITAL COURSE:  This 46 y.o. female presented to the [facility] ED on 10/3/2021 for evaluation of a fall from horse. 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 She C/O left leg pain. Imaging revealed left nondisplaced proximal fibular fracture and mildly displaced left lateral 3-5 rib fractures. Orthopedic surgery was consulted and the patient was admitted to the trauma service. The patient had a positive acute stress disorder screening and noted a history of anxiety. Mental health was consulted and saw patient prior to dischrage.     On 10/4/2021 Avis Avalos was evaluated by orthopedic surgery, recommended non operative management in knee immobilizer. She worked with PT and OT, recommended home with support. On 10/4/2021 she met discharge criteria.     At time of discharge Avis Avalos is hemodynamically stable. She is tolerating a regular diet. " "her pain is adequately controlled on PO pain medication. She is instructed to follow up with orthopedic surgery and her PCP as directed. She is agreeable to this plan.\"    Today: She is still in significant pain and feeling somewhat short of breath.  She is using her incentive spirometer.  She is almost out of her oxycodone.  States she was able to go approximately 9 hours between 5 mg doses at most, but her pain is not completely taken away.  Additionally she has having pain in her right shoulder.  Has follow-up scheduled with orthopedics.  Needs referral to physical therapy.  Denies any fever, chills.  She is concerned that internal bleeding was never checked for; however, I did review with her that they did do a CT chest abdomen and pelvis with contrast which would been a very good indicator for internal bleeding.  She is satisfied with this.    Additionally reviewed blood work that she was concerned about.  This included essentially normal serum chemistry, slight normocytic anemia with hemoglobin 11.5.    She no other questions or concerns today.    Review of Systems   Constitutional, HEENT, msk, neuro, derm, cardiovascular, pulmonary, gi and gu systems are negative, except as otherwise noted.      Objective    /74   Pulse 77   Temp 98  F (36.7  C) (Temporal)   Resp 14   Wt 82.1 kg (181 lb)   LMP 10/07/2021 (Approximate)   SpO2 97%   BMI 31.28 kg/m    Body mass index is 31.28 kg/m .  Physical Exam   General: Appears well and in no acute distress.  Cardiovascular: Regular rate and rhythm, normal S1 and S2 without murmur. No extra heartsounds or friction rub. Radial pulses present and equal bilaterally.  Respiratory: Lungs clear to auscultation bilaterally. No wheezing or crackles. No prolonged expiration. Symmetrical chest rise.  Musculoskeletal: Normal range of motion left shoulder.  Positive empty can test on the right.  Pain with internal and external rotation of the right shoulder.  Positive " Garza maneuver.  Wearing left knee immobilizer.  Tenderness to left chest wall.  No gross extremity deformities. No peripheral edema. Normal muscle bulk.     Labs: none    CHEST TWO VIEWS     10/7/2021 4:56 PM      HISTORY: Closed fracture of multiple ribs of left side, initial  encounter.     COMPARISON: Chest x-rays dated 5/20/2020.     FINDINGS:  The lungs are clear. No pleural effusions or pneumothorax.  Heart size and pulmonary vascularity are within normal limits. No  acute fracture.                                                                   IMPRESSION: No evidence of acute cardiopulmonary disease is seen. No  definite acute left rib fracture or pneumothorax is identified. No  pulmonary contusion is seen.

## 2021-10-11 ENCOUNTER — TELEPHONE (OUTPATIENT)
Dept: FAMILY MEDICINE | Facility: CLINIC | Age: 46
End: 2021-10-11
Payer: COMMERCIAL

## 2021-10-11 NOTE — TELEPHONE ENCOUNTER
Spoke with pt, this was mailed to pt last week. I have a copy at my desk. Pt will mychart us with the fax number for work

## 2021-10-11 NOTE — TELEPHONE ENCOUNTER
Patient is calling to check status of forms (FMLA & Disability) that were brought with to her appointment 10/07/21. Please call to notify ok to leave message.

## 2021-10-12 ENCOUNTER — MYC MEDICAL ADVICE (OUTPATIENT)
Dept: FAMILY MEDICINE | Facility: CLINIC | Age: 46
End: 2021-10-12

## 2021-10-12 ENCOUNTER — TELEPHONE (OUTPATIENT)
Dept: FAMILY MEDICINE | Facility: CLINIC | Age: 46
End: 2021-10-12

## 2021-10-12 ENCOUNTER — APPOINTMENT (OUTPATIENT)
Dept: GENERAL RADIOLOGY | Facility: CLINIC | Age: 46
End: 2021-10-12
Attending: EMERGENCY MEDICINE
Payer: COMMERCIAL

## 2021-10-12 ENCOUNTER — HOSPITAL ENCOUNTER (EMERGENCY)
Facility: CLINIC | Age: 46
Discharge: HOME | End: 2021-10-12
Attending: PHYSICIAN ASSISTANT | Admitting: EMERGENCY MEDICINE
Payer: COMMERCIAL

## 2021-10-12 VITALS
WEIGHT: 178.7 LBS | DIASTOLIC BLOOD PRESSURE: 82 MMHG | BODY MASS INDEX: 31.66 KG/M2 | HEIGHT: 63 IN | TEMPERATURE: 98.2 F | SYSTOLIC BLOOD PRESSURE: 111 MMHG | OXYGEN SATURATION: 98 % | HEART RATE: 80 BPM | RESPIRATION RATE: 20 BRPM

## 2021-10-12 DIAGNOSIS — S22.42XA CLOSED FRACTURE OF MULTIPLE RIBS OF LEFT SIDE, INITIAL ENCOUNTER: ICD-10-CM

## 2021-10-12 PROCEDURE — 71046 X-RAY EXAM CHEST 2 VIEWS: CPT

## 2021-10-12 PROCEDURE — 99283 EMERGENCY DEPT VISIT LOW MDM: CPT | Mod: 25 | Performed by: EMERGENCY MEDICINE

## 2021-10-12 PROCEDURE — 99284 EMERGENCY DEPT VISIT MOD MDM: CPT | Performed by: EMERGENCY MEDICINE

## 2021-10-12 RX ORDER — OMEPRAZOLE 20 MG/1
20 TABLET, DELAYED RELEASE ORAL DAILY
COMMUNITY
End: 2022-03-04

## 2021-10-12 RX ORDER — OXYCODONE HYDROCHLORIDE 5 MG/1
5 TABLET ORAL EVERY 6 HOURS PRN
Qty: 20 TABLET | Refills: 0 | Status: SHIPPED | OUTPATIENT
Start: 2021-10-12 | End: 2022-03-04

## 2021-10-12 RX ORDER — HYDROXYZINE PAMOATE 25 MG/1
25-50 CAPSULE ORAL 4 TIMES DAILY PRN
Qty: 30 CAPSULE | Refills: 0 | Status: SHIPPED | OUTPATIENT
Start: 2021-10-12 | End: 2022-03-04

## 2021-10-12 ASSESSMENT — MIFFLIN-ST. JEOR: SCORE: 1424.71

## 2021-10-12 NOTE — TELEPHONE ENCOUNTER
S-(situation): Worsening left chest and back pain over past 10 hours    B-(background): Patient was struck by a runaway horse 10/3/21 hospitalized with fractured ribs, fractured left tib/fib and a knee injury.    A-(assessment): Patient complains of increase in left chest and left upper back pain onset about 9:30 p.m. last night.  She is using Lidocaine patches, Oxycodone, Tylenol, Ibuprofen and muscle relaxer which did not seem to touch the pain.  Currently rates pain 9 out of 10 with any movement or when trying to take in a deep inspiration.  If she sits and rests pain is a 3/10. She denies fever, nausea or diaphoresis.  She feels she can only breathe in a shallow manner.    R-(recommendations): Advised patient be evaluated in ER now for worsening chest pain and back pain.  SIDDHARTHA Schmitt R.N.

## 2021-10-12 NOTE — ED TRIAGE NOTES
Pt was knocked over by a Horse 9 days ago and sustained some rib and fibula fractures on the Left side.  Pt stated last evening the pain changed and is radiating across the upper back now.  Rating it a 9/10 with movement.  Pt taking Tylenol and Oxy and Muscle relaxants for pain discomfort.

## 2021-10-12 NOTE — ED PROVIDER NOTES
"  History     Chief Complaint   Patient presents with     Rib Pain     Left side 3 fractured ribs about 9 days ago     The history is provided by the patient.     Avis Avalos is a 46 year old female who is having left sided rib pain due to an injury that occurred on October 3rd, resulting in broken ribs. Patient was hit head on by a horse charging at her. Patient was sent to [facility] for the trauma injury. Patient had surgery, stayed overnight and was released the following day. Patient was seen again on the following Wednesday at [facility] with concerns to pain management. Patient is here today complaining of rib pain that is in the middle thoracic area of the back and has discomfort in the chest when she breaths and moves. Pain started last night, patient was having a hard time moving and breathing and it progressively got worse. Describes the pain as \"stabbing knife pain.\" Patient used theincentive spirometer given to her for the first few days but then stopped. Patient has been using tylenol 1000 mg every 6 hours, Oxycodone 5 mg ever 6 hours, Ibuprofen at night, lidocaine patch and muscle relaxers. Patient ran out of hydroxyzine two days ago. No other symptoms reported.       Allergies:  Allergies   Allergen Reactions     Fructose GI Disturbance     Ragweeds      Sorbitol Diarrhea     Adhesive [Liquid Adhesive] Rash       Problem List:    Patient Active Problem List    Diagnosis Date Noted     TMJ (temporomandibular joint syndrome) 08/27/2021     Priority: Medium     Chest wall pain 08/27/2021     Priority: Medium     Nasal turbinate hypertrophy 08/25/2020     Priority: Medium     Added automatically from request for surgery [id number]       Shortness of breath 04/03/2020     Priority: Medium     Abdominal bloating with cramps 04/03/2020     Priority: Medium     Pelvic pain in female 10/07/2019     Priority: Medium     Added automatically from request for surgery [id number]       Pelvic floor " dysfunction 2017     Priority: Medium     Chronic seasonal allergic rhinitis due to pollen 2017     Priority: Medium     Lichen sclerosus of female genitalia 10/19/2017     Priority: Medium     Moderate episode of recurrent major depressive disorder (H) 2017     Priority: Medium     Obsessive-compulsive disorder, unspecified type 2017     Priority: Medium     Sessile colonic polyp 2017     Priority: Medium     Constipation due to outlet dysfunction 2017     Priority: Medium     Knee pain 2015     Priority: Medium     Cervical high risk HPV (human papillomavirus) test positive 2010     Priority: Medium     4/14/10 NIL pap, + HR HPV (59). normal paps since  , , ,  all NIL paps  17 NIL pap/neg HR HPV.  20 NIL pap, Neg HPV. Plan cotest in 5 years.        Herpes simplex type 2 infection 2009     Priority: Medium        Past Medical History:    Past Medical History:   Diagnosis Date     Crohn disease (H)      Depression      Fructose intolerance      High risk HPV infection 4/14/10     PONV (postoperative nausea and vomiting)        Past Surgical History:    Past Surgical History:   Procedure Laterality Date     C APPENDECTOMY           C/SECTION, LOW TRANSVERSE  02/17/10    , Low Transverse      SECTION  2012    Procedure:  SECTION;   SECTION ;  Surgeon: Yehuda Cruz MD;  Location:  L+D     COLONOSCOPY  2011    Procedure:COMBINED COLONOSCOPY, SINGLE BIOPSY/POLYPECTOMY BY BIOPSY; Surgeon:MARTIN MAO; Location: GI     COLONOSCOPY  2011    Procedure:COMBINED COLONOSCOPY, SINGLE BIOPSY/POLYPECTOMY BY BIOPSY; Colonoscopy biopsy of distal iiluem, colonoscopy removal of colon polyp with fulguration, colonoscopy and control of biopsy bleed with placement of resolution clip; Surgeon:TESS POLLARD; Location: GI     COLONOSCOPY  2011    Procedure:COMBINED COLONOSCOPY,  REMOVE TUMOR/POLYP/LESION BY FULGURATION/HOT BIOPSY; Surgeon:TESS POLLARD; Location:[facility]     COLONOSCOPY  9/12/2011    Procedure:COMBINED COLONOSCOPY, CONTROL BLEED; Surgeon:TESS POLLARD; Location:[facility]     COLONOSCOPY N/A 6/19/2017    Procedure: COMBINED COLONOSCOPY, SINGLE OR MULTIPLE BIOPSY/POLYPECTOMY BY BIOPSY;;  Surgeon: Josué Hawk MD;  Location: [facility]     HC FLEX SIGMOIDOSCOPY W/WO MIKY SPEC BY BRUSH/WASH  07/02/09    [facility]     HC TOOTH EXTRACTION W/FORCEP       LAPAROSCOPIC CYSTECTOMY OVARIAN (BENIGN) Right 10/21/2019    Procedure: Laparoscopic cystectomy right ovarian (benign);  Surgeon: Yehuda Cruz MD;  Location: [facility]     LAPAROSCOPIC LYSIS ADHESIONS N/A 10/21/2019    Procedure: LYSIS, ADHESIONS, LAPAROSCOPIC;  Surgeon: Yehuda Cruz MD;  Location: [facility] OR     TURBINOPLASTY Bilateral 9/28/2020    Procedure: BILATERAL ENDOSCOPIC TURBINOPLASTY;  Surgeon: Ann Herrera MD;  Location: [facility] OR     VIDEO CAPSULE ENDOSCOPY  07/30/09    [facility]       Family History:    Family History   Problem Relation Age of Onset     Diabetes Mother         borderline     Depression Mother      Gastrointestinal Disease Mother         IBS     Thyroid Disease Mother         hypothyroidism     Diabetes Father         NIDDM - Type II     Diabetes Maternal Grandmother      Arthritis Maternal Grandfather      Heart Disease Paternal Grandfather      Thyroid Disease Sister         hypothyroidism     Rheumatoid Arthritis Sister      Other Cancer Sister      Asthma Other         niece     Obesity Sister         half-sister/had gastric bypass     Breast Cancer No family hx of        Social History:  Marital Status:   [2]  Social History     Tobacco Use     Smoking status: Former Smoker     Packs/day: 0.25     Years: 10.00     Pack years: 2.50     Start date: 1/1/2007     Smokeless tobacco: Never Used   Vaping Use     Vaping Use: Never used   Substance Use Topics  "    Alcohol use: Yes     Comment: Occ.     Drug use: No        Medications:    amoxicillin (AMOXIL) 875 MG tablet  cholecalciferol 50 MCG (2000 UT) tablet  cyclobenzaprine (FLEXERIL) 10 MG tablet  fexofenadine (ALLEGRA) 180 MG tablet  hydrOXYzine (VISTARIL) 25 MG capsule  hydrOXYzine (VISTARIL) 25 MG capsule  ibuprofen (ADVIL/MOTRIN) 200 MG tablet  lidocaine (LIDODERM) 5 % patch  magnesium oxide 400 MG CAPS  Multiple Vitamins-Calcium (ONE-A-DAY WOMENS PO)  omeprazole (PRILOSEC OTC) 20 MG EC tablet  ondansetron (ZOFRAN-ODT) 4 MG ODT tab  oxyCODONE (ROXICODONE) 5 MG tablet  oxyCODONE (ROXICODONE) 5 MG tablet  polyethylene glycol (MIRALAX) 17 GM/Dose powder  senna-docusate (SENOKOT-S/PERICOLACE) 8.6-50 MG tablet  valACYclovir (VALTREX) 1000 mg tablet  Acetaminophen (TYLENOL PO)  DiphenhydrAMINE HCl (BENADRYL PO)  loratadine (CLARITIN) 10 MG tablet          Review of Systems  All other systems are reviewed and are negative    Physical Exam   BP: 119/81  Pulse: 93  Temp: 98.2  F (36.8  C)  Resp: 20  Height: 160 cm (5' 3\")  Weight: 81.1 kg (178 lb 11.2 oz)  SpO2: 98 %      Physical Exam  Vitals and nursing note reviewed.   Constitutional:       General: She is not in acute distress.     Appearance: She is not diaphoretic.   HENT:      Head: Atraumatic.      Mouth/Throat:      Pharynx: No oropharyngeal exudate.   Eyes:      General: No scleral icterus.     Pupils: Pupils are equal, round, and reactive to light.   Cardiovascular:      Rate and Rhythm: Regular rhythm.      Heart sounds: Normal heart sounds.   Pulmonary:      Effort: No respiratory distress.      Breath sounds: Normal breath sounds.   Chest:      Chest wall: No tenderness.   Abdominal:      General: Bowel sounds are normal.      Palpations: Abdomen is soft.      Tenderness: There is no abdominal tenderness.   Musculoskeletal:         General: Normal range of motion.      Cervical back: No tenderness.      Thoracic back: Tenderness present.      Lumbar back: " No tenderness.      Comments: Left Thoracic area    Skin:     General: Skin is warm.      Findings: No abrasion, laceration or rash.   Neurological:      Mental Status: She is alert and oriented to person, place, and time.         ED Course        Procedures                Results for orders placed or performed during the hospital encounter of 10/12/21 (from the past 24 hour(s))   Chest XR,  PA & LAT    Narrative    XR CHEST 2 VW 10/12/2021 12:23 PM    HISTORY: Chest trauma. Worsening pain on posterior left ribs.    COMPARISON: 10/7/2021    FINDINGS: Minimally displaced fracture of the lateral portion of left  rib 4 and 5. Additional rib fractures not excluded. No pneumothorax or  pleural effusion. Normal cardiomediastinal silhouette. The lungs are  clear.    DANA RIGGINS MD         SYSTEM ID:  [id number]       Medications - No data to display    Assessments & Plan (with Medical Decision Making)  46 year old female was run over by a horse 9 days ago.  She has 2 broken ribs that are still painful.  Chest x-ray shows no pneumothorax or other acute emergency medical condition.  Pain is been difficult to manage.  Prescribed oxycodone and hydroxyzine to use.  Have recommended follow-up next week.     I have reviewed the nursing notes.    I have reviewed the findings, diagnosis, plan and need for follow up with the patient.       Discharge Medication List as of 10/12/2021  1:40 PM      START taking these medications    Details   !! hydrOXYzine (VISTARIL) 25 MG capsule Take 1-2 capsules (25-50 mg) by mouth 4 times daily as needed for other (pain), Disp-30 capsule, R-0, E-Prescribe      !! oxyCODONE (ROXICODONE) 5 MG tablet Take 1 tablet (5 mg) by mouth every 6 hours as needed for pain, Disp-20 tablet, R-0, E-Prescribe       !! - Potential duplicate medications found. Please discuss with provider.          Final diagnoses:   Closed fracture of multiple ribs of left side, initial encounter   This document serves as  a record of services personally performed by Bhargav Carson MD. It was created on their behalf by Romero Wyman, a trained medical scribe. The creation of this record is based on the provider's personal observations and the statements of the patient. This document has been checked and approved by the attending provider.  Note: Chart documentation done in part with Dragon Voice Recognition software. Although reviewed after completion, some word and grammatical errors may remain.  10/12/2021   [facility] EMERGENCY DEPT     Bhargav Carson MD  10/12/21 [id number]

## 2021-10-12 NOTE — TELEPHONE ENCOUNTER
Routing to . Fax number is listed in [employer] message.    SHIVA [facility]

## 2021-10-13 ENCOUNTER — TRANSFERRED RECORDS (OUTPATIENT)
Dept: HEALTH INFORMATION MANAGEMENT | Facility: CLINIC | Age: 46
End: 2021-10-13

## 2021-10-20 ENCOUNTER — THERAPY VISIT (OUTPATIENT)
Dept: PHYSICAL THERAPY | Facility: CLINIC | Age: 46
End: 2021-10-20
Payer: COMMERCIAL

## 2021-10-20 DIAGNOSIS — S82.402A: ICD-10-CM

## 2021-10-20 DIAGNOSIS — R26.89 ANTALGIC GAIT: ICD-10-CM

## 2021-10-20 DIAGNOSIS — M25.562 ACUTE PAIN OF LEFT KNEE: ICD-10-CM

## 2021-10-20 PROCEDURE — 97110 THERAPEUTIC EXERCISES: CPT | Mod: GP | Performed by: PHYSICAL THERAPIST

## 2021-10-20 PROCEDURE — 97161 PT EVAL LOW COMPLEX 20 MIN: CPT | Mod: GP | Performed by: PHYSICAL THERAPIST

## 2021-10-20 ASSESSMENT — ACTIVITIES OF DAILY LIVING (ADL)
KNEE_ACTIVITY_OF_DAILY_LIVING_SUM: 31
GO DOWN STAIRS: ACTIVITY IS SOMEWHAT DIFFICULT
HOW_WOULD_YOU_RATE_THE_CURRENT_FUNCTION_OF_YOUR_KNEE_DURING_YOUR_USUAL_DAILY_ACTIVITIES_ON_A_SCALE_FROM_0_TO_100_WITH_100_BEING_YOUR_LEVEL_OF_KNEE_FUNCTION_PRIOR_TO_YOUR_INJURY_AND_0_BEING_THE_INABILITY_TO_PERFORM_ANY_OF_YOUR_USUAL_DAILY_ACTIVITIES?: 60
WEAKNESS: THE SYMPTOM AFFECTS MY ACTIVITY MODERATELY
HOW_WOULD_YOU_RATE_THE_OVERALL_FUNCTION_OF_YOUR_KNEE_DURING_YOUR_USUAL_DAILY_ACTIVITIES?: ABNORMAL
WALK: ACTIVITY IS SOMEWHAT DIFFICULT
AS_A_RESULT_OF_YOUR_KNEE_INJURY,_HOW_WOULD_YOU_RATE_YOUR_CURRENT_LEVEL_OF_DAILY_ACTIVITY?: SEVERELY ABNORMAL
SIT WITH YOUR KNEE BENT: ACTIVITY IS SOMEWHAT DIFFICULT
ACTIVITIES_OF_DAILY_LIVING_MEASURE_SCORE(%):: 50
RAW_SCORE: 31
STIFFNESS: THE SYMPTOM AFFECTS MY ACTIVITY MODERATELY
PAIN: THE SYMPTOM AFFECTS MY ACTIVITY MODERATELY
GO UP STAIRS: ACTIVITY IS SOMEWHAT DIFFICULT
KNEE_ACTIVITY_OF_DAILY_LIVING_SCORE: 44.29
SWELLING: THE SYMPTOM AFFECTS MY ACTIVITY MODERATELY
LIMPING: THE SYMPTOM AFFECTS MY ACTIVITY MODERATELY
TOTAL_ADL_ITEM_SCORE:: 42
KNEEL ON THE FRONT OF YOUR KNEE: I AM UNABLE TO DO THE ACTIVITY
SQUAT: I AM UNABLE TO DO THE ACTIVITY
HIGHEST_POTENTIAL_ADL_SCORE:: 84
GIVING WAY, BUCKLING OR SHIFTING OF KNEE: THE SYMPTOM AFFECTS MY ACTIVITY MODERATELY
STAND: ACTIVITY IS MINIMALLY DIFFICULT
RISE FROM A CHAIR: ACTIVITY IS SOMEWHAT DIFFICULT

## 2021-10-20 NOTE — PROGRESS NOTES
Physical Therapy Initial Evaluation  Subjective:  The history is provided by the patient. No  was used.   Patient Health History  Avis Avalos being seen for L tibia fracture and knee injury.     Problem began: 10/3/2021.   Problem occurred: Horse accident   Pain is reported as 5/10 on pain scale.  General health as reported by patient is fair.  Pertinent medical history includes: depression, history of fractures, migraines/headaches and overweight.     Medical allergies: adhesives.   Surgeries include:  Other.    Current medications:  Anti-inflammatory, pain medication and other.    Current occupation is ; Lives at home w/  and 2 kids (ages 9, and 11).   Primary job tasks include:  Computer work.                  Therapist Generated HPI Evaluation  Problem details: Pt was standing and got hit by a horse that was running. Was thrown to the ground. Sustained 3 rib fractures (ribs 3-5, mildly displaced) and L proximal and distal fibular fracture. Was given stabilization brace and walker as needed. Primary pain complaint is Left knee. Negative on x-ray. No MRI for knee yet..         Type of problem:  Left knee.    This is a new condition.  Condition occurred with:  Other reason.  Where condition occurred: in the community.  Patient reports pain:  Posterior, lateral and anterior (and lateral lower leg).  Pain is described as aching and is intermittent.  Radiates to: was having numbness from mid thigh to foot (getting less) Pain is the same all the time.  Since onset symptoms are gradually improving.  Associated symptoms:  Loss of motion/stiffness, numbness, buckling/giving out and edema. Symptoms are exacerbated by activity, ascending stairs, descending stairs, kneeling, bending/squatting, standing, transfers, walking and weight bearing (lying w/ knee unsupported is painful)  and relieved by ice, NSAID's, heat and rest.  Special tests included:  X-ray.    Restrictions due to  condition include:  Working in normal job without restrictions (not cleared to drive).  Barriers include:  None as reported by patient.                        Objective:    Gait:    Gait Type:  Antalgic   Weight Bearing Status:  WBAT   Assistive Devices:  None                                                        Knee Evaluation:  ROM:    AROM    Hyperextension:  Left:  4    Right: 7    Flexion: Left: 116 ++pain starting at 90    Right: 150        Strength:     Extension:  Left: 5/5   Pain:      Right: 5/5   Pain:  Flexion:  Left: 5/5   Pain:      Right: 5/5   Pain:    Quad Set Left:  Fair and delayed    Pain: ++   Quad Set Right: Good    Pain:  Ligament Testing:    Varus 0:  Left:  Neg   Right:  Neg  Varus 30:  Left:  Neg  Right:  Neg  Valgus 0:  Left:  Pos  Right:  Neg  Valgus 30:  Left:  Pos    Right:  Neg            Edema:  Edema of the knee: Immediately superior to patella: L 43cm, R 40 cm.  Circumference:      Joint Line:  Left:  40 cm   Right:  37.5 cm  15 cm Prox:  Left:  38 cm  Right:  37 cm          General     ROS    Assessment/Plan:    Patient is a 46 year old female with left side knee complaints.    Patient has the following significant findings with corresponding treatment plan.                Diagnosis 1:  L Knee pain s/p fibular fracture  Pain -  hot/cold therapy, manual therapy, self management, education and home program  Decreased ROM/flexibility - manual therapy, therapeutic exercise, therapeutic activity and home program  Edema - vasopneumatics, cold therapy and self management/home program  Impaired gait - gait training and home program  Impaired muscle performance - neuro re-education and home program  Decreased function - therapeutic activities and home program    Therapy Evaluation Codes:   1) History comprised of:   Personal factors that impact the plan of care:      Past/current experiences.    Comorbidity factors that impact the plan of care are:      Depression and Overweight.      Medications impacting care: Anti-depressant and Pain.  2) Examination of Body Systems comprised of:   Body structures and functions that impact the plan of care:      Knee.   Activity limitations that impact the plan of care are:      Dressing, Jumping, Lifting, Sitting, Squatting/kneeling, Stairs, Standing, Walking, Working, Sleeping and Laying down.  3) Clinical presentation characteristics are:   Stable/Uncomplicated.  4) Decision-Making    Moderate complexity using standardized patient assessment instrument and/or measureable assessment of functional outcome.  Cumulative Therapy Evaluation is: Low complexity.    Previous and current functional limitations:  (See Goal Flow Sheet for this information)    Short term and Long term goals: (See Goal Flow Sheet for this information)     Communication ability:  Patient appears to be able to clearly communicate and understand verbal and written communication and follow directions correctly.  Treatment Explanation - The following has been discussed with the patient:   RX ordered/plan of care  Anticipated outcomes  Possible risks and side effects  This patient would benefit from PT intervention to resume normal activities.   Rehab potential is good.    Frequency:  3 X week, once daily  Duration:  for 6 weeks  Discharge Plan:  Achieve all LTG.  Independent in home treatment program.  Reach maximal therapeutic benefit.    Please refer to the daily flowsheet for treatment today, total treatment time and time spent performing 1:1 timed codes.

## 2021-10-20 NOTE — LETTER
[facility]  [address]  [place] [address] [phone]    2021  Re: Avis Avalos   :   1975  MRN:  [id number]   REFERRING PHYSICIAN:   MD AVIVA Brewer [facility]    Date of Initial Evaluation:  10/20/2021  Visits:  Rxs Used: 1  Reason for Referral:     Acute pain of left knee  Fracture of shaft of left fibula  Antalgic gait    Physical Therapy Initial Evaluation  Subjective:  The history is provided by the patient. No  was used.   Patient Health History  Avis Avalos being seen for L tibia fracture and knee injury.   Problem began: 10/3/2021.   Problem occurred: Horse accident   Pain is reported as 5/10 on pain scale.  General health as reported by patient is fair.  Pertinent medical history includes: depression, history of fractures, migraines/headaches and overweight.   Medical allergies: adhesives.   Surgeries include:  Other.    Current medications:  Anti-inflammatory, pain medication and other.    Current occupation is ; Lives at home w/ , 2 kids (ages 9, and 11).   Primary job tasks include:  Computer work.               Therapist Generated HPI Evaluation  Problem details: Pt was standing and got hit by a horse that was running. Was thrown to the ground. Sustained 3 rib fractures (ribs 3-5, mildly displaced) and L proximal and distal fibular fracture. Was given stabilization brace and walker as needed. Primary pain complaint is Left knee. Negative on x-ray. No MRI for knee yet.         Type of problem:  Left knee.  This is a new condition.  Condition occurred with:  Other reason.  Where condition occurred: in the community.  Patient reports pain:  Posterior, lateral and anterior (and lateral lower leg).  Pain is described as aching and is intermittent.  Radiates to: was having numbness from mid thigh to foot (getting less) Pain is the  same all the time.  Since onset symptoms are gradually improving.  Associated symptoms:  Loss of motion/stiffness, numbness, buckling/giving out and edema. Symptoms are exacerbated by activity, ascending stairs, descending stairs, kneeling, bending/squatting, standing, transfers, walking and weight bearing (lying w/ knee unsupported is painful)  and relieved by ice, NSAID's, heat and rest.  Special tests included:  X-ray.  Re: Avis Avalos   :   1975, page 2    Restrictions due to condition include:  Working in normal job without restrictions (not cleared to drive).  Barriers include:  None as reported by patient.                Objective:  Gait:    Gait Type:  Antalgic   Weight Bearing Status:  WBAT   Assistive Devices:  None    Knee Evaluation:  ROM:    AROM  Hyperextension:  Left:  4    Right: 7  Flexion: Left: 116 ++pain starting at 90    Right: 150  Strength:   Extension:  Left: 5/5   Pain:      Right: 5/5   Pain:  Flexion:  Left: 5/5   Pain:      Right: 5/5   Pain:    Quad Set Left:  Fair and delayed    Pain: ++   Quad Set Right: Good    Pain:  Ligament Testing:    Varus 0:  Left:  Neg   Right:  Neg  Varus 30:  Left:  Neg  Right:  Neg  Valgus 0:  Left:  Pos  Right:  Neg  Valgus 30:  Left:  Pos    Right:  Neg  Edema:  Edema of the knee: Immediately superior to patella: L 43cm, R 40 cm.  Circumference:  Joint Line:  Left:  40 cm   Right:  37.5 cm  15 cm Prox:  Left:  38 cm  Right:  37 cm    Assessment/Plan:    Patient is a 46 year old female with left side knee complaints.    Patient has the following significant findings with corresponding treatment plan.                Diagnosis 1:  L Knee pain s/p fibular fracture.  Pain -  hot/cold therapy, manual therapy, self manage, education and home program  Decreased ROM/flexibility - manual therapy, therapeutic exercise, therapeutic activity and home program  Edema - vasopneumatics, cold therapy and self management/home program  Impaired gait - gait  training and home program  Impaired muscle performance - neuro re-education and home program  Decreased function - therapeutic activities and home program    Therapy Evaluation Codes:   1) History comprised of: Personal factors that impact the plan of care:      Past/current experiences.    Comorbidity factors  impact the plan of care are: Depression and Overweight.     Medications impacting care: Anti-depressant and Pain.  2) Examination of Body Systems comprised of: Body structures and functions that impact the plan of care:  Knee.   Activity limitations that impact the plan of care are: Dressing, Jumping, Lifting, Sitting, Squatting/kneeling, Stairs, Standing, Walking, Working, Sleeping and Laying down.    Re: Avis Avalos   :   1975, page 3    3) Clinical presentation characteristics are: Stable/Uncomplicated.  4) Decision-Making: Moderate complexity using standardized patient assessment instrument and/or measureable assessment of functional outcome.  Cumulative Therapy Evaluation is: Low complexity.  Previous and current functional limitations:  (See Goal Flow Sheet for this information)    Short term and Long term goals: (See Goal Flow Sheet for this information)   Communication ability:  Patient appears to be able to clearly communicate and understand verbal and written communication and follow directions correctly.  Treatment Explanation - The following has been discussed with the patient:   RX ordered/plan of care  Anticipated outcomes  Possible risks and side effects  This patient would benefit from PT intervention to resume normal activities.   Rehab potential is good.    Frequency:  3 X week, once daily  Duration:  for 6 weeks  Discharge Plan:  Achieve all LTG.  Independent in home treatment program.  Reach maximal therapeutic benefit.        Thank you for your referral.    INQUIRIES  Therapist: Amanda Hilligoss, PT, DPT   [address]  [facility] [address]  Phone: [phone]  Fax: [phone]

## 2021-10-22 ENCOUNTER — THERAPY VISIT (OUTPATIENT)
Dept: PHYSICAL THERAPY | Facility: CLINIC | Age: 46
End: 2021-10-22
Payer: COMMERCIAL

## 2021-10-22 DIAGNOSIS — S82.402A: ICD-10-CM

## 2021-10-22 DIAGNOSIS — M25.562 ACUTE PAIN OF LEFT KNEE: ICD-10-CM

## 2021-10-22 DIAGNOSIS — R26.89 ANTALGIC GAIT: ICD-10-CM

## 2021-10-22 PROCEDURE — 97110 THERAPEUTIC EXERCISES: CPT | Mod: GP | Performed by: PHYSICAL THERAPIST

## 2021-10-22 PROCEDURE — 97140 MANUAL THERAPY 1/> REGIONS: CPT | Mod: GP | Performed by: PHYSICAL THERAPIST

## 2021-10-22 PROCEDURE — 97112 NEUROMUSCULAR REEDUCATION: CPT | Mod: GP | Performed by: PHYSICAL THERAPIST

## 2021-10-25 ENCOUNTER — THERAPY VISIT (OUTPATIENT)
Dept: PHYSICAL THERAPY | Facility: CLINIC | Age: 46
End: 2021-10-25
Payer: COMMERCIAL

## 2021-10-25 DIAGNOSIS — M25.562 ACUTE PAIN OF LEFT KNEE: ICD-10-CM

## 2021-10-25 DIAGNOSIS — S82.402A: ICD-10-CM

## 2021-10-25 DIAGNOSIS — R26.89 ANTALGIC GAIT: ICD-10-CM

## 2021-10-25 PROCEDURE — 97110 THERAPEUTIC EXERCISES: CPT | Mod: GP | Performed by: PHYSICAL THERAPIST

## 2021-10-25 PROCEDURE — 97140 MANUAL THERAPY 1/> REGIONS: CPT | Mod: GP | Performed by: PHYSICAL THERAPIST

## 2021-10-27 ENCOUNTER — THERAPY VISIT (OUTPATIENT)
Dept: PHYSICAL THERAPY | Facility: CLINIC | Age: 46
End: 2021-10-27
Payer: COMMERCIAL

## 2021-10-27 DIAGNOSIS — R26.89 ANTALGIC GAIT: ICD-10-CM

## 2021-10-27 DIAGNOSIS — S82.402A: ICD-10-CM

## 2021-10-27 DIAGNOSIS — M25.562 ACUTE PAIN OF LEFT KNEE: ICD-10-CM

## 2021-10-27 PROCEDURE — 97110 THERAPEUTIC EXERCISES: CPT | Mod: GP | Performed by: PHYSICAL THERAPY ASSISTANT

## 2021-10-27 PROCEDURE — 97112 NEUROMUSCULAR REEDUCATION: CPT | Mod: GP | Performed by: PHYSICAL THERAPY ASSISTANT

## 2021-10-29 ENCOUNTER — THERAPY VISIT (OUTPATIENT)
Dept: PHYSICAL THERAPY | Facility: CLINIC | Age: 46
End: 2021-10-29
Payer: COMMERCIAL

## 2021-10-29 DIAGNOSIS — M25.562 ACUTE PAIN OF LEFT KNEE: ICD-10-CM

## 2021-10-29 DIAGNOSIS — R26.89 ANTALGIC GAIT: ICD-10-CM

## 2021-10-29 DIAGNOSIS — S82.402D CLOSED FRACTURE OF SHAFT OF LEFT FIBULA WITH ROUTINE HEALING, UNSPECIFIED FRACTURE MORPHOLOGY, SUBSEQUENT ENCOUNTER: ICD-10-CM

## 2021-10-29 PROCEDURE — 97140 MANUAL THERAPY 1/> REGIONS: CPT | Mod: GP | Performed by: PHYSICAL THERAPIST

## 2021-10-29 PROCEDURE — 97110 THERAPEUTIC EXERCISES: CPT | Mod: GP | Performed by: PHYSICAL THERAPIST

## 2021-11-03 ENCOUNTER — TRANSFERRED RECORDS (OUTPATIENT)
Dept: HEALTH INFORMATION MANAGEMENT | Facility: CLINIC | Age: 46
End: 2021-11-03

## 2021-11-03 ENCOUNTER — THERAPY VISIT (OUTPATIENT)
Dept: PHYSICAL THERAPY | Facility: CLINIC | Age: 46
End: 2021-11-03
Payer: COMMERCIAL

## 2021-11-03 DIAGNOSIS — M25.562 ACUTE PAIN OF LEFT KNEE: ICD-10-CM

## 2021-11-03 DIAGNOSIS — R26.89 ANTALGIC GAIT: ICD-10-CM

## 2021-11-03 DIAGNOSIS — S82.402D CLOSED FRACTURE OF SHAFT OF LEFT FIBULA WITH ROUTINE HEALING, UNSPECIFIED FRACTURE MORPHOLOGY, SUBSEQUENT ENCOUNTER: ICD-10-CM

## 2021-11-03 PROCEDURE — 97140 MANUAL THERAPY 1/> REGIONS: CPT | Mod: GP | Performed by: PHYSICAL THERAPIST

## 2021-11-03 PROCEDURE — 97112 NEUROMUSCULAR REEDUCATION: CPT | Mod: GP | Performed by: PHYSICAL THERAPIST

## 2021-11-03 PROCEDURE — 97110 THERAPEUTIC EXERCISES: CPT | Mod: GP | Performed by: PHYSICAL THERAPIST

## 2021-11-05 ENCOUNTER — THERAPY VISIT (OUTPATIENT)
Dept: PHYSICAL THERAPY | Facility: CLINIC | Age: 46
End: 2021-11-05
Payer: COMMERCIAL

## 2021-11-05 DIAGNOSIS — R26.89 ANTALGIC GAIT: ICD-10-CM

## 2021-11-05 DIAGNOSIS — S82.402D CLOSED FRACTURE OF SHAFT OF LEFT FIBULA WITH ROUTINE HEALING, UNSPECIFIED FRACTURE MORPHOLOGY, SUBSEQUENT ENCOUNTER: ICD-10-CM

## 2021-11-05 DIAGNOSIS — M25.562 ACUTE PAIN OF LEFT KNEE: ICD-10-CM

## 2021-11-05 PROCEDURE — 97112 NEUROMUSCULAR REEDUCATION: CPT | Mod: GP | Performed by: PHYSICAL THERAPIST

## 2021-11-05 PROCEDURE — 97110 THERAPEUTIC EXERCISES: CPT | Mod: GP | Performed by: PHYSICAL THERAPIST

## 2021-11-05 PROCEDURE — 97140 MANUAL THERAPY 1/> REGIONS: CPT | Mod: GP | Performed by: PHYSICAL THERAPIST

## 2021-11-12 ENCOUNTER — THERAPY VISIT (OUTPATIENT)
Dept: PHYSICAL THERAPY | Facility: CLINIC | Age: 46
End: 2021-11-12
Payer: COMMERCIAL

## 2021-11-12 DIAGNOSIS — R26.89 ANTALGIC GAIT: ICD-10-CM

## 2021-11-12 DIAGNOSIS — M25.562 ACUTE PAIN OF LEFT KNEE: ICD-10-CM

## 2021-11-12 DIAGNOSIS — S82.402D CLOSED FRACTURE OF SHAFT OF LEFT FIBULA WITH ROUTINE HEALING, UNSPECIFIED FRACTURE MORPHOLOGY, SUBSEQUENT ENCOUNTER: ICD-10-CM

## 2021-11-12 PROCEDURE — 97140 MANUAL THERAPY 1/> REGIONS: CPT | Mod: GP | Performed by: PHYSICAL THERAPIST

## 2021-11-12 PROCEDURE — 97112 NEUROMUSCULAR REEDUCATION: CPT | Mod: GP | Performed by: PHYSICAL THERAPIST

## 2021-11-19 ENCOUNTER — OFFICE VISIT (OUTPATIENT)
Dept: FAMILY MEDICINE | Facility: CLINIC | Age: 46
End: 2021-11-19
Payer: COMMERCIAL

## 2021-11-19 VITALS
OXYGEN SATURATION: 98 % | TEMPERATURE: 97 F | HEART RATE: 58 BPM | RESPIRATION RATE: 16 BRPM | WEIGHT: 174 LBS | SYSTOLIC BLOOD PRESSURE: 110 MMHG | DIASTOLIC BLOOD PRESSURE: 66 MMHG | BODY MASS INDEX: 29.71 KG/M2 | HEIGHT: 64 IN

## 2021-11-19 DIAGNOSIS — K64.8 INTERNAL HEMORRHOID, BLEEDING: ICD-10-CM

## 2021-11-19 DIAGNOSIS — K64.4 ANAL SKIN TAG: ICD-10-CM

## 2021-11-19 DIAGNOSIS — K62.5 RECTAL BLEEDING: ICD-10-CM

## 2021-11-19 DIAGNOSIS — R06.00 DYSPNEA, UNSPECIFIED TYPE: ICD-10-CM

## 2021-11-19 DIAGNOSIS — R07.89 CHEST WALL PAIN: Primary | ICD-10-CM

## 2021-11-19 DIAGNOSIS — Z23 NEED FOR PROPHYLACTIC VACCINATION AND INOCULATION AGAINST INFLUENZA: ICD-10-CM

## 2021-11-19 PROCEDURE — 90715 TDAP VACCINE 7 YRS/> IM: CPT | Performed by: FAMILY MEDICINE

## 2021-11-19 PROCEDURE — 90472 IMMUNIZATION ADMIN EACH ADD: CPT | Performed by: FAMILY MEDICINE

## 2021-11-19 PROCEDURE — 90686 IIV4 VACC NO PRSV 0.5 ML IM: CPT | Performed by: FAMILY MEDICINE

## 2021-11-19 PROCEDURE — 99214 OFFICE O/P EST MOD 30 MIN: CPT | Mod: 25 | Performed by: FAMILY MEDICINE

## 2021-11-19 PROCEDURE — 90471 IMMUNIZATION ADMIN: CPT | Performed by: FAMILY MEDICINE

## 2021-11-19 RX ORDER — CYCLOBENZAPRINE HCL 10 MG
10 TABLET ORAL 3 TIMES DAILY PRN
Qty: 30 TABLET | Refills: 0 | Status: SHIPPED | OUTPATIENT
Start: 2021-11-19

## 2021-11-19 ASSESSMENT — ANXIETY QUESTIONNAIRES
GAD7 TOTAL SCORE: 0
GAD7 TOTAL SCORE: 0
4. TROUBLE RELAXING: NOT AT ALL
3. WORRYING TOO MUCH ABOUT DIFFERENT THINGS: NOT AT ALL
5. BEING SO RESTLESS THAT IT IS HARD TO SIT STILL: NOT AT ALL
7. FEELING AFRAID AS IF SOMETHING AWFUL MIGHT HAPPEN: NOT AT ALL
2. NOT BEING ABLE TO STOP OR CONTROL WORRYING: NOT AT ALL
GAD7 TOTAL SCORE: 0
1. FEELING NERVOUS, ANXIOUS, OR ON EDGE: NOT AT ALL
6. BECOMING EASILY ANNOYED OR IRRITABLE: NOT AT ALL
7. FEELING AFRAID AS IF SOMETHING AWFUL MIGHT HAPPEN: NOT AT ALL

## 2021-11-19 ASSESSMENT — MIFFLIN-ST. JEOR: SCORE: 1415.29

## 2021-11-19 ASSESSMENT — ENCOUNTER SYMPTOMS
TINGLING: 0
MUSCLE WEAKNESS: 1

## 2021-11-19 ASSESSMENT — PAIN SCALES - GENERAL: PAINLEVEL: NO PAIN (0)

## 2021-11-19 NOTE — NURSING NOTE
Prior to immunization administration, verified patients identity using patient s name and date of birth. Please see Immunization Activity for additional information.     Screening Questionnaire for Adult Immunization    Are you sick today?   No   Do you have allergies to medications, food, a vaccine component or latex?   No   Have you ever had a serious reaction after receiving a vaccination?   No   Do you have a long-term health problem with heart, lung, kidney, or metabolic disease (e.g., diabetes), asthma, a blood disorder, no spleen, complement component deficiency, a cochlear implant, or a spinal fluid leak?  Are you on long-term aspirin therapy?   No   Do you have cancer, leukemia, HIV/AIDS, or any other immune system problem?   No   Do you have a parent, brother, or sister with an immune system problem?   No   In the past 3 months, have you taken medications that affect  your immune system, such as prednisone, other steroids, or anticancer drugs; drugs for the treatment of rheumatoid arthritis, Crohn s disease, or psoriasis; or have you had radiation treatments?   No   Have you had a seizure, or a brain or other nervous system problem?   No   During the past year, have you received a transfusion of blood or blood    products, or been given immune (gamma) globulin or antiviral drug?   No   For women: Are you pregnant or is there a chance you could become       pregnant during the next month?   No   Have you received any vaccinations in the past 4 weeks?   No            Per orders of Dr. Mensah, injection of  FLU and TDAP  given by Brittani Pierson. Patient instructed to remain in clinic for 15 minutes afterwards, and to report any adverse reaction to me immediately.

## 2021-11-19 NOTE — PROGRESS NOTES
Assessment & Plan   1. Chest wall pain: Improving, discussed may take 6-8 weeks total, about at 6 weeks now. Continue flexeril for now.  - cyclobenzaprine (FLEXERIL) 10 MG tablet; Take 1 tablet (10 mg) by mouth 3 times daily as needed for muscle spasms  Dispense: 30 tablet; Refill: 0    2. Dyspnea, unspecified type: Prior dyspnea would like worked up. Previously saw David Watson for this. Recommend PFT's as next step.  - General PFT Lab (Please always keep checked); Future  - Pulmonary Function Test; Future    3. Rectal bleeding: Patient with remote history of rectal bleeding.  Believes this previously be due to internal hemorrhoids.  She would like referral to colorectal surgery to discuss both as well as anal skin tag.  - Colorectal Surgery Referral; Future    4. Anal skin tag  - Colorectal Surgery Referral; Future    5. Internal hemorrhoid, bleeding  - Colorectal Surgery Referral; Future    6. Need for prophylactic vaccination and inoculation against influenza: Flu shot given today.      Return in about 2 weeks (around 12/3/2021), or if symptoms worsen or fail to improve.    Keven Barraza MD  [facility]    This chart is completed utilizing dictation software; typos and/or incorrect word substitutions may unintentionally occur.     Subjective   Avis is a 46 year old who presents for the following health issues     lower extremity pain  The symptoms are aggravated by movement.      Concern - Injuries by a horse back toe and buttock pain  Also has concerns about rectal skin tags  Onset: 2 months ago  Description: injury  Intensity: moderate  Progression of Symptoms:  same  Accompanying Signs & Symptoms: none  Previous history of similar problem: no  Precipitating factors:        Worsened by: movement  Alleviating factors:        Improved by: nothering  Therapies tried and outcome: ibuprofen    Patient is here following up on her previous injury.  She was kicked by a horse  "multiple rib fractures.  Pain has improved.  Believes she is having some muscular wall pain still at times however is improved with Flexeril.     Review of Systems         Objective    /66   Pulse 58   Temp 97  F (36.1  C) (Temporal)   Resp 16   Ht 1.619 m (5' 3.75\")   Wt 78.9 kg (174 lb)   SpO2 98%   BMI 30.10 kg/m    Body mass index is 30.1 kg/m .  Physical Exam       Labs: Pending        Answers for HPI/ROS submitted by the patient on 11/19/2021  If you checked off any problems, how difficult have these problems made it for you to do your work, take care of things at home, or get along with other people?: Not difficult at all  PHQ9 TOTAL SCORE: 3  LYNDSAY 7 TOTAL SCORE: 0  What do you think is the original cause of your back pain?: other  When did you first notice your back pain? : more than 1 month ago  How would you describe your back pain? : dull ache, fullness, sharp  How often do you feel your back pain? : constantly  Where is your back pain located? : left middle of back  Where does your back pain spread? : nowhere  Since you noticed your back pain, how has it changed? : unchanged  Does your back pain interfere with your job?: Not applicable  On a scale of 1-10 (10 being the worst), how strong is your back pain?: 4  What makes your back pain worse? : bending, coughing, certain positions, twisting  Acupuncture:: not tried  Acetaminophen: helpful  Activity or Exercise: not tried  Cold: not tried  Heat: helpful  Massage: not tried  Muscle relaxants : not tried  Physical Therapy: not tried  Rest: not tried  Stretching : not tried  Surgery: not tried  TENS Unit: not tried  Topical pain relievers : not tried  Do you see any other healthcare providers for your back pain? : None  Incident occurred: more than 1 week ago  Incident location: other  Injury mechanism: other  Pain location: left toes  Pain quality: aching  Pain - numeric: 3/10  Pain course: constant  tingling: No  inability to bear weight: " No  loss of motion: Yes  loss of sensation: No  muscle weakness: Yes

## 2021-11-19 NOTE — PROGRESS NOTES
{2021 PROVIDER CHARTING PREFERENCE:903596}    Subjective     Avis Avalos is a 46 year old female who presents to clinic today for the following health issues accompanied by her {accompanied to visit:135387}:    HPI     {SUPERLIST (Optional):135147}  {additonal problems for provider to add (Optional):844431}    Review of Systems   {ROS COMP (Optional):003335}      Objective    There were no vitals taken for this visit.  There is no height or weight on file to calculate BMI.  Physical Exam   {Exam List (Optional):757628}    {Diagnostic Test Results (Optional):879426}

## 2021-11-20 ENCOUNTER — HEALTH MAINTENANCE LETTER (OUTPATIENT)
Age: 46
End: 2021-11-20

## 2021-11-20 ASSESSMENT — ANXIETY QUESTIONNAIRES: GAD7 TOTAL SCORE: 0

## 2021-11-20 ASSESSMENT — PATIENT HEALTH QUESTIONNAIRE - PHQ9: SUM OF ALL RESPONSES TO PHQ QUESTIONS 1-9: 3

## 2021-11-22 ENCOUNTER — TRANSFERRED RECORDS (OUTPATIENT)
Dept: HEALTH INFORMATION MANAGEMENT | Facility: CLINIC | Age: 46
End: 2021-11-22
Payer: COMMERCIAL

## 2021-11-23 NOTE — PATIENT INSTRUCTIONS
Recommend home stretching and ibuprofen 400-600 mg every 6-8 hours.  Unsure if this is completley related to the flu vaccine but could certainly be causing some inflammation that is irritating a nerve.  If worsening or not improving over the next 1-2 weeks, you should be seen in clinic.   
39.15

## 2022-01-12 ENCOUNTER — THERAPY VISIT (OUTPATIENT)
Dept: PHYSICAL THERAPY | Facility: CLINIC | Age: 47
End: 2022-01-12
Payer: COMMERCIAL

## 2022-01-12 DIAGNOSIS — M25.562 CHRONIC PAIN OF LEFT KNEE: Primary | ICD-10-CM

## 2022-01-12 DIAGNOSIS — G89.29 CHRONIC PAIN OF LEFT KNEE: Primary | ICD-10-CM

## 2022-01-12 PROCEDURE — 97162 PT EVAL MOD COMPLEX 30 MIN: CPT | Mod: GP | Performed by: PHYSICAL THERAPIST

## 2022-01-12 PROCEDURE — 97530 THERAPEUTIC ACTIVITIES: CPT | Mod: GP | Performed by: PHYSICAL THERAPIST

## 2022-01-12 ASSESSMENT — ACTIVITIES OF DAILY LIVING (ADL)
SQUAT: I AM UNABLE TO DO THE ACTIVITY
WALK: ACTIVITY IS MINIMALLY DIFFICULT
PAIN: THE SYMPTOM AFFECTS MY ACTIVITY SLIGHTLY
KNEE_ACTIVITY_OF_DAILY_LIVING_SUM: 54
STIFFNESS: I DO NOT HAVE THE SYMPTOM
GO UP STAIRS: ACTIVITY IS MINIMALLY DIFFICULT
STAND: ACTIVITY IS NOT DIFFICULT
WEAKNESS: THE SYMPTOM AFFECTS MY ACTIVITY SLIGHTLY
GIVING WAY, BUCKLING OR SHIFTING OF KNEE: I DO NOT HAVE THE SYMPTOM
RAW_SCORE: 54
RISE FROM A CHAIR: ACTIVITY IS MINIMALLY DIFFICULT
AS_A_RESULT_OF_YOUR_KNEE_INJURY,_HOW_WOULD_YOU_RATE_YOUR_CURRENT_LEVEL_OF_DAILY_ACTIVITY?: ABNORMAL
HOW_WOULD_YOU_RATE_THE_OVERALL_FUNCTION_OF_YOUR_KNEE_DURING_YOUR_USUAL_DAILY_ACTIVITIES?: NEARLY NORMAL
LIMPING: I HAVE THE SYMPTOM BUT IT DOES NOT AFFECT MY ACTIVITY
GO DOWN STAIRS: ACTIVITY IS MINIMALLY DIFFICULT
KNEEL ON THE FRONT OF YOUR KNEE: ACTIVITY IS MINIMALLY DIFFICULT
SIT WITH YOUR KNEE BENT: ACTIVITY IS NOT DIFFICULT
HOW_WOULD_YOU_RATE_THE_CURRENT_FUNCTION_OF_YOUR_KNEE_DURING_YOUR_USUAL_DAILY_ACTIVITIES_ON_A_SCALE_FROM_0_TO_100_WITH_100_BEING_YOUR_LEVEL_OF_KNEE_FUNCTION_PRIOR_TO_YOUR_INJURY_AND_0_BEING_THE_INABILITY_TO_PERFORM_ANY_OF_YOUR_USUAL_DAILY_ACTIVITIES?: 75
KNEE_ACTIVITY_OF_DAILY_LIVING_SCORE: 77.14
SWELLING: I HAVE THE SYMPTOM BUT IT DOES NOT AFFECT MY ACTIVITY

## 2022-01-12 NOTE — LETTER
[facility]  [address]  [facility] [address]  [phone]    2022    Re: Avis Avalos   :   1975  MRN:  [id number]   REFERRING PHYSICIAN:   Ann CHICAS [facility]    Date of Initial Evaluation:  22  Visits: 1  Rxs Used: 1  Reason for Referral:  Chronic pain of left knee    Physical Therapy Initial Evaluation    Subjective:  The history is provided by the patient. No  was used.   Patient Health History  Avis Avalos being seen for L knee pain.   Problem began: 10/3/2021.   Problem occurred: horse accident   Pain is reported as 3/10 on pain scale.    Therapist Generated HPI Evaluation  Problem details: Pt was standing and got hit by a horse that was running. Was thrown to the ground. Sustained 3 rib fractures (ribs 3-5, mildly displaced) and L proximal and distal fibular fracture. Was given stabilization brace and walker as needed. Had PT from 10/20/21- 21 with some improvement in ROM and strength; Had MRI 11/10/21, per report ACL and MCL tear so took break from formal PT. .         Type of problem:  Left knee.  This is a chronic condition.  Condition occurred with:  Other reason.  Where condition occurred: other.  Patient reports pain:  Lateral, posterior, medial, in the joint and sub patellar.  Pain is described as aching and is intermittent.  Pain radiates to:  Lower leg. Pain is the same all the time.  Since onset symptoms are gradually improving.  Associated symptoms:  Catching, buckling/giving out, loss of motion/stiffness and loss of strength. Symptoms are exacerbated by ascending stairs, descending stairs, bending/squatting, walking and transfers (elliptical)  and relieved by rest and NSAID's.  Special tests included:  X-ray and MRI.  Previous treatment includes physical therapy. There was mild improvement following previous  treatment.  Restrictions due to condition include:  Working in normal job without restrictions.  Barriers include:  Stairs.  General health as reported by patient is fair.  Pertinent medical history includes: depression, history of fractures, migraines/headaches and overweight.   Medical allergies: adhesives.   Re: Aivs Avalos   :   1975  Page 2      Surgeries include:  Other.    Current medications:  Anti-inflammatory, pain medication and other.    Current occupation is ; Lives at home w/  and 2 kids (ages 9, and 11).   Primary job tasks include:  Computer work.                   Objective:  Gait:  Minimally antalgic  Hip Evaluation  Hip Strength:    Flexion:   Left: 5/5   Pain:  Right: 5/5   Pain:  Extension:  Left: 5/5  Pain:Right: 5/5    Pain:    Abduction:  Left: 5-/5     Pain:Right: 5-/5    Pain:  Knee Evaluation:  ROM:    AROM  Hyperextension:  Left:  6    Right: 6  Flexion: Left: 140 ++pain    Right: 150  Strength:   Extension:  Left: 5-/5   Pain:      Right: 5/5   Pain:  Flexion:  Left: 5/5   Pain:      Right: 5/5   Pain:    Quad Set Left: Good    Pain:   Quad Set Right: Fair and delayed    Pain:  Ligament Testing:  Normal (with submaximal testing)  Special Tests:   Left knee positive for the following special tests:  IT Band Friction  Left knee negative for the following special tests:  Meninscal and Patellar Tracking-Abduction Lateral  Right knee negative for the following special tests:  Meniscal  Functional Testing:    Core Strength:    Single Leg Bridge: Left:  20/20 reps    Right: 20/20 reps    % of Uninvolved:   Quad:    Single Leg Squat:  Left:       Right:        Bilateral Leg Squat:  80 deg ++pain  Mild loss of control, excessive anterior knee excursion and femoral IR      Assessment/Plan:    Patient is a 46 year old female with left side knee complaints.    Patient has the following significant findings with corresponding treatment plan.                Diagnosis 1:  L  Knee pain S/P tibial bone contusion and fibular fracture and ACL deficiency   Pain -  hot/cold therapy, electric stimulation, manual therapy, splint/taping/bracing/orthotics, self management, education, directional preference exercise and home program      Re: Avis Avalos   :   1975  Page 3    Decreased ROM/flexibility - manual therapy, therapeutic exercise, therapeutic activity and home program  Decreased strength - therapeutic exercise, therapeutic activities and home program  Impaired muscle performance - neuro re-education and home program  Decreased function - therapeutic activities and home program    Therapy Evaluation Codes:   1) History comprised of:   Personal factors that impact the plan of care:      Past/current experiences and Time since onset of symptoms.    Comorbidity factors that impact the plan of care are:      Depression, Overweight and history of fractures.     Medications impacting care: Anti-inflammatory.  2) Examination of Body Systems comprised of:   Body structures and functions that impact the plan of care:      Knee.   Activity limitations that impact the plan of care are:      Bathing, Bending, Driving, Jumping, Running, Squatting/kneeling, Stairs and Walking.  3) Clinical presentation characteristics are:   Evolving/Changing.  4) Decision-Making    Moderate complexity using standardized patient assessment instrument and/or measureable assessment of functional outcome.  Cumulative Therapy Evaluation is: Moderate complexity.  Previous and current functional limitations:  (See Goal Flow Sheet for this information)    Short term and Long term goals: (See Goal Flow Sheet for this information)   Communication ability:  Patient appears to be able to clearly communicate and understand verbal and written communication and follow directions correctly.  Treatment Explanation - The following has been discussed with the patient:   RX ordered/plan of care  Anticipated outcomes  Possible  risks and side effects  This patient would benefit from PT intervention to resume normal activities.   Rehab potential is good.  Frequency:  2 X week, once daily  Duration:  for 6 weeks  Discharge Plan:  Achieve all LTG.  Independent in home treatment program.  Reach maximal therapeutic benefit.    Thank you for your referral.    INQUIRIES  Therapist: Hilligoss, Amanda PT,DPT  [address]  [facility] [address]  Phone: [phone]  Fax: [phone]

## 2022-01-12 NOTE — PROGRESS NOTES
Physical Therapy Initial Evaluation  Subjective:  The history is provided by the patient. No  was used.   Patient Health History  Avis Avalos being seen for L knee pain.     Problem began: 10/3/2021.   Problem occurred: horse accident   Pain is reported as 3/10 on pain scale.                                         Therapist Generated HPI Evaluation  Problem details: Pt was standing and got hit by a horse that was running. Was thrown to the ground. Sustained 3 rib fractures (ribs 3-5, mildly displaced) and L proximal and distal fibular fracture. Was given stabilization brace and walker as needed. Had PT from 10/20/21- 11/12/21 with some improvement in ROM and strength; Had MRI 11/10/21, per report ACL and MCL tear so took break from formal PT. .         Type of problem:  Left knee.    This is a chronic condition.  Condition occurred with:  Other reason.  Where condition occurred: other.  Patient reports pain:  Lateral, posterior, medial, in the joint and sub patellar.  Pain is described as aching and is intermittent.  Pain radiates to:  Lower leg. Pain is the same all the time.  Since onset symptoms are gradually improving.  Associated symptoms:  Catching, buckling/giving out, loss of motion/stiffness and loss of strength. Symptoms are exacerbated by ascending stairs, descending stairs, bending/squatting, walking and transfers (elliptical)  and relieved by rest and NSAID's.  Special tests included:  X-ray and MRI.  Previous treatment includes physical therapy. There was mild improvement following previous treatment.  Restrictions due to condition include:  Working in normal job without restrictions.  Barriers include:  Stairs.    General health as reported by patient is fair.  Pertinent medical history includes: depression, history of fractures, migraines/headaches and overweight.      Medical allergies: adhesives.   Surgeries include:  Other.    Current medications:  Anti-inflammatory, pain  medication and other.    Current occupation is ; Lives at home w/  and 2 kids (ages 9, and 11).   Primary job tasks include:  Computer work.                     Objective:    Gait:  Minimally antalgic                                                     Hip Evaluation    Hip Strength:    Flexion:   Left: 5/5   Pain:  Right: 5/5   Pain:                    Extension:  Left: 5/5  Pain:Right: 5/5    Pain:    Abduction:  Left: 5-/5     Pain:Right: 5-/5    Pain:                           Knee Evaluation:  ROM:    AROM    Hyperextension:  Left:  6    Right: 6    Flexion: Left: 140 ++pain    Right: 150        Strength:     Extension:  Left: 5-/5   Pain:      Right: 5/5   Pain:  Flexion:  Left: 5/5   Pain:      Right: 5/5   Pain:    Quad Set Left: Good    Pain:   Quad Set Right: Fair and delayed    Pain:  Ligament Testing:  Normal (with submaximal testing)                Special Tests:   Left knee positive for the following special tests:  IT Band Friction  Left knee negative for the following special tests:  Meninscal and Patellar Tracking-Abduction Lateral    Right knee negative for the following special tests:  Meniscal        Functional Testing:        Core Strength:    Single Leg Bridge: Left:  20/20 reps    Right: 20/20 reps    % of Uninvolved:   Quad:    Single Leg Squat:  Left:       Right:        Bilateral Leg Squat:  80 deg ++pain  Mild loss of control, excessive anterior knee excursion and femoral IR              General     ROS    Assessment/Plan:    Patient is a 46 year old female with left side knee complaints.    Patient has the following significant findings with corresponding treatment plan.                Diagnosis 1:  L Knee pain S/P tibial bone contusion and fibular fracture and ACL deficiency   Pain -  hot/cold therapy, electric stimulation, manual therapy, splint/taping/bracing/orthotics, self management, education, directional preference exercise and home program  Decreased  ROM/flexibility - manual therapy, therapeutic exercise, therapeutic activity and home program  Decreased strength - therapeutic exercise, therapeutic activities and home program  Impaired muscle performance - neuro re-education and home program  Decreased function - therapeutic activities and home program    Therapy Evaluation Codes:   1) History comprised of:   Personal factors that impact the plan of care:      Past/current experiences and Time since onset of symptoms.    Comorbidity factors that impact the plan of care are:      Depression, Overweight and history of fractures.     Medications impacting care: Anti-inflammatory.  2) Examination of Body Systems comprised of:   Body structures and functions that impact the plan of care:      Knee.   Activity limitations that impact the plan of care are:      Bathing, Bending, Driving, Jumping, Running, Squatting/kneeling, Stairs and Walking.  3) Clinical presentation characteristics are:   Evolving/Changing.  4) Decision-Making    Moderate complexity using standardized patient assessment instrument and/or measureable assessment of functional outcome.  Cumulative Therapy Evaluation is: Moderate complexity.    Previous and current functional limitations:  (See Goal Flow Sheet for this information)    Short term and Long term goals: (See Goal Flow Sheet for this information)     Communication ability:  Patient appears to be able to clearly communicate and understand verbal and written communication and follow directions correctly.  Treatment Explanation - The following has been discussed with the patient:   RX ordered/plan of care  Anticipated outcomes  Possible risks and side effects  This patient would benefit from PT intervention to resume normal activities.   Rehab potential is good.    Frequency:  2 X week, once daily  Duration:  for 6 weeks  Discharge Plan:  Achieve all LTG.  Independent in home treatment program.  Reach maximal therapeutic benefit.    Please  refer to the daily flowsheet for treatment today, total treatment time and time spent performing 1:1 timed codes.

## 2022-01-15 ENCOUNTER — HEALTH MAINTENANCE LETTER (OUTPATIENT)
Age: 47
End: 2022-01-15

## 2022-01-18 ENCOUNTER — THERAPY VISIT (OUTPATIENT)
Dept: PHYSICAL THERAPY | Facility: CLINIC | Age: 47
End: 2022-01-18
Payer: COMMERCIAL

## 2022-01-18 DIAGNOSIS — M25.569 KNEE PAIN: ICD-10-CM

## 2022-01-18 PROCEDURE — 97112 NEUROMUSCULAR REEDUCATION: CPT | Mod: GP | Performed by: PHYSICAL THERAPY ASSISTANT

## 2022-01-18 PROCEDURE — 97110 THERAPEUTIC EXERCISES: CPT | Mod: GP | Performed by: PHYSICAL THERAPY ASSISTANT

## 2022-01-21 ENCOUNTER — MYC MEDICAL ADVICE (OUTPATIENT)
Dept: FAMILY MEDICINE | Facility: OTHER | Age: 47
End: 2022-01-21

## 2022-01-25 ENCOUNTER — THERAPY VISIT (OUTPATIENT)
Dept: PHYSICAL THERAPY | Facility: CLINIC | Age: 47
End: 2022-01-25
Payer: COMMERCIAL

## 2022-01-25 DIAGNOSIS — M25.569 KNEE PAIN: ICD-10-CM

## 2022-01-25 PROCEDURE — 97110 THERAPEUTIC EXERCISES: CPT | Mod: GP | Performed by: PHYSICAL THERAPY ASSISTANT

## 2022-01-25 PROCEDURE — 97112 NEUROMUSCULAR REEDUCATION: CPT | Mod: GP | Performed by: PHYSICAL THERAPY ASSISTANT

## 2022-02-01 ENCOUNTER — THERAPY VISIT (OUTPATIENT)
Dept: PHYSICAL THERAPY | Facility: CLINIC | Age: 47
End: 2022-02-01
Payer: COMMERCIAL

## 2022-02-01 DIAGNOSIS — M25.569 KNEE PAIN: ICD-10-CM

## 2022-02-01 PROCEDURE — 97140 MANUAL THERAPY 1/> REGIONS: CPT | Mod: GP | Performed by: PHYSICAL THERAPY ASSISTANT

## 2022-02-01 PROCEDURE — 97110 THERAPEUTIC EXERCISES: CPT | Mod: GP | Performed by: PHYSICAL THERAPY ASSISTANT

## 2022-02-07 ENCOUNTER — TRANSFERRED RECORDS (OUTPATIENT)
Dept: HEALTH INFORMATION MANAGEMENT | Facility: CLINIC | Age: 47
End: 2022-02-07
Payer: COMMERCIAL

## 2022-02-08 ENCOUNTER — THERAPY VISIT (OUTPATIENT)
Dept: PHYSICAL THERAPY | Facility: CLINIC | Age: 47
End: 2022-02-08
Payer: COMMERCIAL

## 2022-02-08 DIAGNOSIS — M25.569 KNEE PAIN: ICD-10-CM

## 2022-02-08 PROCEDURE — 97110 THERAPEUTIC EXERCISES: CPT | Mod: GP | Performed by: PHYSICAL THERAPY ASSISTANT

## 2022-02-14 ENCOUNTER — THERAPY VISIT (OUTPATIENT)
Dept: PHYSICAL THERAPY | Facility: CLINIC | Age: 47
End: 2022-02-14
Payer: COMMERCIAL

## 2022-02-14 DIAGNOSIS — M25.562 CHRONIC PAIN OF LEFT KNEE: ICD-10-CM

## 2022-02-14 DIAGNOSIS — G89.29 CHRONIC PAIN OF LEFT KNEE: ICD-10-CM

## 2022-02-14 PROCEDURE — 97140 MANUAL THERAPY 1/> REGIONS: CPT | Mod: GP | Performed by: PHYSICAL THERAPIST

## 2022-02-14 PROCEDURE — 97112 NEUROMUSCULAR REEDUCATION: CPT | Mod: GP | Performed by: PHYSICAL THERAPIST

## 2022-02-17 ENCOUNTER — THERAPY VISIT (OUTPATIENT)
Dept: PHYSICAL THERAPY | Facility: CLINIC | Age: 47
End: 2022-02-17
Payer: COMMERCIAL

## 2022-02-17 DIAGNOSIS — M25.562 CHRONIC PAIN OF LEFT KNEE: ICD-10-CM

## 2022-02-17 DIAGNOSIS — G89.29 CHRONIC PAIN OF LEFT KNEE: ICD-10-CM

## 2022-02-17 PROCEDURE — 97112 NEUROMUSCULAR REEDUCATION: CPT | Mod: GP | Performed by: PHYSICAL THERAPIST

## 2022-02-17 PROCEDURE — 97110 THERAPEUTIC EXERCISES: CPT | Mod: GP | Performed by: PHYSICAL THERAPIST

## 2022-02-17 PROCEDURE — 97140 MANUAL THERAPY 1/> REGIONS: CPT | Mod: GP | Performed by: PHYSICAL THERAPIST

## 2022-02-21 ENCOUNTER — THERAPY VISIT (OUTPATIENT)
Dept: PHYSICAL THERAPY | Facility: CLINIC | Age: 47
End: 2022-02-21
Payer: COMMERCIAL

## 2022-02-21 DIAGNOSIS — G89.29 CHRONIC PAIN OF LEFT KNEE: ICD-10-CM

## 2022-02-21 DIAGNOSIS — M25.562 CHRONIC PAIN OF LEFT KNEE: ICD-10-CM

## 2022-02-21 PROCEDURE — 97110 THERAPEUTIC EXERCISES: CPT | Mod: GP | Performed by: PHYSICAL THERAPIST

## 2022-02-21 PROCEDURE — 97140 MANUAL THERAPY 1/> REGIONS: CPT | Mod: GP | Performed by: PHYSICAL THERAPIST

## 2022-02-24 ENCOUNTER — THERAPY VISIT (OUTPATIENT)
Dept: PHYSICAL THERAPY | Facility: CLINIC | Age: 47
End: 2022-02-24
Payer: COMMERCIAL

## 2022-02-24 DIAGNOSIS — M25.562 CHRONIC PAIN OF LEFT KNEE: ICD-10-CM

## 2022-02-24 DIAGNOSIS — G89.29 CHRONIC PAIN OF LEFT KNEE: ICD-10-CM

## 2022-02-24 PROCEDURE — 97112 NEUROMUSCULAR REEDUCATION: CPT | Mod: GP | Performed by: PHYSICAL THERAPIST

## 2022-02-24 PROCEDURE — 97140 MANUAL THERAPY 1/> REGIONS: CPT | Mod: GP | Performed by: PHYSICAL THERAPIST

## 2022-03-04 ENCOUNTER — OFFICE VISIT (OUTPATIENT)
Dept: FAMILY MEDICINE | Facility: OTHER | Age: 47
End: 2022-03-04
Payer: COMMERCIAL

## 2022-03-04 VITALS
BODY MASS INDEX: 29.28 KG/M2 | SYSTOLIC BLOOD PRESSURE: 110 MMHG | DIASTOLIC BLOOD PRESSURE: 62 MMHG | HEIGHT: 64 IN | WEIGHT: 171.5 LBS | HEART RATE: 82 BPM | RESPIRATION RATE: 18 BRPM | TEMPERATURE: 97.2 F | OXYGEN SATURATION: 99 %

## 2022-03-04 DIAGNOSIS — N89.8 VAGINAL ITCHING: ICD-10-CM

## 2022-03-04 DIAGNOSIS — Z00.00 ROUTINE GENERAL MEDICAL EXAMINATION AT A HEALTH CARE FACILITY: Primary | ICD-10-CM

## 2022-03-04 DIAGNOSIS — K64.4 EXTERNAL HEMORRHOIDS: ICD-10-CM

## 2022-03-04 DIAGNOSIS — N92.0 EXCESSIVE AND FREQUENT MENSTRUATION WITH REGULAR CYCLE: ICD-10-CM

## 2022-03-04 DIAGNOSIS — S90.221A CONTUSION OF TOENAIL OF RIGHT FOOT, INITIAL ENCOUNTER: ICD-10-CM

## 2022-03-04 DIAGNOSIS — M25.50 POLYARTHRALGIA: ICD-10-CM

## 2022-03-04 DIAGNOSIS — R07.89 CHEST WALL DISCOMFORT: ICD-10-CM

## 2022-03-04 DIAGNOSIS — L29.0 RECTAL ITCHING: ICD-10-CM

## 2022-03-04 LAB
ALBUMIN SERPL-MCNC: 3.9 G/DL (ref 3.4–5)
ALP SERPL-CCNC: 67 U/L (ref 40–150)
ALT SERPL W P-5'-P-CCNC: 22 U/L (ref 0–50)
ANION GAP SERPL CALCULATED.3IONS-SCNC: 3 MMOL/L (ref 3–14)
AST SERPL W P-5'-P-CCNC: 15 U/L (ref 0–45)
BILIRUB SERPL-MCNC: 0.5 MG/DL (ref 0.2–1.3)
BUN SERPL-MCNC: 9 MG/DL (ref 7–30)
CALCIUM SERPL-MCNC: 8.7 MG/DL (ref 8.5–10.1)
CHLORIDE BLD-SCNC: 108 MMOL/L (ref 94–109)
CHOLEST SERPL-MCNC: 163 MG/DL
CLUE CELLS: ABNORMAL
CO2 SERPL-SCNC: 27 MMOL/L (ref 20–32)
CREAT SERPL-MCNC: 0.73 MG/DL (ref 0.52–1.04)
CRP SERPL-MCNC: <2.9 MG/L (ref 0–8)
ERYTHROCYTE [DISTWIDTH] IN BLOOD BY AUTOMATED COUNT: 12.7 % (ref 10–15)
ERYTHROCYTE [SEDIMENTATION RATE] IN BLOOD BY WESTERGREN METHOD: 8 MM/HR (ref 0–20)
ESTRADIOL SERPL-MCNC: 239 PG/ML
FASTING STATUS PATIENT QL REPORTED: YES
FSH SERPL-ACNC: 5.2 IU/L
GFR SERPL CREATININE-BSD FRML MDRD: >90 ML/MIN/1.73M2
GLUCOSE BLD-MCNC: 87 MG/DL (ref 70–99)
HCT VFR BLD AUTO: 40.8 % (ref 35–47)
HDLC SERPL-MCNC: 69 MG/DL
HGB BLD-MCNC: 14 G/DL (ref 11.7–15.7)
LDLC SERPL CALC-MCNC: 80 MG/DL
LH SERPL-ACNC: 7.6 IU/L
MCH RBC QN AUTO: 31 PG (ref 26.5–33)
MCHC RBC AUTO-ENTMCNC: 34.3 G/DL (ref 31.5–36.5)
MCV RBC AUTO: 90 FL (ref 78–100)
NONHDLC SERPL-MCNC: 94 MG/DL
PLATELET # BLD AUTO: 278 10E3/UL (ref 150–450)
POTASSIUM BLD-SCNC: 3.6 MMOL/L (ref 3.4–5.3)
PROT SERPL-MCNC: 7.6 G/DL (ref 6.8–8.8)
RBC # BLD AUTO: 4.52 10E6/UL (ref 3.8–5.2)
SODIUM SERPL-SCNC: 138 MMOL/L (ref 133–144)
TRICHOMONAS, WET PREP: ABNORMAL
TRIGL SERPL-MCNC: 70 MG/DL
TSH SERPL DL<=0.005 MIU/L-ACNC: 2.04 MU/L (ref 0.4–4)
WBC # BLD AUTO: 6.9 10E3/UL (ref 4–11)
WBC'S/HIGH POWER FIELD, WET PREP: ABNORMAL
YEAST, WET PREP: ABNORMAL

## 2022-03-04 PROCEDURE — 99214 OFFICE O/P EST MOD 30 MIN: CPT | Mod: 25 | Performed by: FAMILY MEDICINE

## 2022-03-04 PROCEDURE — 87210 SMEAR WET MOUNT SALINE/INK: CPT | Performed by: FAMILY MEDICINE

## 2022-03-04 PROCEDURE — 83001 ASSAY OF GONADOTROPIN (FSH): CPT | Performed by: FAMILY MEDICINE

## 2022-03-04 PROCEDURE — 82670 ASSAY OF TOTAL ESTRADIOL: CPT | Performed by: FAMILY MEDICINE

## 2022-03-04 PROCEDURE — 85652 RBC SED RATE AUTOMATED: CPT | Performed by: FAMILY MEDICINE

## 2022-03-04 PROCEDURE — 85027 COMPLETE CBC AUTOMATED: CPT | Performed by: FAMILY MEDICINE

## 2022-03-04 PROCEDURE — 83002 ASSAY OF GONADOTROPIN (LH): CPT | Performed by: FAMILY MEDICINE

## 2022-03-04 PROCEDURE — 80053 COMPREHEN METABOLIC PANEL: CPT | Performed by: FAMILY MEDICINE

## 2022-03-04 PROCEDURE — 36415 COLL VENOUS BLD VENIPUNCTURE: CPT | Performed by: FAMILY MEDICINE

## 2022-03-04 PROCEDURE — 80061 LIPID PANEL: CPT | Performed by: FAMILY MEDICINE

## 2022-03-04 PROCEDURE — 84443 ASSAY THYROID STIM HORMONE: CPT | Performed by: FAMILY MEDICINE

## 2022-03-04 PROCEDURE — 86140 C-REACTIVE PROTEIN: CPT | Performed by: FAMILY MEDICINE

## 2022-03-04 PROCEDURE — 99396 PREV VISIT EST AGE 40-64: CPT | Performed by: FAMILY MEDICINE

## 2022-03-04 RX ORDER — HYDROCORTISONE 25 MG/G
CREAM TOPICAL 2 TIMES DAILY PRN
Qty: 30 G | Refills: 1 | Status: SHIPPED | OUTPATIENT
Start: 2022-03-04

## 2022-03-04 ASSESSMENT — ENCOUNTER SYMPTOMS
HEADACHES: 1
BREAST MASS: 0
ABDOMINAL PAIN: 1
MYALGIAS: 1
ARTHRALGIAS: 1

## 2022-03-04 ASSESSMENT — PAIN SCALES - GENERAL: PAINLEVEL: NO PAIN (0)

## 2022-03-04 NOTE — PATIENT INSTRUCTIONS
Try the proctosol cream for your itching.    We'll let you know your lab results as soon as we can.     If not improving, we can do testing for pinworms.  Can also consider GYN referral for biopsy.    Keep an eye on your toenail.  If moving outwards, no concerns.  If it stays in place, we should consider biopsy.      Contact us or return if questions or concerns.     Have a nice day!    Dr. Watson     Preventive Health Recommendations  Female Ages 40 to 49    Yearly exam:     See your health care provider every year in order to  1. Review health changes.   2. Discuss preventive care.    3. Review your medicines if your doctor prescribed any.      Get a Pap test every three years (unless you have an abnormal result and your provider advises testing more often).      If you get Pap tests with HPV test, you only need to test every 5 years, unless you have an abnormal result. You do not need a Pap test if your uterus was removed (hysterectomy) and you have not had cancer.      You should be tested each year for STDs (sexually transmitted diseases), if you're at risk.     Ask your doctor if you should have a mammogram.      Have a colonoscopy (test for colon cancer) if someone in your family has had colon cancer or polyps before age 50.       Have a cholesterol test every 5 years.       Have a diabetes test (fasting glucose) after age 45. If you are at risk for diabetes, you should have this test every 3 years.    Shots: Get a flu shot each year. Get a tetanus shot every 10 years.     Nutrition:     Eat at least 5 servings of fruits and vegetables each day.    Eat whole-grain bread, whole-wheat pasta and brown rice instead of white grains and rice.    Get adequate Calcium and Vitamin D.      Lifestyle    Exercise at least 150 minutes a week (an average of 30 minutes a day, 5 days a week). This will help you control your weight and prevent disease.    Limit alcohol to one drink per day.    No smoking.     Wear sunscreen to  prevent skin cancer.    See your dentist every six months for an exam and cleaning.
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
## 2022-03-05 ENCOUNTER — MYC MEDICAL ADVICE (OUTPATIENT)
Dept: FAMILY MEDICINE | Facility: OTHER | Age: 47
End: 2022-03-05
Payer: COMMERCIAL

## 2022-03-05 NOTE — RESULT ENCOUNTER NOTE
Avis,    All of your labs were normal for you there were some minor discrepancies in some of your hormone levels, but I cannot really interpret these until I know where you are in your cycle.  Can you let me know when your last period started and when you expect your next 1 to start?    Have a nice day!    Dr. Watson

## 2022-03-07 ENCOUNTER — THERAPY VISIT (OUTPATIENT)
Dept: PHYSICAL THERAPY | Facility: CLINIC | Age: 47
End: 2022-03-07
Payer: COMMERCIAL

## 2022-03-07 DIAGNOSIS — G89.29 CHRONIC PAIN OF LEFT KNEE: ICD-10-CM

## 2022-03-07 DIAGNOSIS — M25.562 CHRONIC PAIN OF LEFT KNEE: ICD-10-CM

## 2022-03-07 PROCEDURE — 97110 THERAPEUTIC EXERCISES: CPT | Mod: GP | Performed by: PHYSICAL THERAPIST

## 2022-03-07 PROCEDURE — 97530 THERAPEUTIC ACTIVITIES: CPT | Mod: GP | Performed by: PHYSICAL THERAPIST

## 2022-03-07 PROCEDURE — 97140 MANUAL THERAPY 1/> REGIONS: CPT | Mod: GP | Performed by: PHYSICAL THERAPIST

## 2022-03-21 ENCOUNTER — THERAPY VISIT (OUTPATIENT)
Dept: PHYSICAL THERAPY | Facility: CLINIC | Age: 47
End: 2022-03-21
Payer: COMMERCIAL

## 2022-03-21 DIAGNOSIS — M25.562 CHRONIC PAIN OF LEFT KNEE: ICD-10-CM

## 2022-03-21 DIAGNOSIS — G89.29 CHRONIC PAIN OF LEFT KNEE: ICD-10-CM

## 2022-03-21 PROCEDURE — 97140 MANUAL THERAPY 1/> REGIONS: CPT | Mod: GP | Performed by: PHYSICAL THERAPIST

## 2022-03-21 PROCEDURE — 97110 THERAPEUTIC EXERCISES: CPT | Mod: GP | Performed by: PHYSICAL THERAPIST

## 2022-03-21 ASSESSMENT — ACTIVITIES OF DAILY LIVING (ADL)
SIT WITH YOUR KNEE BENT: ACTIVITY IS NOT DIFFICULT
STIFFNESS: I HAVE THE SYMPTOM BUT IT DOES NOT AFFECT MY ACTIVITY
PAIN: I HAVE THE SYMPTOM BUT IT DOES NOT AFFECT MY ACTIVITY
KNEE_ACTIVITY_OF_DAILY_LIVING_SUM: 56
RAW_SCORE: 56
WEAKNESS: THE SYMPTOM AFFECTS MY ACTIVITY SLIGHTLY
GO DOWN STAIRS: ACTIVITY IS NOT DIFFICULT
SWELLING: I DO NOT HAVE THE SYMPTOM
HOW_WOULD_YOU_RATE_THE_CURRENT_FUNCTION_OF_YOUR_KNEE_DURING_YOUR_USUAL_DAILY_ACTIVITIES_ON_A_SCALE_FROM_0_TO_100_WITH_100_BEING_YOUR_LEVEL_OF_KNEE_FUNCTION_PRIOR_TO_YOUR_INJURY_AND_0_BEING_THE_INABILITY_TO_PERFORM_ANY_OF_YOUR_USUAL_DAILY_ACTIVITIES?: 95
RISE FROM A CHAIR: ACTIVITY IS NOT DIFFICULT
LIMPING: I HAVE THE SYMPTOM BUT IT DOES NOT AFFECT MY ACTIVITY
WALK: ACTIVITY IS NOT DIFFICULT
GO UP STAIRS: ACTIVITY IS NOT DIFFICULT
SQUAT: I AM UNABLE TO DO THE ACTIVITY
KNEEL ON THE FRONT OF YOUR KNEE: ACTIVITY IS MINIMALLY DIFFICULT
KNEE_ACTIVITY_OF_DAILY_LIVING_SCORE: 80
GIVING WAY, BUCKLING OR SHIFTING OF KNEE: THE SYMPTOM AFFECTS MY ACTIVITY MODERATELY
HOW_WOULD_YOU_RATE_THE_OVERALL_FUNCTION_OF_YOUR_KNEE_DURING_YOUR_USUAL_DAILY_ACTIVITIES?: NEARLY NORMAL
STAND: ACTIVITY IS NOT DIFFICULT
AS_A_RESULT_OF_YOUR_KNEE_INJURY,_HOW_WOULD_YOU_RATE_YOUR_CURRENT_LEVEL_OF_DAILY_ACTIVITY?: ABNORMAL

## 2022-03-21 NOTE — LETTER
[facility]  [address]  [place] [facility] [address] [phone]    2022  Re: Avis Avalos   :   1975  MRN:  [id number]   REFERRING PHYSICIAN:   MD AVIVA Brewer [facility]  Date of Initial Evaluation: 2022  Visits:  Rxs Used: 11  Reason for Referral:  Chronic pain of left knee    DISCHARGE REPORT  Progress reporting period is from 22 to 3/21/222.       SUBJECTIVE  Avis has completed 11 PT visits for this episode of care; She feels she is 65% improved compared to initial injury. Can do most daily activities w/ minimal pain or discomfort. Does notice feelings of instability if turning corner sharply, getting up from floor, hyperext of knee, or when on ambulating or standing on unstable surfaces. Notes she is still cautious w/ activities involving WB on L LE, but can do them with mininmal pain and no longer needs AD. Plans to go in additional evaluation prior to continuing PT. Current Pain level: 10.  Initial Pain level: 5/10.   Changes in function:  Yes (See Goal flowsheet attached for changes in current functional level)  Adverse reaction to treatment or activity: None    OBJECTIVE  Knee ROM [place]: 6-0-145 +pain end range flex; quad set excellent w/ equal VMO B; patellar tracking decreased motion, but symmetrical; Able to perform deep knee flex squats w/ B LE, ++pain at end range L LE and difficulty coming out of squat;   LE MMT: Hip flex, abd, ext all WFL/EQ B; gastroc/soleus MMT 5/5 B (20/20 reps); tenderness to palpation of vastus lateralis, distal ITB, mid quad belly  Knee Activity of Daily Living Score: 80      ASSESSMENT/PLAN  Updated problem list and treatment plan: Diagnosis 1:  Diagnosis 1:  L Knee pain S/P tibial bone contusion and fibular fracture and ACL deficiency.     Pain -  hot/cold therapy, electric stimulation, manual therapy,  splint/taping/bracing/orthotics, self management, education, directional preference exercise and home program  Decreased ROM/flexibility - manual therapy, therapeutic exercise, therapeutic activity and home program  Decreased strength - therapeutic exercise, therapeutic activities and home program  Impaired muscle performance - neuro re-education and home program  Decreased function - therapeutic activities and home program    STG/LTGs have been met or progress has been made towards goals:  Yes (See Goal flow sheet completed today.)  Assessment of Progress: The patient's progress has slowed.  Re: Avis Avalos   :   1975, page 2    Self Management Plans:  Patient is independent in a home treatment program.  Patient is independent in self management of symptoms.  I have re-evaluated this patient and find that the nature, scope, duration and intensity of the therapy is appropriate for the medical condition of the patient.  Avis continues to require the following intervention to meet STG and LTG's:  PT intervention is no longer required to meet STG/LTG.    Recommendations:  This patient would benefit from further evaluation.  May benefit from additional PT in future.      Thank you for your referral.    INQUIRIES  Therapist: Amanda Hilligoss, PT, DPT   [address]  [facility] [address]  Phone: [phone]  Fax: [phone]

## 2022-03-23 PROBLEM — M25.562 ACUTE PAIN OF LEFT KNEE: Status: RESOLVED | Noted: 2021-10-22 | Resolved: 2022-03-23

## 2022-03-23 PROBLEM — R26.89 ANTALGIC GAIT: Status: RESOLVED | Noted: 2021-10-22 | Resolved: 2022-03-23

## 2022-03-23 NOTE — PROGRESS NOTES
DISCHARGE REPORT    Progress reporting period is from 1/12/22 to 3/21/222.       SUBJECTIVE  Avis has completed 11 PT visits for this episode of care; She feels she is 65% improved compared to initial injury. Can do most daily activities w/ minimal pain or discomfort. Does notice feelings of instability if turning corner sharply, getting up from floor, hyperext of knee, or when on ambulating or standing on unstable surfaces. Notes she is still cautious w/ activities involving WB on L LE, but can do them with mininmal pain and no longer needs AD. Plans to go in for additional evaluation prior to continuing PT.    Current Pain level: 1/10.     Initial Pain level: 5/10.   Changes in function:  Yes (See Goal flowsheet attached for changes in current functional level)  Adverse reaction to treatment or activity: None    OBJECTIVE  Knee ROM [place]: 6-0-145 +pain end range flex; quad set excellent w/ equal VMO B; patellar tracking decreased motion, but symmetrical; Able to perform deep knee flex squats w/ B LE, ++pain at end range L LE and difficulty coming out of squat;   LE MMT: Hip flex, abd, ext all WFL/EQ B; gastroc/soleus MMT 5/5 B (20/20 reps); tenderness to palpation of vastus lateralis, distal ITB, mid quad belly  Knee Activity of Daily Living Score: 80      ASSESSMENT/PLAN  Updated problem list and treatment plan: Diagnosis 1:  Diagnosis 1:  L Knee pain S/P tibial bone contusion and fibular fracture and ACL deficiency     Pain -  hot/cold therapy, electric stimulation, manual therapy, splint/taping/bracing/orthotics, self management, education, directional preference exercise and home program  Decreased ROM/flexibility - manual therapy, therapeutic exercise, therapeutic activity and home program  Decreased strength - therapeutic exercise, therapeutic activities and home program  Impaired muscle performance - neuro re-education and home program  Decreased function - therapeutic activities and home  program    STG/LTGs have been met or progress has been made towards goals:  Yes (See Goal flow sheet completed today.)  Assessment of Progress: The patient's progress has slowed.  Self Management Plans:  Patient is independent in a home treatment program.  Patient is independent in self management of symptoms.  I have re-evaluated this patient and find that the nature, scope, duration and intensity of the therapy is appropriate for the medical condition of the patient.  Avis continues to require the following intervention to meet STG and LTG's:  PT intervention is no longer required to meet STG/LTG.    Recommendations:  This patient would benefit from further evaluation.  May benefit from additional PT in future.    Please refer to the daily flowsheet for treatment today, total treatment time and time spent performing 1:1 timed codes.

## 2022-06-24 NOTE — PATIENT INSTRUCTIONS
Thank you for visiting our Gastroenterology office today.     Start taking omeprazole 40 mg every morning on an empty stomach about 30 minutes before breakfast.     You can take miralax as needed for any constipation.     Please call [phone] to schedule an appointment with nutritionist at [facility].    Tetracycline Counseling: Patient counseled regarding possible photosensitivity and increased risk for sunburn.  Patient instructed to avoid sunlight, if possible.  When exposed to sunlight, patients should wear protective clothing, sunglasses, and sunscreen.  The patient was instructed to call the office immediately if the following severe adverse effects occur:  hearing changes, easy bruising/bleeding, severe headache, or vision changes.  The patient verbalized understanding of the proper use and possible adverse effects of tetracycline.  All of the patient's questions and concerns were addressed. Patient understands to avoid pregnancy while on therapy due to potential birth defects.

## 2022-06-28 ENCOUNTER — OFFICE VISIT (OUTPATIENT)
Dept: FAMILY MEDICINE | Facility: CLINIC | Age: 47
End: 2022-06-28
Payer: COMMERCIAL

## 2022-06-28 VITALS
BODY MASS INDEX: 29.41 KG/M2 | WEIGHT: 166 LBS | TEMPERATURE: 98.5 F | SYSTOLIC BLOOD PRESSURE: 113 MMHG | HEART RATE: 80 BPM | OXYGEN SATURATION: 98 % | HEIGHT: 63 IN | DIASTOLIC BLOOD PRESSURE: 76 MMHG

## 2022-06-28 DIAGNOSIS — R21 RASH: Primary | ICD-10-CM

## 2022-06-28 PROCEDURE — 99213 OFFICE O/P EST LOW 20 MIN: CPT | Performed by: PHYSICIAN ASSISTANT

## 2022-06-28 RX ORDER — TRIAMCINOLONE ACETONIDE 1 MG/G
CREAM TOPICAL 2 TIMES DAILY
Qty: 80 G | Refills: 0 | Status: SHIPPED | OUTPATIENT
Start: 2022-06-28 | End: 2022-07-08

## 2022-06-28 ASSESSMENT — ENCOUNTER SYMPTOMS
NAUSEA: 0
ABDOMINAL PAIN: 0
VOMITING: 0
LIGHT-HEADEDNESS: 0
CHILLS: 0
FEVER: 0
SHORTNESS OF BREATH: 0
NERVOUS/ANXIOUS: 0

## 2022-06-28 ASSESSMENT — PAIN SCALES - GENERAL: PAINLEVEL: MILD PAIN (2)

## 2022-06-28 NOTE — PROGRESS NOTES
Assessment & Plan     Rash  Patient is a 46-year-old female who presents to clinic due to perirectal rash that has been present for extended period of time.  Patient notes rash has been present for extended period of time and was previously evaluated by colorectal as well as GI without clear cause.  Patient notes possible history of Crohn's, but was not treated for this and denies any other symptoms.  Vital signs normal.  Physical exam shows well-defined perirectal erythema that appears inflammatory.  Recommended treatment course and triamcinolone discussed importance of taking breaks from steroid application for at least 7 days between courses. If symptoms do not resolve, would recommend follow up with GI for reevaluation.  Return precautions provided    - triamcinolone (KENALOG) 0.1 % external cream; Apply topically 2 times daily for 10 days Apply to rash     See Patient Instructions    Return in about 2 weeks (around 7/12/2022), or if symptoms worsen or fail to improve.    Danielle Navarro PA-C  [facility] EVERARDO Albarran is a 46 year old, presenting for the following health issues:  Derm Problem      HPI     - Swollen, red and painful area at rectum. She notes that area is raised and itching. No fevers or drainage. She has used hydrocortisone cream 2.5 on area and taken valtrex without improvement. She notes some problems with constipation and she takes Miralax with occasional Citrucel with improvement. PMHx hemorrhoids and skin tags. No blood in stools or on toilet paper. Patient notes redness has been present in this area for a long time and was previously evaluated by GI and colorectal without clear cause.     Patient notes history of constipation/diarrhea on and off. Intolerance to fructose. She was diagnosed with Crohn's many years ago but is not on treatment for this. Celiac testing was negative.     Review of Systems   Constitutional: Negative for chills and fever.  "  Respiratory: Negative for shortness of breath.    Cardiovascular: Negative for chest pain.   Gastrointestinal: Negative for abdominal pain, nausea and vomiting.   Skin: Positive for rash.   Neurological: Negative for light-headedness.   Psychiatric/Behavioral: The patient is not nervous/anxious.             Objective    /76   Pulse 80   Temp 98.5  F (36.9  C) (Tympanic)   Ht 1.6 m (5' 3\")   Wt 75.3 kg (166 lb)   LMP  (LMP Unknown)   SpO2 98%   Breastfeeding No   BMI 29.41 kg/m    Body mass index is 29.41 kg/m .  Physical Exam  Vitals and nursing note reviewed.   Constitutional:       General: She is not in acute distress.     Appearance: Normal appearance.   HENT:      Head: Normocephalic and atraumatic.   Eyes:      Extraocular Movements: Extraocular movements intact.      Pupils: Pupils are equal, round, and reactive to light.   Cardiovascular:      Rate and Rhythm: Normal rate and regular rhythm.      Heart sounds: Normal heart sounds.   Pulmonary:      Effort: Pulmonary effort is normal.      Breath sounds: Normal breath sounds.   Genitourinary:     Comments: Well-defined perirectal erythema with few small areas of skin breakdown.  No significant ulceration.  No fluctuance, induration.  No rash.  Musculoskeletal:         General: Normal range of motion.      Cervical back: Normal range of motion.   Skin:     General: Skin is warm and dry.   Neurological:      General: No focal deficit present.      Mental Status: She is alert.   Psychiatric:         Mood and Affect: Mood normal.         Behavior: Behavior normal.                          .  ..    "

## 2022-06-28 NOTE — PATIENT INSTRUCTIONS
The area of rash does appear to be quite inflamed with some small areas of skin breakdown.  For treatment, you have been prescribed a stronger steroid, triamcinolone.  You can apply this twice daily for 10 days.  After 10 days, please take at least a 7-day break from steroid use before using this again.    If your symptoms are not improving after 1-2 courses of treatment, please reach out and we can decide on evaluation by colorectal versus GI at that point.  Please reach out with any further questions or concerns.

## 2022-07-29 ENCOUNTER — ANCILLARY PROCEDURE (OUTPATIENT)
Dept: MAMMOGRAPHY | Facility: CLINIC | Age: 47
End: 2022-07-29
Attending: FAMILY MEDICINE
Payer: COMMERCIAL

## 2022-07-29 DIAGNOSIS — Z12.31 VISIT FOR SCREENING MAMMOGRAM: ICD-10-CM

## 2022-07-29 PROCEDURE — 77063 BREAST TOMOSYNTHESIS BI: CPT | Mod: GC

## 2022-07-29 PROCEDURE — 77067 SCR MAMMO BI INCL CAD: CPT | Mod: GC

## 2022-08-01 PROBLEM — M26.609 TMJ (TEMPOROMANDIBULAR JOINT SYNDROME): Status: RESOLVED | Noted: 2021-08-27 | Resolved: 2022-08-01

## 2022-08-01 PROBLEM — R07.89 CHEST WALL PAIN: Status: RESOLVED | Noted: 2021-08-27 | Resolved: 2022-08-01

## 2022-08-01 PROBLEM — K59.02 CONSTIPATION DUE TO OUTLET DYSFUNCTION: Status: RESOLVED | Noted: 2017-06-09 | Resolved: 2022-08-01

## 2022-08-01 NOTE — PROGRESS NOTES
Discharge Note    Progress reporting period is from initial evaluation date (please see noted date below) to Sep 17, 2021.  Linked Episodes   Type: Episode: Status: Noted: Resolved: Last update: Updated by:   PHYSICAL THERAPY TMJ and Chest wall pain 8-25-21 Active 8/25/2021 9/17/2021  9:43 AM Hilligoss, Amanda K, PT      Comments:       Avis failed to follow up and current status is unknown.  Please see information below for last relevant information on current status.  Patient seen for 3 visits.    SUBJECTIVE  Subjective changes noted by patient:  Pt notes that low back has been sore for past few weeks, especially at night. Feels she has also been spending more time in bed since school year started. Jaw pain has been less if focusing on controlling motion. Chest wall pain may be impacted by acid reflux.   .  Current pain level is 4/10 (in low back; 3/10 in upper back).     Previous pain level was  4/10.   Changes in function:  Yes (See Goal flowsheet attached for changes in current functional level)  Adverse reaction to treatment or activity: None    OBJECTIVE  Changes noted in objective findings: Lumbar AROM: Flex to mid lower leg ++crepitus, (?) Prone lumbar instability test (painful w/ legs relaxed and w/ legs in ext)     ASSESSMENT/PLAN  Diagnosis: Pelvic Floor dysfunction w/ constipation   Updated problem list and treatment plan:   Pain - HEP  Decreased ROM/flexibility - HEP  Decreased function - HEP  Decreased strength - HEP  Impaired muscle performance - HEP  Impaired posture - HEP  STG/LTGs have been met or progress has been made towards goals:  Yes, please see goal flowsheet for most current information  Assessment of Progress: current status is unknown.    Last current status: Pt is progressing as expected   Self Management Plans:  HEP  I have re-evaluated this patient and find that the nature, scope, duration and intensity of the therapy is appropriate for the medical condition of the  patient.  Avis continues to require the following intervention to meet STG and LTG's:  HEP.    Recommendations:  Discharge with current home program.  Patient to follow up with MD as needed.    Please refer to the daily flowsheet for treatment today, total treatment time and time spent performing 1:1 timed codes.

## 2022-08-29 ENCOUNTER — TRANSFERRED RECORDS (OUTPATIENT)
Dept: HEALTH INFORMATION MANAGEMENT | Facility: CLINIC | Age: 47
End: 2022-08-29

## 2022-11-29 ENCOUNTER — NURSE TRIAGE (OUTPATIENT)
Dept: NURSING | Facility: CLINIC | Age: 47
End: 2022-11-29

## 2022-11-30 NOTE — TELEPHONE ENCOUNTER
Avis calls and says that she had a tooth infection, had a root canal, and still has an infection in her tooth. Pt. Says that the infection is in her upper, 2nd to the last tooth. Pt. Says that she spoke to the dentist yesterday and was put on Amoxicillin. Pt. Says that her left cheek is swollen and numb. Pt. Says that she is having oral surgery on Friday. Pt. Says that she is not having difficulty breathing or swallowing. Pt. Says that she will call her dental clinic and speak to an on-call dentist now. Pt. Says that if she cannot speak to a Dentist, pt. Says that she may go to an ER. COVID 19 Nurse Triage Plan/Patient Instructions    Please be aware that novel coronavirus (COVID-19) may be circulating in the community. If you develop symptoms such as fever, cough, or SOB or if you have concerns about the presence of another infection including coronavirus (COVID-19), please contact your health care provider or visit [email or web address].     Disposition/Instructions    Virtual Visit with provider recommended. Reference Visit Selection Guide.    Thank you for taking steps to prevent the spread of this virus.  o Limit your contact with others.  o Wear a simple mask to cover your cough.  o Wash your hands well and often.    Resources    [facility]: About COVID-19: [email or web address]    CDC: What to Do If You're Sick: www.cdc.gov/coronavirus/2019-ncov/about/steps-when-sick.html    CDC: Ending Home Isolation: www.cdc.gov/coronavirus/2019-ncov/hcp/disposition-in-home-patients.html     CDC: Caring for Someone: www.cdc.gov/coronavirus/2019-ncov/if-you-are-sick/care-for-someone.html     [facility]: Interim Guidance for Hospital Discharge to Home: www.health.[facility].[place]/diseases/coronavirus/hcp/hospdischarge.pdf    [facility] clinical trials (COVID-19 research studies): clinicalaffairs.[facility]/um-clinical-trials     Below are the COVID-19 hotlines at the [place] Department of Health  "([facility]). Interpreters are available.   o For health questions: Call 260-180-3484 or 1-158.360.3206 (7 a.m. to 7 p.m.)  o For questions about schools and childcare: Call 476-650-7504 or 1-421.176.3359 (7 a.m. to 7 p.m.)                     Reason for Disposition    Red or yellow lump present at the gum line of the painful tooth    Additional Information    Negative: Shock suspected (e.g., cold/pale/clammy skin, too weak to stand, low BP, rapid pulse)    Negative: [1] Similar pain previously AND [2] it was from \"heart attack\"    Negative: [1] Similar pain previously AND [2] it was from \"angina\" AND [3] not relieved by nitroglycerin    Negative: Sounds like a life-threatening emergency to the triager    Negative: Chest pain    Negative: Toothache followed tooth injury    Negative: Toothache or mouth pain after tooth extraction    Negative: Canker sore (i.e., aphthous ulcer)    Negative: Tongue is very swollen and tender    Negative: [1] Face is swollen AND [2] fever    Negative: Patient sounds very sick or weak to the triager    Negative: [1] SEVERE pain (e.g., excruciating, unable to do any normal activities) AND [2] not improved 2 hours after pain medicine    Negative: Face is very swollen    Negative: Fever    Negative: [1] Face is swollen AND [2] no fever    Negative: Toothache present > 24 hours    Negative: [1] Toothache AND [2] intermittent AND [3] brought on by hot or cold liquids    Negative: Brown cavity visible in the painful tooth    Negative: Cracked or chipped tooth    Protocols used: TOOTHACHE-A-AH      "

## 2022-12-16 ENCOUNTER — TELEPHONE (OUTPATIENT)
Dept: FAMILY MEDICINE | Facility: OTHER | Age: 47
End: 2022-12-16

## 2022-12-16 DIAGNOSIS — F33.1 MODERATE EPISODE OF RECURRENT MAJOR DEPRESSIVE DISORDER (H): ICD-10-CM

## 2022-12-16 DIAGNOSIS — F42.9 OBSESSIVE-COMPULSIVE DISORDER, UNSPECIFIED TYPE: ICD-10-CM

## 2022-12-16 RX ORDER — BUPROPION HYDROCHLORIDE 150 MG/1
150 TABLET ORAL EVERY MORNING
Qty: 90 TABLET | Refills: 0 | Status: SHIPPED | OUTPATIENT
Start: 2022-12-16 | End: 2023-03-20

## 2022-12-16 NOTE — TELEPHONE ENCOUNTER
Called and spoke with patient. Informed of message below. Annual & medication follow up scheduled  Fabiola Pimentel MA

## 2022-12-16 NOTE — TELEPHONE ENCOUNTER
Prescription renewed.  Would recommend not cutting these in half.  They are an extended release formulation.  Please schedule follow-up at next available.

## 2022-12-16 NOTE — TELEPHONE ENCOUNTER
Drug Change Request    What is the current medication?: Not in Current medications.   Bupropion(Wellbutrin) 150 mg   Patient was taking this over a year ago and it was discontinued by another provider.     What alternative is being requested?: Bupropion(Wellbutrin) 75 mg     Why the request to change?:  Patient was on this over a year ago and is looking to start back on it. She said, being fully transparent, she had some left over from then and has been taking half pills (75 mg) and it is working great for her.     Preferred Pharmacy:   [facility] [id number] [place], [place] - [address] [address] [place] [address]  [address] [address] [place] [id number]  Phone: [phone] Fax: [phone]    Could we send this information to you in [facility] or would you prefer to receive a phone call?:   Patient would prefer a phone call   Okay to leave a detailed message?: Yes at [phone]    Offered appointment but pt declined at this time as she would like the med sent when possible and can schedule for a med check as appropriate.

## 2022-12-26 ENCOUNTER — HEALTH MAINTENANCE LETTER (OUTPATIENT)
Age: 47
End: 2022-12-26

## 2022-12-26 ENCOUNTER — VIRTUAL VISIT (OUTPATIENT)
Dept: FAMILY MEDICINE | Facility: CLINIC | Age: 47
End: 2022-12-26
Payer: COMMERCIAL

## 2022-12-26 DIAGNOSIS — Z12.11 COLON CANCER SCREENING: ICD-10-CM

## 2022-12-26 DIAGNOSIS — R19.7 DIARRHEA, UNSPECIFIED TYPE: Primary | ICD-10-CM

## 2022-12-26 PROCEDURE — 99214 OFFICE O/P EST MOD 30 MIN: CPT | Mod: 95 | Performed by: PHYSICIAN ASSISTANT

## 2022-12-26 NOTE — PROGRESS NOTES
Avis is a 47 year old who is being evaluated via a billable video visit.      How would you like to obtain your AVS? MyChart  If the video visit is dropped, the invitation should be resent by: Text to cell phone: [phone]  Will anyone else be joining your video visit? No            Subjective   Avis is a 47 year old, presenting for the following health issues:  No chief complaint on file.      HPI   Diarrhea  - 1 week  - blood in stool  - abdominal pain  - finished clindamycin shortly before abdominal symptoms started.   Was on a course of amox prior to clindamycin as a result of a possible dental infection.   History of internal hemorrhoids that tend to bleed. Some generalized cramping and diarrhea. Brownish-yellow stools. Cramps improved as of yesterday.       Hemorrhoids  - off and on for years    Derm concern  - recheck  - rash near/around rectum            Review of Systems   Constitutional, HEENT, cardiovascular, pulmonary, GI, , musculoskeletal, neuro, skin, endocrine and psych systems are negative, except as otherwise noted.      Objective           Vitals:  No vitals were obtained today due to virtual visit.    Physical Exam   GENERAL: Healthy, alert and no distress  EYES: Eyes grossly normal to inspection.  No discharge or erythema, or obvious scleral/conjunctival abnormalities.  RESP: No audible wheeze, cough, or visible cyanosis.  No visible retractions or increased work of breathing.    SKIN: Visible skin clear. No significant rash, abnormal pigmentation or lesions.  NEURO: Cranial nerves grossly intact.  Mentation and speech appropriate for age.  PSYCH: Mentation appears normal, affect normal/bright, judgement and insight intact, normal speech and appearance well-groomed.    Avis was seen today for diarrhea, derm problem and health maintenance.    Diagnoses and all orders for this visit:    Diarrhea, unspecified type  -     Enteric Bacteria and Virus Panel by BRANDIE Stool; Future  -     C.  difficile Toxin B PCR with reflex to C. difficile Antigen and Toxins A/B EIA; Future    Colon cancer screening  -     Colonoscopy Screening  Referral; Future    Other orders  -     REVIEW OF HEALTH MAINTENANCE PROTOCOL ORDERS      Glycerin suppositories/butt paste.  Pro and prebiotics.   Brat diet.  Advised supportive and symptomatic treatment.  Follow up with Provider - if condition persists or worsens.   Suspect antibiotic  Associated diarrhea with diarrhea which should resolve on it's own.      Video-Visit Details    Type of service:  Video Visit     Originating Location (pt. Location): Home    Distant Location (provider location):  On-site  Platform used for Video Visit: Michael

## 2022-12-30 ENCOUNTER — TELEPHONE (OUTPATIENT)
Dept: GASTROENTEROLOGY | Facility: CLINIC | Age: 47
End: 2022-12-30

## 2022-12-30 NOTE — TELEPHONE ENCOUNTER
Screening Questions  BLUE  KIND OF PREP RED  LOCATION [review exclusion criteria] GREEN  SEDATION TYPE        y Are you active on mychart?       [place] Ordering/Referring Provider?        PO What type of coverage do you have?      n Have you had a positive covid test in the last 14 days?     26.6 1. BMI  [BMI 40+ - review exclusion criteria]    y  2. Are you able to give consent for your medical care? [IF NO,RN REVIEW]        n  3. Are you taking any prescription pain medications on a routine schedule?      n  3a. EXTENDED PREP What kind of prescription?     n 4. Do you have any chemical dependencies such as alcohol, street drugs, or methadone?    n 5. Do you have any history of post-traumatic stress syndrome, severe anxiety or history of psychosis?      **If yes 3- 5 , please schedule with MAC sedation.**          IF YES TO ANY 6 - 10 - HOSPITAL SETTING ONLY.     n 6.   Do you need assistance transferring?     n 7.   Have you had a heart or lung transplant?    n 8.   Are you currently on dialysis?   n 9.   Do you use daily home oxygen?   n 10. Do you take nitroglycerin?   10a. n If yes, how often?     11. [FEMALES]  n Are you currently pregnant?    11a. n If yes, how many weeks? [ Greater than 12 weeks, OR NEEDED]    n 12. Do you have Pulmonary Hypertension? *NEED PAC APPT AT [facility]*     n 13. [review exclusion criteria]  Do you have any implantable devices in your body (pacemaker, defib, LVAD)?    n 14. In the past 6 months, have you had any heart related issues including cardiomyopathy or heart attack?     14a. n If yes, did it require cardiac stenting if so when?     n 15. Have you had a stroke or Transient ischemic attack (TIA - aka  mini stroke ) within 6 months?      n 16. Do you have mod to severe Obstructive Sleep Apnea?  [Hospital only]    n 17. Do you have SEVERE AND UNCONTROLLED asthma? *NEED PAC APPT AT [facility]*     n 18. Are you currently taking any blood thinners?     18a. If yes, inform patient to  "\"follow up w/ ordering provider for bridging instructions.\"    n 19. Do you take the medication Phentermine?    19a. If yes, \"Hold for 7 days before procedure.  Please consult your prescribing provider if you have questions about holding this medication.\"     n  20. Do you have chronic kidney disease?      n  21. Do you have a diagnosis of diabetes?     y  22. On a regular basis do you go 3-5 days between bowel movements?      23. Preferred LOCAL Pharmacy for Pre Prescription    [ LIST ONLY ONE PHARMACY]        [facility] [id number] [place] [facility] [address] [address] [place] [address] [place]          - CLOSING REMINDERS -    Informed patient they will need an adult    Cannot take any type of public or medical transportation alone    Conscious Sedation- Needs  for 6 hours after the procedure       MAC/General-Needs  for 24 hours after procedure    Pre-Procedure Covid test to be completed [[facility] PCR Testing Required]    Confirmed Nurse will call to complete assessment       - SCHEDULING DETAILS -  n Hospital Setting Required? If yes, what is the exclusion?:    Steven  Surgeon    1/17  Date of Procedure  Lower Endoscopy [Colonoscopy]  [facility] of Procedure [facility]uled  [facility] If you answer yes to questions #1, #3, #22 (De Zoraidaen and CF pts)Which Colonoscopy Prep was Sent?     MAC Sedation Type     n PAC / Pre-op Required                 "

## 2023-01-13 RX ORDER — LACTOBACILLUS RHAMNOSUS GG 10B CELL
1 CAPSULE ORAL 2 TIMES DAILY
COMMUNITY

## 2023-01-16 ENCOUNTER — ANESTHESIA EVENT (OUTPATIENT)
Dept: GASTROENTEROLOGY | Facility: CLINIC | Age: 48
End: 2023-01-16
Payer: COMMERCIAL

## 2023-01-16 ASSESSMENT — LIFESTYLE VARIABLES: TOBACCO_USE: 1

## 2023-01-16 NOTE — ANESTHESIA PREPROCEDURE EVALUATION
Anesthesia Pre-Procedure Evaluation    Patient: Avis Avalos   MRN: [id number] : 1975        Procedure : Procedure(s):  COLONOSCOPY          Past Medical History:   Diagnosis Date     Crohn disease (H)      Depression     1-2 years ago     Fructose intolerance      High risk HPV infection 4/14/10    normal pap, + HR HPV (59). normal paps since     PONV (postoperative nausea and vomiting)     after colonoscopy      Past Surgical History:   Procedure Laterality Date     C/SECTION, LOW TRANSVERSE  02/17/10    , Low Transverse      SECTION  2012    Procedure:  SECTION;   SECTION ;  Surgeon: Yehuda Cruz MD;  [facility]     COLONOSCOPY  2011    Procedure:COMBINED COLONOSCOPY, SINGLE BIOPSY/POLYPECTOMY BY BIOPSY; Surgeon:MARTIN MAO; Location:[facility]     COLONOSCOPY  2011    Procedure:COMBINED COLONOSCOPY, SINGLE BIOPSY/POLYPECTOMY BY BIOPSY; Colonoscopy biopsy of distal iiluem, colonoscopy removal of colon polyp with fulguration, colonoscopy and control of biopsy bleed with placement of resolution clip; Surgeon:TESS POLLARD; Location:[facility]     COLONOSCOPY  2011    Procedure:COMBINED COLONOSCOPY, REMOVE TUMOR/POLYP/LESION BY FULGURATION/HOT BIOPSY; Surgeon:TESS POLLARD; Location:[facility]     COLONOSCOPY  2011    Procedure:COMBINED COLONOSCOPY, CONTROL BLEED; Surgeon:TESS POLLARD; Location:[facility]     COLONOSCOPY N/A 2017    Procedure: COMBINED COLONOSCOPY, SINGLE OR MULTIPLE BIOPSY/POLYPECTOMY BY BIOPSY;;  Surgeon: Josué Hawk MD;  Location:  GI     HC FLEX SIGMOIDOSCOPY W/WO MIKY SPEC BY BRUSH/WASH  09    [facility] [place]     HC TOOTH EXTRACTION W/FORCEP       LAPAROSCOPIC CYSTECTOMY OVARIAN (BENIGN) Right 10/21/2019    Procedure: Laparoscopic cystectomy right ovarian (benign);  Surgeon: Yehuda Cruz MD;  [facility]: [facility]     LAPAROSCOPIC LYSIS ADHESIONS N/A 10/21/2019    Procedure:  LYSIS, ADHESIONS, LAPAROSCOPIC;  Surgeon: Yehuda Cruz MD;  Location: [facility]     TURBINOPLASTY Bilateral 9/28/2020    Procedure: BILATERAL ENDOSCOPIC TURBINOPLASTY;  Surgeon: Ann Herrera MD;  Location: [facility]     VIDEO CAPSULE ENDOSCOPY  07/30/09    [facility]     ZZC APPENDECTOMY      2001      Allergies   Allergen Reactions     Fructose GI Disturbance     Ragweeds      Sorbitol Diarrhea     Adhesive [Liquid Adhesive] Rash      Social History     Tobacco Use     Smoking status: Former     Packs/day: 0.25     Years: 10.00     Pack years: 2.50     Types: Cigarettes     Start date: 1/1/2007     Smokeless tobacco: Never   Substance Use Topics     Alcohol use: Yes     Comment: Occ.      Wt Readings from Last 1 Encounters:   06/28/22 75.3 kg (166 lb)        Anesthesia Evaluation   Pt has had prior anesthetic. Type: MAC and General.    History of anesthetic complications  - PONV.      ROS/MED HX  ENT/Pulmonary:     (+) allergic rhinitis, tobacco use, Past use, 3  Pack-Year Hx,      Neurologic:  - neg neurologic ROS     Cardiovascular:  - neg cardiovascular ROS   (+) -----Previous cardiac testing   Echo: Date: Results:    Stress Test: Date: Results:    ECG Reviewed: Date: 1/11/2021 Results:  Sinus Rhythm   WITHIN NORMAL LIMITS    Cath: Date: Results:      METS/Exercise Tolerance:     Hematologic:  - neg hematologic  ROS     Musculoskeletal:  - neg musculoskeletal ROS     GI/Hepatic: Comment: Abdominal bloating with cramps      Renal/Genitourinary:  - neg Renal ROS     Endo:  - neg endo ROS     Psychiatric/Substance Use:     (+) psychiatric history depression and other (comment) (Obsessive-compulsive disorder)     Infectious Disease:  - neg infectious disease ROS     Malignancy:  - neg malignancy ROS     Other:  - neg other ROS          Physical Exam    Airway  airway exam normal      Mallampati: II   TM distance: > 3 FB   Neck ROM: full   Mouth opening: > 3 cm    Respiratory Devices and Support          Dental  no notable dental history         Cardiovascular   cardiovascular exam normal       Rhythm and rate: regular and normal     Pulmonary   pulmonary exam normal        breath sounds clear to auscultation           OUTSIDE LABS:  CBC:   Lab Results   Component Value Date    WBC 6.9 03/04/2022    WBC 5.0 06/02/2017    HGB 14.0 03/04/2022    HGB 12.9 09/23/2020    HCT 40.8 03/04/2022    HCT 38.4 06/02/2017     03/04/2022     06/02/2017     BMP:   Lab Results   Component Value Date     03/04/2022     09/23/2020    POTASSIUM 3.6 03/04/2022    POTASSIUM 4.4 09/23/2020    CHLORIDE 108 03/04/2022    CHLORIDE 107 09/23/2020    CO2 27 03/04/2022    CO2 29 09/23/2020    BUN 9 03/04/2022    BUN 10 09/23/2020    CR 0.73 03/04/2022    CR 0.84 09/23/2020    GLC 87 03/04/2022    GLC 99 09/23/2020     COAGS: No results found for: PTT, INR, FIBR  POC:   Lab Results   Component Value Date    HCG Negative 09/28/2020     HEPATIC:   Lab Results   Component Value Date    ALBUMIN 3.9 03/04/2022    PROTTOTAL 7.6 03/04/2022    ALT 22 03/04/2022    AST 15 03/04/2022    ALKPHOS 67 03/04/2022    BILITOTAL 0.5 03/04/2022     OTHER:   Lab Results   Component Value Date    THI 8.7 03/04/2022    TSH 2.04 03/04/2022    CRP <2.9 03/04/2022    SED 8 03/04/2022       Anesthesia Plan    ASA Status:  2   NPO Status:  NPO Appropriate    Anesthesia Type: MAC.     - Reason for MAC: straight local not clinically adequate   Induction: Intravenous, Propofol.   Maintenance: TIVA.        Consents    Anesthesia Plan(s) and associated risks, benefits, and realistic alternatives discussed. Questions answered and patient/representative(s) expressed understanding.    - Discussed:     - Discussed with:  Patient      - Extended Intubation/Ventilatory Support Discussed: No.      - Patient is DNR/DNI Status: No    Use of blood products discussed: No .     Postoperative Care    Pain management: IV analgesics.        Comments:    Other  Comments: The risks and benefits of anesthesia, and the alternatives where applicable, have been discussed with the patient, and they wish to proceed.            JEM Michael CRNA

## 2023-01-17 ENCOUNTER — HOSPITAL ENCOUNTER (OUTPATIENT)
Facility: CLINIC | Age: 48
Discharge: HOME | End: 2023-01-17
Attending: SPECIALIST | Admitting: SPECIALIST
Payer: COMMERCIAL

## 2023-01-17 ENCOUNTER — ANESTHESIA (OUTPATIENT)
Dept: GASTROENTEROLOGY | Facility: CLINIC | Age: 48
End: 2023-01-17
Payer: COMMERCIAL

## 2023-01-17 VITALS
BODY MASS INDEX: 27.46 KG/M2 | DIASTOLIC BLOOD PRESSURE: 74 MMHG | HEART RATE: 75 BPM | OXYGEN SATURATION: 100 % | SYSTOLIC BLOOD PRESSURE: 103 MMHG | WEIGHT: 155 LBS | TEMPERATURE: 98.7 F | RESPIRATION RATE: 16 BRPM

## 2023-01-17 LAB — COLONOSCOPY: NORMAL

## 2023-01-17 PROCEDURE — 88305 TISSUE EXAM BY PATHOLOGIST: CPT | Mod: TC | Performed by: SPECIALIST

## 2023-01-17 PROCEDURE — 45380 COLONOSCOPY AND BIOPSY: CPT | Performed by: SPECIALIST

## 2023-01-17 PROCEDURE — 250N000011 HC RX IP 250 OP 636: Performed by: NURSE ANESTHETIST, CERTIFIED REGISTERED

## 2023-01-17 PROCEDURE — 250N000009 HC RX 250: Performed by: NURSE ANESTHETIST, CERTIFIED REGISTERED

## 2023-01-17 PROCEDURE — 88305 TISSUE EXAM BY PATHOLOGIST: CPT | Mod: 26 | Performed by: PATHOLOGY

## 2023-01-17 PROCEDURE — 370N000017 HC ANESTHESIA TECHNICAL FEE, PER MIN: Performed by: SPECIALIST

## 2023-01-17 PROCEDURE — 258N000003 HC RX IP 258 OP 636: Performed by: NURSE ANESTHETIST, CERTIFIED REGISTERED

## 2023-01-17 RX ORDER — ONDANSETRON 2 MG/ML
4 INJECTION INTRAMUSCULAR; INTRAVENOUS EVERY 30 MIN PRN
Status: DISCONTINUED | OUTPATIENT
Start: 2023-01-17 | End: 2023-01-17 | Stop reason: HOSPADM

## 2023-01-17 RX ORDER — ONDANSETRON 4 MG/1
4 TABLET, ORALLY DISINTEGRATING ORAL EVERY 30 MIN PRN
Status: DISCONTINUED | OUTPATIENT
Start: 2023-01-17 | End: 2023-01-17 | Stop reason: HOSPADM

## 2023-01-17 RX ORDER — SODIUM CHLORIDE, SODIUM LACTATE, POTASSIUM CHLORIDE, CALCIUM CHLORIDE 600; 310; 30; 20 MG/100ML; MG/100ML; MG/100ML; MG/100ML
INJECTION, SOLUTION INTRAVENOUS CONTINUOUS
Status: DISCONTINUED | OUTPATIENT
Start: 2023-01-17 | End: 2023-01-17 | Stop reason: HOSPADM

## 2023-01-17 RX ORDER — LIDOCAINE HYDROCHLORIDE 20 MG/ML
INJECTION, SOLUTION INFILTRATION; PERINEURAL PRN
Status: DISCONTINUED | OUTPATIENT
Start: 2023-01-17 | End: 2023-01-17

## 2023-01-17 RX ORDER — PROPOFOL 10 MG/ML
INJECTION, EMULSION INTRAVENOUS PRN
Status: DISCONTINUED | OUTPATIENT
Start: 2023-01-17 | End: 2023-01-17

## 2023-01-17 RX ORDER — LIDOCAINE 40 MG/G
CREAM TOPICAL
Status: DISCONTINUED | OUTPATIENT
Start: 2023-01-17 | End: 2023-01-17 | Stop reason: HOSPADM

## 2023-01-17 RX ORDER — PROPOFOL 10 MG/ML
INJECTION, EMULSION INTRAVENOUS CONTINUOUS PRN
Status: DISCONTINUED | OUTPATIENT
Start: 2023-01-17 | End: 2023-01-17

## 2023-01-17 RX ADMIN — LIDOCAINE HYDROCHLORIDE 50 MG: 20 INJECTION, SOLUTION INFILTRATION; PERINEURAL at 13:23

## 2023-01-17 RX ADMIN — PROPOFOL 150 MCG/KG/MIN: 10 INJECTION, EMULSION INTRAVENOUS at 13:25

## 2023-01-17 RX ADMIN — PROPOFOL 50 MG: 10 INJECTION, EMULSION INTRAVENOUS at 13:31

## 2023-01-17 RX ADMIN — SODIUM CHLORIDE, POTASSIUM CHLORIDE, SODIUM LACTATE AND CALCIUM CHLORIDE: 600; 310; 30; 20 INJECTION, SOLUTION INTRAVENOUS at 13:10

## 2023-01-17 RX ADMIN — PROPOFOL 100 MG: 10 INJECTION, EMULSION INTRAVENOUS at 13:25

## 2023-01-17 ASSESSMENT — ACTIVITIES OF DAILY LIVING (ADL): ADLS_ACUITY_SCORE: 39

## 2023-01-17 NOTE — ANESTHESIA POSTPROCEDURE EVALUATION
Patient: Avis Avalos    Procedure: Procedure(s):  COLONOSCOPY, WITH POLYPECTOMY AND BIOPSY       Anesthesia Type:  MAC    Note:  Disposition: Outpatient   Postop Pain Control: Uneventful            Sign Out: Well controlled pain   PONV: No   Neuro/Psych: Uneventful            Sign Out: Acceptable/Baseline neuro status   Airway/Respiratory: Uneventful            Sign Out: Acceptable/Baseline resp. status   CV/Hemodynamics: Uneventful            Sign Out: Acceptable CV status   Other NRE: NONE   DID A NON-ROUTINE EVENT OCCUR? No    Event details/Postop Comments:  Pt was happy with anesthesia care.  No complications.  I will follow up with the pt if needed.           Last vitals:  Vitals Value Taken Time   /74 01/17/23 1420   Temp     Pulse 75 01/17/23 1420   Resp 16 01/17/23 1420   SpO2 99 % 01/17/23 1414   Vitals shown include unvalidated device data.    Electronically Signed By: JEM Marshall CRNA  January 17, 2023  2:33 PM

## 2023-01-17 NOTE — H&P
[facility] History and Physical    Avis Avalos MRN# [id number]   Age: 47 year old YOB: 1975     Date of Admission:  (Not on file)    Home clinic: [facility]  Primary care provider: No Ref-Primary, Physician          Impression and Plan:   Impression:   Colon cancer screening [Z12.11]  Last colonoscopy 5 years ago - 12mm polyps, No signs of IBD      Plan:   Proceed to Colonoscopy as planned.  The procedure, risks(bleeding, perforation), benefits and alternatives were discussed and the patient agrees to proceed. Cleared for Anesthesia             Chief Complaint:   Colon cancer screening [Z12.11]    History is obtained from the patient          History of Present Illness:   This 47 year old female is being seen at this time for evaluation for colonoscopy.  C/o constipation.  And hemorrhoids.  No bleeding or family hx of colon ca.  No IBD complaints.  Not immunosupressed.           Past Medical History:     Past Medical History:   Diagnosis Date     Crohn disease (H)      Depression     1-2 years ago     Fructose intolerance      High risk HPV infection 4/14/10    normal pap, + HR HPV (59). normal paps since     PONV (postoperative nausea and vomiting)     after colonoscopy            Past Surgical History:     Past Surgical History:   Procedure Laterality Date     C/SECTION, LOW TRANSVERSE  02/17/10    , Low Transverse      SECTION  2012    Procedure:  SECTION;   SECTION ;  Surgeon: Yehuda Cruz MD;  [facility]     COLONOSCOPY  2011    Procedure:COMBINED COLONOSCOPY, SINGLE BIOPSY/POLYPECTOMY BY BIOPSY; Surgeon:MARTIN MAO; Location: [facility]     COLONOSCOPY  2011    Procedure:COMBINED COLONOSCOPY, SINGLE BIOPSY/POLYPECTOMY BY BIOPSY; Colonoscopy biopsy of distal iiluem, colonoscopy removal of colon polyp with fulguration, colonoscopy and control of biopsy bleed with placement of resolution clip;  Surgeon:TESS POLLARD; Location:[facility]     COLONOSCOPY  9/12/2011    Procedure:COMBINED COLONOSCOPY, REMOVE TUMOR/POLYP/LESION BY FULGURATION/HOT BIOPSY; Surgeon:TESS POLLARD; [facility]     COLONOSCOPY  9/12/2011    Procedure:COMBINED COLONOSCOPY, CONTROL BLEED; Surgeon:TESS POLLARD; [facility]     COLONOSCOPY N/A 6/19/2017    Procedure: COMBINED COLONOSCOPY, SINGLE OR MULTIPLE BIOPSY/POLYPECTOMY BY BIOPSY;;  Surgeon: Josué Hawk MD;  Location[facility]     HC FLEX SIGMOIDOSCOPY W/WO MIKY SPEC BY BRUSH/WASH  07/02/09    [facility]     HC TOOTH EXTRACTION W/FORCEP       LAPAROSCOPIC CYSTECTOMY OVARIAN (BENIGN) Right 10/21/2019    Procedure: Laparoscopic cystectomy right ovarian (benign);  Surgeon: Yehuda Cruz MD;  [facility]     LAPAROSCOPIC LYSIS ADHESIONS N/A 10/21/2019    Procedure: LYSIS, ADHESIONS, LAPAROSCOPIC;  Surgeon: Yehuda Cruz MD;  [facility]: [facility]     TURBINOPLASTY Bilateral 9/28/2020    Procedure: BILATERAL ENDOSCOPIC TURBINOPLASTY;  Surgeon: Ann Herrera MD;  Location: [facility]     VIDEO CAPSULE ENDOSCOPY  07/30/09    [facility]     ZZC APPENDECTOMY      2001            Social History:     Social History     Tobacco Use     Smoking status: Former     Packs/day: 0.25     Years: 10.00     Pack years: 2.50     Types: Cigarettes     Start date: 1/1/2007     Smokeless tobacco: Never   Substance Use Topics     Alcohol use: Yes     Comment: Occ.            Family History:     Family History   Problem Relation Age of Onset     Diabetes Mother         borderline     Depression Mother      Gastrointestinal Disease Mother         IBS     Thyroid Disease Mother         hypothyroidism     Diabetes Father         NIDDM - Type II     Diabetes Maternal Grandmother      Arthritis Maternal Grandfather      Heart Disease Paternal Grandfather      Thyroid Disease Sister         hypothyroidism     Rheumatoid Arthritis Sister      Other Cancer Sister       Asthma Other         niece     Obesity Sister         half-sister/had gastric bypass     Breast Cancer No family hx of             Immunizations:     VACCINE/DOSE   Diptheria   DPT   DTAP   HBIG   Hepatitis A   Hepatitis B   HIB   Influenza   Measles   Meningococcal   MMR   Mumps   Pneumococcal   Polio   Rubella   Small Pox   TDAP   Varicella   Zoster            Allergies:     Allergies   Allergen Reactions     Fructose GI Disturbance     Ragweeds      Sorbitol Diarrhea     Adhesive [Liquid Adhesive] Rash            Medications:     No current facility-administered medications for this encounter.     Current Outpatient Medications   Medication Sig     lactobacillus rhamnosus, GG, (CULTURELL) capsule Take 1 capsule by mouth 2 times daily     Acetaminophen (TYLENOL PO)      bisacodyl (DULCOLAX) 5 MG EC tablet 2 days prior to procedure, take 2 tablets at 4 pm. 1 day prior to procedure, take 2 tablets at 4 pm. For additional instructions refer to your colonoscopy prep instructions.     buPROPion (WELLBUTRIN XL) 150 MG 24 hr tablet Take 1 tablet (150 mg) by mouth every morning     cholecalciferol 50 MCG (2000 UT) tablet Take 50 mcg by mouth     cyclobenzaprine (FLEXERIL) 10 MG tablet Take 1 tablet (10 mg) by mouth 3 times daily as needed for muscle spasms     DiphenhydrAMINE HCl (BENADRYL PO)      fexofenadine (ALLEGRA) 180 MG tablet Take 180 mg by mouth daily     hydrocortisone, Perianal, (HYDROCORTISONE) 2.5 % cream Place rectally 2 times daily as needed for hemorrhoids (Patient not taking: Reported on 12/26/2022)     ibuprofen (ADVIL/MOTRIN) 200 MG tablet Take 400 mg by mouth     lidocaine (LIDODERM) 5 % patch Place 1 patch onto the skin every 24 hours To prevent lidocaine toxicity, patient should be patch free for 12 hrs daily. (Patient not taking: Reported on 12/26/2022)     loratadine (CLARITIN) 10 MG tablet Take 10 mg by mouth daily      magnesium oxide 400 MG CAPS      methylcellulose (CITRUCEL) 500 MG TABS  tablet Take 500 mg by mouth daily     Multiple Vitamins-Calcium (ONE-A-DAY WOMENS PO)      polyethylene glycol (GOLYTELY) 236 g suspension 2 days prior at 5pm, mix and drink half of a jug of Golytely. Drink an 8 oz. glass of Golytely every 15 minutes until half of the jug is gone. Place remainder of Golytely in the refrigerator. 1 day prior at 5 pm, drink the 2nd half of a jug of Golytely bowel prep. 6 hours before your check-in time, drink an 8 oz. glass of Golytely every 15 minutes until half of the 2nd jug of Golytely is gone. Discard remainder of second jug.     polyethylene glycol (MIRALAX) 17 GM/Dose powder Take 1 capful by mouth daily     senna-docusate (SENOKOT-S/PERICOLACE) 8.6-50 MG tablet Take 1 tablet by mouth daily     valACYclovir (VALTREX) 1000 mg tablet TAKE ONE TABLET BY MOUTH DAILY for 3-7 days. Repeat as needed for flares             Review of Systems:   The review of systems was positive for the following findings.  None.  The remainder of the review of systems was unremarkable.          Physical Exam:   All vitals have been reviewed  Last menstrual period 12/10/2022, not currently breastfeeding.  No intake or output data in the 24 hours ending 01/16/23 2152  SHEENT examination revealed NC/AT, EOMI.  Examination of the chest revealed CTA.  Examination of the heart revealed RRR.  Examination of the abdomen revealed soft, non tender.  The neuromuscular examination was NL.          Data:   All laboratory data reviewed  No results found for any visits on 01/17/23.  -     Yehuda Harris MD, FACS

## 2023-01-17 NOTE — DISCHARGE INSTRUCTIONS
[facility]    Home Care Following Endoscopy          Activity:  You have just undergone an endoscopic procedure usually performed with conscious sedation.    Do not work or operate machinery (including a car) for at least 12 hours.      Diet:  Return to the diet you were on before your procedure but eat lightly for the first 12-24 hours.  Drink plenty of water.  Resume any regular medications unless otherwise advised by your physician.    If you had any biopsy or polyp removed please refrain from aspirin or aspirin products for 2 days.  Pain:  You may take Tylenol as needed for pain.  Expected Recovery:  You can expect some mild abdominal fullness and/or discomfort due to the air used to inflate your intestinal tract. I encourage you to walk and attempt to pass this air as soon as possible.    Call Your Physician if You Have:  After Colonoscopy:  Worsening persisting abdominal pain which is worse with activity.  Fevers (>101 degrees F), chills or shakes.  Passage of continued blood with bowel movements.   Any questions or concerns about your recovery, please call [phone] or after hours [phone] Nurse Advice Line.    Follow-up Care:  You did have polyps/biopsy tissue sample(s) removed.  The polyps/biopsy tissue sample(s) will be sent to pathology.  You should receive letter in your My Chart with your results within 1-2 weeks. If you do not participate in My Chart a physical letter will come in the mail in 2-3 weeks.  Please call if you have not received a notification of your results.

## 2023-01-17 NOTE — ANESTHESIA CARE TRANSFER NOTE
Patient: Avis Avalos    Procedure: Procedure(s):  COLONOSCOPY, WITH POLYPECTOMY AND BIOPSY       Diagnosis: Colon cancer screening [Z12.11]  Diagnosis Additional Information: No value filed.    Anesthesia Type:   MAC     Note:    Oropharynx: oropharynx clear of all foreign objects and spontaneously breathing  Level of Consciousness: drowsy  Oxygen Supplementation: face mask    Independent Airway: airway patency satisfactory and stable  Dentition: dentition unchanged  Vital Signs Stable: post-procedure vital signs reviewed and stable  Report to RN Given: handoff report given  Patient transferred to: Phase II    Handoff Report: Identifed the Patient, Identified the Reponsible Provider, Reviewed the pertinent medical history, Discussed the surgical course, Reviewed Intra-OP anesthesia mangement and issues during anesthesia, Set expectations for post-procedure period and Allowed opportunity for questions and acknowledgement of understanding      Vitals:  Vitals Value Taken Time   BP     Temp     Pulse     Resp     SpO2 99 % 01/17/23 1350   Vitals shown include unvalidated device data.    Electronically Signed By: JEM Marshall CRNA  January 17, 2023  1:51 PM

## 2023-01-19 LAB
PATH REPORT.COMMENTS IMP SPEC: NORMAL
PATH REPORT.COMMENTS IMP SPEC: NORMAL
PATH REPORT.FINAL DX SPEC: NORMAL
PATH REPORT.GROSS SPEC: NORMAL
PATH REPORT.MICROSCOPIC SPEC OTHER STN: NORMAL
PATH REPORT.RELEVANT HX SPEC: NORMAL
PHOTO IMAGE: NORMAL

## 2023-03-01 ENCOUNTER — OFFICE VISIT (OUTPATIENT)
Dept: FAMILY MEDICINE | Facility: OTHER | Age: 48
End: 2023-03-01
Payer: COMMERCIAL

## 2023-03-01 VITALS
DIASTOLIC BLOOD PRESSURE: 69 MMHG | OXYGEN SATURATION: 100 % | HEIGHT: 64 IN | BODY MASS INDEX: 25.78 KG/M2 | HEART RATE: 73 BPM | SYSTOLIC BLOOD PRESSURE: 103 MMHG | WEIGHT: 151 LBS | RESPIRATION RATE: 13 BRPM | TEMPERATURE: 97.9 F

## 2023-03-01 DIAGNOSIS — Z00.00 ROUTINE GENERAL MEDICAL EXAMINATION AT A HEALTH CARE FACILITY: Primary | ICD-10-CM

## 2023-03-01 LAB
ALBUMIN SERPL BCG-MCNC: 4.2 G/DL (ref 3.5–5.2)
ALBUMIN UR-MCNC: NEGATIVE MG/DL
ALP SERPL-CCNC: 55 U/L (ref 35–104)
ALT SERPL W P-5'-P-CCNC: 18 U/L (ref 10–35)
ANION GAP SERPL CALCULATED.3IONS-SCNC: 10 MMOL/L (ref 7–15)
APPEARANCE UR: CLEAR
AST SERPL W P-5'-P-CCNC: 24 U/L (ref 10–35)
BILIRUB SERPL-MCNC: 0.4 MG/DL
BILIRUB UR QL STRIP: NEGATIVE
BUN SERPL-MCNC: 7.8 MG/DL (ref 6–20)
CALCIUM SERPL-MCNC: 9.1 MG/DL (ref 8.6–10)
CHLORIDE SERPL-SCNC: 104 MMOL/L (ref 98–107)
CHOLEST SERPL-MCNC: 165 MG/DL
COLOR UR AUTO: YELLOW
CREAT SERPL-MCNC: 0.78 MG/DL (ref 0.51–0.95)
DEPRECATED HCO3 PLAS-SCNC: 26 MMOL/L (ref 22–29)
ERYTHROCYTE [DISTWIDTH] IN BLOOD BY AUTOMATED COUNT: 12.6 % (ref 10–15)
GFR SERPL CREATININE-BSD FRML MDRD: >90 ML/MIN/1.73M2
GLUCOSE SERPL-MCNC: 87 MG/DL (ref 70–99)
GLUCOSE UR STRIP-MCNC: NEGATIVE MG/DL
HCT VFR BLD AUTO: 39.8 % (ref 35–47)
HDLC SERPL-MCNC: 74 MG/DL
HGB BLD-MCNC: 13.5 G/DL (ref 11.7–15.7)
HGB UR QL STRIP: NEGATIVE
KETONES UR STRIP-MCNC: NEGATIVE MG/DL
LDLC SERPL CALC-MCNC: 82 MG/DL
LEUKOCYTE ESTERASE UR QL STRIP: NEGATIVE
MCH RBC QN AUTO: 31.3 PG (ref 26.5–33)
MCHC RBC AUTO-ENTMCNC: 33.9 G/DL (ref 31.5–36.5)
MCV RBC AUTO: 92 FL (ref 78–100)
NITRATE UR QL: NEGATIVE
NONHDLC SERPL-MCNC: 91 MG/DL
PH UR STRIP: 7.5 [PH] (ref 5–7)
PLATELET # BLD AUTO: 242 10E3/UL (ref 150–450)
POTASSIUM SERPL-SCNC: 4.4 MMOL/L (ref 3.4–5.3)
PROT SERPL-MCNC: 7 G/DL (ref 6.4–8.3)
RBC # BLD AUTO: 4.32 10E6/UL (ref 3.8–5.2)
SODIUM SERPL-SCNC: 140 MMOL/L (ref 136–145)
SP GR UR STRIP: 1.02 (ref 1–1.03)
TRIGL SERPL-MCNC: 43 MG/DL
TSH SERPL DL<=0.005 MIU/L-ACNC: 1.53 UIU/ML (ref 0.3–4.2)
UROBILINOGEN UR STRIP-ACNC: 0.2 E.U./DL
WBC # BLD AUTO: 5.2 10E3/UL (ref 4–11)

## 2023-03-01 PROCEDURE — 80053 COMPREHEN METABOLIC PANEL: CPT | Performed by: FAMILY MEDICINE

## 2023-03-01 PROCEDURE — 99396 PREV VISIT EST AGE 40-64: CPT | Performed by: FAMILY MEDICINE

## 2023-03-01 PROCEDURE — 84443 ASSAY THYROID STIM HORMONE: CPT | Performed by: FAMILY MEDICINE

## 2023-03-01 PROCEDURE — 36415 COLL VENOUS BLD VENIPUNCTURE: CPT | Performed by: FAMILY MEDICINE

## 2023-03-01 PROCEDURE — 81003 URINALYSIS AUTO W/O SCOPE: CPT | Performed by: FAMILY MEDICINE

## 2023-03-01 PROCEDURE — 85027 COMPLETE CBC AUTOMATED: CPT | Performed by: FAMILY MEDICINE

## 2023-03-01 PROCEDURE — 80061 LIPID PANEL: CPT | Performed by: FAMILY MEDICINE

## 2023-03-01 ASSESSMENT — ENCOUNTER SYMPTOMS
HEADACHES: 0
BREAST MASS: 0
FEVER: 0
JOINT SWELLING: 0
CONSTIPATION: 1
HEMATURIA: 0
CHILLS: 0
HEMATOCHEZIA: 0
ABDOMINAL PAIN: 0
NERVOUS/ANXIOUS: 0
SHORTNESS OF BREATH: 0
DIZZINESS: 0
SORE THROAT: 0
DYSURIA: 0
HEARTBURN: 0
ARTHRALGIAS: 1
MYALGIAS: 0
PARESTHESIAS: 0
FREQUENCY: 1
NAUSEA: 0
WEAKNESS: 0
DIARRHEA: 0
PALPITATIONS: 0
EYE PAIN: 0
COUGH: 0

## 2023-03-01 ASSESSMENT — PAIN SCALES - GENERAL: PAINLEVEL: NO PAIN (0)

## 2023-03-01 ASSESSMENT — PATIENT HEALTH QUESTIONNAIRE - PHQ9
SUM OF ALL RESPONSES TO PHQ QUESTIONS 1-9: 0
SUM OF ALL RESPONSES TO PHQ QUESTIONS 1-9: 0

## 2023-03-01 NOTE — PROGRESS NOTES
SUBJECTIVE:   CC: Avis is an 47 year old who presents for preventive health visit.     Patient has been advised of split billing requirements and indicates understanding: Yes  Healthy Habits:     Getting at least 3 servings of Calcium per day:  NO    Bi-annual eye exam:  Yes    Dental care twice a year:  Yes    Sleep apnea or symptoms of sleep apnea:  None    Diet:  Other    Frequency of exercise:  4-5 days/week    Duration of exercise:  30-45 minutes    Taking medications regularly:  Yes    Medication side effects:  None    PHQ-2 Total Score: 0    Additional concerns today:  No            Today's PHQ-2 Score:   PHQ-2 ( 1999 [employer]) 3/1/2023   Q1: Little interest or pleasure in doing things 0   Q2: Feeling down, depressed or hopeless 0   PHQ-2 Score 0   PHQ-2 Total Score (12-17 Years)- Positive if 3 or more points; Administer PHQ-A if positive -   Q1: Little interest or pleasure in doing things Not at all   Q2: Feeling down, depressed or hopeless Not at all   PHQ-2 Score 0           Social History     Tobacco Use     Smoking status: Former     Packs/day: 0.25     Years: 10.00     Pack years: 2.50     Types: Cigarettes     Start date: 1/1/2007     Smokeless tobacco: Never   Substance Use Topics     Alcohol use: Yes     Comment: Occ.         Alcohol Use 3/1/2023   Prescreen: >3 drinks/day or >7 drinks/week? No       Reviewed orders with patient.  Reviewed health maintenance and updated orders accordingly - Yes  BP Readings from Last 3 Encounters:   03/01/23 103/69   01/17/23 103/74   06/28/22 113/76    Wt Readings from Last 3 Encounters:   03/01/23 68.5 kg (151 lb)   01/17/23 70.3 kg (155 lb)   06/28/22 75.3 kg (166 lb)                  Patient Active Problem List   Diagnosis     Herpes simplex type 2 infection     Knee pain     Sessile colonic polyp     Cervical high risk HPV (human papillomavirus) test positive     Moderate episode of recurrent major depressive disorder (H)     Obsessive-compulsive disorder,  unspecified type     Lichen sclerosus of female genitalia     Chronic seasonal allergic rhinitis due to pollen     Pelvic floor dysfunction     Pelvic pain in female     Shortness of breath     Abdominal bloating with cramps     Nasal turbinate hypertrophy     Fracture of shaft of left fibula     Past Surgical History:   Procedure Laterality Date     C/SECTION, LOW TRANSVERSE  02/17/10    , Low Transverse      SECTION  2012    Procedure:  SECTION;   SECTION ;  Surgeon: Yehuda Cruz MD;  [facility]     COLONOSCOPY  2011    Procedure:COMBINED COLONOSCOPY, SINGLE BIOPSY/POLYPECTOMY BY BIOPSY; Surgeon:MARTIN MAO; Location:[facility]     COLONOSCOPY  2011    Procedure:COMBINED COLONOSCOPY, SINGLE BIOPSY/POLYPECTOMY BY BIOPSY; Colonoscopy biopsy of distal iiluem, colonoscopy removal of colon polyp with fulguration, colonoscopy and control of biopsy bleed with placement of resolution clip; Surgeon:TESS POLLARD; Location:[facility]     COLONOSCOPY  2011    Procedure:COMBINED COLONOSCOPY, REMOVE TUMOR/POLYP/LESION BY FULGURATION/HOT BIOPSY; Surgeon:TESS POLLARD; Location:[facility]     COLONOSCOPY  2011    Procedure:COMBINED COLONOSCOPY, CONTROL BLEED; Surgeon:TESS POLLARD; Location:[facility]     COLONOSCOPY N/A 2017    Procedure: COMBINED COLONOSCOPY, SINGLE OR MULTIPLE BIOPSY/POLYPECTOMY BY BIOPSY;;  Surgeon: Josué Hawk MD;  [facility]     COLONOSCOPY N/A 2023    Procedure: COLONOSCOPY, WITH POLYPECTOMY AND BIOPSY;  Surgeon: Yehuda Harris MD;  Location: [facility]     HC FLEX SIGMOIDOSCOPY W/WO MIKY SPEC BY BRUSH/WASH  09    [facility]     HC TOOTH EXTRACTION W/FORCEP       LAPAROSCOPIC CYSTECTOMY OVARIAN (BENIGN) Right 10/21/2019    Procedure: Laparoscopic cystectomy right ovarian (benign);  Surgeon: Yehuda Cruz MD;  Location: [facility]     LAPAROSCOPIC LYSIS ADHESIONS N/A 10/21/2019     Procedure: LYSIS, ADHESIONS, LAPAROSCOPIC;  Surgeon: Yehuda Cruz MD;  Location: [facility]     TURBINOPLASTY Bilateral 9/28/2020    Procedure: BILATERAL ENDOSCOPIC TURBINOPLASTY;  Surgeon: Ann Herrera MD;  Location: [facility]     VIDEO CAPSULE ENDOSCOPY  07/30/09    [facility]     ZZC APPENDECTOMY      2001       Social History     Tobacco Use     Smoking status: Former     Packs/day: 0.25     Years: 10.00     Pack years: 2.50     Types: Cigarettes     Start date: 1/1/2007     Smokeless tobacco: Never   Substance Use Topics     Alcohol use: Yes     Comment: Occ.     Family History   Problem Relation Age of Onset     Diabetes Mother         borderline     Depression Mother      Gastrointestinal Disease Mother         IBS     Thyroid Disease Mother         hypothyroidism     Diabetes Father         NIDDM - Type II     Diabetes Maternal Grandmother      Arthritis Maternal Grandfather      Heart Disease Paternal Grandfather      Thyroid Disease Sister         hypothyroidism     Rheumatoid Arthritis Sister      Other Cancer Sister      Asthma Other         niece     Obesity Sister         half-sister/had gastric bypass     Breast Cancer No family hx of          Current Outpatient Medications   Medication Sig Dispense Refill     Acetaminophen (TYLENOL PO)        buPROPion (WELLBUTRIN XL) 150 MG 24 hr tablet Take 1 tablet (150 mg) by mouth every morning 90 tablet 0     cholecalciferol 50 MCG (2000 UT) tablet Take 50 mcg by mouth       cyclobenzaprine (FLEXERIL) 10 MG tablet Take 1 tablet (10 mg) by mouth 3 times daily as needed for muscle spasms 30 tablet 0     hydrocortisone, Perianal, (HYDROCORTISONE) 2.5 % cream Place rectally 2 times daily as needed for hemorrhoids 30 g 1     ibuprofen (ADVIL/MOTRIN) 200 MG tablet Take 400 mg by mouth       lactobacillus rhamnosus (GG) (CULTURELL) capsule Take 1 capsule by mouth 2 times daily       magnesium oxide 400 MG CAPS        methylcellulose (CITRUCEL) 500 MG TABS  tablet Take 500 mg by mouth daily       Multiple Vitamins-Calcium (ONE-A-DAY WOMENS PO)        polyethylene glycol (MIRALAX) 17 GM/Dose powder Take 1 capful by mouth daily       senna-docusate (SENOKOT-S/PERICOLACE) 8.6-50 MG tablet Take 1 tablet by mouth daily       valACYclovir (VALTREX) 1000 mg tablet TAKE ONE TABLET BY MOUTH DAILY for 3-7 days. Repeat as needed for flares 20 tablet 0     DiphenhydrAMINE HCl (BENADRYL PO)        fexofenadine (ALLEGRA) 180 MG tablet Take 180 mg by mouth daily       loratadine (CLARITIN) 10 MG tablet Take 10 mg by mouth daily        Allergies   Allergen Reactions     Fructose GI Disturbance     Ragweeds      Sorbitol Diarrhea     Adhesive [Liquid Adhesive] Rash     Recent Labs   Lab Test 03/04/22  1016 09/23/20  1708 09/26/19  1101 07/06/18  0944 06/16/17  1621 05/18/15  1231 05/04/15  0813   LDL 80  --   --  82  --   --  65   HDL 69  --   --  71  --   --  69   TRIG 70  --   --  85  --   --  55   ALT 22  --   --   --  25  --   --    CR 0.73 0.84  --  0.69 0.76  --  0.83   GFRESTIMATED >90 85  --  >90 84  --  77   GFRESTBLACK  --  >90  --  >90 >90  African American GFR Calc    --  >90   POTASSIUM 3.6 4.4  --  4.4 4.1   < >  --    TSH 2.04  --  1.48  --  1.33   < >  --     < > = values in this interval not displayed.        Breast Cancer Screening:    Breast CA Risk Assessment (FHS-7) 3/4/2022   Do you have a family history of breast, colon, or ovarian cancer? No / Unknown         Mammogram Screening: Recommended annual mammography  Pertinent mammograms are reviewed under the imaging tab.    History of abnormal Pap smear: NO - age 30-65 PAP every 5 years with negative HPV co-testing recommended  PAP / HPV Latest Ref Rng & Units 11/24/2020 6/16/2017 12/29/2014   PAP (Historical) - NIL NIL NIL   HPV16 NEG:Negative Negative Negative -   HPV18 NEG:Negative Negative Negative -   HRHPV NEG:Negative Negative Negative -     Reviewed and updated as needed this visit by clinical staff    "Tobacco  Allergies  Meds              Reviewed and updated as needed this visit by Provider                     Review of Systems   Constitutional: Negative for chills and fever.   HENT: Negative for congestion, ear pain, hearing loss and sore throat.    Eyes: Negative for pain and visual disturbance.   Respiratory: Negative for cough and shortness of breath.    Cardiovascular: Negative for chest pain, palpitations and peripheral edema.   Gastrointestinal: Positive for constipation. Negative for abdominal pain, diarrhea, heartburn, hematochezia and nausea.   Breasts:  Negative for tenderness, breast mass and discharge.   Genitourinary: Positive for frequency. Negative for dysuria, genital sores, hematuria, pelvic pain, urgency, vaginal bleeding and vaginal discharge.   Musculoskeletal: Positive for arthralgias. Negative for joint swelling and myalgias.   Skin: Negative for rash.   Neurological: Negative for dizziness, weakness, headaches and paresthesias.   Psychiatric/Behavioral: Negative for mood changes. The patient is not nervous/anxious.           OBJECTIVE:   /69 (Cuff Size: Adult Regular)   Pulse 73   Temp 97.9  F (36.6  C) (Oral)   Resp 13   Ht 1.63 m (5' 4.17\")   Wt 68.5 kg (151 lb)   LMP 02/20/2023 (Exact Date)   SpO2 100%   BMI 25.78 kg/m    Physical Exam  GENERAL: healthy, alert and no distress  EYES: Eyes grossly normal to inspection, PERRL and conjunctivae and sclerae normal  HENT: ear canals and TM's normal, nose and mouth without ulcers or lesions  NECK: no adenopathy, no asymmetry, masses, or scars and thyroid normal to palpation  RESP: lungs clear to auscultation - no rales, rhonchi or wheezes  CV: regular rate and rhythm, normal S1 S2, no S3 or S4, no murmur, click or rub, no peripheral edema and peripheral pulses strong  ABDOMEN: soft, nontender, no hepatosplenomegaly, no masses and bowel sounds normal  MS: no gross musculoskeletal defects noted, no edema  SKIN: no suspicious " "lesions or rashes  NEURO: Normal strength and tone, mentation intact and speech normal  PSYCH: mentation appears normal, affect normal/bright    Diagnostic Test Results:  Labs reviewed in Epic  No results found for this or any previous visit (from the past 24 hour(s)).  No results found for any visits on 03/01/23.  Answers for HPI/ROS submitted by the patient on 3/1/2023  PHQ9 TOTAL SCORE: 0      ASSESSMENT/PLAN:   (Z00.00) Routine general medical examination at a health care facility  (primary encounter diagnosis)  Comment: Reviewed recommended screenings and ordered appropriate testing for pt's risks and per pt's request(s).   Plan: Comprehensive metabolic panel (BMP + Alb, Alk         Phos, ALT, AST, Total. Bili, TP), CBC with         platelets, TSH with free T4 reflex, Lipid panel        reflex to direct LDL Fasting, UA Macro with         Reflex to Micro and Culture - lab collect        Patient wished to defer most work-up and treatment until her employer starts a new clinic later this year.          COUNSELING:  Reviewed preventive health counseling, as reflected in patient instructions       Regular exercise       Healthy diet/nutrition       Immunizations    Declined: Covid-19, Hepatitis B and Influenza due to Concerns about side effects/safety               Osteoporosis prevention/bone health       Colorectal Cancer Screening       (Yeimi)menopause management       The 10-year ASCVD risk score (Markus RODRIGUEZ, et al., 2019) is: 0.3%    Values used to calculate the score:      Age: 47 years      Sex: Female      Is Non- : No      Diabetic: No      Tobacco smoker: No      Systolic Blood Pressure: 103 mmHg      Is BP treated: No      HDL Cholesterol: 69 mg/dL      Total Cholesterol: 163 mg/dL      BMI:   Estimated body mass index is 25.78 kg/m  as calculated from the following:    Height as of this encounter: 1.63 m (5' 4.17\").    Weight as of this encounter: 68.5 kg (151 lb).   Weight " management plan: Discussed healthy diet and exercise guidelines      She reports that she has quit smoking. She started smoking about 16 years ago. She has a 2.50 pack-year smoking history. She has never used smokeless tobacco.          Constantin Watson MD, MD  [facility]

## 2023-03-16 DIAGNOSIS — F42.9 OBSESSIVE-COMPULSIVE DISORDER, UNSPECIFIED TYPE: ICD-10-CM

## 2023-03-16 DIAGNOSIS — F33.1 MODERATE EPISODE OF RECURRENT MAJOR DEPRESSIVE DISORDER (H): ICD-10-CM

## 2023-03-19 ENCOUNTER — MYC REFILL (OUTPATIENT)
Dept: FAMILY MEDICINE | Facility: OTHER | Age: 48
End: 2023-03-19
Payer: COMMERCIAL

## 2023-03-19 DIAGNOSIS — F33.1 MODERATE EPISODE OF RECURRENT MAJOR DEPRESSIVE DISORDER (H): ICD-10-CM

## 2023-03-19 DIAGNOSIS — F42.9 OBSESSIVE-COMPULSIVE DISORDER, UNSPECIFIED TYPE: ICD-10-CM

## 2023-03-20 RX ORDER — BUPROPION HYDROCHLORIDE 150 MG/1
TABLET ORAL
Qty: 90 TABLET | Refills: 1 | Status: SHIPPED | OUTPATIENT
Start: 2023-03-20 | End: 2023-08-18

## 2023-03-20 NOTE — TELEPHONE ENCOUNTER
Prescription approved per [facility] Refill Protocol.  Alexandra Horner RN on 3/20/2023 at 11:35 AM

## 2023-03-22 RX ORDER — BUPROPION HYDROCHLORIDE 150 MG/1
150 TABLET ORAL EVERY MORNING
Qty: 90 TABLET | Refills: 0 | OUTPATIENT
Start: 2023-03-22

## 2023-05-22 ENCOUNTER — ANCILLARY ORDERS (OUTPATIENT)
Dept: GENERAL RADIOLOGY | Facility: CLINIC | Age: 48
End: 2023-05-22

## 2023-05-22 DIAGNOSIS — M79.676 TOE PAIN: ICD-10-CM

## 2023-05-25 ENCOUNTER — ANCILLARY PROCEDURE (OUTPATIENT)
Dept: GENERAL RADIOLOGY | Facility: OTHER | Age: 48
End: 2023-05-25
Attending: NURSE PRACTITIONER
Payer: COMMERCIAL

## 2023-05-25 DIAGNOSIS — M79.676 TOE PAIN: ICD-10-CM

## 2023-05-25 PROCEDURE — 73660 X-RAY EXAM OF TOE(S): CPT | Mod: TC | Performed by: RADIOLOGY

## 2023-08-03 ENCOUNTER — HOSPITAL ENCOUNTER (OUTPATIENT)
Dept: MAMMOGRAPHY | Facility: CLINIC | Age: 48
Discharge: HOME | End: 2023-08-03
Admitting: RADIOLOGY
Payer: COMMERCIAL

## 2023-08-03 DIAGNOSIS — Z12.31 VISIT FOR SCREENING MAMMOGRAM: ICD-10-CM

## 2023-08-03 PROCEDURE — 77067 SCR MAMMO BI INCL CAD: CPT

## 2023-08-18 DIAGNOSIS — F33.1 MODERATE EPISODE OF RECURRENT MAJOR DEPRESSIVE DISORDER (H): ICD-10-CM

## 2023-08-18 DIAGNOSIS — F42.9 OBSESSIVE-COMPULSIVE DISORDER, UNSPECIFIED TYPE: ICD-10-CM

## 2023-08-18 RX ORDER — BUPROPION HYDROCHLORIDE 150 MG/1
150 TABLET ORAL EVERY MORNING
Qty: 90 TABLET | Refills: 0 | Status: SHIPPED | OUTPATIENT
Start: 2023-08-18

## 2024-02-05 ENCOUNTER — TRANSCRIBE ORDERS (OUTPATIENT)
Dept: OTHER | Age: 49
End: 2024-02-05

## 2024-02-05 DIAGNOSIS — M62.89 MUSCLE TIGHTNESS: ICD-10-CM

## 2024-02-05 DIAGNOSIS — M25.511 RIGHT SHOULDER PAIN: Primary | ICD-10-CM

## 2024-02-05 DIAGNOSIS — H81.10 BENIGN RECURRENT VERTIGO: ICD-10-CM

## 2024-03-14 ENCOUNTER — THERAPY VISIT (OUTPATIENT)
Dept: PHYSICAL THERAPY | Facility: CLINIC | Age: 49
End: 2024-03-14
Payer: COMMERCIAL

## 2024-03-14 DIAGNOSIS — G89.29 CHRONIC LEFT SHOULDER PAIN: ICD-10-CM

## 2024-03-14 DIAGNOSIS — M54.2 NECK PAIN: Primary | ICD-10-CM

## 2024-03-14 DIAGNOSIS — M25.512 CHRONIC LEFT SHOULDER PAIN: ICD-10-CM

## 2024-03-14 PROCEDURE — 97110 THERAPEUTIC EXERCISES: CPT | Mod: GP | Performed by: PHYSICAL THERAPIST

## 2024-03-14 PROCEDURE — 97162 PT EVAL MOD COMPLEX 30 MIN: CPT | Mod: GP | Performed by: PHYSICAL THERAPIST

## 2024-03-14 NOTE — PROGRESS NOTES
"PHYSICAL THERAPY EVALUATION  Type of Visit: Evaluation    See electronic medical record for Abuse and Falls Screening details.    Subjective       Presenting condition or subjective complaint: Started having R sided neck pain after chiropractic adjustment 2023 (had been regular adjustments prior to that). Has progressed to L sided shoulder pain over past few months. Also getting headaches.  Date of onset: 24 (date of referral)    Relevant medical history: Bladder or bowel problems; Dizziness   Dates & types of surgery:  x 2, appendectomy     Prior diagnostic imaging/testing results: -- (none yet)     Prior therapy history for the same diagnosis, illness or injury: No (tried additional chiropractic w/o improvement, tried massage w/o improvement)      Prior Level of Function  Transfers: Independent  Ambulation: Independent  ADL: Independent  IADL: Childcare, Driving, Finances, Housekeeping, Laundry, Meal preparation, Work, Yard work    Living Environment  Social support: With a significant other or spouse   Type of home:     Stairs to enter the home:         Ramp:     Stairs inside the home:         Help at home:    Equipment owned:       Employment: Yes IT  Hobbies/Interests: Sedentary hobbies such as watching  TV, going for walks    Patient goals for therapy: Get through a work day without pain    Pain assessment: Pain present  See objective evaluation for additional pain details     Objective   SHOULDER EVALUATION  PAIN: Pain Level at Rest: 8/10  Pain Level with Use: 8/10  Pain Location: worst is R side of neck and L shoulder blade  Pain Quality: pinching, \"stuck\"  Pain Frequency: constant  Pain is Worst: usually worst first thing in AM  Pain is Exacerbated By: sometimes affected by position, otherwise difficult to determine aggravating factors; notices difficulty swallowing if head held in retraction  Pain is Relieved By: NSAIDs and muscle relaxers (all temporary)  Pain Progression: " Worsened (progressed in location)    POSTURE: Standing Posture: Sway back  Sitting Posture: Rounded shoulders, Forward head, Thoracic kyphosis increased  GAIT:   Weightbearing Status: WBAT  Assistive Device(s): None  Gait Deviations: WNL    ROM:   Cervical AROM: Ext 60 pain R side of neck, Flex 50 ++pull lower cervical/upper thoracic, L 35, R 35, Rotation L 70, R 58  Shoulder AROM: Flex, Abd, ER, Flex ER all WNL/EQ B, ExtIR L T2, R T4  STRENGTH:   Pain: - none + mild ++ moderate +++ severe  Strength Scale: 0-5/5 Left Right   Shoulder Flexion 5 4   Shoulder Extension     Shoulder Abduction 5 4   Shoulder Adduction     Shoulder Internal Rotation 5 5   Shoulder External Rotation 5 4   Shoulder Horizontal Abduction     Shoulder Horizontal Adduction     Elbow Flexion 5 5   Elbow Extension 5 5   Mid Trap     Lower Trap     Rhomboid     Serratus Anterior       FLEXIBILITY:   SPECIAL TESTS:   PALPATION: Tenderness worst R C2 transverse process and R suboccipitals  JOINT MOBILITY:  Hypomobile R upper cervical, assess more next  CERVICAL SCREEN: Myotomes WNL/EQ B    Assessment & Plan   CLINICAL IMPRESSIONS  Medical Diagnosis: Neck pain, shoulder pain,    Treatment Diagnosis: Neck pain, shoulder pain,   Impression/Assessment: Patient is a 48 year old female with cervical and shoulder complaints.  The following significant findings have been identified: Pain, Decreased ROM/flexibility, Decreased joint mobility, Decreased strength, Impaired muscle performance, Decreased activity tolerance, and Impaired posture. These impairments interfere with their ability to perform self care tasks, work tasks, recreational activities, household chores, and driving  as compared to previous level of function.     Clinical Decision Making (Complexity):  Clinical Presentation: Evolving/Changing  Clinical Presentation Rationale: based on medical and personal factors listed in PT evaluation  Clinical Decision Making (Complexity): Moderate  complexity    PLAN OF CARE  Treatment Interventions:  Modalities: E-stim, Hot Pack, Mechanical Traction  Interventions: Manual Therapy, Neuromuscular Re-education, Therapeutic Activity, Therapeutic Exercise, Self-Care/Home Management    Long Term Goals     PT Goal 1  Goal Identifier: Sitting/computer work  Goal Description: Pt will be able to complete day of work w/ no worse than 2/10 pain by end of day  Rationale: to maximize safety and independence within the home;to maximize safety and independence within the community (to complete work tasks)  Target Date: 06/06/24      Frequency of Treatment: 1x/week  Duration of Treatment: x8 weeks, tapering to 2x/month x 1 month    Recommended Referrals to Other Professionals: none  Education Assessment:   Learner/Method: Patient;Listening;Reading;Demonstration;Pictures/Video;No Barriers to Learning    Risks and benefits of evaluation/treatment have been explained.   Patient/Family/caregiver agrees with Plan of Care.     Evaluation Time:     PT Eval, Moderate Complexity Minutes ([id number]): 18       Signing Clinician: Amanda Hilligoss, PT

## 2024-03-20 ENCOUNTER — THERAPY VISIT (OUTPATIENT)
Dept: PHYSICAL THERAPY | Facility: CLINIC | Age: 49
End: 2024-03-20
Payer: COMMERCIAL

## 2024-03-20 DIAGNOSIS — M25.512 CHRONIC LEFT SHOULDER PAIN: ICD-10-CM

## 2024-03-20 DIAGNOSIS — G89.29 CHRONIC LEFT SHOULDER PAIN: ICD-10-CM

## 2024-03-20 DIAGNOSIS — M54.2 NECK PAIN: Primary | ICD-10-CM

## 2024-03-20 PROCEDURE — 97110 THERAPEUTIC EXERCISES: CPT | Mod: GP | Performed by: PHYSICAL THERAPIST

## 2024-04-01 ENCOUNTER — THERAPY VISIT (OUTPATIENT)
Dept: PHYSICAL THERAPY | Facility: CLINIC | Age: 49
End: 2024-04-01
Payer: COMMERCIAL

## 2024-04-01 DIAGNOSIS — M54.2 NECK PAIN: Primary | ICD-10-CM

## 2024-04-01 DIAGNOSIS — G89.29 CHRONIC LEFT SHOULDER PAIN: ICD-10-CM

## 2024-04-01 DIAGNOSIS — M25.512 CHRONIC LEFT SHOULDER PAIN: ICD-10-CM

## 2024-04-01 PROCEDURE — 97140 MANUAL THERAPY 1/> REGIONS: CPT | Mod: GP | Performed by: PHYSICAL THERAPIST

## 2024-04-01 PROCEDURE — 97110 THERAPEUTIC EXERCISES: CPT | Mod: GP | Performed by: PHYSICAL THERAPIST

## 2024-04-01 PROCEDURE — 97112 NEUROMUSCULAR REEDUCATION: CPT | Mod: GP | Performed by: PHYSICAL THERAPIST

## 2024-04-14 ENCOUNTER — HEALTH MAINTENANCE LETTER (OUTPATIENT)
Age: 49
End: 2024-04-14

## 2024-04-19 ENCOUNTER — THERAPY VISIT (OUTPATIENT)
Dept: PHYSICAL THERAPY | Facility: CLINIC | Age: 49
End: 2024-04-19
Payer: COMMERCIAL

## 2024-04-19 DIAGNOSIS — M54.2 NECK PAIN: Primary | ICD-10-CM

## 2024-04-19 DIAGNOSIS — G89.29 CHRONIC LEFT SHOULDER PAIN: ICD-10-CM

## 2024-04-19 DIAGNOSIS — M25.512 CHRONIC LEFT SHOULDER PAIN: ICD-10-CM

## 2024-04-19 PROCEDURE — 97112 NEUROMUSCULAR REEDUCATION: CPT | Mod: GP | Performed by: PHYSICAL THERAPIST

## 2024-04-19 PROCEDURE — 97140 MANUAL THERAPY 1/> REGIONS: CPT | Mod: GP | Performed by: PHYSICAL THERAPIST

## 2024-04-19 PROCEDURE — 97110 THERAPEUTIC EXERCISES: CPT | Mod: GP | Performed by: PHYSICAL THERAPIST

## 2024-05-20 ENCOUNTER — THERAPY VISIT (OUTPATIENT)
Dept: PHYSICAL THERAPY | Facility: CLINIC | Age: 49
End: 2024-05-20
Payer: COMMERCIAL

## 2024-05-20 ENCOUNTER — HOSPITAL ENCOUNTER (OUTPATIENT)
Dept: ULTRASOUND IMAGING | Facility: CLINIC | Age: 49
Discharge: HOME | End: 2024-05-20
Attending: NURSE PRACTITIONER | Admitting: NURSE PRACTITIONER
Payer: COMMERCIAL

## 2024-05-20 DIAGNOSIS — G89.29 CHRONIC LEFT SHOULDER PAIN: ICD-10-CM

## 2024-05-20 DIAGNOSIS — M25.512 CHRONIC LEFT SHOULDER PAIN: ICD-10-CM

## 2024-05-20 DIAGNOSIS — N94.6 DYSMENORRHEA: ICD-10-CM

## 2024-05-20 DIAGNOSIS — N92.6 IRREGULAR BLEEDING: ICD-10-CM

## 2024-05-20 DIAGNOSIS — M54.2 NECK PAIN: Primary | ICD-10-CM

## 2024-05-20 PROCEDURE — 76856 US EXAM PELVIC COMPLETE: CPT

## 2024-05-20 PROCEDURE — 97110 THERAPEUTIC EXERCISES: CPT | Mod: GP | Performed by: PHYSICAL THERAPIST

## 2024-05-20 PROCEDURE — 76830 TRANSVAGINAL US NON-OB: CPT

## 2024-05-20 PROCEDURE — 97140 MANUAL THERAPY 1/> REGIONS: CPT | Mod: GP | Performed by: PHYSICAL THERAPIST

## 2024-05-20 NOTE — PROGRESS NOTES
DISCHARGE  Reason for Discharge: Patient has met all goals.    Equipment Issued: none    Discharge Plan: Patient to continue home program.    Referring Provider:  Brandie Burns        05/20/24 0500   Appointment Info   Signing clinician's name / credentials Amanda Hilligoss, DPT   Total/Authorized Visits 10 (E&T)   Visits Used 5   Medical Diagnosis Neck pain, shoulder pain,   PT Tx Diagnosis Neck pain, shoulder pain,   Progress Note/Certification   Onset of illness/injury or Date of Surgery 02/05/24  (date of referral)   Therapy Frequency 1x/week   Predicted Duration x8 weeks, tapering to 2x/month x 1 month   Progress Note Due Date 05/12/24   Progress Note Completed Date 03/14/24   GOALS   PT Goals 2   PT Goal 1   Goal Identifier Sitting/computer work   Goal Description Pt will be able to complete day of work w/ no worse than 2/10 pain by end of day   Rationale to maximize safety and independence within the home;to maximize safety and independence within the community  (to complete work tasks)   Goal Progress 1-2/10 by end of day   Target Date 06/06/24   Date Met 04/19/24   PT Goal 2   Goal Identifier Sleeping   Goal Description Pt will be able to sleep through the night w/o being woke from neck pain   Rationale   (to establish restorative sleep pattern)   Goal Progress able to sleep through the night without being woken by neck pain   Target Date 06/14/24   Date Met 05/20/24   Subjective Report   Subjective Report Pt notes her neck is no longer interrupting her sleeping. Can get through her work day with only mild upper back pain. Does still get upper back pain w/ sneezing, but less intense than previously. Has also been noticing R lateral hip pain over past month which she feels makes low back tense up, and then feels more tightness through upper back. Started increasing the amount she is walking, so this may be affecting it. Will try stabilization exercises over next coupl weeks, and discuss referral to PT  "for further low back/SI evaluation and treatment if sx do not improve. Otherwise is ready to DC to independent management w/ HEP.   Objective Measures   Objective Measures Objective Measure 1;Objective Measure 2;Objective Measure 3;Objective Measure 4;Objective Measure 5   Objective Measure 1   Objective Measure Shoulder MMT flex   Details 5/5 B (no pain on release today- was previously painful)   Objective Measure 2   Objective Measure CROM   Details Flex chin to chest w/o pain increase, Ext 65, SB L 40, R 40;  Rotation L 80, R 80   Objective Measure 3   Objective Measure Thoracic ROM   Details Rotation WNL/EQ B   Objective Measure 4   Objective Measure SI screen   Details (+) active SLR R, (+)IESHA B (for lateral hip pain), ASIS high on R, Medial malleoli high on R, (+) supine to sit R, (+) Torsion R; After MET: All negative excluding some tenderness still present in lateral hip B w/ IESHA   Objective Measure 5   Objective Measure NDI   Details 12% (improved from initial score of 28%)   Treatment Interventions (PT)   Interventions Neuromuscular Re-education;Manual Therapy   Therapeutic Procedure/Exercise   Therapeutic Procedures: strength, endurance, ROM, flexibility minutes (65396) 12   Therapeutic Procedures Ther Proc 2;Ther Proc 3;Ther Proc 4;Ther Proc 5;Ther Proc 6;Ther Proc 7;Ther Proc 8;Ther Proc 9   Ther Proc 1 Gluteal Myofascial Arc Series   Ther Proc 1 - Details Full arc, upper arc, lower arc, combo, piriformis cruncher x 5 ea. B after manual and verbal cuing for correct form (continued cuing throughout as needed)   Ther Proc 2 Pretzel   Ther Proc 2 - Details x30\" x 3 B   Skilled Intervention Initiation of pelvic girlde stabilization to improve alignment and muscle recruitment for full spine   Patient Response/Progress quickly fatigues w/ glute series   Manual Therapy   Manual Therapy: Mobilization, MFR, MLD, friction massage minutes (40290) 24   Manual Therapy Manual Therapy 2;Manual Therapy 3;Manual " Therapy 4   Manual Therapy 1 MET   Manual Therapy 1 - Details R hip distraction w/ L hip hike, derotation in supine, shotgun into add/abd, R torsion correction in L SL   Manual Therapy 2 MFR   Manual Therapy 2 - Details fascial pull over B glute med/piriformis x 3 min B to work on keeping pelvic alignemnt and decrease strain through rest of spine   Manual Therapy 3 STM   Manual Therapy 3 - Details fascial rolling from mid thoracic to lumbar x 6 min   Skilled Intervention adjustment of technique, intensity, and location based on patient tolerance and tissue response   Patient Response/Progress less tension through lumbar and thoracic paraspinals after MT, able to lie supine w/o pain in back (initiallly painful to lie supine today)   Manual Therapy 4 Self MET   Manual Therapy 4 - Details edu/demo self MET for HEP as needed   Education   Learner/Method Patient;Listening;Reading;Demonstration;Pictures/Video;No Barriers to Learning   Plan   Home program Cervical Rotation to the L then retrac, repeated 10, 5x/day, R thoracic rotation w/ self OP, horizontal abd unilateral, gluteal myofascial arc series   Updates to plan of care DC; will discuss return to PT fir SI/lumbar eval/treatment if sx do not continue to improve w/ new HEP   Total Session Time   Timed Code Treatment Minutes 36   Total Treatment Time (sum of timed and untimed services) 36

## 2024-05-21 ENCOUNTER — PATIENT OUTREACH (OUTPATIENT)
Dept: GASTROENTEROLOGY | Facility: CLINIC | Age: 49
End: 2024-05-21
Payer: COMMERCIAL

## 2024-06-19 ENCOUNTER — THERAPY VISIT (OUTPATIENT)
Dept: PHYSICAL THERAPY | Facility: CLINIC | Age: 49
End: 2024-06-19
Payer: COMMERCIAL

## 2024-06-19 DIAGNOSIS — M25.512 CHRONIC LEFT SHOULDER PAIN: ICD-10-CM

## 2024-06-19 DIAGNOSIS — G89.29 CHRONIC LEFT SHOULDER PAIN: ICD-10-CM

## 2024-06-19 DIAGNOSIS — M54.2 NECK PAIN: Primary | ICD-10-CM

## 2024-06-19 PROCEDURE — 97164 PT RE-EVAL EST PLAN CARE: CPT | Mod: GP | Performed by: PHYSICAL THERAPIST

## 2024-06-19 PROCEDURE — 97140 MANUAL THERAPY 1/> REGIONS: CPT | Mod: GP | Performed by: PHYSICAL THERAPIST

## 2024-06-19 NOTE — PROGRESS NOTES
RE-EVALUATION       06/19/24 0500   Appointment Info   Signing clinician's name / credentials Amanda Hilligoss, DPT   Total/Authorized Visits 14 (E&T- see PN 6-19-24)   Visits Used 6   Medical Diagnosis Neck pain, shoulder pain,   PT Tx Diagnosis Neck pain, shoulder pain, posture dysfunction   Progress Note/Certification   Onset of illness/injury or Date of Surgery 02/05/24  (date of referral)   Therapy Frequency 1x/week   Predicted Duration x6 weeks, tapering to 2x/month x 1 month   Progress Note Due Date  --    Progress Note Completed Date 05/20/24   GOALS   PT Goals 2   PT Goal 1   Goal Identifier Sitting/computer work   Goal Description Pt will be able to complete day of work w/ no worse than 2/10 pain by end of day   Rationale to maximize safety and independence within the home;to maximize safety and independence within the community  (to complete work tasks)   Goal Progress 4/10 by end of day; exacerbation of sx, goal date extended   Target Date 07/17/24   PT Goal 2   Goal Identifier Sleeping   Goal Description Pt will be able to sleep through the night w/o being woke from neck pain   Rationale   (to establish restorative sleep pattern)   Goal Progress able to sleep through the night without being woken by neck pain   Target Date 06/14/24   Date Met 05/20/24   Subjective Report   Subjective Report Patient returns to therapy today due to an increased in upper back pain. Feels that overall her neck, upper back, low back and shoulder pain all comes and goes depending on day. Pain seems to be worst most frequently in mid back. Feels fascial work at last visit seems to have helped. Feels a lot of it comes from sitting on computer at work, but does use high low desk, stretching itnermittently throughout day (has not been as regular with this over past few weeks). Does do elliptical or treadmill in AM or walking outside 5/7 days/ week. Has not been performing strengthening routine much. Feels low back pain is  "intermittent as well, but feels this may impact posture.   Objective Measures   Objective Measures Objective Measure 1;Objective Measure 2;Objective Measure 3;Objective Measure 4;Objective Measure 5   Objective Measure 1   Objective Measure Beighton Hypermobility score   Details 2/9   Objective Measure 2   Objective Measure CROM   Details Flex chin to chest, ext min limitation, SB WNL/EQ B, Rotation WNL/EQ B   Objective Measure 3   Objective Measure Thoracic ROM   Details Flex mod limitation ++pain lower thoracic, upper lumber; Ext min limitation + pain, Rotation L min limitation, R min limitation +pain, SB WNL/EQ NB   Objective Measure 4   Objective Measure SI screen   Details (-) active SLR R, (+)IESHA R (low back), ASIS high on L, Medial malleoli level, (+) supine to sit R, (+) Torsion R; After MET: All negative   Objective Measure 5   Objective Measure Palpation   Details significant fascial tension and tenderness throughout thoracic and lumbar fascia   Treatment Interventions (PT)   Interventions Neuromuscular Re-education;Manual Therapy   Therapeutic Procedure/Exercise   Therapeutic Procedures Ther Proc 2;Ther Proc 3;Ther Proc 4;Ther Proc 5;Ther Proc 6;Ther Proc 7;Ther Proc 8;Ther Proc 9   Ther Proc 1 Gluteal Myofascial Arc Series   Ther Proc 1 - Details HEP- check form next   Ther Proc 2 Pretzel   Neuromuscular Re-education   Neuro Re-ed 1 Prone arm raises   Neuro Re-ed 1 - Details Y x 20\" x 2; w/ cervical retrac x 20\" x 2 (pinching in R side of neck during, stopped for now)   Neuromuscular re-ed of mvmt, balance, coord, kinesthetic sense, posture, proprioception minutes (65986) 5   Manual Therapy   Manual Therapy: Mobilization, MFR, MLD, friction massage minutes (46904) 24   Manual Therapy Manual Therapy 2;Manual Therapy 3;Manual Therapy 4   Manual Therapy 1 MET   Manual Therapy 1 - Details L hip distraction w/ R hip hike, derotation in supine, shotgun into add/abd, R torsion correction in L SL   Manual " Therapy 2 MFR   Manual Therapy 2 - Details trigger point release B QL x 2 min   Manual Therapy 3 STM   Manual Therapy 3 - Details fascial rolling upper to mid thoracic x 6 min,  thoracic to lumbar x 8 min   Manual Therapy 4 Self MET   Manual Therapy 4 - Details self MET for HEP   Skilled Intervention adjustment of technique, intensity, and location based on patient tolerance and tissue response   Patient Response/Progress significant fascial tension and tenderness throughout thoracic and lumbar fascia   Eval/Assessments   Assessments PT Re-Eval   PT Eval, Re-eval Minutes (10004) 14   Education   Learner/Method Patient;Listening;Reading;Demonstration;Pictures/Video;No Barriers to Learning   Plan   Home program Cervical Rotation to the L then retrac, repeated 10, 5x/day, R thoracic rotation w/ self OP, horizontal abd unilateral, gluteal myofascial arc series   Plan for next session continue core stabilization/posture work and fascial mobilization   Total Session Time   Timed Code Treatment Minutes 29   Total Treatment Time (sum of timed and untimed services) 43         PLAN  Continue therapy per current plan of care.  Duration extended due to exacerbation of sx.   1x/week x 6 weeks, tapering to 2x/month x 1 month    Beginning/End Dates of Progress Note Reporting Period:  05/20/24 to 06/19/2024    Referring Provider:  Brandie Burns

## 2024-06-26 ENCOUNTER — THERAPY VISIT (OUTPATIENT)
Dept: PHYSICAL THERAPY | Facility: CLINIC | Age: 49
End: 2024-06-26
Payer: COMMERCIAL

## 2024-06-26 DIAGNOSIS — G89.29 CHRONIC LEFT SHOULDER PAIN: ICD-10-CM

## 2024-06-26 DIAGNOSIS — M25.512 CHRONIC LEFT SHOULDER PAIN: ICD-10-CM

## 2024-06-26 DIAGNOSIS — M54.2 NECK PAIN: Primary | ICD-10-CM

## 2024-06-26 PROCEDURE — 97140 MANUAL THERAPY 1/> REGIONS: CPT | Mod: GP | Performed by: PHYSICAL THERAPIST

## 2024-06-26 PROCEDURE — 97112 NEUROMUSCULAR REEDUCATION: CPT | Mod: GP | Performed by: PHYSICAL THERAPIST

## 2024-07-15 ENCOUNTER — THERAPY VISIT (OUTPATIENT)
Dept: PHYSICAL THERAPY | Facility: CLINIC | Age: 49
End: 2024-07-15
Payer: COMMERCIAL

## 2024-07-15 DIAGNOSIS — G89.29 CHRONIC LEFT SHOULDER PAIN: ICD-10-CM

## 2024-07-15 DIAGNOSIS — M54.2 NECK PAIN: Primary | ICD-10-CM

## 2024-07-15 DIAGNOSIS — M25.512 CHRONIC LEFT SHOULDER PAIN: ICD-10-CM

## 2024-07-15 PROCEDURE — 97140 MANUAL THERAPY 1/> REGIONS: CPT | Mod: GP | Performed by: PHYSICAL THERAPIST

## 2024-07-15 PROCEDURE — 97112 NEUROMUSCULAR REEDUCATION: CPT | Mod: GP | Performed by: PHYSICAL THERAPIST

## 2024-07-15 PROCEDURE — 97110 THERAPEUTIC EXERCISES: CPT | Mod: GP | Performed by: PHYSICAL THERAPIST

## 2024-07-24 ENCOUNTER — THERAPY VISIT (OUTPATIENT)
Dept: PHYSICAL THERAPY | Facility: CLINIC | Age: 49
End: 2024-07-24
Payer: COMMERCIAL

## 2024-07-24 DIAGNOSIS — G89.29 CHRONIC LEFT SHOULDER PAIN: ICD-10-CM

## 2024-07-24 DIAGNOSIS — M54.2 NECK PAIN: Primary | ICD-10-CM

## 2024-07-24 DIAGNOSIS — M25.512 CHRONIC LEFT SHOULDER PAIN: ICD-10-CM

## 2024-07-24 PROCEDURE — 97530 THERAPEUTIC ACTIVITIES: CPT | Mod: GP | Performed by: PHYSICAL THERAPIST

## 2024-07-24 PROCEDURE — 97140 MANUAL THERAPY 1/> REGIONS: CPT | Mod: GP | Performed by: PHYSICAL THERAPIST

## 2024-08-01 ENCOUNTER — THERAPY VISIT (OUTPATIENT)
Dept: PHYSICAL THERAPY | Facility: CLINIC | Age: 49
End: 2024-08-01
Payer: COMMERCIAL

## 2024-08-01 DIAGNOSIS — M54.2 NECK PAIN: Primary | ICD-10-CM

## 2024-08-01 DIAGNOSIS — M25.512 CHRONIC LEFT SHOULDER PAIN: ICD-10-CM

## 2024-08-01 DIAGNOSIS — G89.29 CHRONIC LEFT SHOULDER PAIN: ICD-10-CM

## 2024-08-01 PROCEDURE — 97530 THERAPEUTIC ACTIVITIES: CPT | Mod: GP | Performed by: PHYSICAL THERAPIST

## 2024-08-01 PROCEDURE — 97140 MANUAL THERAPY 1/> REGIONS: CPT | Mod: GP | Performed by: PHYSICAL THERAPIST

## 2024-08-14 ENCOUNTER — HOSPITAL ENCOUNTER (OUTPATIENT)
Dept: MAMMOGRAPHY | Facility: CLINIC | Age: 49
Discharge: HOME | End: 2024-08-14
Admitting: NURSE PRACTITIONER
Payer: COMMERCIAL

## 2024-08-14 DIAGNOSIS — Z12.31 VISIT FOR SCREENING MAMMOGRAM: ICD-10-CM

## 2024-08-14 PROCEDURE — 77063 BREAST TOMOSYNTHESIS BI: CPT

## 2024-09-26 ENCOUNTER — HOSPITAL ENCOUNTER (OUTPATIENT)
Dept: GENERAL RADIOLOGY | Facility: CLINIC | Age: 49
Discharge: HOME | End: 2024-09-26
Attending: NURSE PRACTITIONER
Payer: COMMERCIAL

## 2024-09-26 ENCOUNTER — HOSPITAL ENCOUNTER (OUTPATIENT)
Dept: MRI IMAGING | Facility: CLINIC | Age: 49
Discharge: HOME | End: 2024-09-26
Attending: NURSE PRACTITIONER
Payer: COMMERCIAL

## 2024-09-26 DIAGNOSIS — M54.9 UPPER BACK PAIN: ICD-10-CM

## 2024-09-26 DIAGNOSIS — M54.10 RADICULOPATHY: ICD-10-CM

## 2024-09-26 DIAGNOSIS — S69.90XA FINGER INJURY: ICD-10-CM

## 2024-09-26 DIAGNOSIS — M54.12 CHRONIC CERVICAL RADICULOPATHY: ICD-10-CM

## 2024-09-26 DIAGNOSIS — M54.2 CERVICALGIA: ICD-10-CM

## 2024-09-26 PROCEDURE — 73140 X-RAY EXAM OF FINGER(S): CPT | Mod: RT

## 2024-09-26 PROCEDURE — 72146 MRI CHEST SPINE W/O DYE: CPT

## 2024-09-26 PROCEDURE — 72141 MRI NECK SPINE W/O DYE: CPT

## 2024-12-30 ENCOUNTER — ANCILLARY PROCEDURE (OUTPATIENT)
Dept: GENERAL RADIOLOGY | Facility: OTHER | Age: 49
End: 2024-12-30
Attending: NURSE PRACTITIONER
Payer: COMMERCIAL

## 2024-12-30 DIAGNOSIS — S89.92XA LEFT LEG INJURY: ICD-10-CM

## 2024-12-30 PROCEDURE — 73590 X-RAY EXAM OF LOWER LEG: CPT | Mod: TC | Performed by: RADIOLOGY

## 2024-12-30 PROCEDURE — 73560 X-RAY EXAM OF KNEE 1 OR 2: CPT | Mod: TC | Performed by: RADIOLOGY

## 2025-01-17 ENCOUNTER — HOSPITAL ENCOUNTER (EMERGENCY)
Facility: CLINIC | Age: 50
Discharge: HOME | End: 2025-01-18
Attending: STUDENT IN AN ORGANIZED HEALTH CARE EDUCATION/TRAINING PROGRAM | Admitting: STUDENT IN AN ORGANIZED HEALTH CARE EDUCATION/TRAINING PROGRAM
Payer: COMMERCIAL

## 2025-01-17 VITALS
HEART RATE: 87 BPM | HEIGHT: 63 IN | RESPIRATION RATE: 20 BRPM | OXYGEN SATURATION: 99 % | DIASTOLIC BLOOD PRESSURE: 82 MMHG | WEIGHT: 158.2 LBS | TEMPERATURE: 97.6 F | BODY MASS INDEX: 28.03 KG/M2 | SYSTOLIC BLOOD PRESSURE: 135 MMHG
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
## 2025-01-17 PROCEDURE — 99283 EMERGENCY DEPT VISIT LOW MDM: CPT

## 2025-01-17 PROCEDURE — 99284 EMERGENCY DEPT VISIT MOD MDM: CPT | Performed by: STUDENT IN AN ORGANIZED HEALTH CARE EDUCATION/TRAINING PROGRAM

## 2025-01-17 ASSESSMENT — COLUMBIA-SUICIDE SEVERITY RATING SCALE - C-SSRS
1. IN THE PAST MONTH, HAVE YOU WISHED YOU WERE DEAD OR WISHED YOU COULD GO TO SLEEP AND NOT WAKE UP?: NO
2. HAVE YOU ACTUALLY HAD ANY THOUGHTS OF KILLING YOURSELF IN THE PAST MONTH?: NO
6. HAVE YOU EVER DONE ANYTHING, STARTED TO DO ANYTHING, OR PREPARED TO DO ANYTHING TO END YOUR LIFE?: NO

## 2025-01-17 ASSESSMENT — ACTIVITIES OF DAILY LIVING (ADL): ADLS_ACUITY_SCORE: 43

## 2025-01-18 ENCOUNTER — APPOINTMENT (OUTPATIENT)
Dept: GENERAL RADIOLOGY | Facility: CLINIC | Age: 50
End: 2025-01-18
Attending: STUDENT IN AN ORGANIZED HEALTH CARE EDUCATION/TRAINING PROGRAM
Payer: COMMERCIAL

## 2025-01-18 PROCEDURE — 73140 X-RAY EXAM OF FINGER(S): CPT | Mod: LT

## 2025-01-18 PROCEDURE — 250N000013 HC RX MED GY IP 250 OP 250 PS 637: Performed by: STUDENT IN AN ORGANIZED HEALTH CARE EDUCATION/TRAINING PROGRAM

## 2025-01-18 RX ADMIN — AMOXICILLIN AND CLAVULANATE POTASSIUM 1 TABLET: 875; 125 TABLET, COATED ORAL at 01:04

## 2025-01-18 ASSESSMENT — ACTIVITIES OF DAILY LIVING (ADL): ADLS_ACUITY_SCORE: 43

## 2025-01-18 NOTE — ED PROVIDER NOTES
History     Chief Complaint   Patient presents with    Cat Bite     HPI  Avis Avalos is a 49 year old female who presents to the emergency department with concerns of cat bite to the left hand fourth digit.  She was trying to grab the cat out of a corner and it bit her.  She was able to squeeze her wedding ring off.  This happened at 2 PM today.  Law enforcement not involved.  Her tetanus is up-to-date.  Took ibuprofen at home for pain control.  She has noticed more pain and swelling of the finger over the past 10 hours.  The cat is known and a family members and up-to-date on current vaccinations.    Allergies:  Allergies   Allergen Reactions    Fructose GI Disturbance    Ragweeds     Sorbitol Diarrhea    Adhesive [Liquid Adhesive] Rash       Problem List:    Patient Active Problem List    Diagnosis Date Noted    Neck pain 03/14/2024     Priority: Medium    Chronic left shoulder pain 03/14/2024     Priority: Medium    Fracture of shaft of left fibula 10/22/2021     Priority: Medium    Nasal turbinate hypertrophy 08/25/2020     Priority: Medium     Added automatically from request for surgery [id number]      Shortness of breath 04/03/2020     Priority: Medium    Abdominal bloating with cramps 04/03/2020     Priority: Medium    Pelvic pain in female 10/07/2019     Priority: Medium     Added automatically from request for surgery [id number]      Pelvic floor dysfunction 12/08/2017     Priority: Medium    Chronic seasonal allergic rhinitis due to pollen 12/06/2017     Priority: Medium    Lichen sclerosus of female genitalia 10/19/2017     Priority: Medium    Moderate episode of recurrent major depressive disorder (H) 06/28/2017     Priority: Medium    Obsessive-compulsive disorder, unspecified type 06/28/2017     Priority: Medium    Sessile colonic polyp 06/09/2017     Priority: Medium    Knee pain 02/06/2015     Priority: Medium    Cervical high risk HPV (human papillomavirus) test positive 04/14/2010     Priority:  Medium     4/14/10 NIL pap, + HR HPV (59). normal paps since  , , ,  all NIL paps  17 NIL pap/neg HR HPV.  20 NIL pap, Neg HPV. Plan cotest in 5 years.       Herpes simplex type 2 infection 2009     Priority: Medium        Past Medical History:    Past Medical History:   Diagnosis Date    Crohn disease (H)     Depression     Fructose intolerance     High risk HPV infection 4/14/10    PONV (postoperative nausea and vomiting)        Past Surgical History:    Past Surgical History:   Procedure Laterality Date    C/SECTION, LOW TRANSVERSE  02/17/10    , Low Transverse     SECTION  2012    Procedure:  SECTION;   SECTION ;  Surgeon: Yehuda Cruz MD;  Location:  [facility]    COLONOSCOPY  2011    Procedure:COMBINED COLONOSCOPY, SINGLE BIOPSY/POLYPECTOMY BY BIOPSY; Surgeon:MARTIN MAO; Location: GI    COLONOSCOPY  2011    Procedure:COMBINED COLONOSCOPY, SINGLE BIOPSY/POLYPECTOMY BY BIOPSY; Colonoscopy biopsy of distal iiluem, colonoscopy removal of colon polyp with fulguration, colonoscopy and control of biopsy bleed with placement of resolution clip; Surgeon:TESS POLLARD; Location: GI    COLONOSCOPY  2011    Procedure:COMBINED COLONOSCOPY, REMOVE TUMOR/POLYP/LESION BY FULGURATION/HOT BIOPSY; Surgeon:TESS POLLARD; Location:[facility]    COLONOSCOPY  2011    Procedure:COMBINED COLONOSCOPY, CONTROL BLEED; Surgeon:TESS POLLARD; Location: GI    COLONOSCOPY N/A 2017    Procedure: COMBINED COLONOSCOPY, SINGLE OR MULTIPLE BIOPSY/POLYPECTOMY BY BIOPSY;;  Surgeon: Josué Hawk MD;  Location:  GI    COLONOSCOPY N/A 2023    Procedure: COLONOSCOPY, WITH POLYPECTOMY AND BIOPSY;  Surgeon: Yehuda Harris MD;  Location:  GI    HC FLEX SIGMOIDOSCOPY W/WO MIKY SPEC BY BRUSH/WASH  09    [facility]    HC TOOTH EXTRACTION W/FORCEP      LAPAROSCOPIC CYSTECTOMY OVARIAN (BENIGN) Right  10/21/2019    Procedure: Laparoscopic cystectomy right ovarian (benign);  Surgeon: Yehuda Cruz MD;  Location: [facility]    LAPAROSCOPIC LYSIS ADHESIONS N/A 10/21/2019    Procedure: LYSIS, ADHESIONS, LAPAROSCOPIC;  Surgeon: Yehuda Cruz MD;  Location: [facility]    TURBINOPLASTY Bilateral 9/28/2020    Procedure: BILATERAL ENDOSCOPIC TURBINOPLASTY;  Surgeon: Ann Herrera MD;  Location: [facility]    VIDEO CAPSULE ENDOSCOPY  07/30/09    [facility]    ZZC APPENDECTOMY      2001       Family History:    Family History   Problem Relation Age of Onset    Diabetes Mother         borderline    Depression Mother     Gastrointestinal Disease Mother         IBS    Thyroid Disease Mother         hypothyroidism    Diabetes Father         NIDDM - Type II    Kidney failure Father     Thyroid Disease Sister         hypothyroidism    Rheumatoid Arthritis Sister     Other Cancer Sister     Obesity Sister         half-sister/had gastric bypass    Diabetes Maternal Grandmother     Arthritis Maternal Grandfather     Heart Disease Paternal Grandfather     Asthma Other         niece    Breast Cancer No family hx of        Social History:  Marital Status:   [2]  Social History     Tobacco Use    Smoking status: Former     Current packs/day: 0.25     Average packs/day: 0.3 packs/day for 18.0 years (4.5 ttl pk-yrs)     Types: Cigarettes     Start date: 1/1/2007    Smokeless tobacco: Never   Vaping Use    Vaping status: Never Used   Substance Use Topics    Alcohol use: Yes     Comment: Occ.    Drug use: No        Medications:    Acetaminophen (TYLENOL PO)  amoxicillin-clavulanate (AUGMENTIN) 875-125 MG tablet  buPROPion (WELLBUTRIN XL) 150 MG 24 hr tablet  cholecalciferol 50 MCG (2000 UT) tablet  cyclobenzaprine (FLEXERIL) 10 MG tablet  DiphenhydrAMINE HCl (BENADRYL PO)  fexofenadine (ALLEGRA) 180 MG tablet  hydrocortisone, Perianal, (HYDROCORTISONE) 2.5 % cream  ibuprofen (ADVIL/MOTRIN) 200 MG  "tablet  lactobacillus rhamnosus (GG) (CULTURELL) capsule  loratadine (CLARITIN) 10 MG tablet  magnesium oxide 400 MG CAPS  methylcellulose (CITRUCEL) 500 MG TABS tablet  Multiple Vitamins-Calcium (ONE-A-DAY WOMENS PO)  polyethylene glycol (MIRALAX) 17 GM/Dose powder  senna-docusate (SENOKOT-S/PERICOLACE) 8.6-50 MG tablet  valACYclovir (VALTREX) 1000 mg tablet          Review of Systems  Gen: No fevers, no unintentional weight changes  Head and neck: No headache, no neck pain  Eye: No eye pain, no vision changes  Respiratory: No shortness of breath, no cough  Cardiovascular: No chest pain, no palpitations  Abdominal: No vomiting, no constipation, no diarrhea  Urinary: No dysuria, no hematuria  Neurologic: No confusion, no weakness, no sensation changes  Psychiatric: No suicidal ideation, no homicidal ideation    Physical Exam   BP: 135/82  Pulse: 87  Temp: 97.6  F (36.4  C)  Resp: 20  Height: 160 cm (5' 3\")  Weight: 71.8 kg (158 lb 3.2 oz)  SpO2: 99 %      Physical Exam  General: Alert, awake, no distress  Head: Normocephalic, atraumatic  Eyes: Grossly intact extraocular movements, conjunctiva clear, pupils equal   Mouth: Mucous membranes moist  Neck: Supple, grossly full range of motion, no observable masses  Respiratory: No distress,  clear to auscultation bilaterally  Cardiac: S1 and S2 cardiac sounds appreciated, normal rate, no edema  Abdominal: Soft, not distended, no tenderness appreciated  Neurologic: Normal level of consciousness, speech is clear and appropriate, moving all extremities grossly normal and symmetric  Musculoskeletal: Left hand fourth digit puncture marks to the middle phalanx, mild swelling, no streaking of redness up the hand, intact neurovascular exam of the left upper extremity  Skin: No gross rashes   Psych: Normal mood and appropriate for situation        ED Course        Procedures                Results for orders placed or performed during the hospital encounter of 01/17/25 (from the " past 24 hours)   Fingers XR, 2-3 views, left    Narrative    EXAM: XR FINGER LEFT G/E 2 VIEWS  LOCATION: [facility]  DATE: 1/18/2025    INDICATION: left 4th finger cat bite  COMPARISON: None.      Impression    IMPRESSION: Normal left fourth finger joint spaces and alignment. No fracture. No opaque foreign body.         Medications   amoxicillin-clavulanate (AUGMENTIN) 875-125 MG per tablet 1 tablet (1 tablet Oral $Given 1/18/25 0104)       Assessments & Plan (with Medical Decision Making)   Vital signs reassuring.  We will give her a dose of Augmentin.  X-ray will be obtained to evaluate for fractured teeth or bony involvement.  There are tiny puncture marks in the finger, encouraged her to wash her hands with soap and water in the room.  Offered analgesic medications however she states she is comfortable.    X-ray interpreted by radiology does not show any bony involvement or foreign body.  We placed the finger in a finger splint and give a first dose of Augmentin.  She appears stable to be discharged from the ER setting.  We educated on flexor tenosynovitis and other complications from cat bites that she is encouraged to monitor for and come back to the ER if present.  Otherwise encouraged to follow-up with a primary doctor to make sure this is healing well.    I have reviewed the nursing notes.    I have reviewed the findings, diagnosis, plan and need for follow up with the patient.          New Prescriptions    AMOXICILLIN-CLAVULANATE (AUGMENTIN) 875-125 MG TABLET    Take 1 tablet by mouth 2 times daily.       Final diagnoses:   Cat bite, initial encounter       1/17/2025   [facility] EMERGENCY DEPT       Sukhdev Horta MD  01/18/25 0137       Sukhdev Horta MD  01/18/25 0137

## 2025-01-18 NOTE — DISCHARGE INSTRUCTIONS
Take the antibiotics as directed and monitor for concerning infection changes that we discussed.  Follow-up with your primary doctor soon as possible to make sure it is healing well.  Come back to the ER if any concerning features arise.

## 2025-01-18 NOTE — ED TRIAGE NOTES
PT here with c/o a cat bite to left 4th digit of the hand at 1400H today.  PT states that it was her cousins cat and it is up to date with vaccines. PT with pain and swelling to affected finger rates it as 5/10 pain score.

## 2025-02-17 PROBLEM — D12.6 ADENOMATOUS COLON POLYP: Status: ACTIVE | Noted: 2025-02-17

## 2025-04-19 ENCOUNTER — HEALTH MAINTENANCE LETTER (OUTPATIENT)
Age: 50
End: 2025-04-19

## 2025-05-05 ENCOUNTER — TELEPHONE (OUTPATIENT)
Dept: CARDIOLOGY | Facility: CLINIC | Age: 50
End: 2025-05-05
Payer: COMMERCIAL

## 2025-05-05 DIAGNOSIS — R07.9 INTERMITTENT CHEST PAIN: ICD-10-CM

## 2025-05-05 DIAGNOSIS — R06.02 SHORTNESS OF BREATH: Primary | ICD-10-CM

## 2025-05-05 NOTE — TELEPHONE ENCOUNTER
Received referral from [facility] for stress echocardiogram dx shortness of breath and intermittent chest pain signed by Paula Castro CNP     Order placed, paper referral faxed to HIM to be scanned into chart, routing to scheduling.     Phone: [phone]  Fax: [phone]

## 2025-05-13 ENCOUNTER — MEDICAL CORRESPONDENCE (OUTPATIENT)
Dept: HEALTH INFORMATION MANAGEMENT | Facility: CLINIC | Age: 50
End: 2025-05-13
Payer: COMMERCIAL

## 2025-05-13 ENCOUNTER — TRANSCRIBE ORDERS (OUTPATIENT)
Dept: OTHER | Age: 50
End: 2025-05-13

## 2025-05-13 DIAGNOSIS — R07.9 CHEST PAIN: ICD-10-CM

## 2025-05-13 DIAGNOSIS — K21.9 GERD (GASTROESOPHAGEAL REFLUX DISEASE): Primary | ICD-10-CM

## 2025-08-20 ENCOUNTER — HOSPITAL ENCOUNTER (OUTPATIENT)
Dept: MAMMOGRAPHY | Facility: CLINIC | Age: 50
Discharge: HOME | End: 2025-08-20
Payer: COMMERCIAL

## 2025-08-20 DIAGNOSIS — Z12.31 VISIT FOR SCREENING MAMMOGRAM: ICD-10-CM

## 2025-08-20 PROCEDURE — 77063 BREAST TOMOSYNTHESIS BI: CPT

## (undated) DEVICE — BLADE KNIFE SURG 11 371111

## (undated) DEVICE — GLOVE PROTEXIS W/NEU-THERA 7.5  2D73TE75

## (undated) DEVICE — SOL WATER IRRIG 1000ML BOTTLE 07139-09

## (undated) DEVICE — TUBING SUCTION 10'X3/16" N510

## (undated) DEVICE — PACK LITHOTOMY CUSTOM

## (undated) DEVICE — SOL ADH LIQUID BENZOIN SWAB 0.6ML C1544

## (undated) DEVICE — ENDO TROCAR SLEEVE KII Z-THREADED 05X100MM CTS02

## (undated) DEVICE — SU VICRYL 4-0 PS-2 27" UND J426H

## (undated) DEVICE — LUBRICATING JELLY 4.25OZ

## (undated) DEVICE — PACK MINOR SBA15MIFSE

## (undated) DEVICE — DECANTER TRANSFER DEVICE 2008S

## (undated) DEVICE — GLOVE PROTEXIS W/NEU-THERA 6.5  2D73TE65

## (undated) DEVICE — NDL 19GA 1.5"

## (undated) DEVICE — ENDO FIXATION CANNULA 05MM VERSAPORT 177092F

## (undated) DEVICE — ESU LIGASURE BLUNT TIP 5MM LF1537

## (undated) DEVICE — SYR 10ML PREFILLED 0.9% NACL INJ NOT STERILE 306500

## (undated) DEVICE — SU CHROMIC 3-0 SH 27" G122H

## (undated) DEVICE — SOL NACL 0.9% INJ 1000ML BAG 07983-09

## (undated) DEVICE — SPONGE COTTONOID 1/2X3" 80-1407

## (undated) DEVICE — DRSG STERI STRIP 1/2X4" R1547

## (undated) DEVICE — TUBING INSUFFLATION W/FILTER 10FT GS1016

## (undated) DEVICE — ENDO TROCAR SHIELDED BLADED KII Z-THRD 05X100MM CTB03

## (undated) DEVICE — DRSG BANDAID 1X3" FABRIC

## (undated) DEVICE — KIT ENDO TURNOVER/PROCEDURE CARRY-ON 101822

## (undated) DEVICE — DRAPE SPLIT EENT 76X124" 3X28" 9447

## (undated) DEVICE — SYR 10ML LL W/O NDL

## (undated) DEVICE — TUBING SUCTION 6"X3/16" N56A

## (undated) DEVICE — PREP CHLORAPREP 26ML TINTED ORANGE  260815

## (undated) DEVICE — SOL WATER IRRIG 1000ML BOTTLE 2F7114

## (undated) DEVICE — SPECIMEN CONTAINER 4OZ

## (undated) DEVICE — NDL SPINAL 25GA 3.5" QUINCKE 405180

## (undated) DEVICE — SYR EAR BULB 3OZ 0035830

## (undated) DEVICE — GLOVE PROTEXIS BLUE W/NEU-THERA 6.5  2D73EB65

## (undated) DEVICE — ANTIFOG SOLUTION W/FOAM PAD CF-1001

## (undated) RX ORDER — FENTANYL CITRATE 50 UG/ML
INJECTION, SOLUTION INTRAMUSCULAR; INTRAVENOUS
Status: DISPENSED
Start: 2020-09-28

## (undated) RX ORDER — BUPIVACAINE HYDROCHLORIDE AND EPINEPHRINE 2.5; 5 MG/ML; UG/ML
INJECTION, SOLUTION EPIDURAL; INFILTRATION; INTRACAUDAL; PERINEURAL
Status: DISPENSED
Start: 2019-10-21

## (undated) RX ORDER — HYDROMORPHONE HYDROCHLORIDE 1 MG/ML
INJECTION, SOLUTION INTRAMUSCULAR; INTRAVENOUS; SUBCUTANEOUS
Status: DISPENSED
Start: 2019-10-21

## (undated) RX ORDER — GLYCOPYRROLATE 0.2 MG/ML
INJECTION INTRAMUSCULAR; INTRAVENOUS
Status: DISPENSED
Start: 2019-10-21

## (undated) RX ORDER — ONDANSETRON 2 MG/ML
INJECTION INTRAMUSCULAR; INTRAVENOUS
Status: DISPENSED
Start: 2019-10-21

## (undated) RX ORDER — DEXAMETHASONE SODIUM PHOSPHATE 4 MG/ML
INJECTION, SOLUTION INTRA-ARTICULAR; INTRALESIONAL; INTRAMUSCULAR; INTRAVENOUS; SOFT TISSUE
Status: DISPENSED
Start: 2020-09-28

## (undated) RX ORDER — OXYCODONE HYDROCHLORIDE 5 MG/1
TABLET ORAL
Status: DISPENSED
Start: 2020-09-28

## (undated) RX ORDER — ACETAMINOPHEN 325 MG/1
TABLET ORAL
Status: DISPENSED
Start: 2020-09-28

## (undated) RX ORDER — OXYMETAZOLINE HYDROCHLORIDE 0.05 G/100ML
SPRAY NASAL
Status: DISPENSED
Start: 2020-09-28

## (undated) RX ORDER — FENTANYL CITRATE 50 UG/ML
INJECTION, SOLUTION INTRAMUSCULAR; INTRAVENOUS
Status: DISPENSED
Start: 2017-06-19

## (undated) RX ORDER — ONDANSETRON 2 MG/ML
INJECTION INTRAMUSCULAR; INTRAVENOUS
Status: DISPENSED
Start: 2020-09-28

## (undated) RX ORDER — PROPOFOL 10 MG/ML
INJECTION, EMULSION INTRAVENOUS
Status: DISPENSED
Start: 2019-10-21

## (undated) RX ORDER — DEXAMETHASONE SODIUM PHOSPHATE 10 MG/ML
INJECTION, SOLUTION INTRAMUSCULAR; INTRAVENOUS
Status: DISPENSED
Start: 2019-10-21

## (undated) RX ORDER — CEFAZOLIN SODIUM 1 G/3ML
INJECTION, POWDER, FOR SOLUTION INTRAMUSCULAR; INTRAVENOUS
Status: DISPENSED
Start: 2019-10-21

## (undated) RX ORDER — FENTANYL CITRATE 50 UG/ML
INJECTION, SOLUTION INTRAMUSCULAR; INTRAVENOUS
Status: DISPENSED
Start: 2019-10-21